# Patient Record
Sex: MALE | Race: WHITE | NOT HISPANIC OR LATINO | Employment: OTHER | URBAN - METROPOLITAN AREA
[De-identification: names, ages, dates, MRNs, and addresses within clinical notes are randomized per-mention and may not be internally consistent; named-entity substitution may affect disease eponyms.]

---

## 2017-01-06 ENCOUNTER — LAB VISIT (OUTPATIENT)
Dept: LAB | Facility: HOSPITAL | Age: 82
End: 2017-01-06
Attending: UROLOGY
Payer: MEDICARE

## 2017-01-06 DIAGNOSIS — Z12.5 ENCOUNTER FOR SCREENING FOR MALIGNANT NEOPLASM OF PROSTATE: ICD-10-CM

## 2017-01-06 DIAGNOSIS — N40.0 BENIGN NON-NODULAR PROSTATIC HYPERPLASIA WITHOUT LOWER URINARY TRACT SYMPTOMS: ICD-10-CM

## 2017-01-06 LAB — COMPLEXED PSA SERPL-MCNC: 1.8 NG/ML

## 2017-01-06 PROCEDURE — 84153 ASSAY OF PSA TOTAL: CPT

## 2017-01-06 PROCEDURE — 36415 COLL VENOUS BLD VENIPUNCTURE: CPT

## 2017-01-09 RX ORDER — ATORVASTATIN CALCIUM 10 MG/1
TABLET, FILM COATED ORAL
Qty: 90 TABLET | Refills: 3 | Status: SHIPPED | OUTPATIENT
Start: 2017-01-09 | End: 2017-05-16 | Stop reason: SDUPTHER

## 2017-01-09 RX ORDER — ATENOLOL AND CHLORTHALIDONE TABLET 50; 25 MG/1; MG/1
TABLET ORAL
Qty: 90 TABLET | Refills: 3 | Status: SHIPPED | OUTPATIENT
Start: 2017-01-09 | End: 2017-05-16 | Stop reason: SDUPTHER

## 2017-02-15 ENCOUNTER — OFFICE VISIT (OUTPATIENT)
Dept: URGENT CARE | Facility: CLINIC | Age: 82
End: 2017-02-15
Payer: MEDICARE

## 2017-02-15 VITALS
WEIGHT: 190.06 LBS | HEART RATE: 52 BPM | TEMPERATURE: 97 F | DIASTOLIC BLOOD PRESSURE: 68 MMHG | HEIGHT: 70 IN | BODY MASS INDEX: 27.21 KG/M2 | SYSTOLIC BLOOD PRESSURE: 130 MMHG | OXYGEN SATURATION: 99 %

## 2017-02-15 DIAGNOSIS — H10.019: Primary | ICD-10-CM

## 2017-02-15 DIAGNOSIS — H57.10: ICD-10-CM

## 2017-02-15 PROCEDURE — 99214 OFFICE O/P EST MOD 30 MIN: CPT | Mod: 25,S$GLB,, | Performed by: NURSE PRACTITIONER

## 2017-02-15 PROCEDURE — 1159F MED LIST DOCD IN RCRD: CPT | Mod: S$GLB,,, | Performed by: NURSE PRACTITIONER

## 2017-02-15 PROCEDURE — 99173 VISUAL ACUITY SCREEN: CPT | Mod: 59,S$GLB,, | Performed by: NURSE PRACTITIONER

## 2017-02-15 PROCEDURE — 99999 PR PBB SHADOW E&M-EST. PATIENT-LVL IV: CPT | Mod: PBBFAC,,, | Performed by: NURSE PRACTITIONER

## 2017-02-15 PROCEDURE — 3075F SYST BP GE 130 - 139MM HG: CPT | Mod: S$GLB,,, | Performed by: NURSE PRACTITIONER

## 2017-02-15 PROCEDURE — 1157F ADVNC CARE PLAN IN RCRD: CPT | Mod: S$GLB,,, | Performed by: NURSE PRACTITIONER

## 2017-02-15 PROCEDURE — 1160F RVW MEDS BY RX/DR IN RCRD: CPT | Mod: S$GLB,,, | Performed by: NURSE PRACTITIONER

## 2017-02-15 PROCEDURE — 1126F AMNT PAIN NOTED NONE PRSNT: CPT | Mod: S$GLB,,, | Performed by: NURSE PRACTITIONER

## 2017-02-15 PROCEDURE — 3078F DIAST BP <80 MM HG: CPT | Mod: S$GLB,,, | Performed by: NURSE PRACTITIONER

## 2017-02-15 RX ORDER — NEOMYCIN SULFATE, POLYMYXIN B SULFATE AND DEXAMETHASONE 3.5; 10000; 1 MG/ML; [USP'U]/ML; MG/ML
2 SUSPENSION/ DROPS OPHTHALMIC EVERY 6 HOURS
Qty: 5 ML | Refills: 0 | Status: SHIPPED | OUTPATIENT
Start: 2017-02-15 | End: 2017-02-25

## 2017-02-15 NOTE — PATIENT INSTRUCTIONS
What Is Conjunctivitis?  Conjunctivitis is an irritation or infection. It affects the membrane that covers the white of your eye and the inside of your eyelid (conjunctiva). It can happen to one or both eyes. The membrane swells and the blood vessels enlarge (dilate). This makes your eye red. That's why conjunctivitis is sometimes called red eye or pink eye.    What are the symptoms?  If you have one or more of these symptoms, see an eye doctor:  · Redness in and around your eye  · Eyes that are puffy and sore  · Itching, burning, or stinging eyes  · Watery eyes or discharge from your eye  · Eyelids that are crusty or stuck together when you wake up in the morning  · Pink color in the whites of one or both eyes  Getting treatment quickly can help prevent damage to your eyes.    How is it diagnosed?  Conjunctivitis is usually a minor eye infection. But it can sometimes become a more serious problem. Some more serious eye diseases have symptoms that look like conjunctivitis. So it's important for an eye doctor to diagnose you. Your eye doctor will ask about your symptoms and any medicines you take. He or she will ask about any illnesses or medical conditions you may have. The doctor will also check your eyes with a hand-held light and a special microscope called a slit lamp.  Date Last Reviewed: 6/11/2015  © 6619-8499 [employer]. [address] [place] [address], [place] [address]. All rights reserved. This information is not intended as a substitute for professional medical care. Always follow your healthcare professional's instructions.    Follow prescribed treatment plan as directed.  Stay hydrated and rest.  Report to ER if symptoms worsen.  Follow up with PCP in 2-3 days or sooner if symptoms do not improve.

## 2017-02-15 NOTE — MR AVS SNAPSHOT
[facility]  [address]  [facility] [address]  Phone: [phone]  Fax: [phone]                  Harman Johnston   2/15/2017 11:40 AM   Office Visit    Description:  Male : 1934   Provider:  GAMALIEL Ramos   Department:  [facility]           Reason for Visit     Eye Problem           Diagnoses this Visit        Comments    Follicular conjunctivitis, unspecified laterality    -  Primary     Eye discomfort, unspecified laterality                To Do List           Future Appointments        Provider Department Dept Phone    2017 8:20 AM LABORATORY, [facility] - Laboratory [phone]    2017 9:00 AM Bill Garcia MD [facility] Internal Medicine [phone]    2017 10:10 AM LABORATORY, [address] [facility] [phone]    2017 10:40 AM John Contreras IV, MD [facility] Urology [phone]      Goals (5 Years of Data)     None      Follow-Up and Disposition     Return if symptoms worsen or fail to improve.       These Medications        Disp Refills Start End    neomycin-polymyxin-dexamethasone (MAXITROL) 3.5mg/mL-10,000 unit/mL-0.1 % DrpS 5 mL 0 2/15/2017 2017    Place 2 drops into both eyes every 6 (six) hours. - Both Eyes    Pharmacy: [facility]/pharmacy #[id number] - [place], [place] - [address] [facility] #: [phone]         [facility] On Call     [facility] On Call Nurse Care Line -  Assistance  Registered nurses in the [facility] provide clinical advisement, health education, appointment booking, and other advisory services.  Call for this free service at [phone].             Medications           Message regarding Medications     Verify the changes and/or additions to your medication regime listed below are the same as discussed with your clinician today.  If any of these changes or additions are incorrect, please notify your healthcare provider.        START  "taking these NEW medications        Refills    neomycin-polymyxin-dexamethasone (MAXITROL) 3.5mg/mL-10,000 unit/mL-0.1 % DrpS 0    Sig: Place 2 drops into both eyes every 6 (six) hours.    Class: Normal    Route: Both Eyes           Verify that the below list of medications is an accurate representation of the medications you are currently taking.  If none reported, the list may be blank. If incorrect, please contact your healthcare provider. Carry this list with you in case of emergency.           Current Medications     aspirin (ECOTRIN) 81 MG EC tablet Take 81 mg by mouth once daily.      atenolol-chlorthalidone (TENORETIC) 50-25 mg Tab TAKE 1 TABLET ONE TIME DAILY    atorvastatin (LIPITOR) 10 MG tablet TAKE 1 TABLET ONE TIME DAILY    finasteride (PROSCAR) 5 mg tablet Take 1 tablet (5 mg total) by mouth once daily.    tamsulosin (FLOMAX) 0.4 mg Cp24 Take 1 capsule (0.4 mg total) by mouth once daily.    fluocinonide 0.05% (LIDEX) 0.05 % cream Apply topically 2 (two) times daily.    meloxicam (MOBIC) 7.5 MG tablet Take 1 tablet (7.5 mg total) by mouth 2 (two) times daily as needed for Pain.    neomycin-polymyxin-dexamethasone (MAXITROL) 3.5mg/mL-10,000 unit/mL-0.1 % DrpS Place 2 drops into both eyes every 6 (six) hours.           Clinical Reference Information           Your Vitals Were     BP Pulse Temp Height    130/68 (BP Location: Left arm, Patient Position: Sitting, BP Method: Manual) 52 96.7 °F (35.9 °C) (Tympanic) 5' 10" (1.778 m)    Weight SpO2 BMI    86.2 kg (190 lb 0.6 oz) 99% 27.27 kg/m2      Blood Pressure          Most Recent Value    BP  130/68      Allergies as of 2/15/2017     No Known Allergies      Immunizations Administered on Date of Encounter - 2/15/2017     None      Orders Placed During Today's Visit      Normal Orders This Visit    Visual acuity screening       Instructions      What Is Conjunctivitis?  Conjunctivitis is an irritation or infection. It affects the membrane that covers the " white of your eye and the inside of your eyelid (conjunctiva). It can happen to one or both eyes. The membrane swells and the blood vessels enlarge (dilate). This makes your eye red. That's why conjunctivitis is sometimes called red eye or pink eye.    What are the symptoms?  If you have one or more of these symptoms, see an eye doctor:  · Redness in and around your eye  · Eyes that are puffy and sore  · Itching, burning, or stinging eyes  · Watery eyes or discharge from your eye  · Eyelids that are crusty or stuck together when you wake up in the morning  · Pink color in the whites of one or both eyes  Getting treatment quickly can help prevent damage to your eyes.    How is it diagnosed?  Conjunctivitis is usually a minor eye infection. But it can sometimes become a more serious problem. Some more serious eye diseases have symptoms that look like conjunctivitis. So it's important for an eye doctor to diagnose you. Your eye doctor will ask about your symptoms and any medicines you take. He or she will ask about any illnesses or medical conditions you may have. The doctor will also check your eyes with a hand-held light and a special microscope called a slit lamp.  Date Last Reviewed: 6/11/2015  © 7287-9237 [employer]. [address] [place] [address], [place] [address]. All rights reserved. This information is not intended as a substitute for professional medical care. Always follow your healthcare professional's instructions.    Follow prescribed treatment plan as directed.  Stay hydrated and rest.  Report to ER if symptoms worsen.  Follow up with PCP in 2-3 days or sooner if symptoms do not improve.           Language Assistance Services     ATTENTION: Language assistance services are available, free of charge. Please call [phone].      ATENCIÓN: Si rosinala ayan, tiene a wynne disposición servicios gratuitos de asistencia lingüística. Llame al [phone].     [facility] Ý: N?u b?n nói Ti?ng Vi?t, có các  d?ch v? h? tr? ngôn ng? mi?n phí dành cho b?n. G?i s? [phone].         [facility] complies with applicable Federal civil rights laws and does not discriminate on the basis of race, color, national origin, age, disability, or sex.

## 2017-02-15 NOTE — PROGRESS NOTES
"Subjective:       Patient ID: Harman Johnston is a 82 y.o. male.    Chief Complaint: Eye Problem    HPI Comments: 82 year old white male presents to Urgent Care with reports of right eye redness that has been present for about 4 days and progressively becoming worse. According to patient symptoms occurred after a "cold".    Eye Problem    The right eye is affected. This is a new problem. The current episode started in the past 7 days. The problem has been gradually worsening. There was no injury mechanism. The pain is at a severity of 0/10. The patient is experiencing no pain. There is no known exposure to pink eye. He does not wear contacts. Associated symptoms include an eye discharge, eye redness and itching. Pertinent negatives include no blurred vision, fever, foreign body sensation, nausea, photophobia, vomiting or weakness. He has tried nothing for the symptoms.     Review of Systems   Constitutional: Negative for appetite change, chills and fever.   HENT: Negative for ear pain, sinus pressure, sore throat and trouble swallowing.    Eyes: Positive for discharge and redness. Negative for blurred vision, photophobia and visual disturbance.   Respiratory: Negative for shortness of breath.    Cardiovascular: Negative for chest pain.   Gastrointestinal: Negative for abdominal pain, diarrhea, nausea and vomiting.   Endocrine: Negative for cold intolerance, polyphagia and polyuria.   Genitourinary: Negative for decreased urine volume and dysuria.   Musculoskeletal: Negative for back pain.   Skin: Positive for itching. Negative for rash.   Allergic/Immunologic: Negative for environmental allergies and food allergies.   Neurological: Negative for dizziness, tremors, weakness and numbness.   Hematological: Does not bruise/bleed easily.   Psychiatric/Behavioral: Negative for confusion and hallucinations. The patient is not nervous/anxious and is not hyperactive.    All other systems reviewed and are negative.    "   Objective:     Physical Exam   Constitutional: He is oriented to person, place, and time. He appears well-developed and well-nourished.   HENT:   Head: Normocephalic.   Right Ear: External ear normal.   Left Ear: External ear normal.   Nose: Nose normal.   Mouth/Throat: Oropharynx is clear and moist.   Eyes: EOM and lids are normal. Pupils are equal, round, and reactive to light. Lids are everted and swept, no foreign bodies found. Right conjunctiva is injected. Right conjunctiva has no hemorrhage.       Neck: Normal range of motion. Neck supple. No JVD present.   Cardiovascular: Normal rate, regular rhythm, normal heart sounds and intact distal pulses.    Pulmonary/Chest: Effort normal and breath sounds normal. He has no wheezes.   Abdominal: Soft. Bowel sounds are normal. There is no tenderness.   Lymphadenopathy:     He has no cervical adenopathy.   Neurological: He is alert and oriented to person, place, and time.   Skin: Skin is warm and dry.   Psychiatric: He has a normal mood and affect. His behavior is normal. Judgment and thought content normal.   Nursing note and vitals reviewed.    Assessment:     1. Eye discomfort, unspecified laterality      Plan:   Eye discomfort, unspecified laterality  -     Visual acuity screening

## 2017-03-03 ENCOUNTER — OFFICE VISIT (OUTPATIENT)
Dept: OPHTHALMOLOGY | Facility: CLINIC | Age: 82
End: 2017-03-03
Payer: MEDICARE

## 2017-03-03 DIAGNOSIS — H01.00B BLEPHARITIS OF UPPER AND LOWER EYELIDS OF BOTH EYES, UNSPECIFIED TYPE: Primary | ICD-10-CM

## 2017-03-03 DIAGNOSIS — H01.00A BLEPHARITIS OF UPPER AND LOWER EYELIDS OF BOTH EYES, UNSPECIFIED TYPE: Primary | ICD-10-CM

## 2017-03-03 PROCEDURE — 92012 INTRM OPH EXAM EST PATIENT: CPT | Mod: S$GLB,,, | Performed by: OPTOMETRIST

## 2017-03-03 PROCEDURE — 99999 PR PBB SHADOW E&M-EST. PATIENT-LVL II: CPT | Mod: PBBFAC,,, | Performed by: OPTOMETRIST

## 2017-03-03 RX ORDER — NEOMYCIN SULFATE, POLYMYXIN B SULFATE, AND DEXAMETHASONE 3.5; 10000; 1 MG/G; [USP'U]/G; MG/G
OINTMENT OPHTHALMIC
Qty: 1 TUBE | Refills: 1 | Status: SHIPPED | OUTPATIENT
Start: 2017-03-03 | End: 2017-05-22

## 2017-03-03 NOTE — PROGRESS NOTES
HPI     Last [facility] exam 12/09/2016  Chief complaint: irritation, OD  Onset: about 3 weeks ago  Patient went to Urgent Care for treatment  Medication: REJI-POLY-DEX EYE DROP    Patient states eyes feel better  Patient states eyes are red with some swelling  No blurred vision  No matting in the AM  No light sensitivity       Last edited by Tommy Zaragoza MA on 3/3/2017  3:04 PM.         Assessment /Plan     For exam results, see Encounter Report.    Blepharitis of upper and lower eyelids of both eyes, unspecified type      Bilateral blepharoconjuntivitis OU improving with Maxitrol drops given outside of the clinic.    Start HC BID, with lid scrubs BID, and add Maxitrol david along UL an LL OU BID for 10 days for all.

## 2017-03-03 NOTE — MR AVS SNAPSHOT
[facility]hthal[facility]  [address] [facility] [address]  Phone: [phone]  Fax: [phone]                  Harman Johnston   3/3/2017 3:30 PM   Office Visit    Description:  Male : 1934   Provider:  LUDWIG Camarillo OD   Department:  [facility]           Reason for Visit     Eye Problem           Diagnoses this Visit        Comments    Blepharitis of upper and lower eyelids of both eyes, unspecified type    -  Primary            To Do List           Future Appointments        Provider Department Dept Phone    2017 8:20 AM LABORATORY, [facility] - Laboratory [phone]    2017 9:00 AM Bill Garcia MD [facility] Internal Medicine [phone]    2017 10:10 AM LABORATORY, [address] [facility] [phone]    2017 10:40 AM John Contreras IV, MD [facility] Urology [phone]      Goals (5 Years of Data)     None      Follow-Up and Disposition     Return if symptoms worsen or fail to improve.       These Medications        Disp Refills Start End    neomycin-polymyxin-dexamethasone (DEXACINE) 3.5 mg/g-10,000 unit/g-0.1 % Oint 1 Tube 1 3/3/2017     Apply to eyelid rims OU, upper and lower, twice a day for 10 days as instructed.    Pharmacy: [facility]/pharmacy #[id number] [address] #: [phone]         [facility] On Call     [facility] Nurse Care Line -  Assistance  Registered nurses in the [facility] provide clinical advisement, health education, appointment booking, and other advisory services.  Call for this free service at [phone].             Medications           Message regarding Medications     Verify the changes and/or additions to your medication regime listed below are the same as discussed with your clinician today.  If any of these changes or additions are incorrect, please notify your healthcare provider.        START taking these NEW medications         Refills    neomycin-polymyxin-dexamethasone (DEXACINE) 3.5 mg/g-10,000 unit/g-0.1 % Oint 1    Sig: Apply to eyelid rims OU, upper and lower, twice a day for 10 days as instructed.    Class: Normal           Verify that the below list of medications is an accurate representation of the medications you are currently taking.  If none reported, the list may be blank. If incorrect, please contact your healthcare provider. Carry this list with you in case of emergency.           Current Medications     aspirin (ECOTRIN) 81 MG EC tablet Take 81 mg by mouth once daily.      atenolol-chlorthalidone (TENORETIC) 50-25 mg Tab TAKE 1 TABLET ONE TIME DAILY    atorvastatin (LIPITOR) 10 MG tablet TAKE 1 TABLET ONE TIME DAILY    finasteride (PROSCAR) 5 mg tablet Take 1 tablet (5 mg total) by mouth once daily.    meloxicam (MOBIC) 7.5 MG tablet Take 1 tablet (7.5 mg total) by mouth 2 (two) times daily as needed for Pain.    tamsulosin (FLOMAX) 0.4 mg Cp24 Take 1 capsule (0.4 mg total) by mouth once daily.    fluocinonide 0.05% (LIDEX) 0.05 % cream Apply topically 2 (two) times daily.    neomycin-polymyxin-dexamethasone (DEXACINE) 3.5 mg/g-10,000 unit/g-0.1 % Oint Apply to eyelid rims OU, upper and lower, twice a day for 10 days as instructed.           Clinical Reference Information           Allergies as of 3/3/2017     No Known Allergies      Immunizations Administered on Date of Encounter - 3/3/2017     None      Language Assistance Services     ATTENTION: Language assistance services are available, free of charge. Please call [phone].      ATENCIÓN: Si sejal ayan, tiene a wynne disposición servicios gratuitos de asistencia lingüística. Llame al [phone].     [facility] Ý: N?u b?n nói Ti?ng Vi?t, có các d?ch v? h? tr? ngôn ng? mi?n phí dành cho b?n. G?i s? [phone].         [facility] complies with applicable Federal civil rights laws and does not discriminate on the basis of race, color, national origin,  age, disability, or sex.

## 2017-03-27 RX ORDER — FINASTERIDE 5 MG/1
5 TABLET, FILM COATED ORAL DAILY
Qty: 90 TABLET | Refills: 3 | Status: SHIPPED | OUTPATIENT
Start: 2017-03-27 | End: 2017-05-16 | Stop reason: SDUPTHER

## 2017-03-27 NOTE — TELEPHONE ENCOUNTER
----- Message from Mami Green sent at 3/27/2017  1:04 PM CDT -----  Contact: pt wife - Susanne   States pt has 2 weeks left of his meds and needs a refill on Finasteride 5mg and can be reached at [phone]/phong/dbw       Pt pharm is   [facility] [address]   [address]  [facility] [address]  Phone: [phone] Fax: [phone]

## 2017-04-07 ENCOUNTER — CLINICAL SUPPORT (OUTPATIENT)
Dept: AUDIOLOGY | Facility: CLINIC | Age: 82
End: 2017-04-07
Payer: MEDICARE

## 2017-04-07 ENCOUNTER — OFFICE VISIT (OUTPATIENT)
Dept: OTOLARYNGOLOGY | Facility: CLINIC | Age: 82
End: 2017-04-07
Payer: MEDICARE

## 2017-04-07 DIAGNOSIS — H90.3 HEARING LOSS, SENSORINEURAL, ASYMMETRICAL: Primary | ICD-10-CM

## 2017-04-07 DIAGNOSIS — H90.3 SENSORINEURAL HEARING LOSS OF BOTH EARS: ICD-10-CM

## 2017-04-07 DIAGNOSIS — Z96.21 COCHLEAR IMPLANT IN PLACE: Primary | ICD-10-CM

## 2017-04-07 DIAGNOSIS — H61.21 IMPACTED CERUMEN OF RIGHT EAR: ICD-10-CM

## 2017-04-07 PROCEDURE — 1157F ADVNC CARE PLAN IN RCRD: CPT | Mod: S$GLB,,, | Performed by: OTOLARYNGOLOGY

## 2017-04-07 PROCEDURE — 1160F RVW MEDS BY RX/DR IN RCRD: CPT | Mod: S$GLB,,, | Performed by: OTOLARYNGOLOGY

## 2017-04-07 PROCEDURE — 99203 OFFICE O/P NEW LOW 30 MIN: CPT | Mod: 25,S$GLB,, | Performed by: OTOLARYNGOLOGY

## 2017-04-07 PROCEDURE — 69210 REMOVE IMPACTED EAR WAX UNI: CPT | Mod: S$GLB,,, | Performed by: OTOLARYNGOLOGY

## 2017-04-07 PROCEDURE — 1159F MED LIST DOCD IN RCRD: CPT | Mod: S$GLB,,, | Performed by: OTOLARYNGOLOGY

## 2017-04-07 NOTE — PROGRESS NOTES
Harman Johnston was seen 04/07/2017 for a hearing aid follow-up appointment.  Impacted cerumen in the right ear where he wears his hearing aid.  Left CI.  Dr. Bueno cleaned the ear in clinic today.  Aid is in good working order.      Patient will call for any future hearing aid follow-up appointments as needed.

## 2017-04-13 NOTE — PROGRESS NOTES
Subjective:   Patient: Harman Johnston [id number], :1934   Visit date:2017 9:03 AM    Chief Complaint:  No chief complaint on file.    HPI:  Harman is a 82 y.o. male is here for evaluation of the following issue(s):    Harman is here for the first time.  He had a left CI in  by Dr. Hobson.  He did well with this for a few years but it is no longer working.  He has profound HL on the left.  No issues with vertigo, drainage or pain.  No facial nerve stimulation with the implant.  His right ear has severe hearing loss.  He uses a HA but has had decreased benefit over time.  He still has servicable hearing on the right.  No major tinnitus or drainage.      Review of Systems:  -     Allergic/Immunologic: has No Known Allergies..  -     Constitutional: Current temp:        His meds, allergies, medical, surgical, social & family histories were reviewed & updated:  -     He has a current medication list which includes the following prescription(s): aspirin, atenolol-chlorthalidone, atorvastatin, finasteride, fluocinonide 0.05%, meloxicam, neomycin-polymyxin-dexamethasone, and tamsulosin.  -     He  has a past medical history of BPH (benign prostatic hyperplasia); Cataract; Chronic kidney disease, stage III (moderate) (7/15/2016); Elevated PSA; HEARING LOSS; Hyperlipemia; Hypertension; Myocardial infarction; and Tobacco dependence.   -     He  does not have any pertinent problems on file.   -     He  has a past surgical history that includes Ear mastoidectomy w/ cochlear implant w/ landmark (); Tonsillectomy; PCIOL OD (Right, ); Cataract extraction w/  intraocular lens implant (Left, 4-20-16); Cataract extraction w/  intraocular lens implant (Right, 316-16); Appendectomy (); and Eye surgery (Bilateral, ).  -     He  reports that he quit smoking about 16 years ago. His smoking use included Cigarettes. He has a 10.00 pack-year smoking history. He has never used smokeless tobacco. He reports that  he does not drink alcohol or use illicit drugs.  -     His family history includes Cancer in his brother, sister, and sister; Hyperlipidemia in his mother; Macular degeneration in his sister and sister; Stroke in his mother. There is no history of Prostate cancer, Diabetes, Heart disease, Kidney disease, Mental illness, or Mental retardation.  -     He has No Known Allergies.    Objective:     Physical Exam:  Vitals:  AFVSS  Communication:  Able to communicate, no hoarseness.  Head & Face:  Normocephalic, atraumatic, no sinus tenderness.  Eyes:  Extraocular motions intact.  Ears:  After cerumen removal, Otoscopy of external auditory canals and tympanic membranes was normal, clinical speech reception thresholds grossly elevated, no mass/lesion of auricle.  Nose:  No masses/lesions of external nose, nasal mucosa, septum, and turbinates were within normal limits.  Mouth:  No mass/lesion of lips, teeth, gums, hard/soft palate, tongue, tonsils, or oropharynx.  Neck & Lymphatics:  No cervical lymphadenopathy, no neck mass/crepitus/ asymmetry, trachea is midline, no thyroid enlargement/tenderness/mass.  Neuro/Psych: Alert with normal mood and affect.   Respiration/Chest:  Symmetric expansion during respiration, normal respiratory effort.  Skin:  Warm and intact.    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  Chief Complaint:  No chief complaint on file.    HPI:  Harman is a  82 y.o. male with the history of present illness as discussed in the clinic note from today.  During this unrelated patient encounter I observed impacted cerumen.  The otoscopic examination of the tympanic membrane was not possible due to copious cerumen impaction.      Procedure: The patient was in agreement with the examination and debridement of the ears. Removal of the cerumen required a high level of expertise and use of an operating microscope and multiple micro-instruments.     With the patient in the supine position, we used the operating microscope to examine both ears with the appropriate sized ear speculum.  A variety of sterile, micro-instruments were utilized to remove the cerumen atraumatically.  I performed the procedure which required a significant amount of time and effort. The tympanic membrane was then well visualized.  The patient tolerated the procedure well and there were no complications.    Findings:   Right ear had significant wax, the EAC was normal, and the tympanic membrane was intact with no evidence of middle ear fluid.    Left ear had little wax, the EAC was normal, and the tympanic membrane was intact with no evidence of middle ear fluid.

## 2017-05-08 ENCOUNTER — LAB VISIT (OUTPATIENT)
Dept: LAB | Facility: HOSPITAL | Age: 82
End: 2017-05-08
Attending: INTERNAL MEDICINE
Payer: MEDICARE

## 2017-05-08 DIAGNOSIS — I10 ESSENTIAL HYPERTENSION: Chronic | ICD-10-CM

## 2017-05-08 LAB
ALBUMIN SERPL BCP-MCNC: 3.4 G/DL
ALP SERPL-CCNC: 97 U/L
ALT SERPL W/O P-5'-P-CCNC: 19 U/L
ANION GAP SERPL CALC-SCNC: 9 MMOL/L
AST SERPL-CCNC: 23 U/L
BILIRUB SERPL-MCNC: 1 MG/DL
BUN SERPL-MCNC: 17 MG/DL
CALCIUM SERPL-MCNC: 9.3 MG/DL
CHLORIDE SERPL-SCNC: 103 MMOL/L
CHOLEST/HDLC SERPL: 4 {RATIO}
CO2 SERPL-SCNC: 30 MMOL/L
CREAT SERPL-MCNC: 1.2 MG/DL
EST. GFR  (AFRICAN AMERICAN): >60 ML/MIN/1.73 M^2
EST. GFR  (NON AFRICAN AMERICAN): 56 ML/MIN/1.73 M^2
GLUCOSE SERPL-MCNC: 80 MG/DL
HDL/CHOLESTEROL RATIO: 24.8 %
HDLC SERPL-MCNC: 157 MG/DL
HDLC SERPL-MCNC: 39 MG/DL
LDLC SERPL CALC-MCNC: 77.8 MG/DL
NONHDLC SERPL-MCNC: 118 MG/DL
POTASSIUM SERPL-SCNC: 3.6 MMOL/L
PROT SERPL-MCNC: 7.1 G/DL
SODIUM SERPL-SCNC: 142 MMOL/L
TRIGL SERPL-MCNC: 201 MG/DL

## 2017-05-08 PROCEDURE — 80053 COMPREHEN METABOLIC PANEL: CPT

## 2017-05-08 PROCEDURE — 80061 LIPID PANEL: CPT

## 2017-05-08 PROCEDURE — 36415 COLL VENOUS BLD VENIPUNCTURE: CPT | Mod: PO

## 2017-05-16 ENCOUNTER — OFFICE VISIT (OUTPATIENT)
Dept: INTERNAL MEDICINE | Facility: CLINIC | Age: 82
End: 2017-05-16
Payer: MEDICARE

## 2017-05-16 VITALS
WEIGHT: 189.81 LBS | TEMPERATURE: 98 F | HEART RATE: 61 BPM | SYSTOLIC BLOOD PRESSURE: 126 MMHG | OXYGEN SATURATION: 96 % | BODY MASS INDEX: 27.17 KG/M2 | HEIGHT: 70 IN | DIASTOLIC BLOOD PRESSURE: 70 MMHG

## 2017-05-16 DIAGNOSIS — E78.00 PURE HYPERCHOLESTEROLEMIA: Chronic | ICD-10-CM

## 2017-05-16 DIAGNOSIS — N18.30 CHRONIC KIDNEY DISEASE, STAGE III (MODERATE): Chronic | ICD-10-CM

## 2017-05-16 DIAGNOSIS — I10 ESSENTIAL HYPERTENSION: Chronic | ICD-10-CM

## 2017-05-16 DIAGNOSIS — N40.0 BENIGN NODULAR PROSTATIC HYPERPLASIA WITHOUT LOWER URINARY TRACT SYMPTOMS: Chronic | ICD-10-CM

## 2017-05-16 DIAGNOSIS — Z00.00 ROUTINE GENERAL MEDICAL EXAMINATION AT A HEALTH CARE FACILITY: Primary | ICD-10-CM

## 2017-05-16 PROCEDURE — 3074F SYST BP LT 130 MM HG: CPT | Mod: S$GLB,,, | Performed by: INTERNAL MEDICINE

## 2017-05-16 PROCEDURE — 99397 PER PM REEVAL EST PAT 65+ YR: CPT | Mod: S$GLB,,, | Performed by: INTERNAL MEDICINE

## 2017-05-16 PROCEDURE — 3078F DIAST BP <80 MM HG: CPT | Mod: S$GLB,,, | Performed by: INTERNAL MEDICINE

## 2017-05-16 PROCEDURE — 99999 PR PBB SHADOW E&M-EST. PATIENT-LVL III: CPT | Mod: PBBFAC,,, | Performed by: INTERNAL MEDICINE

## 2017-05-16 PROCEDURE — 99499 UNLISTED E&M SERVICE: CPT | Mod: S$GLB,,, | Performed by: INTERNAL MEDICINE

## 2017-05-16 RX ORDER — MELOXICAM 7.5 MG/1
7.5 TABLET ORAL 2 TIMES DAILY PRN
Qty: 180 TABLET | Refills: 3 | Status: SHIPPED | OUTPATIENT
Start: 2017-05-16 | End: 2018-07-20 | Stop reason: SDUPTHER

## 2017-05-16 RX ORDER — ATENOLOL AND CHLORTHALIDONE TABLET 50; 25 MG/1; MG/1
TABLET ORAL
Qty: 90 TABLET | Refills: 3 | Status: SHIPPED | OUTPATIENT
Start: 2017-05-16 | End: 2018-03-19 | Stop reason: SDUPTHER

## 2017-05-16 RX ORDER — TAMSULOSIN HYDROCHLORIDE 0.4 MG/1
0.4 CAPSULE ORAL DAILY
Qty: 90 CAPSULE | Refills: 3 | Status: SHIPPED | OUTPATIENT
Start: 2017-05-16 | End: 2017-07-12 | Stop reason: SDUPTHER

## 2017-05-16 RX ORDER — ATORVASTATIN CALCIUM 10 MG/1
TABLET, FILM COATED ORAL
Qty: 90 TABLET | Refills: 3 | Status: SHIPPED | OUTPATIENT
Start: 2017-05-16 | End: 2018-03-19 | Stop reason: SDUPTHER

## 2017-05-16 RX ORDER — FINASTERIDE 5 MG/1
5 TABLET, FILM COATED ORAL DAILY
Qty: 90 TABLET | Refills: 3 | Status: SHIPPED | OUTPATIENT
Start: 2017-05-16 | End: 2017-07-12 | Stop reason: SDUPTHER

## 2017-05-16 NOTE — MR AVS SNAPSHOT
[facility]  [address] [place] [address]  Phone: [phone]                  Harman Johnston   2017 9:00 AM   Office Visit    Description:  Male : 1934   Provider:  Bill Garcia MD   Department:  [place] - [facility] Medicine           Reason for Visit     Annual Exam           Diagnoses this Visit        Comments    Routine general medical examination at a health care facility    -  Primary     Essential hypertension         Pure hypercholesterolemia         Chronic kidney disease, stage III (moderate)         Benign nodular prostatic hyperplasia without lower urinary tract symptoms                To Do List           Future Appointments        Provider Department Dept Phone    2017 1:30 PM Kaylee Alarcon, [facility] - Audiology [phone]    2017 2:00 PM HEARING [facility] - Hearing Aids [phone]    2017 10:00 AM [facility] - Internal Medicine [phone]    2017 10:10 AM LABORATORY, [address] [facility] [phone]    2017 10:40 AM [facility] [phone]      Goals (5 Years of Data)     None      Follow-Up and Disposition     Return in about 6 months (around 2017).       These Medications        Disp Refills Start End    meloxicam (MOBIC) 7.5 MG tablet 180 tablet 3 2017     Take 1 tablet (7.5 mg total) by mouth 2 (two) times daily as needed for Pain. - Oral    Pharmacy: [facility] - [facility] [address] Ph #: [phone]       tamsulosin (FLOMAX) 0.4 mg Cp24 90 capsule 3 2017     Take 1 capsule (0.4 mg total) by mouth once daily. - Oral    Pharmacy: [facility] - [facility] [address] Ph #: [phone]       finasteride (PROSCAR) 5 mg tablet 90 tablet 3 2017     Take 1 tablet (5 mg total) by mouth once daily. - Oral    Pharmacy: [facility] -  [facility] [address] Ph #: [phone]       atorvastatin (LIPITOR) 10 MG tablet 90 tablet 3 5/16/2017     TAKE 1 TABLET ONE TIME DAILY    Pharmacy: [facility] - [facility] [address] Ph #: [phone]       atenolol-chlorthalidone (TENORETIC) 50-25 mg Tab 90 tablet 3 5/16/2017     TAKE 1 TABLET ONE TIME DAILY    Pharmacy: [facility] - [facility] [address] Ph #: [phone]         [facility]     [facility] - 24/7 Assistance  Unless otherwise directed by your provider, please contact [facility], our nurse care line that is available for 24/7 assistance.     Registered nurses in the [facility] provide: appointment scheduling, clinical advisement, health education, and other advisory services.  Call: [phone] (toll free)               Medications           Message regarding Medications     Verify the changes and/or additions to your medication regime listed below are the same as discussed with your clinician today.  If any of these changes or additions are incorrect, please notify your healthcare provider.             Verify that the below list of medications is an accurate representation of the medications you are currently taking.  If none reported, the list may be blank. If incorrect, please contact your healthcare provider. Carry this list with you in case of emergency.           Current Medications     aspirin (ECOTRIN) 81 MG EC tablet Take 81 mg by mouth once daily.      atenolol-chlorthalidone (TENORETIC) 50-25 mg Tab TAKE 1 TABLET ONE TIME DAILY    atorvastatin (LIPITOR) 10 MG tablet TAKE 1 TABLET ONE TIME DAILY    finasteride (PROSCAR) 5 mg tablet Take 1 tablet (5 mg total) by mouth once daily.    meloxicam (MOBIC) 7.5 MG tablet Take 1 tablet (7.5 mg total) by mouth 2 (two) times daily as needed for Pain.    neomycin-polymyxin-dexamethasone (DEXACINE) 3.5 mg/g-10,000 unit/g-0.1  "% Oint Apply to eyelid rims OU, upper and lower, twice a day for 10 days as instructed.    tamsulosin (FLOMAX) 0.4 mg Cp24 Take 1 capsule (0.4 mg total) by mouth once daily.    fluocinonide 0.05% (LIDEX) 0.05 % cream Apply topically 2 (two) times daily.           Clinical Reference Information           Your Vitals Were     BP Pulse Temp Height Weight SpO2    126/70 61 98 °F (36.7 °C) (Tympanic) 5' 10" (1.778 m) 86.1 kg (189 lb 13.1 oz) 96%    BMI                27.24 kg/m2          Blood Pressure          Most Recent Value    BP  126/70      Allergies as of 5/16/2017     No Known Allergies      Immunizations Administered on Date of Encounter - 5/16/2017     None      Orders Placed During Today's Visit     Future Labs/Procedures Expected by Expires    Comprehensive metabolic panel  11/12/2017 (Approximate) 7/15/2018    Lipid panel  11/12/2017 (Approximate) 7/15/2018      [facility] Sign-Up     Activating your [facility] account is as easy as 1-2-3!     1) Visit [email or web address], select Sign Up Now, enter this activation code and your date of birth, then select Next.  Activation code not generated  Current Patient Portal Status: Account disabled      2) Create a username and password to use when you visit [facility] in the future and select a security question in case you lose your password and select Next.    3) Enter your e-mail address and click Sign Up!    Additional Information  If you have questions, please e-mail [email or web address] or call [phone] to talk to our [facility] staff. Remember, [facility] is NOT to be used for urgent needs. For medical emergencies, dial 911.         Language Assistance Services     ATTENTION: Language assistance services are available, free of charge. Please call [phone].      ATENCIÓN: Si habla español, tiene a wynne disposición servicios gratuitos de asistencia lingüística. Llame al [phone].     CHÚ Ý: N?u b?n nói Ti?ng Vi?t, có các d?ch v? h? tr? ngôn ng? mi?n phí " dành cho b?n. G?i s? [phone].         [facility] complies with applicable Federal civil rights laws and does not discriminate on the basis of race, color, national origin, age, disability, or sex.

## 2017-05-16 NOTE — PROGRESS NOTES
"HPI:  Patient is an 82-year-old man who comes in today for follow-up of his hypertension, lipids and for his annual physical exam.  He's been doing well.  He denies any chest pains or shortness of breath.  His blood pressures been very well-controlled.  There've been no other new problems or complaints.      Current MEDS: medcard review, verified and update  Allergies: Per the electronic medical record    Past Medical History:   Diagnosis Date    BPH (benign prostatic hyperplasia)     Cataract     Chronic kidney disease, stage III (moderate) 7/15/2016    Elevated PSA     HEARING LOSS     Hyperlipemia     Hypertension     Myocardial infarction     per ekg and nuclear ett - old scar    Tobacco dependence     resolved       Past Surgical History:   Procedure Laterality Date    APPENDECTOMY  1960    CATARACT EXTRACTION W/  INTRAOCULAR LENS IMPLANT Left 4-20-16    CATARACT EXTRACTION W/  INTRAOCULAR LENS IMPLANT Right 3-16-16    EAR MASTOIDECTOMY W/ COCHLEAR IMPLANT W/ LANDMARK  2011    failed later removed    EYE SURGERY Bilateral 2016    cataract w/ IOL    PCIOL OD Right 03/`16/16    DR. CARPIO    TONSILLECTOMY         SHx: per the electronic medical record    FHx: recorded in the electronic medical record    ROS:    denies any chest pains or shortness of breath. Denies any nausea, vomiting or diarrhea. Denies any fever, chills or sweats. Denies any change in weight, voice, stool, skin or hair. Denies any dysuria, dyspepsia or dysphagia. Denies any change in vision, hearing or headaches. Denies any swollen lymph nodes or loss of memory.    PE:  /70  Pulse 61  Temp 98 °F (36.7 °C) (Tympanic)   Ht 5' 10" (1.778 m)  Wt 86.1 kg (189 lb 13.1 oz)  SpO2 96%  BMI 27.24 kg/m2  Gen: Well-developed, well-nourished, male, in no acute distress, oriented x3  HEENT: neck is supple, no adenopathy, carotids 2+ equal without bruits, thyroid exam normal size without nodules.  CHEST: clear to auscultation and " percussion  CVS: regular rate and rhythm without significant murmur, gallop, or rubs  ABD: soft, benign, no rebound no guarding, no distention.  Bowel sounds are normal.     nontender.  No palpable masses.  No organomegaly and no audible bruits.  RECTAL: Deferred  EXT: no clubbing, cyanosis, or edema  LYMPH: no cervical, inguinal, or axillary adenopathy  FEET: no loss of sensation.  No ulcers or pressure sores.  NEURO: gait normal.  Cranial nerves II- XII intact. No nystagmus.  Speech normal.   Gross motor and sensory unremarkable.    Lab Results   Component Value Date    WBC 8.05 11/04/2016    HGB 15.1 11/04/2016    HCT 46.8 11/04/2016     11/04/2016    CHOL 157 05/08/2017    TRIG 201 (H) 05/08/2017    HDL 39 (L) 05/08/2017    ALT 19 05/08/2017    AST 23 05/08/2017     05/08/2017    K 3.6 05/08/2017     05/08/2017    CREATININE 1.2 05/08/2017    BUN 17 05/08/2017    CO2 30 (H) 05/08/2017    TSH 2.482 11/04/2016    PSA 1.8 01/06/2017       Impression:  Multiple medical problems below, stable  Patient Active Problem List   Diagnosis    BPH (benign prostatic hyperplasia)    Hyperlipemia    Essential hypertension    Choroidal nevus, right eye    Macular chorioretinal scar of left eye    Chronic kidney disease, stage III (moderate)    SNHL (sensorineural hearing loss)s/p implant on rt       Plan:   Orders Placed This Encounter    Comprehensive metabolic panel    Lipid panel    meloxicam (MOBIC) 7.5 MG tablet    tamsulosin (FLOMAX) 0.4 mg Cp24    finasteride (PROSCAR) 5 mg tablet    atorvastatin (LIPITOR) 10 MG tablet    atenolol-chlorthalidone (TENORETIC) 50-25 mg Tab    his medications remain the same.  He'll be seen again in 6 months with above lab work.

## 2017-05-17 ENCOUNTER — CLINICAL SUPPORT (OUTPATIENT)
Dept: AUDIOLOGY | Facility: CLINIC | Age: 82
End: 2017-05-17
Payer: MEDICARE

## 2017-05-17 DIAGNOSIS — H90.3 HEARING LOSS, SENSORINEURAL, ASYMMETRICAL: Primary | ICD-10-CM

## 2017-05-17 PROCEDURE — 92567 TYMPANOMETRY: CPT | Mod: S$GLB,,, | Performed by: AUDIOLOGIST

## 2017-05-17 PROCEDURE — 92557 COMPREHENSIVE HEARING TEST: CPT | Mod: S$GLB,,, | Performed by: AUDIOLOGIST

## 2017-05-17 NOTE — PROGRESS NOTES
Harman Johnston was seen 05/17/2017 for a hearing aid follow-up appointment.  Current audiogram put into computer and re-prescribed settings.  He will remain in 12 mm power dome.    He will renew his warranty today that will cover him until 7/6/18       Patient will call for any future hearing aid follow-up appointments as needed.

## 2017-05-22 ENCOUNTER — OFFICE VISIT (OUTPATIENT)
Dept: INTERNAL MEDICINE | Facility: CLINIC | Age: 82
End: 2017-05-22
Payer: MEDICARE

## 2017-05-22 VITALS
OXYGEN SATURATION: 98 % | HEIGHT: 70 IN | DIASTOLIC BLOOD PRESSURE: 70 MMHG | BODY MASS INDEX: 26.92 KG/M2 | TEMPERATURE: 97 F | SYSTOLIC BLOOD PRESSURE: 128 MMHG | HEART RATE: 60 BPM | WEIGHT: 188.06 LBS

## 2017-05-22 DIAGNOSIS — I70.0 ATHEROSCLEROSIS OF AORTA: ICD-10-CM

## 2017-05-22 DIAGNOSIS — N40.0 BENIGN NODULAR PROSTATIC HYPERPLASIA WITHOUT LOWER URINARY TRACT SYMPTOMS: Chronic | ICD-10-CM

## 2017-05-22 DIAGNOSIS — I10 ESSENTIAL HYPERTENSION: Chronic | ICD-10-CM

## 2017-05-22 DIAGNOSIS — E78.00 PURE HYPERCHOLESTEROLEMIA: Chronic | ICD-10-CM

## 2017-05-22 DIAGNOSIS — H90.A21 SENSORINEURAL HEARING LOSS (SNHL) OF RIGHT EAR WITH RESTRICTED HEARING OF LEFT EAR: Chronic | ICD-10-CM

## 2017-05-22 DIAGNOSIS — Z00.00 ENCOUNTER FOR PREVENTIVE HEALTH EXAMINATION: Primary | ICD-10-CM

## 2017-05-22 DIAGNOSIS — N18.30 CHRONIC KIDNEY DISEASE, STAGE III (MODERATE): Chronic | ICD-10-CM

## 2017-05-22 PROCEDURE — 3078F DIAST BP <80 MM HG: CPT | Mod: S$GLB,,, | Performed by: NURSE PRACTITIONER

## 2017-05-22 PROCEDURE — 3074F SYST BP LT 130 MM HG: CPT | Mod: S$GLB,,, | Performed by: NURSE PRACTITIONER

## 2017-05-22 PROCEDURE — G0439 PPPS, SUBSEQ VISIT: HCPCS | Mod: S$GLB,,, | Performed by: NURSE PRACTITIONER

## 2017-05-22 PROCEDURE — 99499 UNLISTED E&M SERVICE: CPT | Mod: S$GLB,,, | Performed by: NURSE PRACTITIONER

## 2017-05-22 PROCEDURE — 99999 PR PBB SHADOW E&M-EST. PATIENT-LVL IV: CPT | Mod: PBBFAC,,, | Performed by: NURSE PRACTITIONER

## 2017-05-22 NOTE — Clinical Note
Your patient was seen today for a HRA visit.  I have included a copy of my visit note, please review the note and feel free to contact me with any questions.  Thank you for allowing me to participate in the care of your patients.  ARI Cardozo

## 2017-05-22 NOTE — PATIENT INSTRUCTIONS
Counseling and Referral of Other Preventative  (Italic type indicates deductible and co-insurance are waived)    Patient Name: Harman Johnston  Today's Date: 5/22/2017      SERVICE LIMITATIONS RECOMMENDATION    Vaccines    · Pneumococcal (once after 65)    · Influenza (annually)    · Hepatitis B (if medium/high risk)    · Prevnar 13      Hepatitis B medium/high risk factors:       - End-stage renal disease       - Hemophiliacs who received Factor VII or         IX concentrates       - Clients of institutions for the mentally             retarded       - Persons who live in the same house as          a HepB carrier       - Homosexual men       - Illicit injectable drug abusers     Pneumococcal: Done, no repeat necessary 10/2014     Influenza: Done, repeat in one year 11/2016     Hepatitis B: N/A     Prevnar 13: Done, no repeat necessary 3/2013    Prostate cancer screening (annually to age 75)     Prostate specific antigen (PSA) Shared decision making with Provider. Sometimes a co-pay may be required if the patient decides to have this test. The USPSTF no longer recommends prostate cancer screening routinely in medicine: every 1 year last done 1//2017    Colorectal cancer screening (to age 75)    · Fecal occult blood test (annual)  · Flexible sigmoidoscopy (5y)  · Screening colonoscopy (10y)  · Barium enema   Last done 1/11/13, recommend to repeat every 3  years     Diabetes self-management training (no USPSTF recommendations)  Requires referral by treating physician for patient with diabetes or renal disease. 10 hours of initial DSMT sessions of no less than 30 minutes each in a continuous 12-month period. 2 hours of follow-up DSMT in subsequent years.  N/A    Glaucoma screening (no USPSTF recommendation)  Diabetes mellitus, family history   , age 50 or over    American, age 65 or over  N/A    Medical nutrition therapy for diabetes or renal disease (no recommended schedule)  Requires referral by  treating physician for patient with diabetes or renal disease or kidney transplant within the past 3 years.  Can be provided in same year as diabetes self-management training (DSMT), and CMS recommends medical nutrition therapy take place after DSMT. Up to 3 hours for initial year and 2 hours in subsequent years.  N/A    Cardiovascular screening blood tests (every 5 years)  · Fasting lipid panel  Order as a panel if possible  Done this year, repeat every year 5/8/17    Diabetes screening tests (at least every 3 years, Medicare covers annually or at 6-month intervals for prediabetic patients)  · Fasting blood sugar (FBS) or glucose tolerance test (GTT)  Patient must be diagnosed with one of the following:       - Hypertension       - Dyslipidemia       - Obesity (BMI 30kg/m2)       - Previous elevated impaired FBS or GTT       ... or any two of the following:       - Overweight (BMI 25 but <30)       - Family history of diabetes       - Age 65 or older       - History of gestational diabetes or birth of baby weighing more than 9 pounds  Done this year, repeat every year    Abdominal aortic aneurysm screening (once)  · Sonogram   Limited to patients who meet one of the following criteria:       - Men who are 65-75 years old and have smoked more than 100 cigarette in their lifetime       - Anyone with a family history of abdominal aortic aneurysm       - Anyone recommended for screening by the USPSTF  Recommended to patient, discussed to follow up with PCP    HIV screening (annually for increased risk patients)  · HIV-1 and HIV-2 by EIA, or SIMONA, rapid antibody test or oral mucosa transudate  Patients must be at increased risk for HIV infection per USPSTF guidelines or pregnant. Tests covered annually for patient at increased risk or as requested by the patient. Pregnant patients may receive up to 3 tests during pregnancy.  Risks discussed, screening is not recommended    Smoking cessation counseling (up to 8 sessions  per year)  Patients must be asymptomatic of tobacco-related conditions to receive as a preventative service.  Non-smoker    Subsequent annual wellness visit  At least 12 months since last AWV  Return in one year     The following information is provided to all patients.  This information is to help you find resources for any of the problems found today that may be affecting your health:                Living healthy guide: www.[facility].[place].[facility]      Understanding Diabetes: www.diabetes.org      Eating healthy: www.cdc.gov/healthyweight      CDC home safety checklist: www.cdc.gov/steadi/patient.html      Agency on Aging: www.goea.[place].[facility]      Alcoholics anonymous (AA): www.aa.org      Physical Activity: www.bradley.nih.gov/yb7kpyq      Tobacco use: www.quitwithusla.org

## 2017-05-22 NOTE — PROGRESS NOTES
"Harman Johnston presented for a  Medicare AWV and comprehensive Health Risk Assessment today. The following components were reviewed and updated:    · Medical history  · Family History  · Social history  · Allergies and Current Medications  · Health Risk Assessment  · Health Maintenance  · Care Team     ** See Completed Assessments for Annual Wellness Visit within the encounter summary.**       The following assessments were completed:  · Living Situation  · CAGE  · Depression Screening  · Timed Get Up and Go  · Whisper Test  · Cognitive Function Screening  · Nutrition Screening  · ADL Screening  · PAQ Screening    Vitals:    05/22/17 1010   BP: 128/70   BP Location: Right arm   Patient Position: Sitting   Pulse: 60   Temp: 96.9 °F (36.1 °C)   TempSrc: Tympanic   SpO2: 98%   Weight: 85.3 kg (188 lb 0.8 oz)   Height: 5' 10" (1.778 m)     Body mass index is 26.98 kg/m².  Physical Exam   Constitutional: He is oriented to person, place, and time. He appears well-developed and well-nourished. No distress.   HENT:   Head: Normocephalic and atraumatic.   Eyes: Conjunctivae and EOM are normal. Pupils are equal, round, and reactive to light. No scleral icterus.   Cardiovascular: Normal rate and regular rhythm.  Exam reveals no gallop and no friction rub.    No murmur heard.  Pulmonary/Chest: Effort normal and breath sounds normal.   Abdominal: Soft. Bowel sounds are normal. He exhibits no distension and no mass. There is no tenderness.   Musculoskeletal: Normal range of motion.   Neurological: He is alert and oriented to person, place, and time. No cranial nerve deficit.   Skin: Skin is warm and dry.   Psychiatric: He has a normal mood and affect.   Vitals reviewed.        Diagnoses and health risks identified today and associated recommendations/orders:    1. Encounter for preventive health examination      2. Essential hypertension  Stable and controlled. Continue current treatment plan as previously prescribed with your PCP. "       3. Pure hypercholesterolemia  Stable and controlled. Continue current treatment plan as previously prescribed with your PCP.       4. Sensorineural hearing loss (SNHL) of right ear with restricted hearing of left ear  Stable and controlled. Continue current treatment plan as previously prescribed with your physicians and audiologist.       5. Chronic kidney disease, stage III (moderate)  Ongoing problem that is being monitored. Improved GFR to 56 (from 46.8). Continue current treatment plan as previously prescribed with your PCP.      6. Benign nodular prostatic hyperplasia without lower urinary tract symptoms  Stable and controlled on finasteride and tamsulosin. Prior elevated PSA, now normal, Continue current treatment plan as previously prescribed with your urologist, Dr. Contreras.    9. Atherosclerosis of Aorta  From CXR 3/29/2011. No chest pain, SOB, or claudication. Discussed risk modification to control BP and lipids. Continue current treatment plan as previously prescribed with your PCP.        Patient has no recall of AAA screen. Advised to discuss with PCP at next visit.       Provided Harman with a 5-10 year written screening schedule and personal prevention plan. Recommendations were developed using the USPSTF age appropriate recommendations. Education, counseling, and referrals were provided as needed. After Visit Summary printed and given to patient which includes a list of additional screenings\tests needed.    Return in about 6 months (around 11/22/2017) for follow-up of chronic conditions.    Shey Zaragoza NP

## 2017-07-07 ENCOUNTER — LAB VISIT (OUTPATIENT)
Dept: LAB | Facility: HOSPITAL | Age: 82
End: 2017-07-07
Attending: UROLOGY
Payer: MEDICARE

## 2017-07-07 DIAGNOSIS — Z12.5 ENCOUNTER FOR SCREENING FOR MALIGNANT NEOPLASM OF PROSTATE: ICD-10-CM

## 2017-07-07 DIAGNOSIS — N40.0 BENIGN NON-NODULAR PROSTATIC HYPERPLASIA WITHOUT LOWER URINARY TRACT SYMPTOMS: ICD-10-CM

## 2017-07-07 LAB — COMPLEXED PSA SERPL-MCNC: 1.4 NG/ML

## 2017-07-07 PROCEDURE — 84153 ASSAY OF PSA TOTAL: CPT

## 2017-07-07 PROCEDURE — 36415 COLL VENOUS BLD VENIPUNCTURE: CPT | Mod: PO

## 2017-07-12 ENCOUNTER — OFFICE VISIT (OUTPATIENT)
Dept: UROLOGY | Facility: CLINIC | Age: 82
End: 2017-07-12
Payer: MEDICARE

## 2017-07-12 VITALS
SYSTOLIC BLOOD PRESSURE: 133 MMHG | WEIGHT: 184.06 LBS | DIASTOLIC BLOOD PRESSURE: 71 MMHG | BODY MASS INDEX: 26.41 KG/M2 | HEART RATE: 55 BPM

## 2017-07-12 DIAGNOSIS — N40.0 BENIGN PROSTATIC HYPERPLASIA, PRESENCE OF LOWER URINARY TRACT SYMPTOMS UNSPECIFIED: ICD-10-CM

## 2017-07-12 DIAGNOSIS — Z12.5 PROSTATE CANCER SCREENING: Primary | ICD-10-CM

## 2017-07-12 LAB
BILIRUB SERPL-MCNC: NORMAL MG/DL
BLOOD URINE, POC: NORMAL
COLOR, POC UA: NORMAL
GLUCOSE UR QL STRIP: NORMAL
KETONES UR QL STRIP: NORMAL
LEUKOCYTE ESTERASE URINE, POC: NORMAL
NITRITE, POC UA: NORMAL
PH, POC UA: 7
PROTEIN, POC: NORMAL
SPECIFIC GRAVITY, POC UA: 1
UROBILINOGEN, POC UA: NORMAL

## 2017-07-12 PROCEDURE — 1126F AMNT PAIN NOTED NONE PRSNT: CPT | Mod: S$GLB,,, | Performed by: UROLOGY

## 2017-07-12 PROCEDURE — 81002 URINALYSIS NONAUTO W/O SCOPE: CPT | Mod: S$GLB,,, | Performed by: UROLOGY

## 2017-07-12 PROCEDURE — 99214 OFFICE O/P EST MOD 30 MIN: CPT | Mod: 25,S$GLB,, | Performed by: UROLOGY

## 2017-07-12 PROCEDURE — 99999 PR PBB SHADOW E&M-EST. PATIENT-LVL II: CPT | Mod: PBBFAC,,, | Performed by: UROLOGY

## 2017-07-12 PROCEDURE — 1159F MED LIST DOCD IN RCRD: CPT | Mod: S$GLB,,, | Performed by: UROLOGY

## 2017-07-12 RX ORDER — TAMSULOSIN HYDROCHLORIDE 0.4 MG/1
0.4 CAPSULE ORAL DAILY
Qty: 90 CAPSULE | Refills: 3 | Status: SHIPPED | OUTPATIENT
Start: 2017-07-12 | End: 2017-12-01

## 2017-07-12 RX ORDER — FINASTERIDE 5 MG/1
5 TABLET, FILM COATED ORAL DAILY
Qty: 90 TABLET | Refills: 3 | Status: SHIPPED | OUTPATIENT
Start: 2017-07-12 | End: 2018-06-06

## 2017-07-12 NOTE — PROGRESS NOTES
Chief Complaint: Prostate Cancer screening    HPI:   7/12/17: PSA down sig on finasteride, no LUTS.  7/8/16: 82 yo man with elevated PSA was started on finasteride and with expectation of improving the value.  No urinary bother on flomax/finasteride seein improvement. PSA dramatically lower in last 3 mo    Allergies:  Review of patient's allergies indicates no known allergies.    Medications:  has a current medication list which includes the following prescription(s): aspirin, atenolol-chlorthalidone, atorvastatin, finasteride, meloxicam, and tamsulosin.    Review of Systems:  General: No fever, chills, fatigability, or weight loss.  Skin: No rashes, itching, or changes in color or texture of skin.  Chest: Denies COTTON, cyanosis, wheezing, cough, and sputum production.  Abdomen: Appetite fine. No weight loss. Denies diarrhea, abdominal pain, hematemesis, or blood in stool.  Musculoskeletal: No joint stiffness or swelling. Denies back pain.  : As above.  All other review of systems negative.    PMH:   has a past medical history of BPH (benign prostatic hyperplasia); Cataract; Chronic kidney disease, stage III (moderate) (7/15/2016); Elevated PSA; HEARING LOSS; Hyperlipemia; Hypertension; Myocardial infarction; and Tobacco dependence.    PSH:   has a past surgical history that includes Ear mastoidectomy w/ cochlear implant w/ landmark (2011); Tonsillectomy; PCIOL OD (Right, 03/`16/16); Cataract extraction w/  intraocular lens implant (Left, 4-20-16); Cataract extraction w/  intraocular lens implant (Right, 3-16-16); Appendectomy (1960); and Eye surgery (Bilateral, 2016).    FamHx: family history includes Cancer in his brother, sister, and sister; Hyperlipidemia in his mother; Macular degeneration in his sister and sister; Stroke in his mother.    SocHx:  reports that he quit smoking about 17 years ago. His smoking use included Cigarettes. He has a 10.00 pack-year smoking history. He has never used smokeless tobacco.  He reports that he does not drink alcohol or use drugs.      Physical Exam:  Vitals:    07/12/17 1045   BP: 133/71   Pulse: (!) 55     General: A&Ox3, no apparent distress, no deformities  Neck: No masses, normal thyroid  Lungs: normal inspiration, no use of accessory muscles  Heart: normal pulse, no arrhythmias  Abdomen: Soft, NT, ND  Skin: The skin is warm and dry. No jaundice.  Ext: No c/c/e.  : Test desc maira, no abnormalities of epididymus. Penis normal, with normal penile and scrotal skin. Meatus normal. Normal rectal tone, no hemorrhoids. Prost 40 gm no nodules or masses appreciated. SV not palpable. Perineum and anus normal.    Labs/Studies:   PSA    2010-15: 7-9    3/16: 11.4    6/16: 2.7    7/17: 1.4    Impression/Plan:   1. Appopriate response to finasteride.  Refill meds.  BOBBY/RTC 12 mo.

## 2017-07-18 ENCOUNTER — OFFICE VISIT (OUTPATIENT)
Dept: INTERNAL MEDICINE | Facility: CLINIC | Age: 82
End: 2017-07-18
Payer: MEDICARE

## 2017-07-18 VITALS
HEART RATE: 92 BPM | SYSTOLIC BLOOD PRESSURE: 132 MMHG | TEMPERATURE: 97 F | WEIGHT: 187.81 LBS | DIASTOLIC BLOOD PRESSURE: 78 MMHG | OXYGEN SATURATION: 99 % | HEIGHT: 70 IN | BODY MASS INDEX: 26.89 KG/M2

## 2017-07-18 DIAGNOSIS — N64.4 NIPPLE PAIN: Primary | ICD-10-CM

## 2017-07-18 PROCEDURE — 99999 PR PBB SHADOW E&M-EST. PATIENT-LVL III: CPT | Mod: PBBFAC,,, | Performed by: NURSE PRACTITIONER

## 2017-07-18 PROCEDURE — 1159F MED LIST DOCD IN RCRD: CPT | Mod: S$GLB,,, | Performed by: NURSE PRACTITIONER

## 2017-07-18 PROCEDURE — 99213 OFFICE O/P EST LOW 20 MIN: CPT | Mod: S$GLB,,, | Performed by: NURSE PRACTITIONER

## 2017-07-18 PROCEDURE — 1125F AMNT PAIN NOTED PAIN PRSNT: CPT | Mod: S$GLB,,, | Performed by: NURSE PRACTITIONER

## 2017-07-18 NOTE — PROGRESS NOTES
"Subjective:      Patient ID: Harman Johnston is a 82 y.o. male.    Chief Complaint: Pain (right breast)    HPI:  Patient state  For the last month he has had a sore breast on the right side.  He denies any trauma, falls, or injury.  Denies any discharge from the nipple, no redness.  Has not taken anything at home for it.      Past Medical History:   Diagnosis Date    BPH (benign prostatic hyperplasia)     Cataract     Chronic kidney disease, stage III (moderate) 7/15/2016    Elevated PSA     HEARING LOSS     Hyperlipemia     Hypertension     Myocardial infarction     per ekg and nuclear ett - old scar    Tobacco dependence     resolved       Past Surgical History:   Procedure Laterality Date    APPENDECTOMY  1960    CATARACT EXTRACTION W/  INTRAOCULAR LENS IMPLANT Left 4-20-16    CATARACT EXTRACTION W/  INTRAOCULAR LENS IMPLANT Right 3-16-16    EAR MASTOIDECTOMY W/ COCHLEAR IMPLANT W/ LANDMARK  2011    failed later removed    EYE SURGERY Bilateral 2016    cataract w/ IOL    PCIOL OD Right 03/`16/16    DR. CARPIO    TONSILLECTOMY         Lab Results   Component Value Date    WBC 8.05 11/04/2016    HGB 15.1 11/04/2016    HCT 46.8 11/04/2016     11/04/2016    CHOL 157 05/08/2017    TRIG 201 (H) 05/08/2017    HDL 39 (L) 05/08/2017    ALT 19 05/08/2017    AST 23 05/08/2017     05/08/2017    K 3.6 05/08/2017     05/08/2017    CREATININE 1.2 05/08/2017    BUN 17 05/08/2017    CO2 30 (H) 05/08/2017    TSH 2.482 11/04/2016    PSA 1.4 07/07/2017       /78 (BP Location: Left arm, Patient Position: Sitting, BP Method: Manual)   Pulse 92   Temp 97 °F (36.1 °C) (Tympanic)   Ht 5' 10" (1.778 m)   Wt 85.2 kg (187 lb 13.3 oz)   SpO2 99%   BMI 26.95 kg/m²       Review of Systems   Constitutional: Negative for appetite change, chills, diaphoresis and fever.   HENT: Negative for congestion, ear pain, postnasal drip, rhinorrhea, sneezing, sore throat and trouble swallowing.    Eyes: Negative " for photophobia, pain and visual disturbance.   Respiratory: Negative for apnea, cough, choking, chest tightness, shortness of breath and wheezing.    Cardiovascular: Negative for chest pain, palpitations and leg swelling.   Gastrointestinal: Negative for abdominal pain, constipation, diarrhea, nausea and vomiting.   Genitourinary: Negative for decreased urine volume, difficulty urinating, dysuria, hematuria and urgency.   Musculoskeletal: Negative for arthralgias, gait problem, joint swelling and myalgias.   Skin: Negative for rash.   Neurological: Negative for dizziness, tremors, seizures, syncope, weakness, light-headedness, numbness and headaches.   Psychiatric/Behavioral: Negative for agitation, confusion, decreased concentration, hallucinations and sleep disturbance. The patient is not nervous/anxious.       Objective:     Physical Exam   Constitutional: He is oriented to person, place, and time. He appears well-developed and well-nourished. No distress.   Musculoskeletal:   Normal gait   Neurological: He is alert and oriented to person, place, and time.   Skin: Skin is warm and dry.   Bilateral breasts look normal, right breast mild firmness around nipple, no redness, mild TTP   Psychiatric: He has a normal mood and affect. His behavior is normal.     Assessment:      1. Nipple pain      Plan:   Nipple pain  -     US Breast Right Limited; Future; Expected date: 07/18/2017    will check with the above,  Can use Tylenol for pain prn and warm compress      Current Outpatient Prescriptions:     aspirin (ECOTRIN) 81 MG EC tablet, Take 81 mg by mouth once daily.  , Disp: , Rfl:     atenolol-chlorthalidone (TENORETIC) 50-25 mg Tab, TAKE 1 TABLET ONE TIME DAILY, Disp: 90 tablet, Rfl: 3    atorvastatin (LIPITOR) 10 MG tablet, TAKE 1 TABLET ONE TIME DAILY, Disp: 90 tablet, Rfl: 3    finasteride (PROSCAR) 5 mg tablet, Take 1 tablet (5 mg total) by mouth once daily., Disp: 90 tablet, Rfl: 3    meloxicam (MOBIC) 7.5  MG tablet, Take 1 tablet (7.5 mg total) by mouth 2 (two) times daily as needed for Pain., Disp: 180 tablet, Rfl: 3    tamsulosin (FLOMAX) 0.4 mg Cp24, Take 1 capsule (0.4 mg total) by mouth once daily., Disp: 90 capsule, Rfl: 3

## 2017-07-26 ENCOUNTER — TELEPHONE (OUTPATIENT)
Dept: INTERNAL MEDICINE | Facility: CLINIC | Age: 82
End: 2017-07-26

## 2017-07-26 ENCOUNTER — TELEPHONE (OUTPATIENT)
Dept: RADIOLOGY | Facility: HOSPITAL | Age: 82
End: 2017-07-26

## 2017-07-26 DIAGNOSIS — N64.4 BREAST PAIN IN MALE: Primary | ICD-10-CM

## 2017-07-26 NOTE — TELEPHONE ENCOUNTER
Called pt. Spoke with his wife.  Informed her that a mammogram is needed before the Ultrasound.  She  voiced understanding and scheduled appointments.

## 2017-07-26 NOTE — TELEPHONE ENCOUNTER
Call from Candis in radiology.  Pt is needing a mammogram done before the ultrasound.  Need order.  Will have to reschedule his appointments.

## 2017-07-31 ENCOUNTER — TELEPHONE (OUTPATIENT)
Dept: UROLOGY | Facility: CLINIC | Age: 82
End: 2017-07-31

## 2017-07-31 NOTE — TELEPHONE ENCOUNTER
Returned call to pts wife; told her that Dr. Contreras couldn't be sure if the Finasteride is causing breast soreness but wanted pt to f/u with him once mammogram is done.  Wife verbalized understanding and stated that she would call back to schedule appt.

## 2017-08-09 ENCOUNTER — TELEPHONE (OUTPATIENT)
Dept: RADIOLOGY | Facility: HOSPITAL | Age: 82
End: 2017-08-09

## 2017-08-10 ENCOUNTER — TELEPHONE (OUTPATIENT)
Dept: INTERNAL MEDICINE | Facility: CLINIC | Age: 82
End: 2017-08-10

## 2017-08-10 ENCOUNTER — HOSPITAL ENCOUNTER (OUTPATIENT)
Dept: RADIOLOGY | Facility: HOSPITAL | Age: 82
Discharge: HOME | End: 2017-08-10
Attending: NURSE PRACTITIONER
Payer: MEDICARE

## 2017-08-10 VITALS — BODY MASS INDEX: 26.77 KG/M2 | HEIGHT: 70 IN | WEIGHT: 187 LBS

## 2017-08-10 DIAGNOSIS — N64.4 NIPPLE PAIN: ICD-10-CM

## 2017-08-10 DIAGNOSIS — N64.4 BREAST PAIN IN MALE: ICD-10-CM

## 2017-08-10 PROCEDURE — 77062 BREAST TOMOSYNTHESIS BI: CPT | Mod: 26,,, | Performed by: RADIOLOGY

## 2017-08-10 PROCEDURE — 76642 ULTRASOUND BREAST LIMITED: CPT | Mod: TC,50,PO

## 2017-08-10 PROCEDURE — 77066 DX MAMMO INCL CAD BI: CPT | Mod: TC

## 2017-08-10 PROCEDURE — 77066 DX MAMMO INCL CAD BI: CPT | Mod: 26,,, | Performed by: RADIOLOGY

## 2017-08-10 PROCEDURE — 76642 ULTRASOUND BREAST LIMITED: CPT | Mod: 26,50,, | Performed by: RADIOLOGY

## 2017-11-24 ENCOUNTER — LAB VISIT (OUTPATIENT)
Dept: LAB | Facility: HOSPITAL | Age: 82
End: 2017-11-24
Attending: INTERNAL MEDICINE
Payer: MEDICARE

## 2017-11-24 DIAGNOSIS — I10 ESSENTIAL HYPERTENSION: Chronic | ICD-10-CM

## 2017-11-24 LAB
ALBUMIN SERPL BCP-MCNC: 3.5 G/DL
ALP SERPL-CCNC: 120 U/L
ALT SERPL W/O P-5'-P-CCNC: 15 U/L
ANION GAP SERPL CALC-SCNC: 8 MMOL/L
AST SERPL-CCNC: 22 U/L
BILIRUB SERPL-MCNC: 0.9 MG/DL
BUN SERPL-MCNC: 27 MG/DL
CALCIUM SERPL-MCNC: 9.7 MG/DL
CHLORIDE SERPL-SCNC: 104 MMOL/L
CHOLEST SERPL-MCNC: 143 MG/DL
CHOLEST/HDLC SERPL: 3.3 {RATIO}
CO2 SERPL-SCNC: 30 MMOL/L
CREAT SERPL-MCNC: 1.3 MG/DL
EST. GFR  (AFRICAN AMERICAN): 58.3 ML/MIN/1.73 M^2
EST. GFR  (NON AFRICAN AMERICAN): 50.5 ML/MIN/1.73 M^2
GLUCOSE SERPL-MCNC: 97 MG/DL
HDLC SERPL-MCNC: 43 MG/DL
HDLC SERPL: 30.1 %
LDLC SERPL CALC-MCNC: 80.8 MG/DL
NONHDLC SERPL-MCNC: 100 MG/DL
POTASSIUM SERPL-SCNC: 3.5 MMOL/L
PROT SERPL-MCNC: 8.1 G/DL
SODIUM SERPL-SCNC: 142 MMOL/L
TRIGL SERPL-MCNC: 96 MG/DL

## 2017-11-24 PROCEDURE — 36415 COLL VENOUS BLD VENIPUNCTURE: CPT | Mod: PO

## 2017-11-24 PROCEDURE — 80053 COMPREHEN METABOLIC PANEL: CPT

## 2017-11-24 PROCEDURE — 80061 LIPID PANEL: CPT

## 2017-12-01 ENCOUNTER — OFFICE VISIT (OUTPATIENT)
Dept: INTERNAL MEDICINE | Facility: CLINIC | Age: 82
End: 2017-12-01
Payer: MEDICARE

## 2017-12-01 VITALS
WEIGHT: 184.94 LBS | HEART RATE: 60 BPM | TEMPERATURE: 98 F | HEIGHT: 70 IN | DIASTOLIC BLOOD PRESSURE: 80 MMHG | OXYGEN SATURATION: 96 % | SYSTOLIC BLOOD PRESSURE: 112 MMHG | BODY MASS INDEX: 26.48 KG/M2

## 2017-12-01 DIAGNOSIS — I10 ESSENTIAL HYPERTENSION: Chronic | ICD-10-CM

## 2017-12-01 DIAGNOSIS — I70.0 ATHEROSCLEROSIS OF AORTA: ICD-10-CM

## 2017-12-01 DIAGNOSIS — E78.00 PURE HYPERCHOLESTEROLEMIA: Primary | Chronic | ICD-10-CM

## 2017-12-01 DIAGNOSIS — N18.30 CHRONIC KIDNEY DISEASE, STAGE III (MODERATE): Chronic | ICD-10-CM

## 2017-12-01 DIAGNOSIS — N40.0 BENIGN PROSTATIC HYPERPLASIA WITHOUT LOWER URINARY TRACT SYMPTOMS: Chronic | ICD-10-CM

## 2017-12-01 PROCEDURE — G0008 ADMIN INFLUENZA VIRUS VAC: HCPCS | Mod: S$GLB,,, | Performed by: INTERNAL MEDICINE

## 2017-12-01 PROCEDURE — 99999 PR PBB SHADOW E&M-EST. PATIENT-LVL III: CPT | Mod: PBBFAC,,, | Performed by: INTERNAL MEDICINE

## 2017-12-01 PROCEDURE — 99499 UNLISTED E&M SERVICE: CPT | Mod: S$GLB,,, | Performed by: INTERNAL MEDICINE

## 2017-12-01 PROCEDURE — 99213 OFFICE O/P EST LOW 20 MIN: CPT | Mod: 25,S$GLB,, | Performed by: INTERNAL MEDICINE

## 2017-12-01 PROCEDURE — 90662 IIV NO PRSV INCREASED AG IM: CPT | Mod: S$GLB,,, | Performed by: INTERNAL MEDICINE

## 2017-12-01 NOTE — PROGRESS NOTES
"HPI:  Patient is a 83-year-old man who comes today for follow-up of his hypertension, lipids.  He's doing extremely well.  He has no complaints at all.  His blood pressures been well controlled at home.    Current meds have been verified and updated per the EMR  Exam:/80 (BP Location: Right arm)   Pulse 60   Temp 97.6 °F (36.4 °C) (Tympanic)   Ht 5' 10" (1.778 m)   Wt 83.9 kg (184 lb 15.5 oz)   SpO2 96%   BMI 26.54 kg/m²   Carotids 2+ equal without bruits  Chest clear  Cardiovascular regular rate and rhythm without murmur, gallop or rub  Extremities without edema    Lab Results   Component Value Date    WBC 8.05 11/04/2016    HGB 15.1 11/04/2016    HCT 46.8 11/04/2016     11/04/2016    CHOL 143 11/24/2017    TRIG 96 11/24/2017    HDL 43 11/24/2017    ALT 15 11/24/2017    AST 22 11/24/2017     11/24/2017    K 3.5 11/24/2017     11/24/2017    CREATININE 1.3 11/24/2017    BUN 27 (H) 11/24/2017    CO2 30 (H) 11/24/2017    TSH 2.482 11/04/2016    PSA 1.4 07/07/2017       Impression:  Multiple medical problems below, stable  Patient Active Problem List   Diagnosis    BPH (benign prostatic hyperplasia)    Hyperlipemia    Essential hypertension    Choroidal nevus, right eye    Macular chorioretinal scar of left eye    Chronic kidney disease, stage III (moderate)    SNHL (sensorineural hearing loss)s/p implant on rt    Atherosclerosis of aorta       Plan:  Orders Placed This Encounter    Comprehensive metabolic panel    Lipid panel    TSH    CBC auto differential     Medications remain same.  He'll be seen again in 6 months with above lab work.  He is given high-dose influenza vaccine today.    "

## 2018-02-01 ENCOUNTER — TELEPHONE (OUTPATIENT)
Dept: UROLOGY | Facility: CLINIC | Age: 83
End: 2018-02-01

## 2018-02-01 RX ORDER — TAMSULOSIN HYDROCHLORIDE 0.4 MG/1
0.4 CAPSULE ORAL DAILY
Qty: 30 CAPSULE | Refills: 11 | Status: SHIPPED | OUTPATIENT
Start: 2018-02-01 | End: 2018-07-18 | Stop reason: SDUPTHER

## 2018-02-01 NOTE — TELEPHONE ENCOUNTER
Called and spoke to patient's wife; stated pt stopped taking the Finasteride because he was experiencing difficulty breathing;  Needs new order to for flomax.

## 2018-02-26 ENCOUNTER — PES CALL (OUTPATIENT)
Dept: ADMINISTRATIVE | Facility: CLINIC | Age: 83
End: 2018-02-26

## 2018-03-20 RX ORDER — ATENOLOL AND CHLORTHALIDONE TABLET 50; 25 MG/1; MG/1
TABLET ORAL
Qty: 90 TABLET | Refills: 3 | Status: SHIPPED | OUTPATIENT
Start: 2018-03-20 | End: 2019-01-03 | Stop reason: SDUPTHER

## 2018-03-20 RX ORDER — ATORVASTATIN CALCIUM 10 MG/1
TABLET, FILM COATED ORAL
Qty: 90 TABLET | Refills: 3 | Status: SHIPPED | OUTPATIENT
Start: 2018-03-20 | End: 2019-01-03 | Stop reason: SDUPTHER

## 2018-05-23 ENCOUNTER — PATIENT OUTREACH (OUTPATIENT)
Dept: ADMINISTRATIVE | Facility: HOSPITAL | Age: 83
End: 2018-05-23

## 2018-05-25 ENCOUNTER — LAB VISIT (OUTPATIENT)
Dept: LAB | Facility: HOSPITAL | Age: 83
End: 2018-05-25
Attending: INTERNAL MEDICINE
Payer: MEDICARE

## 2018-05-25 DIAGNOSIS — I10 ESSENTIAL HYPERTENSION: Chronic | ICD-10-CM

## 2018-05-25 LAB
ALBUMIN SERPL BCP-MCNC: 3.6 G/DL
ALP SERPL-CCNC: 110 U/L
ALT SERPL W/O P-5'-P-CCNC: 15 U/L
ANION GAP SERPL CALC-SCNC: 9 MMOL/L
AST SERPL-CCNC: 22 U/L
BASOPHILS # BLD AUTO: 0.06 K/UL
BASOPHILS NFR BLD: 0.8 %
BILIRUB SERPL-MCNC: 0.7 MG/DL
BUN SERPL-MCNC: 20 MG/DL
CALCIUM SERPL-MCNC: 9.3 MG/DL
CHLORIDE SERPL-SCNC: 104 MMOL/L
CHOLEST SERPL-MCNC: 152 MG/DL
CHOLEST/HDLC SERPL: 3.7 {RATIO}
CO2 SERPL-SCNC: 28 MMOL/L
CREAT SERPL-MCNC: 1.3 MG/DL
DIFFERENTIAL METHOD: ABNORMAL
EOSINOPHIL # BLD AUTO: 0.7 K/UL
EOSINOPHIL NFR BLD: 8.3 %
ERYTHROCYTE [DISTWIDTH] IN BLOOD BY AUTOMATED COUNT: 13.2 %
EST. GFR  (AFRICAN AMERICAN): 58.3 ML/MIN/1.73 M^2
EST. GFR  (NON AFRICAN AMERICAN): 50.5 ML/MIN/1.73 M^2
GLUCOSE SERPL-MCNC: 91 MG/DL
HCT VFR BLD AUTO: 46.9 %
HDLC SERPL-MCNC: 41 MG/DL
HDLC SERPL: 27 %
HGB BLD-MCNC: 15.2 G/DL
IMM GRANULOCYTES # BLD AUTO: 0.02 K/UL
IMM GRANULOCYTES NFR BLD AUTO: 0.3 %
LDLC SERPL CALC-MCNC: 89.2 MG/DL
LYMPHOCYTES # BLD AUTO: 2 K/UL
LYMPHOCYTES NFR BLD: 25 %
MCH RBC QN AUTO: 29.6 PG
MCHC RBC AUTO-ENTMCNC: 32.4 G/DL
MCV RBC AUTO: 91 FL
MONOCYTES # BLD AUTO: 0.7 K/UL
MONOCYTES NFR BLD: 8.4 %
NEUTROPHILS # BLD AUTO: 4.5 K/UL
NEUTROPHILS NFR BLD: 57.2 %
NONHDLC SERPL-MCNC: 111 MG/DL
NRBC BLD-RTO: 0 /100 WBC
PLATELET # BLD AUTO: 214 K/UL
PMV BLD AUTO: 11.2 FL
POTASSIUM SERPL-SCNC: 4 MMOL/L
PROT SERPL-MCNC: 7.7 G/DL
RBC # BLD AUTO: 5.14 M/UL
SODIUM SERPL-SCNC: 141 MMOL/L
TRIGL SERPL-MCNC: 109 MG/DL
TSH SERPL DL<=0.005 MIU/L-ACNC: 3.36 UIU/ML
WBC # BLD AUTO: 7.84 K/UL

## 2018-05-25 PROCEDURE — 84443 ASSAY THYROID STIM HORMONE: CPT

## 2018-05-25 PROCEDURE — 36415 COLL VENOUS BLD VENIPUNCTURE: CPT | Mod: PO

## 2018-05-25 PROCEDURE — 85025 COMPLETE CBC W/AUTO DIFF WBC: CPT

## 2018-05-25 PROCEDURE — 80053 COMPREHEN METABOLIC PANEL: CPT

## 2018-05-25 PROCEDURE — 80061 LIPID PANEL: CPT

## 2018-05-28 ENCOUNTER — TELEPHONE (OUTPATIENT)
Dept: OPHTHALMOLOGY | Facility: CLINIC | Age: 83
End: 2018-05-28

## 2018-05-28 NOTE — TELEPHONE ENCOUNTER
I left a message for the patient to call me. I was calling the patient to see if he wanted to schedule an appointment to see the doctor.

## 2018-06-06 ENCOUNTER — DOCUMENTATION ONLY (OUTPATIENT)
Dept: INTERNAL MEDICINE | Facility: CLINIC | Age: 83
End: 2018-06-06

## 2018-06-06 ENCOUNTER — OFFICE VISIT (OUTPATIENT)
Dept: INTERNAL MEDICINE | Facility: CLINIC | Age: 83
End: 2018-06-06
Payer: MEDICARE

## 2018-06-06 VITALS
RESPIRATION RATE: 16 BRPM | BODY MASS INDEX: 25.74 KG/M2 | HEART RATE: 54 BPM | SYSTOLIC BLOOD PRESSURE: 126 MMHG | DIASTOLIC BLOOD PRESSURE: 76 MMHG | OXYGEN SATURATION: 97 % | TEMPERATURE: 98 F | WEIGHT: 183.88 LBS | HEIGHT: 71 IN

## 2018-06-06 DIAGNOSIS — Z00.00 ROUTINE GENERAL MEDICAL EXAMINATION AT A HEALTH CARE FACILITY: Primary | ICD-10-CM

## 2018-06-06 DIAGNOSIS — I10 ESSENTIAL HYPERTENSION: Chronic | ICD-10-CM

## 2018-06-06 DIAGNOSIS — N18.30 CHRONIC KIDNEY DISEASE, STAGE III (MODERATE): Chronic | ICD-10-CM

## 2018-06-06 DIAGNOSIS — I70.0 ATHEROSCLEROSIS OF AORTA: ICD-10-CM

## 2018-06-06 DIAGNOSIS — Z12.11 COLON CANCER SCREENING: ICD-10-CM

## 2018-06-06 DIAGNOSIS — Z86.010 HISTORY OF COLON POLYPS: ICD-10-CM

## 2018-06-06 DIAGNOSIS — E78.00 PURE HYPERCHOLESTEROLEMIA: Chronic | ICD-10-CM

## 2018-06-06 DIAGNOSIS — N40.0 BENIGN PROSTATIC HYPERPLASIA WITHOUT LOWER URINARY TRACT SYMPTOMS: Chronic | ICD-10-CM

## 2018-06-06 PROCEDURE — 99397 PER PM REEVAL EST PAT 65+ YR: CPT | Mod: S$GLB,,, | Performed by: INTERNAL MEDICINE

## 2018-06-06 PROCEDURE — 99999 PR PBB SHADOW E&M-EST. PATIENT-LVL III: CPT | Mod: PBBFAC,,, | Performed by: INTERNAL MEDICINE

## 2018-06-06 PROCEDURE — 99499 UNLISTED E&M SERVICE: CPT | Mod: S$PBB,,, | Performed by: INTERNAL MEDICINE

## 2018-06-06 PROCEDURE — 3074F SYST BP LT 130 MM HG: CPT | Mod: CPTII,S$GLB,, | Performed by: INTERNAL MEDICINE

## 2018-06-06 PROCEDURE — 3078F DIAST BP <80 MM HG: CPT | Mod: CPTII,S$GLB,, | Performed by: INTERNAL MEDICINE

## 2018-06-06 NOTE — PROGRESS NOTES
"HPI:  Patient is an 83-year-old man who comes today for follow-up of his hypertension, lipids, coronary artery disease, chronic kidney disease, and for his annual physical exam.  He denies any chest pains or shortness of breath.  His blood pressures been very well controlled.  There been no other new problems or complaints.      Current MEDS: medcard review, verified and update  Allergies: Per the electronic medical record    Past Medical History:   Diagnosis Date    BPH (benign prostatic hyperplasia)     Cataract     Chronic kidney disease, stage III (moderate) 7/15/2016    Elevated PSA     HEARING LOSS     Hyperlipemia     Hypertension     Myocardial infarction     per ekg and nuclear ett - old scar    Tobacco dependence     resolved       Past Surgical History:   Procedure Laterality Date    APPENDECTOMY  1960    CATARACT EXTRACTION W/  INTRAOCULAR LENS IMPLANT Left 4-20-16    CATARACT EXTRACTION W/  INTRAOCULAR LENS IMPLANT Right 3-16-16    EAR MASTOIDECTOMY W/ COCHLEAR IMPLANT W/ LANDMARK  2011    failed later removed    EYE SURGERY Bilateral 2016    cataract w/ IOL    PCIOL OD Right 03/`16/16    DR. CARPIO    TONSILLECTOMY         SHx: per the electronic medical record    FHx: recorded in the electronic medical record    ROS:    denies any chest pains or shortness of breath. Denies any nausea, vomiting or diarrhea. Denies any fever, chills or sweats. Denies any change in weight, voice, stool, skin or hair. Denies any dysuria, dyspepsia or dysphagia. Denies any change in vision, hearing or headaches. Denies any swollen lymph nodes or loss of memory.    PE:  /76   Pulse (!) 54   Temp 97.6 °F (36.4 °C)   Resp 16   Ht 5' 10.5" (1.791 m)   Wt 83.4 kg (183 lb 13.8 oz)   SpO2 97%   BMI 26.01 kg/m²   Gen: Well-developed, well-nourished, male, in no acute distress, oriented x3  HEENT: neck is supple, no adenopathy, carotids 2+ equal without bruits, thyroid exam normal size without " nodules.  CHEST: clear to auscultation and percussion  CVS: regular rate and rhythm without significant murmur, gallop, or rubs  ABD: soft, benign, no rebound no guarding, no distention.  Bowel sounds are normal.     nontender.  No palpable masses.  No organomegaly and no audible bruits.  RECTAL:  Deferred  EXT: no clubbing, cyanosis, or edema  LYMPH: no cervical, inguinal, or axillary adenopathy  FEET: no loss of sensation.  No ulcers or pressure sores.  NEURO: gait normal.  Cranial nerves II- XII intact. No nystagmus.  Speech normal.   Gross motor and sensory unremarkable.    Lab Results   Component Value Date    WBC 7.84 05/25/2018    HGB 15.2 05/25/2018    HCT 46.9 05/25/2018     05/25/2018    CHOL 152 05/25/2018    TRIG 109 05/25/2018    HDL 41 05/25/2018    ALT 15 05/25/2018    AST 22 05/25/2018     05/25/2018    K 4.0 05/25/2018     05/25/2018    CREATININE 1.3 05/25/2018    BUN 20 05/25/2018    CO2 28 05/25/2018    TSH 3.363 05/25/2018    PSA 1.4 07/07/2017       Impression:  Multiple medical problems below, stable  Patient Active Problem List   Diagnosis    BPH (benign prostatic hyperplasia)    Hyperlipemia    Essential hypertension    Choroidal nevus, right eye    Macular chorioretinal scar of left eye    Chronic kidney disease, stage III (moderate)    SNHL (sensorineural hearing loss)s/p implant on rt    Atherosclerosis of aorta       Plan:   Orders Placed This Encounter    Lipid panel    Basic metabolic panel    Fecal Immunochemical Test (iFOBT)     Patient will have a fecal immunochemical test done.  He will see me again in 6 months with above lab work.  His medications remain the same

## 2018-06-08 ENCOUNTER — OFFICE VISIT (OUTPATIENT)
Dept: OPHTHALMOLOGY | Facility: CLINIC | Age: 83
End: 2018-06-08
Payer: MEDICARE

## 2018-06-08 DIAGNOSIS — D31.31 CHOROIDAL NEVUS, RIGHT: ICD-10-CM

## 2018-06-08 DIAGNOSIS — Z96.1 PSEUDOPHAKIA OF BOTH EYES: Primary | ICD-10-CM

## 2018-06-08 DIAGNOSIS — H52.223 REGULAR ASTIGMATISM OF BOTH EYES: ICD-10-CM

## 2018-06-08 DIAGNOSIS — H52.4 PRESBYOPIA: ICD-10-CM

## 2018-06-08 DIAGNOSIS — Z13.5 SCREENING FOR GLAUCOMA: ICD-10-CM

## 2018-06-08 DIAGNOSIS — H02.132 SENILE ECTROPION OF RIGHT LOWER EYELID: ICD-10-CM

## 2018-06-08 DIAGNOSIS — H31.012 MACULAR SCAR, LEFT: ICD-10-CM

## 2018-06-08 PROCEDURE — 92015 DETERMINE REFRACTIVE STATE: CPT | Mod: S$GLB,,, | Performed by: OPTOMETRIST

## 2018-06-08 PROCEDURE — 99999 PR PBB SHADOW E&M-EST. PATIENT-LVL II: CPT | Mod: PBBFAC,,, | Performed by: OPTOMETRIST

## 2018-06-08 PROCEDURE — 92014 COMPRE OPH EXAM EST PT 1/>: CPT | Mod: S$GLB,,, | Performed by: OPTOMETRIST

## 2018-06-08 NOTE — PROGRESS NOTES
HPI     Last [facility] exam 12/09/2016  Pseudophakia, OU  Choroidal nevus, OD  Macular chorioretinal scar of left eye      Last edited by Tommy Zaragoza MA on 6/8/2018  1:34 PM. (History)            Assessment /Plan     For exam results, see Encounter Report.    Pseudophakia of both eyes    Choroidal nevus, right    Macular scar, left    Screening for glaucoma    Regular astigmatism of both eyes    Presbyopia    Senile ectropion of right lower eyelid      Stable PIOL OU.  Stable CR nevus OD and macular scar OS. OD lower lid ectropion (senile with chronic blepharitis) with little response to david/HC.  I suggest he see Dr. Muro at his convenience for an ectropion evaluation. OH OK OU otherwise.  Spec Rx given.  RTC one year.

## 2018-07-03 ENCOUNTER — OFFICE VISIT (OUTPATIENT)
Dept: FAMILY MEDICINE | Facility: CLINIC | Age: 83
End: 2018-07-03
Payer: MEDICARE

## 2018-07-03 VITALS
TEMPERATURE: 98 F | DIASTOLIC BLOOD PRESSURE: 76 MMHG | WEIGHT: 182.75 LBS | HEART RATE: 63 BPM | OXYGEN SATURATION: 96 % | HEIGHT: 71 IN | BODY MASS INDEX: 25.58 KG/M2 | SYSTOLIC BLOOD PRESSURE: 126 MMHG

## 2018-07-03 DIAGNOSIS — H90.5 SENSORINEURAL HEARING LOSS (SNHL) OF RIGHT EAR, UNSPECIFIED HEARING STATUS ON CONTRALATERAL SIDE: Chronic | ICD-10-CM

## 2018-07-03 DIAGNOSIS — I70.0 ATHEROSCLEROSIS OF AORTA: ICD-10-CM

## 2018-07-03 DIAGNOSIS — E78.2 MIXED HYPERLIPIDEMIA: Chronic | ICD-10-CM

## 2018-07-03 DIAGNOSIS — N40.0 BENIGN PROSTATIC HYPERPLASIA WITHOUT LOWER URINARY TRACT SYMPTOMS: Primary | Chronic | ICD-10-CM

## 2018-07-03 DIAGNOSIS — I10 ESSENTIAL HYPERTENSION: Chronic | ICD-10-CM

## 2018-07-03 DIAGNOSIS — N18.30 CHRONIC KIDNEY DISEASE, STAGE III (MODERATE): Chronic | ICD-10-CM

## 2018-07-03 PROBLEM — H91.93 BILATERAL HEARING LOSS: Status: ACTIVE | Noted: 2018-07-03

## 2018-07-03 PROCEDURE — 99999 PR PBB SHADOW E&M-EST. PATIENT-LVL III: CPT | Mod: PBBFAC,,, | Performed by: NURSE PRACTITIONER

## 2018-07-03 PROCEDURE — 96160 PT-FOCUSED HLTH RISK ASSMT: CPT | Mod: S$GLB,,, | Performed by: NURSE PRACTITIONER

## 2018-07-03 NOTE — PROGRESS NOTES
"Harman Johnston presented for a  Medicare AWV and comprehensive Health Risk Assessment today. The following components were reviewed and updated:    · Medical history  · Family History  · Social history  · Allergies and Current Medications  · Health Risk Assessment  · Health Maintenance  · Care Team     ** See Completed Assessments for Annual Wellness Visit within the encounter summary.**       The following assessments were completed:  · Living Situation  · CAGE  · Depression Screening  · Timed Get Up and Go  · Whisper Test  · Cognitive Function Screening  · Nutrition Screening  · ADL Screening  · PAQ Screening    Vitals:    07/03/18 0950   BP: 126/76   BP Location: Left arm   Patient Position: Sitting   BP Method: Large (Manual)   Pulse: 63   Temp: 97.7 °F (36.5 °C)   TempSrc: Tympanic   SpO2: 96%   Weight: 82.9 kg (182 lb 12.2 oz)   Height: 5' 10.5" (1.791 m)     Body mass index is 25.85 kg/m².  Physical Exam      Diagnoses and health risks identified today and associated recommendations/orders:    1. Benign prostatic hyperplasia without lower urinary tract symptoms  Stable. Being treated per urology    2. Mixed hyperlipidemia  Stable. Continue current treatment plan    3. Essential hypertension  Stable. Continue current treatment plan    4. Chronic kidney disease, stage III (moderate)  5/25/2018   eGFR if non African American >60 mL/min/1.73 m^2 50.5       Stable.     5. Atherosclerosis of aorta  Stable. Continue statin therapy.  Low fat diet and exercise    6. Sensorineural hearing loss (SNHL) of right ear, unspecified hearing status on contralateral side  Managed per audiology       Provided Harman with a 5-10 year written screening schedule and personal prevention plan. Recommendations were developed using the USPSTF age appropriate recommendations. Education, counseling, and referrals were provided as needed. After Visit Summary printed and given to patient which includes a list of additional screenings\tests " needed.    No Follow-up on file.    Callie Biswas NP  I offered to discuss end of life issues, including information on how to make advance directives that the patient could use to name someone who would make medical decisions on their behalf if they became too ill to make themselves.    _X_Patient declined  ___Patient is interested, I provided paper work and offered to discuss.

## 2018-07-18 ENCOUNTER — OFFICE VISIT (OUTPATIENT)
Dept: UROLOGY | Facility: CLINIC | Age: 83
End: 2018-07-18
Payer: MEDICARE

## 2018-07-18 VITALS
BODY MASS INDEX: 25.5 KG/M2 | HEIGHT: 71 IN | DIASTOLIC BLOOD PRESSURE: 64 MMHG | HEART RATE: 68 BPM | SYSTOLIC BLOOD PRESSURE: 116 MMHG | WEIGHT: 182.13 LBS

## 2018-07-18 DIAGNOSIS — N40.0 BENIGN PROSTATIC HYPERPLASIA, UNSPECIFIED WHETHER LOWER URINARY TRACT SYMPTOMS PRESENT: Primary | ICD-10-CM

## 2018-07-18 PROCEDURE — 3074F SYST BP LT 130 MM HG: CPT | Mod: CPTII,S$GLB,, | Performed by: UROLOGY

## 2018-07-18 PROCEDURE — 3078F DIAST BP <80 MM HG: CPT | Mod: CPTII,S$GLB,, | Performed by: UROLOGY

## 2018-07-18 PROCEDURE — 99213 OFFICE O/P EST LOW 20 MIN: CPT | Mod: S$GLB,,, | Performed by: UROLOGY

## 2018-07-18 PROCEDURE — 99999 PR PBB SHADOW E&M-EST. PATIENT-LVL II: CPT | Mod: PBBFAC,,, | Performed by: UROLOGY

## 2018-07-18 RX ORDER — TAMSULOSIN HYDROCHLORIDE 0.4 MG/1
0.4 CAPSULE ORAL DAILY
Qty: 30 CAPSULE | Refills: 11 | Status: SHIPPED | OUTPATIENT
Start: 2018-07-18 | End: 2019-07-18

## 2018-07-18 NOTE — PROGRESS NOTES
Chief Complaint: Prostate Cancer screening    HPI:   7/18/18: No problems in last years, doing okay except he had some breast tenderness and it seems he is off the finasteride and that got better. Reviewed history in detail.  7/12/17: PSA down sig on finasteride, no LUTS.  7/8/16: 82 yo man with elevated PSA was started on finasteride and with expectation of improving the value.  No urinary bother on flomax/finasteride seein improvement. PSA dramatically lower in last 3 mo    Allergies:  Patient has no known allergies.    Medications:  has a current medication list which includes the following prescription(s): aspirin, atenolol-chlorthalidone, atorvastatin, meloxicam, and tamsulosin.    Review of Systems:  General: No fever, chills, fatigability, or weight loss.  Skin: No rashes, itching, or changes in color or texture of skin.  Chest: Denies COTTON, cyanosis, wheezing, cough, and sputum production.  Abdomen: Appetite fine. No weight loss. Denies diarrhea, abdominal pain, hematemesis, or blood in stool.  Musculoskeletal: No joint stiffness or swelling. Denies back pain.  : As above.  All other review of systems negative.    PMH:   has a past medical history of BPH (benign prostatic hyperplasia); Cataract; Chronic kidney disease, stage III (moderate) (7/15/2016); Elevated PSA; HEARING LOSS; History of colon polyps; Hyperlipemia; Hypertension; Myocardial infarction; and Tobacco dependence.    PSH:   has a past surgical history that includes Ear mastoidectomy w/ cochlear implant w/ landmark (2011); Tonsillectomy; PCIOL OD (Right, 03/`16/16); Cataract extraction w/  intraocular lens implant (Left, 4-20-16); Cataract extraction w/  intraocular lens implant (Right, 3-16-16); Appendectomy (1960); and Eye surgery (Bilateral, 2016).    FamHx: family history includes Breast cancer in his sister; Cancer in his brother, sister, and sister; Hyperlipidemia in his mother; Macular degeneration in his sister and sister; Stroke in his  mother.    SocHx:  reports that he quit smoking about 18 years ago. His smoking use included Cigarettes. He has a 10.00 pack-year smoking history. He has never used smokeless tobacco. He reports that he drinks about 0.6 oz of alcohol per week . He reports that he does not use drugs.      Physical Exam:  Vitals:    07/18/18 1434   BP: 116/64   Pulse: 68     General: A&Ox3, no apparent distress, no deformities  Neck: No masses, normal thyroid  Lungs: normal inspiration, no use of accessory muscles  Heart: normal pulse, no arrhythmias  Abdomen: Soft, NT, ND  Skin: The skin is warm and dry. No jaundice.  Ext: No c/c/e.  : Test desc maira, no abnormalities of epididymus. Penis normal, with normal penile and scrotal skin. Meatus normal. Normal rectal tone, no hemorrhoids. Prost 40 gm no nodules or masses appreciated. SV not palpable. Perineum and anus normal.    Labs/Studies:   PSA    2010-15: 7-9    3/16: 11.4    6/16: 2.7    7/17: 1.4    Impression/Plan:   1. Off finasteride but emptying well.  Refill flomax.  BOBBY/RTC 12 mo.

## 2018-07-20 RX ORDER — MELOXICAM 7.5 MG/1
7.5 TABLET ORAL 2 TIMES DAILY PRN
Qty: 180 TABLET | Refills: 3 | Status: SHIPPED | OUTPATIENT
Start: 2018-07-20 | End: 2019-06-12

## 2018-07-20 NOTE — TELEPHONE ENCOUNTER
----- Message from William Lopez sent at 7/20/2018  1:54 PM CDT -----  Contact: self [phone]  Would like to have refill for Meloxicam sent to pharmacy.  Please call back at [phone].  Md Jonathan          Pt uses.    [facility]  [place], [place]  [phone]

## 2018-07-20 NOTE — TELEPHONE ENCOUNTER
LOV 6/6/18   Dr. Garcia, can you please review the dosage before sending this in to just be sure that it's the correct dosage that you would like the patient to have, Thanks

## 2018-07-24 ENCOUNTER — OFFICE VISIT (OUTPATIENT)
Dept: OPHTHALMOLOGY | Facility: CLINIC | Age: 83
End: 2018-07-24
Payer: MEDICARE

## 2018-07-24 DIAGNOSIS — L71.9 ROSACEA: ICD-10-CM

## 2018-07-24 DIAGNOSIS — H01.00A BLEPHARITIS OF UPPER AND LOWER EYELIDS OF BOTH EYES, UNSPECIFIED TYPE: ICD-10-CM

## 2018-07-24 DIAGNOSIS — H02.132 SENILE ECTROPION OF RIGHT LOWER EYELID: Primary | ICD-10-CM

## 2018-07-24 DIAGNOSIS — H04.129 DRY EYE: ICD-10-CM

## 2018-07-24 DIAGNOSIS — H01.00B BLEPHARITIS OF UPPER AND LOWER EYELIDS OF BOTH EYES, UNSPECIFIED TYPE: ICD-10-CM

## 2018-07-24 PROCEDURE — 99999 PR PBB SHADOW E&M-EST. PATIENT-LVL II: CPT | Mod: PBBFAC,,, | Performed by: OPHTHALMOLOGY

## 2018-07-24 PROCEDURE — 92012 INTRM OPH EXAM EST PATIENT: CPT | Mod: S$GLB,,, | Performed by: OPHTHALMOLOGY

## 2018-07-24 NOTE — PROGRESS NOTES
SUBJECTIVE:   Harman Johnston is a 83 y.o. male   Corrected distance visual acuity was 20/50 in the right eye and 20/25 +1 in the left eye.   Chief Complaint   Patient presents with    Eye Problem     pt states here due to ou being red and itch at times states using drops but they do not help         HPI:  HPI     Eye Problem    Additional comments: pt states here due to ou being red and itch at times   states using drops but they do not help            Comments   [place] [facility]  1.PCIOL OD 03/16/16   2.PCIOL OS 4/20/16  3. Chorioretinal scars OD  4. Choroidal nevus OD       Last edited by Mirna Hugo MA on 7/24/2018 10:00 AM. (History)        Assessment /Plan :  1. Senile ectropion of right lower eyelid with some retraction due to rosacea/actinic skin damage and prominent orbital fat pads OD>OS  Pt interested in seeing Dr Castano for possible repair due to irritation and FBS. Will use ATs and lube.   2. Dry eye    3. Blepharitis of upper and lower eyelids of both eyes, unspecified type    4. Rosacea

## 2018-08-02 ENCOUNTER — APPOINTMENT (OUTPATIENT)
Dept: LAB | Facility: HOSPITAL | Age: 83
End: 2018-08-02
Attending: INTERNAL MEDICINE
Payer: MEDICARE

## 2018-08-03 ENCOUNTER — TELEPHONE (OUTPATIENT)
Dept: INTERNAL MEDICINE | Facility: CLINIC | Age: 83
End: 2018-08-03

## 2018-08-03 NOTE — TELEPHONE ENCOUNTER
----- Message from Bill Garcia MD sent at 8/3/2018  1:18 PM CDT -----  Your stool test was negative

## 2018-09-18 ENCOUNTER — TELEPHONE (OUTPATIENT)
Dept: INTERNAL MEDICINE | Facility: CLINIC | Age: 83
End: 2018-09-18

## 2018-10-01 ENCOUNTER — IMMUNIZATION (OUTPATIENT)
Dept: FAMILY MEDICINE | Facility: CLINIC | Age: 83
End: 2018-10-01
Payer: MEDICARE

## 2018-10-01 PROCEDURE — 90662 IIV NO PRSV INCREASED AG IM: CPT | Mod: PBBFAC,PO

## 2018-10-03 ENCOUNTER — PATIENT OUTREACH (OUTPATIENT)
Dept: ADMINISTRATIVE | Facility: HOSPITAL | Age: 83
End: 2018-10-03

## 2018-10-10 ENCOUNTER — OFFICE VISIT (OUTPATIENT)
Dept: INTERNAL MEDICINE | Facility: CLINIC | Age: 83
End: 2018-10-10
Payer: MEDICARE

## 2018-10-10 ENCOUNTER — HOSPITAL ENCOUNTER (OUTPATIENT)
Dept: RADIOLOGY | Facility: HOSPITAL | Age: 83
Discharge: HOME | End: 2018-10-10
Attending: INTERNAL MEDICINE
Payer: MEDICARE

## 2018-10-10 VITALS
SYSTOLIC BLOOD PRESSURE: 130 MMHG | OXYGEN SATURATION: 97 % | WEIGHT: 178.13 LBS | HEART RATE: 60 BPM | RESPIRATION RATE: 16 BRPM | DIASTOLIC BLOOD PRESSURE: 80 MMHG | HEIGHT: 71 IN | TEMPERATURE: 97 F | BODY MASS INDEX: 24.94 KG/M2

## 2018-10-10 DIAGNOSIS — N18.30 CHRONIC KIDNEY DISEASE, STAGE III (MODERATE): Chronic | ICD-10-CM

## 2018-10-10 DIAGNOSIS — I10 ESSENTIAL HYPERTENSION: Chronic | ICD-10-CM

## 2018-10-10 DIAGNOSIS — E78.2 MIXED HYPERLIPIDEMIA: Chronic | ICD-10-CM

## 2018-10-10 DIAGNOSIS — Z01.818 PREOP EXAMINATION: Primary | ICD-10-CM

## 2018-10-10 DIAGNOSIS — Z01.818 PREOP EXAMINATION: ICD-10-CM

## 2018-10-10 DIAGNOSIS — H10.509 BLEPHAROCONJUNCTIVITIS, UNSPECIFIED BLEPHAROCONJUNCTIVITIS TYPE, UNSPECIFIED LATERALITY: ICD-10-CM

## 2018-10-10 PROCEDURE — 3079F DIAST BP 80-89 MM HG: CPT | Mod: CPTII,,, | Performed by: INTERNAL MEDICINE

## 2018-10-10 PROCEDURE — 93005 ELECTROCARDIOGRAM TRACING: CPT | Mod: PBBFAC,PO | Performed by: INTERNAL MEDICINE

## 2018-10-10 PROCEDURE — 99213 OFFICE O/P EST LOW 20 MIN: CPT | Mod: PBBFAC,25,PO | Performed by: INTERNAL MEDICINE

## 2018-10-10 PROCEDURE — 1101F PT FALLS ASSESS-DOCD LE1/YR: CPT | Mod: CPTII,,, | Performed by: INTERNAL MEDICINE

## 2018-10-10 PROCEDURE — 71046 X-RAY EXAM CHEST 2 VIEWS: CPT | Mod: TC,PO

## 2018-10-10 PROCEDURE — 93010 ELECTROCARDIOGRAM REPORT: CPT | Mod: ,,, | Performed by: INTERNAL MEDICINE

## 2018-10-10 PROCEDURE — 71046 X-RAY EXAM CHEST 2 VIEWS: CPT | Mod: 26,,, | Performed by: RADIOLOGY

## 2018-10-10 PROCEDURE — 99999 PR PBB SHADOW E&M-EST. PATIENT-LVL III: CPT | Mod: PBBFAC,,, | Performed by: INTERNAL MEDICINE

## 2018-10-10 PROCEDURE — 99214 OFFICE O/P EST MOD 30 MIN: CPT | Mod: S$PBB,,, | Performed by: INTERNAL MEDICINE

## 2018-10-10 PROCEDURE — 3075F SYST BP GE 130 - 139MM HG: CPT | Mod: CPTII,,, | Performed by: INTERNAL MEDICINE

## 2018-10-10 NOTE — PROGRESS NOTES
"HPI:  Patient is an 84-year-old man who comes in today for preoperative clearance to have surgery on both lower eyelids done.  He has a chronically inverted eyelid.  Patient otherwise has been doing fine.  He denies any chest pains or shortness of breath.  His blood pressures been well controlled.      Current MEDS: medcard review, verified and update  Allergies: Per the electronic medical record    Past Medical History:   Diagnosis Date    BPH (benign prostatic hyperplasia)     Cataract     Chronic kidney disease, stage III (moderate) 7/15/2016    Elevated PSA     HEARING LOSS     History of colon polyps     Hyperlipemia     Hypertension     Myocardial infarction     per ekg and nuclear ett - old scar    Tobacco dependence     resolved       Past Surgical History:   Procedure Laterality Date    APPENDECTOMY  1960    CATARACT EXTRACTION W/  INTRAOCULAR LENS IMPLANT Left 4-20-16    CATARACT EXTRACTION W/  INTRAOCULAR LENS IMPLANT Right 3-16-16    EAR MASTOIDECTOMY W/ COCHLEAR IMPLANT W/ LANDMARK  2011    failed later removed    EYE SURGERY Bilateral 2016    cataract w/ IOL    PCIOL OD Right 03/`16/16    DR. CARPIO    TONSILLECTOMY         SHx: per the electronic medical record    FHx: recorded in the electronic medical record    ROS:    denies any chest pains or shortness of breath. Denies any nausea, vomiting or diarrhea. Denies any fever, chills or sweats. Denies any change in weight, voice, stool, skin or hair. Denies any dysuria, dyspepsia or dysphagia. Denies any change in vision, hearing or headaches. Denies any swollen lymph nodes or loss of memory.    PE:  /80   Pulse 60   Temp 96.7 °F (35.9 °C)   Resp 16   Ht 5' 11" (1.803 m)   Wt 80.8 kg (178 lb 2.1 oz)   SpO2 97%   BMI 24.84 kg/m²   Gen: Well-developed, well-nourished, male, in no acute distress, oriented x3  HEENT: neck is supple, no adenopathy, carotids 2+ equal without bruits, thyroid exam normal size without " nodules.  CHEST: clear to auscultation and percussion  CVS: regular rate and rhythm without significant murmur, gallop, or rubs  ABD: soft, benign, no rebound no guarding, no distention.  Bowel sounds are normal.     nontender.  No palpable masses.  No organomegaly and no audible bruits.          Impression:  Chronically inverted eyelids  Patient Active Problem List   Diagnosis    BPH (benign prostatic hyperplasia)    Hyperlipemia    Essential hypertension    Choroidal nevus, right eye    Macular chorioretinal scar of left eye    Chronic kidney disease, stage III (moderate)    SNHL (sensorineural hearing loss)s/p implant on rt    Atherosclerosis of aorta    History of colon polyps    Bilateral hearing loss    Rosacea    Senile ectropion of right lower eyelid    Dry eye    Blepharitis of upper and lower eyelids of both eyes       Plan:   Orders Placed This Encounter    X-Ray Chest PA And Lateral    Basic metabolic panel    CBC auto differential    EKG 12-lead     Patient will have the above lab work chest x-ray and EKG done.  Patient is cleared for anesthesia and surgery.  He has no contraindications.  He should not take the aspirin or anti-inflammatories 1 week prior to the procedure.  All of his forms have been filled out and sent to his surgeon

## 2018-10-12 ENCOUNTER — TELEPHONE (OUTPATIENT)
Dept: INTERNAL MEDICINE | Facility: CLINIC | Age: 83
End: 2018-10-12

## 2018-10-12 NOTE — TELEPHONE ENCOUNTER
----- Message from Mami Green sent at 10/12/2018 10:51 AM CDT -----  Contact: pt wife - Susanne   State she's calling rg rtn nurses call and can be reached at [phone]//thanks/dbw

## 2018-11-20 ENCOUNTER — TELEPHONE (OUTPATIENT)
Dept: OTOLARYNGOLOGY | Facility: CLINIC | Age: 83
End: 2018-11-20

## 2018-11-20 NOTE — TELEPHONE ENCOUNTER
----- Message from Janell Zaragoza sent at 11/20/2018  3:12 PM CST -----  Contact: PT WIFE   Caller states that pt has hearing aid and need to schedule a appt due to not hearing well.  [phone] (home)

## 2018-11-20 NOTE — TELEPHONE ENCOUNTER
Pt needs to see audiology to have his hearing aids looked at.  Scheduled to see [facility] audiology on 11/28/18 @9:30am.  Wife verbalized understanding of appt details.

## 2018-11-28 ENCOUNTER — TELEPHONE (OUTPATIENT)
Dept: INTERNAL MEDICINE | Facility: CLINIC | Age: 83
End: 2018-11-28

## 2018-11-28 ENCOUNTER — CLINICAL SUPPORT (OUTPATIENT)
Dept: AUDIOLOGY | Facility: CLINIC | Age: 83
End: 2018-11-28
Payer: MEDICARE

## 2018-11-28 DIAGNOSIS — H91.90 HEARING LOSS, UNSPECIFIED HEARING LOSS TYPE, UNSPECIFIED LATERALITY: Primary | ICD-10-CM

## 2018-11-28 DIAGNOSIS — Z46.1 HEARING AID FITTING OR ADJUSTMENT: Primary | ICD-10-CM

## 2018-11-28 NOTE — PROGRESS NOTES
Harman Johnston was seen 11/28/2018 for a hearing aid follow-up appointment.  Hearing aid dead. After replacing the speaker the aid is now in good working order. He would like to purchase the speaker today and not send the aid in for repair. I offered a 30 day warranty on the speaker and will credit this toward repair charge if necessary.     He will return next week for audiogram and hearing aid follow-up and his wife will call PCP for referral.

## 2018-11-28 NOTE — TELEPHONE ENCOUNTER
----- Message from Balbina Dallas sent at 11/28/2018  1:10 PM CST -----  Contact: Mrs. Johnston/wife  Mrs. Johnston called and stated the patient was seen in Audiology today, but he needs to have another Hearing Test. They advised him that they would need a referral to have another test (as Medicare won't pay until the doctor approves it). He would like a referral to [facility]. She can be contacted at [phone].    Thanks,  Balbina

## 2018-12-05 ENCOUNTER — LAB VISIT (OUTPATIENT)
Dept: LAB | Facility: HOSPITAL | Age: 83
End: 2018-12-05
Attending: INTERNAL MEDICINE
Payer: MEDICARE

## 2018-12-05 DIAGNOSIS — I10 ESSENTIAL HYPERTENSION: Chronic | ICD-10-CM

## 2018-12-05 LAB
ANION GAP SERPL CALC-SCNC: 6 MMOL/L
BUN SERPL-MCNC: 22 MG/DL
CALCIUM SERPL-MCNC: 9.6 MG/DL
CHLORIDE SERPL-SCNC: 103 MMOL/L
CHOLEST SERPL-MCNC: 162 MG/DL
CHOLEST/HDLC SERPL: 3.3 {RATIO}
CO2 SERPL-SCNC: 32 MMOL/L
CREAT SERPL-MCNC: 1.4 MG/DL
EST. GFR  (AFRICAN AMERICAN): 53 ML/MIN/1.73 M^2
EST. GFR  (NON AFRICAN AMERICAN): 45.8 ML/MIN/1.73 M^2
GLUCOSE SERPL-MCNC: 90 MG/DL
HDLC SERPL-MCNC: 49 MG/DL
HDLC SERPL: 30.2 %
LDLC SERPL CALC-MCNC: 90.2 MG/DL
NONHDLC SERPL-MCNC: 113 MG/DL
POTASSIUM SERPL-SCNC: 3.9 MMOL/L
SODIUM SERPL-SCNC: 141 MMOL/L
TRIGL SERPL-MCNC: 114 MG/DL

## 2018-12-05 PROCEDURE — 36415 COLL VENOUS BLD VENIPUNCTURE: CPT | Mod: HCNC,PO

## 2018-12-05 PROCEDURE — 80048 BASIC METABOLIC PNL TOTAL CA: CPT | Mod: HCNC

## 2018-12-05 PROCEDURE — 80061 LIPID PANEL: CPT | Mod: HCNC

## 2018-12-06 ENCOUNTER — CLINICAL SUPPORT (OUTPATIENT)
Dept: AUDIOLOGY | Facility: CLINIC | Age: 83
End: 2018-12-06
Payer: MEDICARE

## 2018-12-06 DIAGNOSIS — Z46.1 HEARING AID FITTING OR ADJUSTMENT: Primary | ICD-10-CM

## 2018-12-06 DIAGNOSIS — H90.3 HEARING LOSS, SENSORINEURAL, ASYMMETRICAL: Primary | ICD-10-CM

## 2018-12-06 PROCEDURE — 92567 TYMPANOMETRY: CPT | Mod: 52,HCNC,S$GLB, | Performed by: AUDIOLOGIST

## 2018-12-06 PROCEDURE — 92552 PURE TONE AUDIOMETRY AIR: CPT | Mod: 52,HCNC,S$GLB, | Performed by: AUDIOLOGIST

## 2018-12-06 PROCEDURE — 92556 SPEECH AUDIOMETRY COMPLETE: CPT | Mod: 52,HCNC,S$GLB, | Performed by: AUDIOLOGIST

## 2018-12-06 NOTE — PROGRESS NOTES
Harman Johnston was seen 12/06/2018 for a hearing aid follow-up appointment.  No change in hearing, therefore no adjustments made today. He will reach out to [facility] to start the process of getting a new left CI processor.      Patient will call for any future hearing aid follow-up appointments as needed.

## 2018-12-06 NOTE — PROGRESS NOTES
Harman Johnston was seen 12/06/2018 for an audiological evaluation.  Patient here for annual  audiogram. He complains of a possible decreased hearing. He has CI, left and an [employer]ine RITE purchased in 2014. He is due for a new processor for his left CI and will contact [facility] at [facility] in [place].     Results reveal a moderately severe to severe sensorineural hearing loss 250-8000 Hz for the right ear. Speech Reception Thresholds were  65 dBHL for the right ear. Word recognition scores were poor for the right ear.  Tympanograms were Type A, normal for the right ear.    Patient was counseled on the above findings.     Recommendations include:    1.  ENT follow-up as needed  2.  Hearing aid follow-up today  3.  Wear hearing protective devices around loud noise  4.  Annual audiograms

## 2018-12-12 ENCOUNTER — OFFICE VISIT (OUTPATIENT)
Dept: INTERNAL MEDICINE | Facility: CLINIC | Age: 83
End: 2018-12-12
Payer: MEDICARE

## 2018-12-12 VITALS
TEMPERATURE: 98 F | SYSTOLIC BLOOD PRESSURE: 118 MMHG | DIASTOLIC BLOOD PRESSURE: 70 MMHG | OXYGEN SATURATION: 97 % | HEIGHT: 69 IN | BODY MASS INDEX: 26.91 KG/M2 | WEIGHT: 181.69 LBS | HEART RATE: 52 BPM

## 2018-12-12 DIAGNOSIS — E78.2 MIXED HYPERLIPIDEMIA: Primary | Chronic | ICD-10-CM

## 2018-12-12 DIAGNOSIS — I70.0 ATHEROSCLEROSIS OF AORTA: ICD-10-CM

## 2018-12-12 DIAGNOSIS — N18.30 CHRONIC KIDNEY DISEASE, STAGE III (MODERATE): Chronic | ICD-10-CM

## 2018-12-12 DIAGNOSIS — I10 ESSENTIAL HYPERTENSION: Chronic | ICD-10-CM

## 2018-12-12 PROCEDURE — 3078F DIAST BP <80 MM HG: CPT | Mod: CPTII,HCNC,S$GLB, | Performed by: INTERNAL MEDICINE

## 2018-12-12 PROCEDURE — 3074F SYST BP LT 130 MM HG: CPT | Mod: CPTII,HCNC,S$GLB, | Performed by: INTERNAL MEDICINE

## 2018-12-12 PROCEDURE — 99213 OFFICE O/P EST LOW 20 MIN: CPT | Mod: HCNC,S$GLB,, | Performed by: INTERNAL MEDICINE

## 2018-12-12 PROCEDURE — 99999 PR PBB SHADOW E&M-EST. PATIENT-LVL III: CPT | Mod: PBBFAC,HCNC,, | Performed by: INTERNAL MEDICINE

## 2018-12-12 PROCEDURE — 1101F PT FALLS ASSESS-DOCD LE1/YR: CPT | Mod: CPTII,HCNC,S$GLB, | Performed by: INTERNAL MEDICINE

## 2018-12-12 RX ORDER — ERYTHROMYCIN 5 MG/G
0.5 OINTMENT OPHTHALMIC DAILY PRN
COMMUNITY
Start: 2018-10-25 | End: 2019-01-02

## 2018-12-12 NOTE — PROGRESS NOTES
"HPI:  Patient is an 84-year-old man who comes today for follow-up of his hypertension and lipids.  He is doing well.  His recent eye surgery went well.  He has has no complaints at this time.  His blood pressures been well controlled.    Current meds have been verified and updated per the EMR  Exam:/70 (BP Location: Left arm, Patient Position: Sitting, BP Method: Large (Manual))   Pulse (!) 52   Temp 98.1 °F (36.7 °C) (Tympanic)   Ht 5' 9" (1.753 m)   Wt 82.4 kg (181 lb 10.5 oz)   SpO2 97%   BMI 26.83 kg/m²   Carotids 2+ equal without bruits  Chest clear  Cardiovascular regular rate and rhythm without murmur gallop or rub    Lab Results   Component Value Date    WBC 7.58 10/10/2018    HGB 15.4 10/10/2018    HCT 48.5 10/10/2018     10/10/2018    CHOL 162 12/05/2018    TRIG 114 12/05/2018    HDL 49 12/05/2018    ALT 15 05/25/2018    AST 22 05/25/2018     12/05/2018    K 3.9 12/05/2018     12/05/2018    CREATININE 1.4 12/05/2018    BUN 22 12/05/2018    CO2 32 (H) 12/05/2018    TSH 3.363 05/25/2018    PSA 1.4 07/07/2017       Impression:  Stable medical problems below  Patient Active Problem List   Diagnosis    BPH (benign prostatic hyperplasia)    Hyperlipemia    Essential hypertension    Choroidal nevus, right eye    Macular chorioretinal scar of left eye    Chronic kidney disease, stage III (moderate)    SNHL (sensorineural hearing loss)s/p implant on rt    Atherosclerosis of aorta    History of colon polyps    Bilateral hearing loss    Senile ectropion of right lower eyelid    Dry eye    Blepharitis of upper and lower eyelids of both eyes       Plan:  Orders Placed This Encounter    Comprehensive metabolic panel    Lipid panel    TSH    CBC auto differential     Medications remain the same.  He will be seen again in 6 months with above lab work.    "

## 2019-01-02 ENCOUNTER — OFFICE VISIT (OUTPATIENT)
Dept: URGENT CARE | Facility: CLINIC | Age: 84
End: 2019-01-02
Payer: MEDICARE

## 2019-01-02 ENCOUNTER — HOSPITAL ENCOUNTER (OUTPATIENT)
Dept: RADIOLOGY | Facility: HOSPITAL | Age: 84
Discharge: HOME | End: 2019-01-02
Attending: PHYSICIAN ASSISTANT
Payer: MEDICARE

## 2019-01-02 VITALS
WEIGHT: 176 LBS | HEIGHT: 69 IN | BODY MASS INDEX: 26.07 KG/M2 | OXYGEN SATURATION: 97 % | TEMPERATURE: 99 F | RESPIRATION RATE: 18 BRPM | HEART RATE: 64 BPM

## 2019-01-02 DIAGNOSIS — B96.89 BACTERIAL LOWER RESPIRATORY INFECTION: ICD-10-CM

## 2019-01-02 DIAGNOSIS — R05.9 COUGH: ICD-10-CM

## 2019-01-02 DIAGNOSIS — R05.9 COUGH: Primary | ICD-10-CM

## 2019-01-02 DIAGNOSIS — J22 BACTERIAL LOWER RESPIRATORY INFECTION: ICD-10-CM

## 2019-01-02 PROCEDURE — 71046 XR CHEST PA AND LATERAL: ICD-10-PCS | Mod: 26,HCNC,, | Performed by: RADIOLOGY

## 2019-01-02 PROCEDURE — 71046 X-RAY EXAM CHEST 2 VIEWS: CPT | Mod: TC,HCNC,PO

## 2019-01-02 PROCEDURE — 99214 OFFICE O/P EST MOD 30 MIN: CPT | Mod: HCNC,S$GLB,, | Performed by: PHYSICIAN ASSISTANT

## 2019-01-02 PROCEDURE — 71046 X-RAY EXAM CHEST 2 VIEWS: CPT | Mod: 26,HCNC,, | Performed by: RADIOLOGY

## 2019-01-02 PROCEDURE — 1101F PT FALLS ASSESS-DOCD LE1/YR: CPT | Mod: CPTII,HCNC,S$GLB, | Performed by: PHYSICIAN ASSISTANT

## 2019-01-02 PROCEDURE — 99214 PR OFFICE/OUTPT VISIT, EST, LEVL IV, 30-39 MIN: ICD-10-PCS | Mod: HCNC,S$GLB,, | Performed by: PHYSICIAN ASSISTANT

## 2019-01-02 PROCEDURE — 1101F PR PT FALLS ASSESS DOC 0-1 FALLS W/OUT INJ PAST YR: ICD-10-PCS | Mod: CPTII,HCNC,S$GLB, | Performed by: PHYSICIAN ASSISTANT

## 2019-01-02 PROCEDURE — 99999 PR PBB SHADOW E&M-EST. PATIENT-LVL IV: CPT | Mod: PBBFAC,HCNC,, | Performed by: PHYSICIAN ASSISTANT

## 2019-01-02 PROCEDURE — 99999 PR PBB SHADOW E&M-EST. PATIENT-LVL IV: ICD-10-PCS | Mod: PBBFAC,HCNC,, | Performed by: PHYSICIAN ASSISTANT

## 2019-01-02 RX ORDER — DOXYCYCLINE 100 MG/1
100 CAPSULE ORAL EVERY 12 HOURS
Qty: 14 CAPSULE | Refills: 0 | Status: SHIPPED | OUTPATIENT
Start: 2019-01-02 | End: 2019-01-09

## 2019-01-02 NOTE — PATIENT INSTRUCTIONS
Pneumonia (Adult)  Pneumonia is an infection deep within the lungs. It is in the small air sacs (alveoli). Pneumonia may be caused by a virus or bacteria. Pneumonia caused by bacteria is usually treated with an antibiotic. Severe cases may need to be treated in the hospital. Milder cases can be treated at home. Symptoms usually start to get better during the first 2 days of treatment.    Home care  Follow these guidelines when caring for yourself at home:  · Rest at home for the first 2 to 3 days, or until you feel stronger. Dont let yourself get overly tired when you go back to your activities.  · Stay away from cigarette smoke - yours or other peoples.  · You may use acetaminophen or ibuprofen to control fever or pain, unless another medicine was prescribed. If you have chronic liver or kidney disease, talk with your healthcare provider before using these medicines. Also talk with your provider if youve had a stomach ulcer or gastrointestinal bleeding. Dont give aspirin to anyone younger than 18 years of age who is ill with a fever. It may cause severe liver damage.  · Your appetite may be poor, so a light diet is fine.  · Drink 6 to 8 glasses of fluids every day to make sure you are getting enough fluids. Beverages can include water, sport drinks, sodas without caffeine, juices, tea, or soup. Fluids will help loosen secretions in the lung. This will make it easier for you to cough up the phlegm (sputum). If you also have heart or kidney disease, check with your healthcare provider before you drink extra fluids.  · Take antibiotic medicine prescribed until it is all gone, even if you are feeling better after a few days.  Follow-up care  Follow up with your healthcare provider in the next 2 to 3 days, or as advised. This is to be sure the medicine is helping you get better.  If you are 65 or older, you should get a pneumococcal vaccine and a yearly flu (influenza) shot. You should also get these vaccines if  you have chronic lung disease like asthma, emphysema, or COPD. Recently, a second type of pneumonia vaccine has become available for everyone over 65 years old. This is in addition to the previous vaccine. Ask your provider about this.  When to seek medical advice  Call your healthcare provider right away if any of these occur:  · You dont get better within the first 48 hours of treatment  · Shortness of breath gets worse  · Rapid breathing (more than 25 breaths per minute)  · Coughing up blood  · Chest pain gets worse with breathing  · Fever of 100.4°F (38°C) or higher that doesnt get better with fever medicine  · Weakness, dizziness, or fainting that gets worse  · Thirst or dry mouth that gets worse  · Sinus pain, headache, or a stiff neck  · Chest pain not caused by coughing  Date Last Reviewed: 1/1/2017  © 0069-8133 [employer]. [address] [place] [address]. All rights reserved. This information is not intended as a substitute for professional medical care. Always follow your healthcare professional's instructions.

## 2019-01-02 NOTE — PROGRESS NOTES
"Subjective:      Patient ID: Harman Johnston is a 84 y.o. male.    Chief Complaint: Cough (congestion x 3 -4 days )    Cough   This is a new problem. The problem has been gradually worsening. The cough is productive of purulent sputum. Associated symptoms include postnasal drip and rhinorrhea. Pertinent negatives include no chills, ear congestion, ear pain, fever, headaches, rash, shortness of breath or wheezing. Treatments tried: corticedin  The treatment provided mild relief.     Review of Systems   Constitutional: Negative for chills and fever.   HENT: Positive for postnasal drip and rhinorrhea. Negative for ear pain.    Respiratory: Positive for cough. Negative for shortness of breath and wheezing.    Gastrointestinal: Negative for abdominal pain, diarrhea, nausea and vomiting.   Skin: Negative for rash.   Neurological: Negative for headaches.       Objective:   Pulse 64   Temp 98.5 °F (36.9 °C) (Tympanic)   Resp 18   Ht 5' 9" (1.753 m)   Wt 79.8 kg (176 lb)   SpO2 97%   BMI 25.99 kg/m²   Physical Exam   Constitutional: He is oriented to person, place, and time. He appears well-developed and well-nourished. No distress.   HENT:   Head: Normocephalic.   Right Ear: Tympanic membrane, external ear and ear canal normal.   Left Ear: Tympanic membrane, external ear and ear canal normal.   Nose: Nose normal. No mucosal edema or rhinorrhea. Right sinus exhibits no maxillary sinus tenderness and no frontal sinus tenderness. Left sinus exhibits no maxillary sinus tenderness and no frontal sinus tenderness.   Mouth/Throat: Uvula is midline, oropharynx is clear and moist and mucous membranes are normal. No oropharyngeal exudate, posterior oropharyngeal edema or posterior oropharyngeal erythema.   Eyes: Conjunctivae and EOM are normal.   Neck: Normal range of motion. Neck supple.   Cardiovascular: Normal rate, regular rhythm and normal heart sounds.   Pulmonary/Chest: Effort normal and breath sounds normal. No accessory " muscle usage. No apnea, no tachypnea and no bradypnea. No respiratory distress. He has no decreased breath sounds. He has no wheezes. He has no rhonchi. He has no rales.   Lymphadenopathy:        Head (right side): No submental, no submandibular and no tonsillar adenopathy present.        Head (left side): No submental, no submandibular and no tonsillar adenopathy present.     He has no cervical adenopathy.   Neurological: He is alert and oriented to person, place, and time.   Skin: Skin is warm and dry. He is not diaphoretic.   Vitals reviewed.    Assessment:      1. Cough    2. Bacterial lower respiratory infection       Plan:   Cough  -     X-Ray Chest PA And Lateral; Future; Expected date: 01/02/2019    Bacterial lower respiratory infection  -     doxycycline (VIBRAMYCIN) 100 MG Cap; Take 1 capsule (100 mg total) by mouth every 12 (twelve) hours. for 7 days  Dispense: 14 capsule; Refill: 0    Bacterial Lower Respiratory Infection     -  Chest x-ray - atelectasis vs. Pneumonia    -  Treat with doxycycline    -  Increase fluids, rest    -  Follow-up with Dr. Garcia to ensure improvement after antibiotic     AVS provided and instructions reviewed with patient. Patient was counseled on supportive care and instructed to return or contact primary care provider if condition does not improve or for any new or worsening symptoms.    Verna López PA-C   Physician Assistant   [facility]

## 2019-01-03 RX ORDER — ATORVASTATIN CALCIUM 10 MG/1
TABLET, FILM COATED ORAL
Qty: 90 TABLET | Refills: 3 | Status: SHIPPED | OUTPATIENT
Start: 2019-01-03 | End: 2019-10-28 | Stop reason: SDUPTHER

## 2019-01-03 RX ORDER — ATENOLOL AND CHLORTHALIDONE TABLET 50; 25 MG/1; MG/1
TABLET ORAL
Qty: 90 TABLET | Refills: 3 | Status: SHIPPED | OUTPATIENT
Start: 2019-01-03 | End: 2019-10-28 | Stop reason: SDUPTHER

## 2019-01-11 ENCOUNTER — OFFICE VISIT (OUTPATIENT)
Dept: INTERNAL MEDICINE | Facility: CLINIC | Age: 84
End: 2019-01-11
Payer: MEDICARE

## 2019-01-11 VITALS
TEMPERATURE: 97 F | OXYGEN SATURATION: 95 % | DIASTOLIC BLOOD PRESSURE: 72 MMHG | BODY MASS INDEX: 26.71 KG/M2 | HEART RATE: 55 BPM | SYSTOLIC BLOOD PRESSURE: 134 MMHG | WEIGHT: 180.31 LBS | HEIGHT: 69 IN

## 2019-01-11 DIAGNOSIS — E78.2 MIXED HYPERLIPIDEMIA: Chronic | ICD-10-CM

## 2019-01-11 DIAGNOSIS — I10 ESSENTIAL HYPERTENSION: Primary | Chronic | ICD-10-CM

## 2019-01-11 DIAGNOSIS — N18.30 CHRONIC KIDNEY DISEASE, STAGE III (MODERATE): Chronic | ICD-10-CM

## 2019-01-11 PROCEDURE — 99213 OFFICE O/P EST LOW 20 MIN: CPT | Mod: HCNC,S$GLB,, | Performed by: INTERNAL MEDICINE

## 2019-01-11 PROCEDURE — 3078F PR MOST RECENT DIASTOLIC BLOOD PRESSURE < 80 MM HG: ICD-10-PCS | Mod: CPTII,HCNC,S$GLB, | Performed by: INTERNAL MEDICINE

## 2019-01-11 PROCEDURE — 99213 PR OFFICE/OUTPT VISIT, EST, LEVL III, 20-29 MIN: ICD-10-PCS | Mod: HCNC,S$GLB,, | Performed by: INTERNAL MEDICINE

## 2019-01-11 PROCEDURE — 3075F SYST BP GE 130 - 139MM HG: CPT | Mod: CPTII,HCNC,S$GLB, | Performed by: INTERNAL MEDICINE

## 2019-01-11 PROCEDURE — 1101F PT FALLS ASSESS-DOCD LE1/YR: CPT | Mod: CPTII,HCNC,S$GLB, | Performed by: INTERNAL MEDICINE

## 2019-01-11 PROCEDURE — 3075F PR MOST RECENT SYSTOLIC BLOOD PRESS GE 130-139MM HG: ICD-10-PCS | Mod: CPTII,HCNC,S$GLB, | Performed by: INTERNAL MEDICINE

## 2019-01-11 PROCEDURE — 1101F PR PT FALLS ASSESS DOC 0-1 FALLS W/OUT INJ PAST YR: ICD-10-PCS | Mod: CPTII,HCNC,S$GLB, | Performed by: INTERNAL MEDICINE

## 2019-01-11 PROCEDURE — 99999 PR PBB SHADOW E&M-EST. PATIENT-LVL III: ICD-10-PCS | Mod: PBBFAC,HCNC,, | Performed by: INTERNAL MEDICINE

## 2019-01-11 PROCEDURE — 3078F DIAST BP <80 MM HG: CPT | Mod: CPTII,HCNC,S$GLB, | Performed by: INTERNAL MEDICINE

## 2019-01-11 PROCEDURE — 99999 PR PBB SHADOW E&M-EST. PATIENT-LVL III: CPT | Mod: PBBFAC,HCNC,, | Performed by: INTERNAL MEDICINE

## 2019-01-11 NOTE — PROGRESS NOTES
"HPI:  Patient is an 84-year-old man who comes today for follow-up of his hypertension and lipids.  He is doing well.  He has no reported problems or complaints.    Current meds have been verified and updated per the EMR  Exam:/72   Pulse (!) 55   Temp 97 °F (36.1 °C) (Tympanic)   Ht 5' 9" (1.753 m)   Wt 81.8 kg (180 lb 5.4 oz)   SpO2 95%   BMI 26.63 kg/m²   Carotids 2+ equal without bruits  Chest clear  Cardiovascular regular rate and rhythm without murmur gallop or rub    Lab Results   Component Value Date    WBC 7.58 10/10/2018    HGB 15.4 10/10/2018    HCT 48.5 10/10/2018     10/10/2018    CHOL 162 12/05/2018    TRIG 114 12/05/2018    HDL 49 12/05/2018    ALT 15 05/25/2018    AST 22 05/25/2018     12/05/2018    K 3.9 12/05/2018     12/05/2018    CREATININE 1.4 12/05/2018    BUN 22 12/05/2018    CO2 32 (H) 12/05/2018    TSH 3.363 05/25/2018    PSA 1.4 07/07/2017       Impression:  Multiple medical problems below, stable  Patient Active Problem List   Diagnosis    BPH (benign prostatic hyperplasia)    Hyperlipemia    Essential hypertension    Choroidal nevus, right eye    Macular chorioretinal scar of left eye    Chronic kidney disease, stage III (moderate)    SNHL (sensorineural hearing loss)s/p implant on rt    Atherosclerosis of aorta    History of colon polyps    Bilateral hearing loss    Senile ectropion of right lower eyelid    Dry eye    Blepharitis of upper and lower eyelids of both eyes       Plan:     Patient will see me again in June with lab work.  Medications remain the same    "

## 2019-06-05 ENCOUNTER — LAB VISIT (OUTPATIENT)
Dept: LAB | Facility: HOSPITAL | Age: 84
End: 2019-06-05
Attending: INTERNAL MEDICINE
Payer: MEDICARE

## 2019-06-05 DIAGNOSIS — I10 ESSENTIAL HYPERTENSION: Chronic | ICD-10-CM

## 2019-06-05 LAB
ALBUMIN SERPL BCP-MCNC: 3.5 G/DL (ref 3.5–5.2)
ALP SERPL-CCNC: 92 U/L (ref 55–135)
ALT SERPL W/O P-5'-P-CCNC: 18 U/L (ref 10–44)
ANION GAP SERPL CALC-SCNC: 6 MMOL/L (ref 8–16)
AST SERPL-CCNC: 24 U/L (ref 10–40)
BASOPHILS # BLD AUTO: 0.04 K/UL (ref 0–0.2)
BASOPHILS NFR BLD: 0.6 % (ref 0–1.9)
BILIRUB SERPL-MCNC: 0.8 MG/DL (ref 0.1–1)
BUN SERPL-MCNC: 30 MG/DL (ref 8–23)
CALCIUM SERPL-MCNC: 9.9 MG/DL (ref 8.7–10.5)
CHLORIDE SERPL-SCNC: 107 MMOL/L (ref 95–110)
CHOLEST SERPL-MCNC: 125 MG/DL (ref 120–199)
CHOLEST/HDLC SERPL: 2.9 {RATIO} (ref 2–5)
CO2 SERPL-SCNC: 29 MMOL/L (ref 23–29)
CREAT SERPL-MCNC: 1.9 MG/DL (ref 0.5–1.4)
DIFFERENTIAL METHOD: ABNORMAL
EOSINOPHIL # BLD AUTO: 0.5 K/UL (ref 0–0.5)
EOSINOPHIL NFR BLD: 7.1 % (ref 0–8)
ERYTHROCYTE [DISTWIDTH] IN BLOOD BY AUTOMATED COUNT: 13.8 % (ref 11.5–14.5)
EST. GFR  (AFRICAN AMERICAN): 36.6 ML/MIN/1.73 M^2
EST. GFR  (NON AFRICAN AMERICAN): 31.7 ML/MIN/1.73 M^2
GLUCOSE SERPL-MCNC: 93 MG/DL (ref 70–110)
HCT VFR BLD AUTO: 44.5 % (ref 40–54)
HDLC SERPL-MCNC: 43 MG/DL (ref 40–75)
HDLC SERPL: 34.4 % (ref 20–50)
HGB BLD-MCNC: 13.9 G/DL (ref 14–18)
IMM GRANULOCYTES # BLD AUTO: 0.02 K/UL (ref 0–0.04)
IMM GRANULOCYTES NFR BLD AUTO: 0.3 % (ref 0–0.5)
LDLC SERPL CALC-MCNC: 56 MG/DL (ref 63–159)
LYMPHOCYTES # BLD AUTO: 1.7 K/UL (ref 1–4.8)
LYMPHOCYTES NFR BLD: 24.1 % (ref 18–48)
MCH RBC QN AUTO: 29.8 PG (ref 27–31)
MCHC RBC AUTO-ENTMCNC: 31.2 G/DL (ref 32–36)
MCV RBC AUTO: 96 FL (ref 82–98)
MONOCYTES # BLD AUTO: 0.6 K/UL (ref 0.3–1)
MONOCYTES NFR BLD: 8.7 % (ref 4–15)
NEUTROPHILS # BLD AUTO: 4.2 K/UL (ref 1.8–7.7)
NEUTROPHILS NFR BLD: 59.2 % (ref 38–73)
NONHDLC SERPL-MCNC: 82 MG/DL
NRBC BLD-RTO: 0 /100 WBC
PLATELET # BLD AUTO: 217 K/UL (ref 150–350)
PMV BLD AUTO: 11.6 FL (ref 9.2–12.9)
POTASSIUM SERPL-SCNC: 4.3 MMOL/L (ref 3.5–5.1)
PROT SERPL-MCNC: 7.7 G/DL (ref 6–8.4)
RBC # BLD AUTO: 4.66 M/UL (ref 4.6–6.2)
SODIUM SERPL-SCNC: 142 MMOL/L (ref 136–145)
TRIGL SERPL-MCNC: 130 MG/DL (ref 30–150)
TSH SERPL DL<=0.005 MIU/L-ACNC: 2.65 UIU/ML (ref 0.4–4)
WBC # BLD AUTO: 7.15 K/UL (ref 3.9–12.7)

## 2019-06-05 PROCEDURE — 80061 LIPID PANEL: CPT | Mod: HCNC

## 2019-06-05 PROCEDURE — 80053 COMPREHEN METABOLIC PANEL: CPT | Mod: HCNC

## 2019-06-05 PROCEDURE — 36415 COLL VENOUS BLD VENIPUNCTURE: CPT | Mod: HCNC,PO

## 2019-06-05 PROCEDURE — 85025 COMPLETE CBC W/AUTO DIFF WBC: CPT | Mod: HCNC

## 2019-06-05 PROCEDURE — 84443 ASSAY THYROID STIM HORMONE: CPT | Mod: HCNC

## 2019-06-12 ENCOUNTER — OFFICE VISIT (OUTPATIENT)
Dept: INTERNAL MEDICINE | Facility: CLINIC | Age: 84
End: 2019-06-12
Payer: MEDICARE

## 2019-06-12 VITALS
TEMPERATURE: 97 F | WEIGHT: 177.94 LBS | OXYGEN SATURATION: 97 % | SYSTOLIC BLOOD PRESSURE: 120 MMHG | BODY MASS INDEX: 26.35 KG/M2 | DIASTOLIC BLOOD PRESSURE: 62 MMHG | HEART RATE: 61 BPM | HEIGHT: 69 IN

## 2019-06-12 DIAGNOSIS — Z00.00 ROUTINE GENERAL MEDICAL EXAMINATION AT A HEALTH CARE FACILITY: Primary | ICD-10-CM

## 2019-06-12 DIAGNOSIS — I10 ESSENTIAL HYPERTENSION: Chronic | ICD-10-CM

## 2019-06-12 DIAGNOSIS — E78.2 MIXED HYPERLIPIDEMIA: Chronic | ICD-10-CM

## 2019-06-12 PROCEDURE — 99397 PER PM REEVAL EST PAT 65+ YR: CPT | Mod: HCNC,S$GLB,, | Performed by: INTERNAL MEDICINE

## 2019-06-12 PROCEDURE — 99499 RISK ADDL DX/OHS AUDIT: ICD-10-PCS | Mod: HCNC,S$GLB,, | Performed by: INTERNAL MEDICINE

## 2019-06-12 PROCEDURE — 99397 PR PREVENTIVE VISIT,EST,65 & OVER: ICD-10-PCS | Mod: HCNC,S$GLB,, | Performed by: INTERNAL MEDICINE

## 2019-06-12 PROCEDURE — 99999 PR PBB SHADOW E&M-EST. PATIENT-LVL III: ICD-10-PCS | Mod: PBBFAC,HCNC,, | Performed by: INTERNAL MEDICINE

## 2019-06-12 PROCEDURE — 3074F PR MOST RECENT SYSTOLIC BLOOD PRESSURE < 130 MM HG: ICD-10-PCS | Mod: HCNC,CPTII,S$GLB, | Performed by: INTERNAL MEDICINE

## 2019-06-12 PROCEDURE — 3074F SYST BP LT 130 MM HG: CPT | Mod: HCNC,CPTII,S$GLB, | Performed by: INTERNAL MEDICINE

## 2019-06-12 PROCEDURE — 3078F PR MOST RECENT DIASTOLIC BLOOD PRESSURE < 80 MM HG: ICD-10-PCS | Mod: HCNC,CPTII,S$GLB, | Performed by: INTERNAL MEDICINE

## 2019-06-12 PROCEDURE — 3078F DIAST BP <80 MM HG: CPT | Mod: HCNC,CPTII,S$GLB, | Performed by: INTERNAL MEDICINE

## 2019-06-12 PROCEDURE — 99499 UNLISTED E&M SERVICE: CPT | Mod: HCNC,S$GLB,, | Performed by: INTERNAL MEDICINE

## 2019-06-12 PROCEDURE — 99999 PR PBB SHADOW E&M-EST. PATIENT-LVL III: CPT | Mod: PBBFAC,HCNC,, | Performed by: INTERNAL MEDICINE

## 2019-06-12 NOTE — PROGRESS NOTES
"HPI:  Patient is an 84-year-old man who comes today for follow-up of hypertension, lipids, chronic kidney disease, coronary disease, and for his annual physical.  He is doing well.  He has no complaints at all.  He denies any chest pain.  His blood pressure has been well controlled.      Current MEDS: medcard review, verified and update  Allergies: Per the electronic medical record    Past Medical History:   Diagnosis Date    BPH (benign prostatic hyperplasia)     Cataract     Chronic kidney disease, stage III (moderate) 7/15/2016    Elevated PSA     HEARING LOSS     History of colon polyps     Hyperlipemia     Hypertension     Myocardial infarction     per ekg and nuclear ett - old scar    Tobacco dependence     resolved       Past Surgical History:   Procedure Laterality Date    APPENDECTOMY  1960    CATARACT EXTRACTION W/  INTRAOCULAR LENS IMPLANT Left 4-20-16    CATARACT EXTRACTION W/  INTRAOCULAR LENS IMPLANT Right 3-16-16    EAR MASTOIDECTOMY W/ COCHLEAR IMPLANT W/ LANDMARK  2011    failed later removed    EYE SURGERY Bilateral 2016    cataract w/ IOL    PCIOL OD Right 03/`16/16    DR. CARPIO    TONSILLECTOMY         SHx: per the electronic medical record    FHx: recorded in the electronic medical record    ROS:    denies any chest pains or shortness of breath. Denies any nausea, vomiting or diarrhea. Denies any fever, chills or sweats. Denies any change in weight, voice, stool, skin or hair. Denies any dysuria, dyspepsia or dysphagia. Denies any change in vision, hearing or headaches. Denies any swollen lymph nodes or loss of memory.    PE:  /62   Pulse 61   Temp 97.2 °F (36.2 °C) (Tympanic)   Ht 5' 9" (1.753 m)   Wt 80.7 kg (177 lb 14.6 oz)   SpO2 97%   BMI 26.27 kg/m²   Gen: Well-developed, well-nourished, male, in no acute distress, oriented x3  HEENT: neck is supple, no adenopathy, carotids 2+ equal without bruits, thyroid exam normal size without nodules.  CHEST: clear to " auscultation and percussion  CVS: regular rate and rhythm without significant murmur, gallop, or rubs  ABD: soft, benign, no rebound no guarding, no distention.  Bowel sounds are normal.     nontender.  No palpable masses.  No organomegaly and no audible bruits.  RECTAL:  Deferred.  EXT: no clubbing, cyanosis, or edema  LYMPH: no cervical, inguinal, or axillary adenopathy  FEET: no loss of sensation.  No ulcers or pressure sores.  NEURO: gait normal.  Cranial nerves II- XII intact. No nystagmus.  Speech normal.   Gross motor and sensory unremarkable.    Lab Results   Component Value Date    WBC 7.15 06/05/2019    HGB 13.9 (L) 06/05/2019    HCT 44.5 06/05/2019     06/05/2019    CHOL 125 06/05/2019    TRIG 130 06/05/2019    HDL 43 06/05/2019    ALT 18 06/05/2019    AST 24 06/05/2019     06/05/2019    K 4.3 06/05/2019     06/05/2019    CREATININE 1.9 (H) 06/05/2019    BUN 30 (H) 06/05/2019    CO2 29 06/05/2019    TSH 2.650 06/05/2019    PSA 1.4 07/07/2017       Impression:  Chronic kidney disease, his creatinine is increased from 1.4 to now being 1.9.  He uses Mobic intermittently for arthritis.  Other medical problems below, stable  Patient Active Problem List   Diagnosis    BPH (benign prostatic hyperplasia)    Hyperlipemia    Essential hypertension    Choroidal nevus, right eye    Macular chorioretinal scar of left eye    Chronic kidney disease, stage III (moderate)    SNHL (sensorineural hearing loss)s/p implant on rt    Atherosclerosis of aorta    History of colon polyps    Bilateral hearing loss    Senile ectropion of right lower eyelid    Dry eye    Blepharitis of upper and lower eyelids of both eyes       Plan:   Orders Placed This Encounter    Basic metabolic panel    He has been told to stop the Mobic altogether.  He should use only Tylenol for aches and pains.  He will see me in 3 months with repeat BMP    This note is generated with speech recognition software and is subject  to transcription error and sound alike phrases that may be missed by proofreading.

## 2019-06-17 RX ORDER — TAMSULOSIN HYDROCHLORIDE 0.4 MG/1
CAPSULE ORAL
Qty: 90 CAPSULE | Refills: 11 | Status: SHIPPED | OUTPATIENT
Start: 2019-06-17 | End: 2019-11-13 | Stop reason: SDUPTHER

## 2019-07-30 ENCOUNTER — OFFICE VISIT (OUTPATIENT)
Dept: INTERNAL MEDICINE | Facility: CLINIC | Age: 84
End: 2019-07-30
Payer: MEDICARE

## 2019-07-30 VITALS
WEIGHT: 177.5 LBS | OXYGEN SATURATION: 98 % | SYSTOLIC BLOOD PRESSURE: 110 MMHG | TEMPERATURE: 97 F | HEIGHT: 69 IN | HEART RATE: 74 BPM | BODY MASS INDEX: 26.29 KG/M2 | DIASTOLIC BLOOD PRESSURE: 76 MMHG | RESPIRATION RATE: 16 BRPM

## 2019-07-30 DIAGNOSIS — H31.012 MACULAR CHORIORETINAL SCAR OF LEFT EYE: ICD-10-CM

## 2019-07-30 DIAGNOSIS — H91.93 BILATERAL HEARING LOSS, UNSPECIFIED HEARING LOSS TYPE: ICD-10-CM

## 2019-07-30 DIAGNOSIS — H01.00A BLEPHARITIS OF UPPER AND LOWER EYELIDS OF BOTH EYES, UNSPECIFIED TYPE: ICD-10-CM

## 2019-07-30 DIAGNOSIS — N18.30 CHRONIC KIDNEY DISEASE, STAGE III (MODERATE): Chronic | ICD-10-CM

## 2019-07-30 DIAGNOSIS — E78.2 MIXED HYPERLIPIDEMIA: Chronic | ICD-10-CM

## 2019-07-30 DIAGNOSIS — D31.31 CHOROIDAL NEVUS, RIGHT EYE: ICD-10-CM

## 2019-07-30 DIAGNOSIS — H90.5 SENSORINEURAL HEARING LOSS (SNHL) OF RIGHT EAR, UNSPECIFIED HEARING STATUS ON CONTRALATERAL SIDE: Chronic | ICD-10-CM

## 2019-07-30 DIAGNOSIS — I10 ESSENTIAL HYPERTENSION: Chronic | ICD-10-CM

## 2019-07-30 DIAGNOSIS — Z00.00 ENCOUNTER FOR PREVENTIVE HEALTH EXAMINATION: Primary | ICD-10-CM

## 2019-07-30 DIAGNOSIS — N40.0 BENIGN PROSTATIC HYPERPLASIA WITHOUT LOWER URINARY TRACT SYMPTOMS: Chronic | ICD-10-CM

## 2019-07-30 DIAGNOSIS — I70.0 ATHEROSCLEROSIS OF AORTA: ICD-10-CM

## 2019-07-30 DIAGNOSIS — H04.129 DRY EYE: ICD-10-CM

## 2019-07-30 DIAGNOSIS — H02.132 SENILE ECTROPION OF RIGHT LOWER EYELID: ICD-10-CM

## 2019-07-30 DIAGNOSIS — H01.00B BLEPHARITIS OF UPPER AND LOWER EYELIDS OF BOTH EYES, UNSPECIFIED TYPE: ICD-10-CM

## 2019-07-30 PROCEDURE — 3074F SYST BP LT 130 MM HG: CPT | Mod: HCNC,CPTII,S$GLB, | Performed by: NURSE PRACTITIONER

## 2019-07-30 PROCEDURE — 99999 PR PBB SHADOW E&M-EST. PATIENT-LVL III: ICD-10-PCS | Mod: PBBFAC,HCNC,, | Performed by: NURSE PRACTITIONER

## 2019-07-30 PROCEDURE — 3074F PR MOST RECENT SYSTOLIC BLOOD PRESSURE < 130 MM HG: ICD-10-PCS | Mod: HCNC,CPTII,S$GLB, | Performed by: NURSE PRACTITIONER

## 2019-07-30 PROCEDURE — 3078F DIAST BP <80 MM HG: CPT | Mod: HCNC,CPTII,S$GLB, | Performed by: NURSE PRACTITIONER

## 2019-07-30 PROCEDURE — G0439 PPPS, SUBSEQ VISIT: HCPCS | Mod: HCNC,S$GLB,, | Performed by: NURSE PRACTITIONER

## 2019-07-30 PROCEDURE — 3078F PR MOST RECENT DIASTOLIC BLOOD PRESSURE < 80 MM HG: ICD-10-PCS | Mod: HCNC,CPTII,S$GLB, | Performed by: NURSE PRACTITIONER

## 2019-07-30 PROCEDURE — 99999 PR PBB SHADOW E&M-EST. PATIENT-LVL III: CPT | Mod: PBBFAC,HCNC,, | Performed by: NURSE PRACTITIONER

## 2019-07-30 PROCEDURE — G0439 PR MEDICARE ANNUAL WELLNESS SUBSEQUENT VISIT: ICD-10-PCS | Mod: HCNC,S$GLB,, | Performed by: NURSE PRACTITIONER

## 2019-07-30 NOTE — PROGRESS NOTES
"Harman Johnston presented for a  Medicare AWV and comprehensive Health Risk Assessment today. The following components were reviewed and updated:    · Medical history  · Family History  · Social history  · Allergies and Current Medications  · Health Risk Assessment  · Health Maintenance  · Care Team     ** See Completed Assessments for Annual Wellness Visit within the encounter summary.**       The following assessments were completed:  · Living Situation  · CAGE  · Depression Screening  · Timed Get Up and Go  · Whisper Test  · Cognitive Function Screening  · Nutrition Screening  · ADL Screening  · PAQ Screening    Vitals:    07/30/19 1342   BP: 110/76   Pulse: 74   Resp: 16   Temp: 97.4 °F (36.3 °C)   SpO2: 98%   Weight: 80.5 kg (177 lb 7.5 oz)   Height: 5' 9" (1.753 m)     Body mass index is 26.21 kg/m².  Physical Exam   Constitutional: He is oriented to person, place, and time. He appears well-developed and well-nourished. No distress.   HENT:   Head: Normocephalic and atraumatic.   Mouth/Throat: Oropharynx is clear and moist.   Bilateral hearing aids   Eyes: Conjunctivae are normal.   Neck: Normal range of motion. Neck supple. No JVD present. No tracheal deviation present. No thyromegaly present.   No carotid bruits   Cardiovascular: Normal rate, regular rhythm, normal heart sounds and intact distal pulses. Exam reveals no gallop and no friction rub.   No murmur heard.  Pulmonary/Chest: Effort normal and breath sounds normal. No respiratory distress. He has no wheezes. He has no rales. He exhibits no tenderness.   Abdominal: Soft. Bowel sounds are normal. He exhibits no distension and no mass. There is no tenderness. There is no rebound and no guarding. No hernia.   Musculoskeletal: Normal range of motion. He exhibits no edema or tenderness.   Lymphadenopathy:     He has no cervical adenopathy.   Neurological: He is alert and oriented to person, place, and time. No cranial nerve deficit.   Skin: Skin is warm and " dry. He is not diaphoretic.   Psychiatric: He has a normal mood and affect. His behavior is normal.         Diagnoses and health risks identified today and associated recommendations/orders:    1. Encounter for preventive health examination  Screenings performed, as noted above.  Personal preventative testing needs reviewed.     2. Blepharitis of upper and lower eyelids of both eyes, unspecified type  Monitored/treated on meds, continue the same tx, stable, sees Dr Muro    3. Choroidal nevus, right eye  Monitored/treated on meds, continue the same tx, stable, sees Dr Muro    4. Dry eye  Monitored/treated on meds, continue the same tx, stable, sees Dr Muro    5. Macular chorioretinal scar of left eye  Monitored/treated on meds, continue the same tx, stable, sees Dr Muro    6. Senile ectropion of right lower eyelid  Monitored/treated on meds, continue the same tx, stable, sees Dr Muro    7. Bilateral hearing loss, unspecified hearing loss type  Monitored/treated on meds, continue the same tx, stable, sees Dr Bueno    8. Sensorineural hearing loss (SNHL) of right ear, unspecified hearing status on contralateral side  Monitored/treated on meds, continue the same tx, stable, sees Dr Bueno    9. Atherosclerosis of aorta  Monitored/treated on meds, continue the same tx, stable, no chest pain    10. Essential hypertension  Monitored/treated on meds, continue the same tx, stable    11. Mixed hyperlipidemia  Monitored/treated on meds, continue the same tx, stable, last LDL 56.0    12. Benign prostatic hyperplasia without lower urinary tract symptoms  Monitored/treated on meds, continue the same tx, stable, sees Dr Contreras    13. Chronic kidney disease, stage III (moderate)  Monitored/treated on meds, continue the same tx, stable      Provided Harman with a 5-10 year written screening schedule and personal prevention plan. Recommendations were developed using the USPSTF age appropriate recommendations. Education,  counseling, and referrals were provided as needed. After Visit Summary printed and given to patient which includes a list of additional screenings\tests needed.    No follow-ups on file.    Vasile Reed NP

## 2019-07-30 NOTE — PATIENT INSTRUCTIONS
Counseling and Referral of Other Preventative  (Italic type indicates deductible and co-insurance are waived)    Patient Name: Harman Johnston  Today's Date: 7/30/2019    Health Maintenance       Date Due Completion Date    TETANUS VACCINE 09/30/1952 ---    Shingles Vaccine (1 of 2) 09/30/1984 ---    Influenza Vaccine 08/01/2019 10/1/2018    Fecal Occult Blood Test (FOBT)/[employer] 08/02/2019 8/2/2018    Lipid Panel 06/05/2020 6/5/2019        No orders of the defined types were placed in this encounter.    The following information is provided to all patients.  This information is to help you find resources for any of the problems found today that may be affecting your health:                Living healthy guide: www.[facility].gov      Understanding Diabetes: www.diabetes.org      Eating healthy: www.cdc.gov/healthyweight      [facility] home safety checklist: www.cdc.gov/steadi/patient.html      Agency on Aging: www.goea.[place].gov      Alcoholics anonymous (AA): www.aa.org      Physical Activity: www.[place].nih.gov/wa6qyjx      Tobacco use: www.quitwithusla.org

## 2019-08-01 ENCOUNTER — TELEPHONE (OUTPATIENT)
Dept: UROLOGY | Facility: CLINIC | Age: 84
End: 2019-08-01

## 2019-08-01 DIAGNOSIS — Z12.5 ENCOUNTER FOR SCREENING FOR MALIGNANT NEOPLASM OF PROSTATE: ICD-10-CM

## 2019-08-01 DIAGNOSIS — N40.0 BENIGN PROSTATIC HYPERPLASIA, UNSPECIFIED WHETHER LOWER URINARY TRACT SYMPTOMS PRESENT: Primary | ICD-10-CM

## 2019-08-01 NOTE — TELEPHONE ENCOUNTER
Pt was asking for a PSA level to be done before appointment with Dr Contreras.  Dr Contreras ok'ed the order.  Labs ordered for labs at [place].  Called and spoke with Susanne isidro's wife to tell her that labs were ordered and will be drawn on 9-4 with other labs that were ordered by his pcp

## 2019-08-02 ENCOUNTER — OFFICE VISIT (OUTPATIENT)
Dept: OPHTHALMOLOGY | Facility: CLINIC | Age: 84
End: 2019-08-02
Payer: MEDICARE

## 2019-08-02 DIAGNOSIS — H02.132 SENILE ECTROPION OF RIGHT LOWER EYELID: ICD-10-CM

## 2019-08-02 DIAGNOSIS — H52.223 REGULAR ASTIGMATISM OF BOTH EYES: ICD-10-CM

## 2019-08-02 DIAGNOSIS — H52.4 PRESBYOPIA: ICD-10-CM

## 2019-08-02 DIAGNOSIS — Z96.1 PSEUDOPHAKIA OF BOTH EYES: ICD-10-CM

## 2019-08-02 DIAGNOSIS — H04.129 DRY EYE: ICD-10-CM

## 2019-08-02 DIAGNOSIS — H31.012 MACULAR SCAR, LEFT: ICD-10-CM

## 2019-08-02 DIAGNOSIS — Z13.5 SCREENING FOR GLAUCOMA: ICD-10-CM

## 2019-08-02 DIAGNOSIS — D31.31 CHOROIDAL NEVUS, RIGHT: Primary | ICD-10-CM

## 2019-08-02 PROCEDURE — 99999 PR PBB SHADOW E&M-EST. PATIENT-LVL II: CPT | Mod: PBBFAC,HCNC,, | Performed by: OPTOMETRIST

## 2019-08-02 PROCEDURE — 99999 PR PBB SHADOW E&M-EST. PATIENT-LVL II: ICD-10-PCS | Mod: PBBFAC,HCNC,, | Performed by: OPTOMETRIST

## 2019-08-02 PROCEDURE — 92014 COMPRE OPH EXAM EST PT 1/>: CPT | Mod: HCNC,S$GLB,, | Performed by: OPTOMETRIST

## 2019-08-02 PROCEDURE — 92014 PR EYE EXAM, EST PATIENT,COMPREHESV: ICD-10-PCS | Mod: HCNC,S$GLB,, | Performed by: OPTOMETRIST

## 2019-08-02 NOTE — PROGRESS NOTES
HPI        Last eye exam 06/08/2018 [facility].  Update glasses RX.  HX of   1.PCIOL OD 03/16/16   2.PCIOL OS 4/20/16  3. Chorioretinal scars OD  4. Choroidal nevus OD    Last edited by Hannah Felix on 8/2/2019  3:09 PM. (History)            Assessment /Plan     For exam results, see Encounter Report.    Choroidal nevus, right    Macular scar, left    Senile ectropion of right lower eyelid    Dry eye    Pseudophakia of both eyes    Screening for glaucoma    Regular astigmatism of both eyes    Presbyopia      Stable PIOL OU.  Stable CR nevus OD and macular scar OS. OD lower lid ectropion (senile with chronic blepharitis) with little response to david/HC.  I again suggest he see Dr. Muro at his convenience for an ectropion evaluation of the right lower eyelid. OH OK OU otherwise.  Spec Rx given.  RTC one year.

## 2019-09-04 ENCOUNTER — LAB VISIT (OUTPATIENT)
Dept: LAB | Facility: HOSPITAL | Age: 84
End: 2019-09-04
Attending: INTERNAL MEDICINE
Payer: MEDICARE

## 2019-09-04 DIAGNOSIS — N40.0 BENIGN PROSTATIC HYPERPLASIA, UNSPECIFIED WHETHER LOWER URINARY TRACT SYMPTOMS PRESENT: ICD-10-CM

## 2019-09-04 DIAGNOSIS — I10 ESSENTIAL HYPERTENSION: Chronic | ICD-10-CM

## 2019-09-04 DIAGNOSIS — Z12.5 ENCOUNTER FOR SCREENING FOR MALIGNANT NEOPLASM OF PROSTATE: ICD-10-CM

## 2019-09-04 LAB
ANION GAP SERPL CALC-SCNC: 10 MMOL/L (ref 8–16)
BUN SERPL-MCNC: 34 MG/DL (ref 8–23)
CALCIUM SERPL-MCNC: 9.5 MG/DL (ref 8.7–10.5)
CHLORIDE SERPL-SCNC: 104 MMOL/L (ref 95–110)
CO2 SERPL-SCNC: 25 MMOL/L (ref 23–29)
CREAT SERPL-MCNC: 2.2 MG/DL (ref 0.5–1.4)
EST. GFR  (AFRICAN AMERICAN): 30.7 ML/MIN/1.73 M^2
EST. GFR  (NON AFRICAN AMERICAN): 26.5 ML/MIN/1.73 M^2
GLUCOSE SERPL-MCNC: 83 MG/DL (ref 70–110)
POTASSIUM SERPL-SCNC: 4 MMOL/L (ref 3.5–5.1)
SODIUM SERPL-SCNC: 139 MMOL/L (ref 136–145)

## 2019-09-04 PROCEDURE — 80048 BASIC METABOLIC PNL TOTAL CA: CPT | Mod: HCNC

## 2019-09-04 PROCEDURE — 36415 COLL VENOUS BLD VENIPUNCTURE: CPT | Mod: HCNC,PO

## 2019-09-04 PROCEDURE — 84153 ASSAY OF PSA TOTAL: CPT | Mod: HCNC

## 2019-09-05 ENCOUNTER — TELEPHONE (OUTPATIENT)
Dept: UROLOGY | Facility: CLINIC | Age: 84
End: 2019-09-05

## 2019-09-05 DIAGNOSIS — Z12.5 ENCOUNTER FOR SCREENING FOR MALIGNANT NEOPLASM OF PROSTATE: ICD-10-CM

## 2019-09-05 DIAGNOSIS — R97.20 ELEVATED PSA: Primary | ICD-10-CM

## 2019-09-05 LAB — COMPLEXED PSA SERPL-MCNC: 16.1 NG/ML (ref 0–4)

## 2019-09-05 NOTE — TELEPHONE ENCOUNTER
Spoke with pt's wife, scheduled psa recheck for tomorrow at [facility] & confirmed appt for 9/9.

## 2019-09-06 ENCOUNTER — LAB VISIT (OUTPATIENT)
Dept: LAB | Facility: HOSPITAL | Age: 84
End: 2019-09-06
Attending: UROLOGY
Payer: MEDICARE

## 2019-09-06 DIAGNOSIS — R97.20 ELEVATED PSA: ICD-10-CM

## 2019-09-06 DIAGNOSIS — Z12.5 ENCOUNTER FOR SCREENING FOR MALIGNANT NEOPLASM OF PROSTATE: ICD-10-CM

## 2019-09-06 LAB — COMPLEXED PSA SERPL-MCNC: 17.2 NG/ML (ref 0–4)

## 2019-09-06 PROCEDURE — 84153 ASSAY OF PSA TOTAL: CPT | Mod: HCNC

## 2019-09-06 PROCEDURE — 36415 COLL VENOUS BLD VENIPUNCTURE: CPT | Mod: HCNC,PO

## 2019-09-09 ENCOUNTER — OFFICE VISIT (OUTPATIENT)
Dept: UROLOGY | Facility: CLINIC | Age: 84
End: 2019-09-09
Payer: MEDICARE

## 2019-09-09 VITALS
HEIGHT: 69 IN | DIASTOLIC BLOOD PRESSURE: 64 MMHG | SYSTOLIC BLOOD PRESSURE: 118 MMHG | BODY MASS INDEX: 25.83 KG/M2 | HEART RATE: 51 BPM | WEIGHT: 174.38 LBS

## 2019-09-09 DIAGNOSIS — R97.20 ELEVATED PSA: Primary | ICD-10-CM

## 2019-09-09 DIAGNOSIS — Z12.5 PROSTATE CANCER SCREENING: ICD-10-CM

## 2019-09-09 LAB
BILIRUB SERPL-MCNC: NORMAL MG/DL
BLOOD URINE, POC: NORMAL
COLOR, POC UA: YELLOW
GLUCOSE UR QL STRIP: NORMAL
KETONES UR QL STRIP: NORMAL
LEUKOCYTE ESTERASE URINE, POC: NORMAL
NITRITE, POC UA: NORMAL
PH, POC UA: 5
PROTEIN, POC: NORMAL
SPECIFIC GRAVITY, POC UA: 1.01
UROBILINOGEN, POC UA: NORMAL

## 2019-09-09 PROCEDURE — 3074F SYST BP LT 130 MM HG: CPT | Mod: HCNC,CPTII,S$GLB, | Performed by: UROLOGY

## 2019-09-09 PROCEDURE — 81002 URINALYSIS NONAUTO W/O SCOPE: CPT | Mod: HCNC,S$GLB,, | Performed by: UROLOGY

## 2019-09-09 PROCEDURE — 99499 RISK ADDL DX/OHS AUDIT: ICD-10-PCS | Mod: HCNC,S$GLB,, | Performed by: UROLOGY

## 2019-09-09 PROCEDURE — 99499 UNLISTED E&M SERVICE: CPT | Mod: HCNC,S$GLB,, | Performed by: UROLOGY

## 2019-09-09 PROCEDURE — 1101F PT FALLS ASSESS-DOCD LE1/YR: CPT | Mod: HCNC,CPTII,S$GLB, | Performed by: UROLOGY

## 2019-09-09 PROCEDURE — 3078F PR MOST RECENT DIASTOLIC BLOOD PRESSURE < 80 MM HG: ICD-10-PCS | Mod: HCNC,CPTII,S$GLB, | Performed by: UROLOGY

## 2019-09-09 PROCEDURE — 99999 PR PBB SHADOW E&M-EST. PATIENT-LVL III: CPT | Mod: PBBFAC,HCNC,, | Performed by: UROLOGY

## 2019-09-09 PROCEDURE — 99999 PR PBB SHADOW E&M-EST. PATIENT-LVL III: ICD-10-PCS | Mod: PBBFAC,HCNC,, | Performed by: UROLOGY

## 2019-09-09 PROCEDURE — 3074F PR MOST RECENT SYSTOLIC BLOOD PRESSURE < 130 MM HG: ICD-10-PCS | Mod: HCNC,CPTII,S$GLB, | Performed by: UROLOGY

## 2019-09-09 PROCEDURE — 1101F PR PT FALLS ASSESS DOC 0-1 FALLS W/OUT INJ PAST YR: ICD-10-PCS | Mod: HCNC,CPTII,S$GLB, | Performed by: UROLOGY

## 2019-09-09 PROCEDURE — 81002 POCT URINE DIPSTICK WITHOUT MICROSCOPE: ICD-10-PCS | Mod: HCNC,S$GLB,, | Performed by: UROLOGY

## 2019-09-09 PROCEDURE — 99214 OFFICE O/P EST MOD 30 MIN: CPT | Mod: 25,HCNC,S$GLB, | Performed by: UROLOGY

## 2019-09-09 PROCEDURE — 3078F DIAST BP <80 MM HG: CPT | Mod: HCNC,CPTII,S$GLB, | Performed by: UROLOGY

## 2019-09-09 PROCEDURE — 99214 PR OFFICE/OUTPT VISIT, EST, LEVL IV, 30-39 MIN: ICD-10-PCS | Mod: 25,HCNC,S$GLB, | Performed by: UROLOGY

## 2019-09-09 RX ORDER — CIPROFLOXACIN 500 MG/1
500 TABLET ORAL EVERY 12 HOURS
Qty: 3 TABLET | Refills: 0 | Status: SHIPPED | OUTPATIENT
Start: 2019-09-09 | End: 2019-11-13

## 2019-09-09 NOTE — PROGRESS NOTES
Chief Complaint: Prostate Cancer screening    HPI:   9/9/19: Voiding okay no sig problems.    7/18/18: No problems in last years, doing okay except he had some breast tenderness and it seems he is off the finasteride and that got better. Reviewed history in detail.  7/12/17: PSA down sig on finasteride, no LUTS.  7/8/16: 82 yo man with elevated PSA was started on finasteride and with expectation of improving the value.  No urinary bother on flomax/finasteride seein improvement. PSA dramatically lower in last 3 mo    Allergies:  Patient has no known allergies.    Medications:  has a current medication list which includes the following prescription(s): aspirin, atenolol-chlorthalidone, atorvastatin, and tamsulosin.    Review of Systems:  General: No fever, chills, fatigability, or weight loss.  Skin: No rashes, itching, or changes in color or texture of skin.  Chest: Denies COTTON, cyanosis, wheezing, cough, and sputum production.  Abdomen: Appetite fine. No weight loss. Denies diarrhea, abdominal pain, hematemesis, or blood in stool.  Musculoskeletal: No joint stiffness or swelling. Denies back pain.  : As above.  All other review of systems negative.    PMH:   has a past medical history of BPH (benign prostatic hyperplasia), Cataract, Chronic kidney disease, stage III (moderate) (7/15/2016), Elevated PSA, HEARING LOSS, History of colon polyps, Hyperlipemia, Hypertension, Myocardial infarction, and Tobacco dependence.    PSH:   has a past surgical history that includes Ear mastoidectomy w/ cochlear implant w/ landmark (2011); Tonsillectomy; PCIOL OD (Right, 03/`16/16); Cataract extraction w/  intraocular lens implant (Left, 4-20-16); Cataract extraction w/  intraocular lens implant (Right, 3-16-16); Appendectomy (1960); and Eye surgery (Bilateral, 2016).    FamHx: family history includes Breast cancer in his sister; Cancer in his brother, sister, and sister; Hyperlipidemia in his mother; Macular degeneration in his  sister and sister; Stroke in his mother.    SocHx:  reports that he quit smoking about 19 years ago. His smoking use included cigarettes. He has a 10.00 pack-year smoking history. He has never used smokeless tobacco. He reports that he drinks about 0.6 oz of alcohol per week. He reports that he does not use drugs.      Physical Exam:  Vitals:    09/09/19 1620   BP: 118/64   Pulse: (!) 51     General: A&Ox3, no apparent distress, no deformities  Neck: No masses, normal thyroid  Lungs: normal inspiration, no use of accessory muscles  Heart: normal pulse, no arrhythmias  Abdomen: Soft, NT, ND  Skin: The skin is warm and dry. No jaundice.  Ext: No c/c/e.  :   8/18: Test desc maira, no abnormalities of epididymus. Penis normal, with normal penile and scrotal skin. Meatus normal. Normal rectal tone, no hemorrhoids. Prost 40 gm no nodules or masses appreciated. SV not palpable. Perineum and anus normal.    Labs/Studies:   PSA    2010-15: 7-9    3/16: 11.4    6/16: 2.7    7/17: 1.4    9/19: 16.1; 17.2    Impression/Plan:   1. Sig worry for prostate cancer. Cr/MRI/Cipro -> prostate biopsy.

## 2019-09-11 ENCOUNTER — OFFICE VISIT (OUTPATIENT)
Dept: INTERNAL MEDICINE | Facility: CLINIC | Age: 84
End: 2019-09-11
Payer: MEDICARE

## 2019-09-11 VITALS
WEIGHT: 175.25 LBS | DIASTOLIC BLOOD PRESSURE: 64 MMHG | HEART RATE: 51 BPM | OXYGEN SATURATION: 97 % | HEIGHT: 67 IN | TEMPERATURE: 97 F | BODY MASS INDEX: 27.51 KG/M2 | SYSTOLIC BLOOD PRESSURE: 122 MMHG

## 2019-09-11 DIAGNOSIS — I10 ESSENTIAL HYPERTENSION: Primary | Chronic | ICD-10-CM

## 2019-09-11 DIAGNOSIS — N18.4 CKD (CHRONIC KIDNEY DISEASE), STAGE IV: ICD-10-CM

## 2019-09-11 PROCEDURE — 3074F SYST BP LT 130 MM HG: CPT | Mod: HCNC,CPTII,S$GLB, | Performed by: INTERNAL MEDICINE

## 2019-09-11 PROCEDURE — 99213 OFFICE O/P EST LOW 20 MIN: CPT | Mod: HCNC,S$GLB,, | Performed by: INTERNAL MEDICINE

## 2019-09-11 PROCEDURE — 99499 RISK ADDL DX/OHS AUDIT: ICD-10-PCS | Mod: HCNC,S$GLB,, | Performed by: INTERNAL MEDICINE

## 2019-09-11 PROCEDURE — 3078F DIAST BP <80 MM HG: CPT | Mod: HCNC,CPTII,S$GLB, | Performed by: INTERNAL MEDICINE

## 2019-09-11 PROCEDURE — 99499 UNLISTED E&M SERVICE: CPT | Mod: HCNC,S$GLB,, | Performed by: INTERNAL MEDICINE

## 2019-09-11 PROCEDURE — 99999 PR PBB SHADOW E&M-EST. PATIENT-LVL III: ICD-10-PCS | Mod: PBBFAC,HCNC,, | Performed by: INTERNAL MEDICINE

## 2019-09-11 PROCEDURE — 1101F PR PT FALLS ASSESS DOC 0-1 FALLS W/OUT INJ PAST YR: ICD-10-PCS | Mod: HCNC,CPTII,S$GLB, | Performed by: INTERNAL MEDICINE

## 2019-09-11 PROCEDURE — 99999 PR PBB SHADOW E&M-EST. PATIENT-LVL III: CPT | Mod: PBBFAC,HCNC,, | Performed by: INTERNAL MEDICINE

## 2019-09-11 PROCEDURE — 3074F PR MOST RECENT SYSTOLIC BLOOD PRESSURE < 130 MM HG: ICD-10-PCS | Mod: HCNC,CPTII,S$GLB, | Performed by: INTERNAL MEDICINE

## 2019-09-11 PROCEDURE — 99213 PR OFFICE/OUTPT VISIT, EST, LEVL III, 20-29 MIN: ICD-10-PCS | Mod: HCNC,S$GLB,, | Performed by: INTERNAL MEDICINE

## 2019-09-11 PROCEDURE — 1101F PT FALLS ASSESS-DOCD LE1/YR: CPT | Mod: HCNC,CPTII,S$GLB, | Performed by: INTERNAL MEDICINE

## 2019-09-11 PROCEDURE — 3078F PR MOST RECENT DIASTOLIC BLOOD PRESSURE < 80 MM HG: ICD-10-PCS | Mod: HCNC,CPTII,S$GLB, | Performed by: INTERNAL MEDICINE

## 2019-09-11 NOTE — PROGRESS NOTES
"HPI:  Patient is 84-year-old man who comes in today for recheck of his chronic kidney disease.  Patient's creatinine had increased from 1.32 up to 1.9 at his last visit 3 months ago.  His meloxicam was discontinued.  He has been faithful and not taking any anti-inflammatories for the last 3 months.  He comes today for follow-up.  He is doing fine.  He has no complaints    Current meds have been verified and updated per the EMR  Exam:/64 (BP Location: Right arm, Patient Position: Sitting, BP Method: Medium (Manual))   Pulse (!) 51   Temp 97.4 °F (36.3 °C) (Tympanic)   Ht 5' 7" (1.702 m)   Wt 79.5 kg (175 lb 4.3 oz)   SpO2 97%   BMI 27.45 kg/m²   Exam deferred    Lab Results   Component Value Date    WBC 7.15 06/05/2019    HGB 13.9 (L) 06/05/2019    HCT 44.5 06/05/2019     06/05/2019    CHOL 125 06/05/2019    TRIG 130 06/05/2019    HDL 43 06/05/2019    ALT 18 06/05/2019    AST 24 06/05/2019     09/04/2019    K 4.0 09/04/2019     09/04/2019    CREATININE 2.2 (H) 09/04/2019    BUN 34 (H) 09/04/2019    CO2 25 09/04/2019    TSH 2.650 06/05/2019    PSA 17.2 (H) 09/06/2019       Impression:  Chronic kidney disease, progressive.  Multiple medical problems below, stable  Patient Active Problem List   Diagnosis    BPH (benign prostatic hyperplasia)    Hyperlipemia    Essential hypertension    Choroidal nevus, right eye    Macular chorioretinal scar of left eye    SNHL (sensorineural hearing loss)s/p implant on rt    Atherosclerosis of aorta    History of colon polyps    Bilateral hearing loss    Senile ectropion of right lower eyelid    Dry eye    Blepharitis of upper and lower eyelids of both eyes    CKD (chronic kidney disease), stage IV       Plan:  Orders Placed This Encounter    Comprehensive metabolic panel    Lipid panel    TSH    CBC auto differential    PTH, intact    PHOSPHORUS    Ambulatory consult to Nephrology     Patient was referred to see Nephrology.  He will " see me again in 6 months with the other lab work above    This note is generated with speech recognition software and is subject to transcription error and sound alike phrases that may be missed by proofreading.

## 2019-09-19 ENCOUNTER — TELEPHONE (OUTPATIENT)
Dept: UROLOGY | Facility: CLINIC | Age: 84
End: 2019-09-19

## 2019-09-19 NOTE — TELEPHONE ENCOUNTER
Scheduled lab for 9/23 prior to MRI. Spoke with pt's wife & explained need to be there an hour early for labs. She verbalized understanding.

## 2019-09-19 NOTE — TELEPHONE ENCOUNTER
----- Message from Yamilka Bee RT sent at 9/19/2019  1:10 PM CDT -----  Regarding: MRI Labs  Can someone order a ASAP Creatinine on Mr Johnston and order it one hour before his 3:00 MRI appointment on Monday 23rd. Thank you!

## 2019-09-20 ENCOUNTER — TELEPHONE (OUTPATIENT)
Dept: RADIOLOGY | Facility: HOSPITAL | Age: 84
End: 2019-09-20

## 2019-10-02 ENCOUNTER — TELEPHONE (OUTPATIENT)
Dept: RADIOLOGY | Facility: HOSPITAL | Age: 84
End: 2019-10-02

## 2019-10-03 ENCOUNTER — HOSPITAL ENCOUNTER (OUTPATIENT)
Dept: RADIOLOGY | Facility: HOSPITAL | Age: 84
Discharge: HOME | End: 2019-10-03
Attending: UROLOGY
Payer: MEDICARE

## 2019-10-03 DIAGNOSIS — Z12.5 PROSTATE CANCER SCREENING: ICD-10-CM

## 2019-10-03 DIAGNOSIS — R97.20 ELEVATED PSA: ICD-10-CM

## 2019-10-03 PROCEDURE — 72195 MRI PELVIS WITHOUT CONTRAST: ICD-10-PCS | Mod: 26,HCNC,, | Performed by: RADIOLOGY

## 2019-10-03 PROCEDURE — 72195 MRI PELVIS W/O DYE: CPT | Mod: 26,HCNC,, | Performed by: RADIOLOGY

## 2019-10-03 PROCEDURE — 72195 MRI PELVIS W/O DYE: CPT | Mod: TC,HCNC

## 2019-10-08 ENCOUNTER — IMMUNIZATION (OUTPATIENT)
Dept: FAMILY MEDICINE | Facility: CLINIC | Age: 84
End: 2019-10-08
Payer: MEDICARE

## 2019-10-08 PROCEDURE — G0008 ADMIN INFLUENZA VIRUS VAC: HCPCS | Mod: HCNC,S$GLB,, | Performed by: FAMILY MEDICINE

## 2019-10-08 PROCEDURE — 90662 FLU VACCINE - HIGH DOSE (65+) PRESERVATIVE FREE IM: ICD-10-PCS | Mod: HCNC,S$GLB,, | Performed by: FAMILY MEDICINE

## 2019-10-08 PROCEDURE — 99999 PR PBB SHADOW E&M-EST. PATIENT-LVL I: ICD-10-PCS | Mod: PBBFAC,HCNC,,

## 2019-10-08 PROCEDURE — 90662 IIV NO PRSV INCREASED AG IM: CPT | Mod: HCNC,S$GLB,, | Performed by: FAMILY MEDICINE

## 2019-10-08 PROCEDURE — G0008 FLU VACCINE - HIGH DOSE (65+) PRESERVATIVE FREE IM: ICD-10-PCS | Mod: HCNC,S$GLB,, | Performed by: FAMILY MEDICINE

## 2019-10-08 PROCEDURE — 99999 PR PBB SHADOW E&M-EST. PATIENT-LVL I: CPT | Mod: PBBFAC,HCNC,,

## 2019-10-23 ENCOUNTER — PROCEDURE VISIT (OUTPATIENT)
Dept: UROLOGY | Facility: CLINIC | Age: 84
End: 2019-10-23
Payer: MEDICARE

## 2019-10-23 VITALS
WEIGHT: 175.25 LBS | DIASTOLIC BLOOD PRESSURE: 64 MMHG | HEART RATE: 51 BPM | HEIGHT: 67 IN | SYSTOLIC BLOOD PRESSURE: 128 MMHG | BODY MASS INDEX: 27.51 KG/M2

## 2019-10-23 DIAGNOSIS — R97.20 ELEVATED PSA: Primary | ICD-10-CM

## 2019-10-23 PROCEDURE — 88305 TISSUE EXAM BY PATHOLOGIST: CPT | Mod: 26,HCNC,, | Performed by: PATHOLOGY

## 2019-10-23 PROCEDURE — 76872 PR US TRANSRECTAL: ICD-10-PCS | Mod: 26,HCNC,S$GLB, | Performed by: UROLOGY

## 2019-10-23 PROCEDURE — 88305 TISSUE SPECIMEN TO PATHOLOGY, UROLOGY: ICD-10-PCS | Mod: 26,HCNC,, | Performed by: PATHOLOGY

## 2019-10-23 PROCEDURE — 55700 PR BIOPSY OF PROSTATE,NEEDLE/PUNCH: ICD-10-PCS | Mod: HCNC,S$GLB,, | Performed by: UROLOGY

## 2019-10-23 PROCEDURE — 76942 ECHO GUIDE FOR BIOPSY: CPT | Mod: 26,59,HCNC,S$GLB | Performed by: UROLOGY

## 2019-10-23 PROCEDURE — 55700 PR BIOPSY OF PROSTATE,NEEDLE/PUNCH: CPT | Mod: HCNC,S$GLB,, | Performed by: UROLOGY

## 2019-10-23 PROCEDURE — 88305 TISSUE EXAM BY PATHOLOGIST: CPT | Mod: HCNC | Performed by: PATHOLOGY

## 2019-10-23 PROCEDURE — 76942 PR U/S GUIDANCE FOR NEEDLE GUIDANCE: ICD-10-PCS | Mod: 26,59,HCNC,S$GLB | Performed by: UROLOGY

## 2019-10-23 PROCEDURE — 76872 US TRANSRECTAL: CPT | Mod: 26,HCNC,S$GLB, | Performed by: UROLOGY

## 2019-10-23 NOTE — PROCEDURES
Procedures  Chief Complaint: Prostate Cancer screening    HPI:   10/23/19: TRUS/Bx 53 gm.  MRI PIRADS5 multifocal.  9/9/19: Voiding okay no sig problems.    7/18/18: No problems in last years, doing okay except he had some breast tenderness and it seems he is off the finasteride and that got better. Reviewed history in detail.  7/12/17: PSA down sig on finasteride, no LUTS.  7/8/16: 80 yo man with elevated PSA was started on finasteride and with expectation of improving the value.  No urinary bother on flomax/finasteride seein improvement. PSA dramatically lower in last 3 mo    Allergies:  Patient has no known allergies.    Medications:  has a current medication list which includes the following prescription(s): aspirin, atenolol-chlorthalidone, atorvastatin, ciprofloxacin hcl, and tamsulosin.    Review of Systems:  General: No fever, chills, fatigability, or weight loss.  Skin: No rashes, itching, or changes in color or texture of skin.  Chest: Denies COTTON, cyanosis, wheezing, cough, and sputum production.  Abdomen: Appetite fine. No weight loss. Denies diarrhea, abdominal pain, hematemesis, or blood in stool.  Musculoskeletal: No joint stiffness or swelling. Denies back pain.  : As above.  All other review of systems negative.    PMH:   has a past medical history of BPH (benign prostatic hyperplasia), Cataract, CKD (chronic kidney disease), stage IV (07/15/2016), Elevated PSA, HEARING LOSS, History of colon polyps, Hyperlipemia, Hypertension, Myocardial infarction, and Tobacco dependence.    PSH:   has a past surgical history that includes Ear mastoidectomy w/ cochlear implant w/ landmark (2011); Tonsillectomy; PCIOL OD (Right, 03/`16/16); Cataract extraction w/  intraocular lens implant (Left, 4-20-16); Cataract extraction w/  intraocular lens implant (Right, 3-16-16); Appendectomy (1960); and Eye surgery (Bilateral, 2016).    FamHx: family history includes Breast cancer in his sister; Cancer in his brother,  sister, and sister; Hyperlipidemia in his mother; Macular degeneration in his sister and sister; Stroke in his mother.    SocHx:  reports that he quit smoking about 19 years ago. His smoking use included cigarettes. He has a 10.00 pack-year smoking history. He has never used smokeless tobacco. He reports that he drinks about 1.0 standard drinks of alcohol per week. He reports that he does not use drugs.      Physical Exam:  Vitals:    10/23/19 1616   BP: 128/64   Pulse: (!) 51     General: A&Ox3, no apparent distress, no deformities  Neck: No masses, normal thyroid  Lungs: normal inspiration, no use of accessory muscles  Heart: normal pulse, no arrhythmias  Abdomen: Soft, NT, ND  Skin: The skin is warm and dry. No jaundice.  Ext: No c/c/e.  :   8/18: Test desc maira, no abnormalities of epididymus. Penis normal, with normal penile and scrotal skin. Meatus normal. Normal rectal tone, no hemorrhoids. Prost 40 gm no nodules or masses appreciated. SV not palpable. Perineum and anus normal.    Labs/Studies:   PSA    2010-15: 7-9    3/16: 11.4    6/16: 2.7    7/17: 1.4    9/19: 16.1; 17.2    Procedure: (1) Transrectal Prostate Biopsy                      (2) Transrectal ultrasound of prostate                     (3) Ultrasound Guidance of Prostate Biopsy needles    Detail: After proper consents were obtained, the patient was prepped and draped in normal fashion in the left lateral decubitus position for TRUS/Bx.  5 ml of lidocaine jelly was instilled in the rectum.  The U/S with rectal probe was used to size the prostate.  A spinal needle was used and 5ml of 1% lidocaine was instilled on the side of the prostate at the base of the seminal vesicles.  Biopsy was then performed using an 18Ga biopsy needle directed at the base, mid and apex bilaterally for a total of 12 cores. Antibiotic prophylaxis was provided using oral ciprofloxacin.    Findings: The prostate is 46W, 41H, and 53L for volume of 53 grams, with no abnl  apprec.    Impression/Plan:   1. Sig worry for prostate cancer. Cr/MRI/Cipro -> prostate biopsy.

## 2019-10-24 ENCOUNTER — OFFICE VISIT (OUTPATIENT)
Dept: NEPHROLOGY | Facility: CLINIC | Age: 84
End: 2019-10-24
Payer: MEDICARE

## 2019-10-24 VITALS
BODY MASS INDEX: 27.09 KG/M2 | RESPIRATION RATE: 20 BRPM | HEIGHT: 67 IN | HEART RATE: 58 BPM | SYSTOLIC BLOOD PRESSURE: 130 MMHG | WEIGHT: 172.63 LBS | DIASTOLIC BLOOD PRESSURE: 58 MMHG

## 2019-10-24 DIAGNOSIS — N18.4 CKD (CHRONIC KIDNEY DISEASE) STAGE 4, GFR 15-29 ML/MIN: Primary | ICD-10-CM

## 2019-10-24 PROCEDURE — 99999 PR PBB SHADOW E&M-EST. PATIENT-LVL III: ICD-10-PCS | Mod: PBBFAC,HCNC,, | Performed by: INTERNAL MEDICINE

## 2019-10-24 PROCEDURE — 3078F PR MOST RECENT DIASTOLIC BLOOD PRESSURE < 80 MM HG: ICD-10-PCS | Mod: HCNC,CPTII,S$GLB, | Performed by: INTERNAL MEDICINE

## 2019-10-24 PROCEDURE — 99999 PR PBB SHADOW E&M-EST. PATIENT-LVL III: CPT | Mod: PBBFAC,HCNC,, | Performed by: INTERNAL MEDICINE

## 2019-10-24 PROCEDURE — 1101F PR PT FALLS ASSESS DOC 0-1 FALLS W/OUT INJ PAST YR: ICD-10-PCS | Mod: HCNC,CPTII,S$GLB, | Performed by: INTERNAL MEDICINE

## 2019-10-24 PROCEDURE — 3075F SYST BP GE 130 - 139MM HG: CPT | Mod: HCNC,CPTII,S$GLB, | Performed by: INTERNAL MEDICINE

## 2019-10-24 PROCEDURE — 1101F PT FALLS ASSESS-DOCD LE1/YR: CPT | Mod: HCNC,CPTII,S$GLB, | Performed by: INTERNAL MEDICINE

## 2019-10-24 PROCEDURE — 3078F DIAST BP <80 MM HG: CPT | Mod: HCNC,CPTII,S$GLB, | Performed by: INTERNAL MEDICINE

## 2019-10-24 PROCEDURE — 3075F PR MOST RECENT SYSTOLIC BLOOD PRESS GE 130-139MM HG: ICD-10-PCS | Mod: HCNC,CPTII,S$GLB, | Performed by: INTERNAL MEDICINE

## 2019-10-24 PROCEDURE — 99205 OFFICE O/P NEW HI 60 MIN: CPT | Mod: HCNC,S$GLB,, | Performed by: INTERNAL MEDICINE

## 2019-10-24 PROCEDURE — 99205 PR OFFICE/OUTPT VISIT, NEW, LEVL V, 60-74 MIN: ICD-10-PCS | Mod: HCNC,S$GLB,, | Performed by: INTERNAL MEDICINE

## 2019-10-24 NOTE — PATIENT INSTRUCTIONS
Avoid NSAID pain medications such as advil, aleve, motrin, ibuprofen, naprosyn, meloxicam, diclofenac, mobic.     Hydrate with 60-80 ounces of water per day.

## 2019-10-24 NOTE — LETTER
October 24, 2019      Bill Garcia MD  [address]  [address]  [facility] [address]           [facility]  [address] [place] [id number]  Phone: [phone]  Fax: [phone]          Patient: Harman Johnston   MR Number: [id number]   YOB: 1934   Date of Visit: 10/24/2019       Dear Dr. Bill Garcia:    Thank you for referring Harman Johnston to me for evaluation. Attached you will find relevant portions of my assessment and plan of care.    If you have questions, please do not hesitate to call me. I look forward to following Harman Johnston along with you.    Sincerely,    Nico Li MD    Enclosure  CC:  No Recipients    If you would like to receive this communication electronically, please contact [email or web address] or [phone] to request more information on [facility] access.    For providers and/or their staff who would like to refer a patient to [facility], please contact us through our one-stop-shop provider referral line, [facility], at [phone].    If you feel you have received this communication in error or would no longer like to receive these types of communications, please e-mail [email or web address]

## 2019-10-24 NOTE — PROGRESS NOTES
NEPHROLOGY CONSULTATION    PHYSICIAN REQUESTING THE CONSULT: Dr. Bill Garcia    REASON FOR CONSULTATION: Renal insufficiency    85 y.o. male with history of HTN, hyperlipidemia, elevated PSA, BPH, hearing loss (acquired) presents to the renal clinic for evaluation of renal insufficiency. A consultation has been requested by the patient's PMD, Dr. Bill Garcia. Patient today presents to the clinic complaining of intermittent LE swelling and back pain. He denies any headaches, congenital hearing loss, chest pain, SOB, hemoptysis, abdominal or flank pain, diarrhea, nausea/vomiting, hematuria, dysuria, rashes, hand/foot paresthesia, nasal congestion. Patient reports strong NSAID use history. He had been on regular meloxicam for at least 2-3 years. Last use was about 3 months ago.  Patient has a longstanding history of HTN that was diagnosed > 10 years ago. BP is currently well controlled at 130/58. In addition, patient reports history of BPH (s/p prostate biopsy on 10/23/19, followed by Dr. Contreras), nephrolithiasis (he reports at least 2 episodes about 30 years ago, no recurrence). He denies any history of diabetes mellitus or heart disease. Patient denies any history of renal disease in his family. Laboratory review revealed that the patient's renal function has been worsening with creatinine increasing from 1.4 on 12/5/2018 to 1.9 on 6/5/19 to 2.2 on 9/4/19.        Past Medical History:   Diagnosis Date    BPH (benign prostatic hyperplasia)     Cataract     CKD (chronic kidney disease), stage IV 07/15/2016    Elevated PSA     HEARING LOSS     History of colon polyps     Hyperlipemia     Hypertension     Myocardial infarction     per ekg and nuclear ett - old scar    Tobacco dependence     resolved       Past Surgical History:   Procedure Laterality Date    APPENDECTOMY  1960    CATARACT EXTRACTION W/  INTRAOCULAR LENS IMPLANT Left 4-20-16    CATARACT EXTRACTION W/  INTRAOCULAR LENS IMPLANT Right  3-16-16    EAR MASTOIDECTOMY W/ COCHLEAR IMPLANT W/ LANDMARK      failed later removed    EYE SURGERY Bilateral 2016    cataract w/ IOL    PCIOL OD Right     DR. CARPIO    TONSILLECTOMY         Review of patient's allergies indicates:  No Known Allergies    Current Outpatient Medications   Medication Sig Dispense Refill    aspirin (ECOTRIN) 81 MG EC tablet Take 81 mg by mouth once daily.        atenolol-chlorthalidone (TENORETIC) 50-25 mg Tab TAKE 1 TABLET EVERY DAY 90 tablet 3    atorvastatin (LIPITOR) 10 MG tablet TAKE 1 TABLET EVERY DAY 90 tablet 3    ciprofloxacin HCl (CIPRO) 500 MG tablet Take 1 tablet (500 mg total) by mouth every 12 (twelve) hours. 3 tablet 0    tamsulosin (FLOMAX) 0.4 mg Cap TAKE 1 CAPSULE EVERY DAY 90 capsule 11     No current facility-administered medications for this visit.        Family History   Problem Relation Age of Onset    Macular degeneration Sister     Cancer Sister         Breast    Breast cancer Sister     Macular degeneration Sister     Cancer Sister         colon    Stroke Mother     Hyperlipidemia Mother     Cancer Brother         stomach    Prostate cancer Neg Hx     Diabetes Neg Hx     Heart disease Neg Hx     Kidney disease Neg Hx     Mental illness Neg Hx     Mental retardation Neg Hx        Social History     Socioeconomic History    Marital status:      Spouse name: Not on file    Number of children: Not on file    Years of education: Not on file    Highest education level: Not on file   Occupational History    Not on file   Social Needs    Financial resource strain: Not on file    Food insecurity:     Worry: Not on file     Inability: Not on file    Transportation needs:     Medical: Not on file     Non-medical: Not on file   Tobacco Use    Smoking status: Former Smoker     Packs/day: 0.50     Years: 20.00     Pack years: 10.00     Types: Cigarettes     Last attempt to quit: 7/15/2000     Years since quittin.2  "   Smokeless tobacco: Never Used   Substance and Sexual Activity    Alcohol use: Yes     Alcohol/week: 1.0 standard drinks     Types: 1 Cans of beer per week     Comment: weekly    Drug use: No    Sexual activity: Never   Lifestyle    Physical activity:     Days per week: Not on file     Minutes per session: Not on file    Stress: Not on file   Relationships    Social connections:     Talks on phone: Not on file     Gets together: Not on file     Attends Yazidi service: Not on file     Active member of club or organization: Not on file     Attends meetings of clubs or organizations: Not on file     Relationship status: Not on file   Other Topics Concern    Not on file   Social History Narrative    . 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 He still drives.2 children - in good health. Does not have a Living Will.       Review of Systems:  1. GENERAL: patient denies any fever, weight changes, generalized weakness, dizziness.  2. HEENT: patient denies headaches, visual disturbances, swallowing problems, sinus pain, nasal congestion.  3. CARDIOVASCULAR: patient denies chest pain, palpitations.  4. PULMONARY: patient denies SOB, coughing, hemoptysis, wheezing.  5. GASTROINTESTINAL: patient denies abdominal pain, nausea, vomiting, diarrhea.  6. GENITOURINARY: patient denies dysuria, hematuria, hesitancy, frequency.  7. EXTREMITIES: patient reports intermittent  LE edema, no LE cramping.  8. DERMATOLOGY: patient denies rashes, ulcers.  9. NEURO: patient denies tremors, extremity weakness, extremity numbness/tingling.  10. MUSCULOSKELETAL: patient denies joint pain, joint swelling, he reports intermittent back pain  11. HEMATOLOGY: patient denies rectal or gum bleeding.  12: PSYCH: patient denies anxiety, depression.      PHYSICAL EXAM:    BP (!) 130/58   Pulse (!) 58   Resp 20   Ht 5' 7" (1.702 m)   Wt 78.3 kg (172 lb 9.9 oz)   BMI 27.04 kg/m²     GENERAL: Pleasant gentleman presents to clinic with " non-labored breathing, patient is hard-of-hearing (wearing hearing aid in left ear)  HEENT: PER, no nasal discharge, no icterus, no oral exudates, moist mucosal membranes.  NECK: no thyroid mass, no lymphadenopathy.  HEART: RRR S1/S2, no rubs, good peripheral pulses.  LUNGS: CTA bilaterally, no wheezing, breathing is nonlabored.  ABDOMEN: soft, nontender, not distended, bowel sounds are present, no abdominal hernia.  EXTREM: no LE edema.  SKIN: no rashes, skin is warm and dry.  NEURO: A & O x 3, no obvious focal signs.    LABORATORY RESULTS:    Lab Results   Component Value Date    CREATININE 2.2 (H) 10/03/2019    BUN 34 (H) 09/04/2019     09/04/2019    K 4.0 09/04/2019     09/04/2019    CO2 25 09/04/2019      Lab Results   Component Value Date    CALCIUM 9.5 09/04/2019     Lab Results   Component Value Date    ALBUMIN 3.5 06/05/2019     Lab Results   Component Value Date    WBC 7.15 06/05/2019    HGB 13.9 (L) 06/05/2019    HCT 44.5 06/05/2019    MCV 96 06/05/2019     06/05/2019     Urinalysis: no protein, no blood (3/5/13).       ASSESSMENT AND PLAN:  85 y.o.  male with history of HTN, hyperlipidemia, elevated PSA, BPH presents to the renal clinic for evaluation of renal insufficiency.     1. Renal insufficiency: Patient presents with renal insufficiency, consistent with CKD stage 4. Likely cause of his renal insufficiency is his past extensive NSAID use in the form of meloxicam. Patient's renal function will be monitored closely and he will return to the clinic in 3 weeks for follow up. I will order a renal panel, CBC, urinalysis, protein creatinine ratio, PTH and renal ultrasound prior to his return visit. Patient was advised to avoid NSAID pain medications such as advil, aleve, motrin, ibuprofen, naprosyn, meloxicam, diclofenac, mobic and to hydrate with 60-80 ounces of water per day.     2. Electrolytes: Within normal limits.    3. Acid base status: No acute issues.    4. Volume: Euvolemic.      5. Hypertension: Good BP control.     6. Medications: Reviewed. Agree with current medical regimen. Patient was advised to avoid NSAID pain medications such as advil, aleve, motrin, ibuprofen, naprosyn, meloxicam, diclofenac, mobic.     7. Anemia: Will monitor.          Thank you very much for this consult. Please see my note in Epic for recommendations.    Total time spent was about 45 min. 50 % or more of time was spent on counseling patient about treatment options and educating patient about disease etiology. Complex case. Level 5 consult.

## 2019-10-28 ENCOUNTER — TELEPHONE (OUTPATIENT)
Dept: RADIOLOGY | Facility: HOSPITAL | Age: 84
End: 2019-10-28

## 2019-10-29 ENCOUNTER — APPOINTMENT (OUTPATIENT)
Dept: RADIOLOGY | Facility: HOSPITAL | Age: 84
End: 2019-10-29
Attending: INTERNAL MEDICINE
Payer: MEDICARE

## 2019-10-29 DIAGNOSIS — N18.4 CKD (CHRONIC KIDNEY DISEASE) STAGE 4, GFR 15-29 ML/MIN: ICD-10-CM

## 2019-10-29 PROCEDURE — 76770 US EXAM ABDO BACK WALL COMP: CPT | Mod: TC,HCNC,PO

## 2019-10-29 PROCEDURE — 76770 US EXAM ABDO BACK WALL COMP: CPT | Mod: 26,HCNC,, | Performed by: RADIOLOGY

## 2019-10-29 PROCEDURE — 76770 US KIDNEY: ICD-10-PCS | Mod: 26,HCNC,, | Performed by: RADIOLOGY

## 2019-10-29 RX ORDER — ATORVASTATIN CALCIUM 10 MG/1
TABLET, FILM COATED ORAL
Qty: 90 TABLET | Refills: 3 | Status: SHIPPED | OUTPATIENT
Start: 2019-10-29 | End: 2020-08-13

## 2019-10-29 RX ORDER — ATENOLOL AND CHLORTHALIDONE TABLET 50; 25 MG/1; MG/1
TABLET ORAL
Qty: 90 TABLET | Refills: 3 | Status: SHIPPED | OUTPATIENT
Start: 2019-10-29 | End: 2020-01-23 | Stop reason: ALTCHOICE

## 2019-11-13 ENCOUNTER — OFFICE VISIT (OUTPATIENT)
Dept: UROLOGY | Facility: CLINIC | Age: 84
End: 2019-11-13
Payer: MEDICARE

## 2019-11-13 VITALS — DIASTOLIC BLOOD PRESSURE: 68 MMHG | WEIGHT: 175.5 LBS | SYSTOLIC BLOOD PRESSURE: 138 MMHG | BODY MASS INDEX: 27.49 KG/M2

## 2019-11-13 DIAGNOSIS — N40.0 BENIGN PROSTATIC HYPERPLASIA, UNSPECIFIED WHETHER LOWER URINARY TRACT SYMPTOMS PRESENT: Primary | ICD-10-CM

## 2019-11-13 DIAGNOSIS — R97.20 ELEVATED PSA: ICD-10-CM

## 2019-11-13 DIAGNOSIS — Z12.5 ENCOUNTER FOR SCREENING FOR MALIGNANT NEOPLASM OF PROSTATE: ICD-10-CM

## 2019-11-13 DIAGNOSIS — R97.20 INCREASED PROSTATE SPECIFIC ANTIGEN (PSA) VELOCITY: ICD-10-CM

## 2019-11-13 PROCEDURE — 99999 PR PBB SHADOW E&M-EST. PATIENT-LVL II: ICD-10-PCS | Mod: PBBFAC,HCNC,, | Performed by: UROLOGY

## 2019-11-13 PROCEDURE — 3078F PR MOST RECENT DIASTOLIC BLOOD PRESSURE < 80 MM HG: ICD-10-PCS | Mod: HCNC,CPTII,S$GLB, | Performed by: UROLOGY

## 2019-11-13 PROCEDURE — 3075F SYST BP GE 130 - 139MM HG: CPT | Mod: HCNC,CPTII,S$GLB, | Performed by: UROLOGY

## 2019-11-13 PROCEDURE — 81002 URINALYSIS NONAUTO W/O SCOPE: CPT | Mod: HCNC,S$GLB,, | Performed by: UROLOGY

## 2019-11-13 PROCEDURE — 99214 OFFICE O/P EST MOD 30 MIN: CPT | Mod: 25,HCNC,S$GLB, | Performed by: UROLOGY

## 2019-11-13 PROCEDURE — 3075F PR MOST RECENT SYSTOLIC BLOOD PRESS GE 130-139MM HG: ICD-10-PCS | Mod: HCNC,CPTII,S$GLB, | Performed by: UROLOGY

## 2019-11-13 PROCEDURE — 3078F DIAST BP <80 MM HG: CPT | Mod: HCNC,CPTII,S$GLB, | Performed by: UROLOGY

## 2019-11-13 PROCEDURE — 1101F PT FALLS ASSESS-DOCD LE1/YR: CPT | Mod: HCNC,CPTII,S$GLB, | Performed by: UROLOGY

## 2019-11-13 PROCEDURE — 99214 PR OFFICE/OUTPT VISIT, EST, LEVL IV, 30-39 MIN: ICD-10-PCS | Mod: 25,HCNC,S$GLB, | Performed by: UROLOGY

## 2019-11-13 PROCEDURE — 81002 POCT URINE DIPSTICK WITHOUT MICROSCOPE: ICD-10-PCS | Mod: HCNC,S$GLB,, | Performed by: UROLOGY

## 2019-11-13 PROCEDURE — 99999 PR PBB SHADOW E&M-EST. PATIENT-LVL II: CPT | Mod: PBBFAC,HCNC,, | Performed by: UROLOGY

## 2019-11-13 PROCEDURE — 1101F PR PT FALLS ASSESS DOC 0-1 FALLS W/OUT INJ PAST YR: ICD-10-PCS | Mod: HCNC,CPTII,S$GLB, | Performed by: UROLOGY

## 2019-11-13 RX ORDER — TAMSULOSIN HYDROCHLORIDE 0.4 MG/1
1 CAPSULE ORAL DAILY
Qty: 90 CAPSULE | Refills: 11 | Status: SHIPPED | OUTPATIENT
Start: 2019-11-13 | End: 2020-02-19 | Stop reason: SDUPTHER

## 2019-11-13 RX ORDER — DUTASTERIDE 0.5 MG/1
0.5 CAPSULE, LIQUID FILLED ORAL DAILY
Qty: 30 CAPSULE | Refills: 11 | Status: SHIPPED | OUTPATIENT
Start: 2019-11-13 | End: 2020-02-19 | Stop reason: SDUPTHER

## 2019-11-13 NOTE — PROGRESS NOTES
Chief Complaint: Prostate Cancer screening    HPI:   11/13/19: Biopsy negative findings.  Discussed BPH and indications for dutasteride (finasteride caused breast tenderness).  Reviewed history in detail.   10/23/19: TRUS/Bx 53 gm.  MRI PIRADS5 multifocal.  9/9/19: Voiding okay no sig problems.    7/18/18: No problems in last years, doing okay except he had some breast tenderness and it seems he is off the finasteride and that got better. Reviewed history in detail.  7/12/17: PSA down sig on finasteride, no LUTS.  7/8/16: 80 yo man with elevated PSA was started on finasteride and with expectation of improving the value.  No urinary bother on flomax/finasteride seein improvement. PSA dramatically lower in last 3 mo    Allergies:  Patient has no known allergies.    Medications:  has a current medication list which includes the following prescription(s): aspirin, atenolol-chlorthalidone, atorvastatin, tamsulosin, and ciprofloxacin hcl.    Review of Systems:  General: No fever, chills, fatigability, or weight loss.  Skin: No rashes, itching, or changes in color or texture of skin.  Chest: Denies COTTON, cyanosis, wheezing, cough, and sputum production.  Abdomen: Appetite fine. No weight loss. Denies diarrhea, abdominal pain, hematemesis, or blood in stool.  Musculoskeletal: No joint stiffness or swelling. Denies back pain.  : As above.  All other review of systems negative.    PMH:   has a past medical history of BPH (benign prostatic hyperplasia), Cataract, CKD (chronic kidney disease), stage IV (07/15/2016), Elevated PSA, HEARING LOSS, History of colon polyps, Hyperlipemia, Hypertension, Myocardial infarction, and Tobacco dependence.    PSH:   has a past surgical history that includes Ear mastoidectomy w/ cochlear implant w/ landmark (2011); Tonsillectomy; PCIOL OD (Right, 03/`16/16); Cataract extraction w/  intraocular lens implant (Left, 4-20-16); Cataract extraction w/  intraocular lens implant (Right, 3-16-16);  Appendectomy (1960); and Eye surgery (Bilateral, 2016).    FamHx: family history includes Breast cancer in his sister; Cancer in his brother, sister, and sister; Hyperlipidemia in his mother; Macular degeneration in his sister and sister; Stroke in his mother.    SocHx:  reports that he quit smoking about 19 years ago. His smoking use included cigarettes. He has a 10.00 pack-year smoking history. He has never used smokeless tobacco. He reports that he drinks about 1.0 standard drinks of alcohol per week. He reports that he does not use drugs.      Physical Exam:  Vitals:    11/13/19 1406   BP: 138/68     General: A&Ox3, no apparent distress, no deformities  Neck: No masses, normal thyroid  Lungs: normal inspiration, no use of accessory muscles  Heart: normal pulse, no arrhythmias  Abdomen: Soft, NT, ND  Skin: The skin is warm and dry. No jaundice.  Ext: No c/c/e.  :   8/18: Test desc maira, no abnormalities of epididymus. Penis normal, with normal penile and scrotal skin. Meatus normal. Normal rectal tone, no hemorrhoids. Prost 40 gm no nodules or masses appreciated. SV not palpable. Perineum and anus normal.    Labs/Studies:   PSA    2010-15: 7-9    3/16: 11.4    6/16: 2.7    7/17: 1.4    9/19: 16.1; 17.2    Impression/Plan:   1. Sig worry for prostate cancer but biopsy negative, elev PSA. PSA/RTC 3 mo  2. Adding dutasteride to flomax for BPH

## 2019-11-25 ENCOUNTER — LAB VISIT (OUTPATIENT)
Dept: LAB | Facility: HOSPITAL | Age: 84
End: 2019-11-25
Attending: INTERNAL MEDICINE
Payer: MEDICARE

## 2019-11-25 DIAGNOSIS — N18.4 CKD (CHRONIC KIDNEY DISEASE) STAGE 4, GFR 15-29 ML/MIN: ICD-10-CM

## 2019-11-25 LAB
BASOPHILS # BLD AUTO: 0.06 K/UL (ref 0–0.2)
BASOPHILS NFR BLD: 0.7 % (ref 0–1.9)
DIFFERENTIAL METHOD: ABNORMAL
EOSINOPHIL # BLD AUTO: 0.5 K/UL (ref 0–0.5)
EOSINOPHIL NFR BLD: 5.9 % (ref 0–8)
ERYTHROCYTE [DISTWIDTH] IN BLOOD BY AUTOMATED COUNT: 13.2 % (ref 11.5–14.5)
HCT VFR BLD AUTO: 41.1 % (ref 40–54)
HGB BLD-MCNC: 12.7 G/DL (ref 14–18)
IMM GRANULOCYTES # BLD AUTO: 0.02 K/UL (ref 0–0.04)
IMM GRANULOCYTES NFR BLD AUTO: 0.2 % (ref 0–0.5)
LYMPHOCYTES # BLD AUTO: 1.7 K/UL (ref 1–4.8)
LYMPHOCYTES NFR BLD: 19.5 % (ref 18–48)
MCH RBC QN AUTO: 30 PG (ref 27–31)
MCHC RBC AUTO-ENTMCNC: 30.9 G/DL (ref 32–36)
MCV RBC AUTO: 97 FL (ref 82–98)
MONOCYTES # BLD AUTO: 0.8 K/UL (ref 0.3–1)
MONOCYTES NFR BLD: 9.3 % (ref 4–15)
NEUTROPHILS # BLD AUTO: 5.5 K/UL (ref 1.8–7.7)
NEUTROPHILS NFR BLD: 64.4 % (ref 38–73)
NRBC BLD-RTO: 0 /100 WBC
PLATELET # BLD AUTO: 200 K/UL (ref 150–350)
PMV BLD AUTO: 12.3 FL (ref 9.2–12.9)
RBC # BLD AUTO: 4.23 M/UL (ref 4.6–6.2)
WBC # BLD AUTO: 8.61 K/UL (ref 3.9–12.7)

## 2019-11-25 PROCEDURE — 85025 COMPLETE CBC W/AUTO DIFF WBC: CPT | Mod: HCNC

## 2019-11-25 PROCEDURE — 83970 ASSAY OF PARATHORMONE: CPT | Mod: HCNC

## 2019-11-25 PROCEDURE — 80069 RENAL FUNCTION PANEL: CPT | Mod: HCNC

## 2019-11-25 PROCEDURE — 36415 COLL VENOUS BLD VENIPUNCTURE: CPT | Mod: HCNC,PO

## 2019-11-26 LAB
ALBUMIN SERPL BCP-MCNC: 3.2 G/DL (ref 3.5–5.2)
ANION GAP SERPL CALC-SCNC: 7 MMOL/L (ref 8–16)
BUN SERPL-MCNC: 37 MG/DL (ref 8–23)
CALCIUM SERPL-MCNC: 9.3 MG/DL (ref 8.7–10.5)
CHLORIDE SERPL-SCNC: 109 MMOL/L (ref 95–110)
CO2 SERPL-SCNC: 28 MMOL/L (ref 23–29)
CREAT SERPL-MCNC: 2.3 MG/DL (ref 0.5–1.4)
EST. GFR  (AFRICAN AMERICAN): 28.9 ML/MIN/1.73 M^2
EST. GFR  (NON AFRICAN AMERICAN): 25 ML/MIN/1.73 M^2
GLUCOSE SERPL-MCNC: 68 MG/DL (ref 70–110)
PHOSPHATE SERPL-MCNC: 2.7 MG/DL (ref 2.7–4.5)
POTASSIUM SERPL-SCNC: 4.3 MMOL/L (ref 3.5–5.1)
PTH-INTACT SERPL-MCNC: 74 PG/ML (ref 9–77)
SODIUM SERPL-SCNC: 144 MMOL/L (ref 136–145)

## 2019-11-27 ENCOUNTER — OFFICE VISIT (OUTPATIENT)
Dept: NEPHROLOGY | Facility: CLINIC | Age: 84
End: 2019-11-27
Payer: MEDICARE

## 2019-11-27 VITALS
WEIGHT: 175.06 LBS | HEART RATE: 52 BPM | DIASTOLIC BLOOD PRESSURE: 68 MMHG | SYSTOLIC BLOOD PRESSURE: 132 MMHG | BODY MASS INDEX: 26.53 KG/M2 | HEIGHT: 68 IN

## 2019-11-27 DIAGNOSIS — N18.30 STAGE 3 CHRONIC KIDNEY DISEASE: ICD-10-CM

## 2019-11-27 DIAGNOSIS — N40.0 BENIGN PROSTATIC HYPERPLASIA WITHOUT LOWER URINARY TRACT SYMPTOMS: ICD-10-CM

## 2019-11-27 DIAGNOSIS — N17.9 ACUTE KIDNEY INJURY: ICD-10-CM

## 2019-11-27 DIAGNOSIS — I10 ESSENTIAL HYPERTENSION: ICD-10-CM

## 2019-11-27 DIAGNOSIS — Z71.89 ENCOUNTER FOR MEDICATION REVIEW AND COUNSELING: ICD-10-CM

## 2019-11-27 DIAGNOSIS — N14.0 ANALGESIC NEPHROPATHY: Primary | ICD-10-CM

## 2019-11-27 PROCEDURE — 99999 PR PBB SHADOW E&M-EST. PATIENT-LVL III: CPT | Mod: PBBFAC,HCNC,, | Performed by: INTERNAL MEDICINE

## 2019-11-27 PROCEDURE — 1159F PR MEDICATION LIST DOCUMENTED IN MEDICAL RECORD: ICD-10-PCS | Mod: HCNC,S$GLB,, | Performed by: INTERNAL MEDICINE

## 2019-11-27 PROCEDURE — 99999 PR PBB SHADOW E&M-EST. PATIENT-LVL III: ICD-10-PCS | Mod: PBBFAC,HCNC,, | Performed by: INTERNAL MEDICINE

## 2019-11-27 PROCEDURE — 3075F PR MOST RECENT SYSTOLIC BLOOD PRESS GE 130-139MM HG: ICD-10-PCS | Mod: HCNC,CPTII,S$GLB, | Performed by: INTERNAL MEDICINE

## 2019-11-27 PROCEDURE — 99215 OFFICE O/P EST HI 40 MIN: CPT | Mod: HCNC,S$GLB,, | Performed by: INTERNAL MEDICINE

## 2019-11-27 PROCEDURE — 99499 UNLISTED E&M SERVICE: CPT | Mod: HCNC,S$GLB,, | Performed by: INTERNAL MEDICINE

## 2019-11-27 PROCEDURE — 3078F PR MOST RECENT DIASTOLIC BLOOD PRESSURE < 80 MM HG: ICD-10-PCS | Mod: HCNC,CPTII,S$GLB, | Performed by: INTERNAL MEDICINE

## 2019-11-27 PROCEDURE — 1126F AMNT PAIN NOTED NONE PRSNT: CPT | Mod: HCNC,S$GLB,, | Performed by: INTERNAL MEDICINE

## 2019-11-27 PROCEDURE — 1126F PR PAIN SEVERITY QUANTIFIED, NO PAIN PRESENT: ICD-10-PCS | Mod: HCNC,S$GLB,, | Performed by: INTERNAL MEDICINE

## 2019-11-27 PROCEDURE — 1159F MED LIST DOCD IN RCRD: CPT | Mod: HCNC,S$GLB,, | Performed by: INTERNAL MEDICINE

## 2019-11-27 PROCEDURE — 1101F PT FALLS ASSESS-DOCD LE1/YR: CPT | Mod: HCNC,CPTII,S$GLB, | Performed by: INTERNAL MEDICINE

## 2019-11-27 PROCEDURE — 3078F DIAST BP <80 MM HG: CPT | Mod: HCNC,CPTII,S$GLB, | Performed by: INTERNAL MEDICINE

## 2019-11-27 PROCEDURE — 99215 PR OFFICE/OUTPT VISIT, EST, LEVL V, 40-54 MIN: ICD-10-PCS | Mod: HCNC,S$GLB,, | Performed by: INTERNAL MEDICINE

## 2019-11-27 PROCEDURE — 99499 RISK ADDL DX/OHS AUDIT: ICD-10-PCS | Mod: HCNC,S$GLB,, | Performed by: INTERNAL MEDICINE

## 2019-11-27 PROCEDURE — 1101F PR PT FALLS ASSESS DOC 0-1 FALLS W/OUT INJ PAST YR: ICD-10-PCS | Mod: HCNC,CPTII,S$GLB, | Performed by: INTERNAL MEDICINE

## 2019-11-27 PROCEDURE — 3075F SYST BP GE 130 - 139MM HG: CPT | Mod: HCNC,CPTII,S$GLB, | Performed by: INTERNAL MEDICINE

## 2019-11-28 NOTE — PROGRESS NOTES
Harman Johnston is a 85 y.o. male for whom nephrology consult has been requested to evaluate and give opinion.   Renal clinic f/u note:  Date of clinic visit: 11/27/19  Reason for f/u and chief c/o: acute kidney injury    HPI: Pt was seen and examined and chart was reviewed. Pt is a 86 y/o male whom I am seeing for the first time today, seen previously by Dr. Li, who has developed renal injury in the past 11 months since he started using mobic more frequently. s  r has worsened form baseline of 1.3-1.4 to 2.3. Pt has been using mobic for foot pain (both sides) x 3-4 times a week. Pt says that ever time he goes walking, his feet begin yo hurt and he takes the pain killer. No other pertinent h/o, no cardiopulmonary sx's, no sx's pf dyuria, no recent infections, no abx.     PAST MEDICAL HISTORY:  CKD stage 3 (baseline s Cr 1.4), BPH (benign prostatic hyperplasia), Cataract, Elevated PSA, HEARING LOSS, History of colon polyps, Hyperlipemia, Hypertension, Myocardial infarction, and Tobacco dependence.    PAST SURGICAL HISTORY:  He  has a past surgical history that includes Ear mastoidectomy w/ cochlear implant w/ landmark (2011); Tonsillectomy; PCIOL OD (Right, 03/`16/16); Cataract extraction w/  intraocular lens implant (Left, 4-20-16); Cataract extraction w/  intraocular lens implant (Right, 3-16-16); Appendectomy (1960); and Eye surgery (Bilateral, 2016).    SOCIAL HISTORY:  He  reports that he quit smoking about 19 years ago. His smoking use included cigarettes. He has a 10.00 pack-year smoking history. He has never used smokeless tobacco. He reports that he drinks about 1.0 standard drinks of alcohol per week. He reports that he does not use drugs.    FAMILY MEDICAL HISTORY:  His family history includes Breast cancer in his sister; Cancer in his brother, sister, and sister; Hyperlipidemia in his mother; Macular degeneration in his sister and sister; Stroke in his mother.    Review of patient's allergies  "indicates:  No Known Allergies        Prior to Admission medications    Medication Sig Start Date End Date Taking? Authorizing Provider   aspirin (ECOTRIN) 81 MG EC tablet Take 81 mg by mouth once daily.     Yes Historical Provider, MD   atenolol-chlorthalidone (TENORETIC) 50-25 mg Tab TAKE 1 TABLET EVERY DAY 10/29/19  Yes Bill Garcia MD   atorvastatin (LIPITOR) 10 MG tablet TAKE 1 TABLET EVERY DAY 10/29/19  Yes Bill Garcia MD   dutasteride (AVODART) 0.5 mg capsule Take 1 capsule (0.5 mg total) by mouth once daily. 11/13/19 11/12/20 Yes John Contreras IV, MD   tamsulosin (FLOMAX) 0.4 mg Cap Take 1 capsule (0.4 mg total) by mouth once daily. 11/13/19  Yes John Contreras IV, MD        REVIEW OF SYSTEMS:  Patient has no fever, fatigue, visual changes, chest pain, edema, cough, dyspnea, nausea, vomiting, constipation, diarrhea, arthralgias, pruritis, dizziness, weakness, depression, confusion.    PHYSICAL EXAM:   height is 5' 8" (1.727 m) and weight is 79.4 kg (175 lb 0.7 oz). His blood pressure is 132/68 and his pulse is 52 (abnormal).   Gen: WDWN male in no apparent distress  Psych: Normal mood and affect  Skin: No rashes or ulcers  Eyes: Normal conjunctiva and lids, PERRLA  ENT: Normal hearing with no oropharyngeal lesions  Neck: No JVD  Chest: Clear with no rales, rhonchi, wheezing with normal effort  CV: Regular with no murmurs, gallops or rubs  Abd: Soft, nontender, no distension, positive bowel sounds  Ext: No cyanosis, clubbing or edema    Labs reviewed  BMP  Lab Results   Component Value Date     11/25/2019    K 4.3 11/25/2019     11/25/2019    CO2 28 11/25/2019    BUN 37 (H) 11/25/2019    CREATININE 2.3 (H) 11/25/2019    CALCIUM 9.3 11/25/2019    ANIONGAP 7 (L) 11/25/2019    ESTGFRAFRICA 28.9 (A) 11/25/2019    EGFRNONAA 25.0 (A) 11/25/2019     Lab Results   Component Value Date    WBC 8.61 11/25/2019    HGB 12.7 (L) 11/25/2019    HCT 41.1 11/25/2019    MCV 97 11/25/2019     " 11/25/2019       Lab Results   Component Value Date    PTH 74.0 11/25/2019    CALCIUM 9.3 11/25/2019    PHOS 2.7 11/25/2019         IMPRESSION AND RECOMMENDATIONS:  86 y/o male with NINO on CKD stage 3:    1. Renal: NINO likely due to taking mobic frequently.  Analgesic nephropathy  Per review of the records, s Cr started going up about 11 months ago  K normal  Acid base stable  Advised pt to stop taking mobic, do not take other forms of NSAIDs, examples described  Risk of renal injury with NSAIDs, in elderly    2. HTN: BP controlled  Meds reviewed    3. Has BPH: no hydronephrosis per review of the renal u/s.    Plans and recommendations:   As reviewed  Above  Opportunity for questions provided  Total time spent 60 minutes including time needed to review the records, the   patient evaluation, documentation, face-to-face discussion with the patient,   more than 50% of the time was spent on coordination of care and counseling.    Level V visit.  RTC 2 months    Tanmay Pereira MD

## 2020-01-16 ENCOUNTER — LAB VISIT (OUTPATIENT)
Dept: LAB | Facility: HOSPITAL | Age: 85
End: 2020-01-16
Attending: INTERNAL MEDICINE
Payer: MEDICARE

## 2020-01-16 DIAGNOSIS — N14.0 ANALGESIC NEPHROPATHY: ICD-10-CM

## 2020-01-16 LAB
ALBUMIN SERPL BCP-MCNC: 3.3 G/DL (ref 3.5–5.2)
ANION GAP SERPL CALC-SCNC: 10 MMOL/L (ref 8–16)
BUN SERPL-MCNC: 41 MG/DL (ref 8–23)
CALCIUM SERPL-MCNC: 9.2 MG/DL (ref 8.7–10.5)
CHLORIDE SERPL-SCNC: 106 MMOL/L (ref 95–110)
CO2 SERPL-SCNC: 26 MMOL/L (ref 23–29)
CREAT SERPL-MCNC: 2.7 MG/DL (ref 0.5–1.4)
EST. GFR  (AFRICAN AMERICAN): 23.8 ML/MIN/1.73 M^2
EST. GFR  (NON AFRICAN AMERICAN): 20.6 ML/MIN/1.73 M^2
GLUCOSE SERPL-MCNC: 90 MG/DL (ref 70–110)
PHOSPHATE SERPL-MCNC: 3.4 MG/DL (ref 2.7–4.5)
POTASSIUM SERPL-SCNC: 3.8 MMOL/L (ref 3.5–5.1)
SODIUM SERPL-SCNC: 142 MMOL/L (ref 136–145)

## 2020-01-16 PROCEDURE — 36415 COLL VENOUS BLD VENIPUNCTURE: CPT | Mod: HCNC

## 2020-01-16 PROCEDURE — 80069 RENAL FUNCTION PANEL: CPT | Mod: HCNC

## 2020-01-23 ENCOUNTER — OFFICE VISIT (OUTPATIENT)
Dept: NEPHROLOGY | Facility: CLINIC | Age: 85
End: 2020-01-23
Payer: MEDICARE

## 2020-01-23 VITALS
DIASTOLIC BLOOD PRESSURE: 68 MMHG | SYSTOLIC BLOOD PRESSURE: 138 MMHG | HEART RATE: 60 BPM | HEIGHT: 68 IN | RESPIRATION RATE: 20 BRPM | BODY MASS INDEX: 27.03 KG/M2 | WEIGHT: 178.38 LBS

## 2020-01-23 DIAGNOSIS — N14.0 ANALGESIC NEPHROPATHY: ICD-10-CM

## 2020-01-23 DIAGNOSIS — E87.6 HYPOKALEMIA: ICD-10-CM

## 2020-01-23 DIAGNOSIS — E87.3 METABOLIC ALKALOSIS: ICD-10-CM

## 2020-01-23 DIAGNOSIS — N17.9 PRERENAL ACUTE RENAL FAILURE: ICD-10-CM

## 2020-01-23 DIAGNOSIS — Z71.89 ENCOUNTER FOR MEDICATION REVIEW AND COUNSELING: ICD-10-CM

## 2020-01-23 DIAGNOSIS — N18.30 STAGE 3 CHRONIC KIDNEY DISEASE: ICD-10-CM

## 2020-01-23 DIAGNOSIS — N17.9 ACUTE KIDNEY INJURY: Primary | ICD-10-CM

## 2020-01-23 PROCEDURE — 99499 UNLISTED E&M SERVICE: CPT | Mod: S$PBB,,, | Performed by: INTERNAL MEDICINE

## 2020-01-23 PROCEDURE — 99999 PR PBB SHADOW E&M-EST. PATIENT-LVL III: CPT | Mod: PBBFAC,HCNC,, | Performed by: INTERNAL MEDICINE

## 2020-01-23 PROCEDURE — 1159F MED LIST DOCD IN RCRD: CPT | Mod: HCNC,S$GLB,, | Performed by: INTERNAL MEDICINE

## 2020-01-23 PROCEDURE — 1159F PR MEDICATION LIST DOCUMENTED IN MEDICAL RECORD: ICD-10-PCS | Mod: HCNC,S$GLB,, | Performed by: INTERNAL MEDICINE

## 2020-01-23 PROCEDURE — 3078F PR MOST RECENT DIASTOLIC BLOOD PRESSURE < 80 MM HG: ICD-10-PCS | Mod: HCNC,CPTII,S$GLB, | Performed by: INTERNAL MEDICINE

## 2020-01-23 PROCEDURE — 3075F SYST BP GE 130 - 139MM HG: CPT | Mod: HCNC,CPTII,S$GLB, | Performed by: INTERNAL MEDICINE

## 2020-01-23 PROCEDURE — 1101F PT FALLS ASSESS-DOCD LE1/YR: CPT | Mod: HCNC,CPTII,S$GLB, | Performed by: INTERNAL MEDICINE

## 2020-01-23 PROCEDURE — 3075F PR MOST RECENT SYSTOLIC BLOOD PRESS GE 130-139MM HG: ICD-10-PCS | Mod: HCNC,CPTII,S$GLB, | Performed by: INTERNAL MEDICINE

## 2020-01-23 PROCEDURE — 99215 OFFICE O/P EST HI 40 MIN: CPT | Mod: HCNC,S$GLB,, | Performed by: INTERNAL MEDICINE

## 2020-01-23 PROCEDURE — 99215 PR OFFICE/OUTPT VISIT, EST, LEVL V, 40-54 MIN: ICD-10-PCS | Mod: HCNC,S$GLB,, | Performed by: INTERNAL MEDICINE

## 2020-01-23 PROCEDURE — 1126F PR PAIN SEVERITY QUANTIFIED, NO PAIN PRESENT: ICD-10-PCS | Mod: HCNC,S$GLB,, | Performed by: INTERNAL MEDICINE

## 2020-01-23 PROCEDURE — 3078F DIAST BP <80 MM HG: CPT | Mod: HCNC,CPTII,S$GLB, | Performed by: INTERNAL MEDICINE

## 2020-01-23 PROCEDURE — 1101F PR PT FALLS ASSESS DOC 0-1 FALLS W/OUT INJ PAST YR: ICD-10-PCS | Mod: HCNC,CPTII,S$GLB, | Performed by: INTERNAL MEDICINE

## 2020-01-23 PROCEDURE — 99499 RISK ADDL DX/OHS AUDIT: ICD-10-PCS | Mod: S$PBB,,, | Performed by: INTERNAL MEDICINE

## 2020-01-23 PROCEDURE — 1126F AMNT PAIN NOTED NONE PRSNT: CPT | Mod: HCNC,S$GLB,, | Performed by: INTERNAL MEDICINE

## 2020-01-23 PROCEDURE — 99999 PR PBB SHADOW E&M-EST. PATIENT-LVL III: ICD-10-PCS | Mod: PBBFAC,HCNC,, | Performed by: INTERNAL MEDICINE

## 2020-01-23 RX ORDER — ATENOLOL 50 MG/1
50 TABLET ORAL DAILY
Qty: 30 TABLET | Refills: 11 | Status: SHIPPED | OUTPATIENT
Start: 2020-01-23 | End: 2020-02-19

## 2020-01-23 NOTE — PROGRESS NOTES
Patient, Harman Johnston (MRN #[id number]), presented with a recent Estimated Glumerular Filtration Rate (EGFR) between 15 and 29 consistent with the definition of chronic kidney disease stage 4 (ICD10 - N18.4).    eGFR if non    Date Value Ref Range Status   01/16/2020 20.6 (A) >60 mL/min/1.73 m^2 Final     Comment:     Calculation used to obtain the estimated glomerular filtration  rate (eGFR) is the CKD-EPI equation.          The patient's chronic kidney disease stage 4 was monitored, evaluated, addressed and/or treated. This addendum to the medical record is made on 01/23/2020.

## 2020-01-23 NOTE — PROGRESS NOTES
Renal clinic f/u note:  Date of clinic visit: 1/23/20  Reason for f/u and chief c/o: acute kidney injury     HPI: Pt is a 86 y/o male who presents for f/u of NINO on CKD stage 3. s Cr worsened from baseline of 1.4 to 2.3. NINO was suspected to be due to taking mobic on a daily basis x 11 months for foot pain. He was last seen by us 2 months ago. He was advised that his NINO is very alarming, specially given pt's advanced age of 85. He was advised to immediately stop mobic. He was advised that in addition he should not take any form of ibuprofen or naproxyn. Generic names of these meds reviewed with him.     On f/u today, noted s Cr has worsened further to 2.7. Pt's wife is present. They deny that he is taking any form of mobic, ibuprofen, or naproxyn, or any other NSAIds. He has not taken any abx, no dehydration, no GI losses. No leg swelling, no SOB. Noted that despite renal failure, s K is borderline low and he has metabolic alkalosis. Meds reviewed. He is on chlorthalidone combined with atenolol. He also denies any sx's pf dyuria, no recent infections.       PAST MEDICAL HISTORY:  CKD stage 3 (baseline s Cr 1.4), BPH (benign prostatic hyperplasia), Cataract, Elevated PSA, HEARING LOSS, History of colon polyps, Hyperlipemia, Hypertension, Myocardial infarction, and Tobacco dependence.     PAST SURGICAL HISTORY:  He  has a past surgical history that includes Ear mastoidectomy w/ cochlear implant w/ landmark (2011); Tonsillectomy; PCIOL OD (Right, 03/`16/16); Cataract extraction w/  intraocular lens implant (Left, 4-20-16); Cataract extraction w/  intraocular lens implant (Right, 3-16-16); Appendectomy (1960); and Eye surgery (Bilateral, 2016).     SOCIAL HISTORY:  He  reports that he quit smoking about 19 years ago. His smoking use included cigarettes. He has a 10.00 pack-year smoking history. He has never used smokeless tobacco. He reports that he drinks about 1.0 standard drinks of alcohol per week. He reports that  "he does not use drugs.     FAMILY MEDICAL HISTORY:  His family history includes Breast cancer in his sister; Cancer in his brother, sister, and sister; Hyperlipidemia in his mother; Macular degeneration in his sister and sister; Stroke in his mother.     Review of patient's allergies indicates:  No Known Allergies     Meds:    Current Outpatient Medications:     aspirin (ECOTRIN) 81 MG EC tablet, Take 81 mg by mouth once daily.  , Disp: , Rfl:     atorvastatin (LIPITOR) 10 MG tablet, TAKE 1 TABLET EVERY DAY, Disp: 90 tablet, Rfl: 3    dutasteride (AVODART) 0.5 mg capsule, Take 1 capsule (0.5 mg total) by mouth once daily., Disp: 30 capsule, Rfl: 11    tamsulosin (FLOMAX) 0.4 mg Cap, Take 1 capsule (0.4 mg total) by mouth once daily., Disp: 90 capsule, Rfl: 11    atenolol (TENORMIN) 50 MG tablet, Take 1 tablet (50 mg total) by mouth once daily., Disp: 30 tablet, Rfl: 11        REVIEW OF SYSTEMS:  Patient has no fever, fatigue, visual changes, chest pain, edema, cough, dyspnea, nausea, vomiting, constipation, diarrhea, arthralgias, pruritis, dizziness, weakness, depression, confusion.     PHYSICAL EXAM:   height is 5' 8" (1.727 m) and weight is 79.4 kg (175 lb 0.7 oz). His blood pressure is 132/68 and his pulse is 52 (abnormal).   Gen:    WDWN male in no apparent distress  Psych: Normal mood and affect  Skin:    No rashes or ulcers  Eyes:   Normal conjunctiva and lids, PERRLA  ENT:    Normal hearing with no oropharyngeal lesions  Neck:   No JVD  Chest:  Clear with no rales, rhonchi, wheezing with normal effort  CV:      Regular with no murmurs, gallops or rubs  Abd:     Soft, nontender, no distension, positive bowel sounds  Ext:      No cyanosis, clubbing or edema     Labs reviewed  BMP  Lab Results   Component Value Date     01/16/2020    K 3.8 01/16/2020     01/16/2020    CO2 26 01/16/2020    BUN 41 (H) 01/16/2020    CREATININE 2.7 (H) 01/16/2020    CALCIUM 9.2 01/16/2020    ANIONGAP 10 01/16/2020 "    ESTGFRAFRICA 23.8 (A) 01/16/2020    EGFRNONAA 20.6 (A) 01/16/2020                IMPRESSION AND RECOMMENDATIONS:  84 y/o male with NINO on CKD stage 3:     1. Renal: s Cr is worse. No sign of recovery in NINO.  This is alarming in an elderly pt  NINO occurred as a result of taking mobic frequently   Analgesic nephropathy  s Cr may not recover fully as pt had exposure to mobic for a long time (11 months)  However renal failure has been maintained and not reversed because of diuretics. Pt is overdiuresed: prerenal azotemia  Despite renal failure, pt has mild hypokalemia and metabolic alkalosis  Will d/c chlorthalidone in tenoretic and give pt instead plain atenolol  No NSAIds of any form     2. HTN: BP controlled and is appropriate for pt's age  Meds reviewed     3. Has BPH: no hydronephrosis per review of the renal u/s.     Plans and recommendations:   As reviewed  Above  Opportunity for questions provided  Total time spent 40 minutes including time needed to review the records, the   patient evaluation, documentation, face-to-face discussion with the patient,   more than 50% of the time was spent on coordination of care and counseling.    Level V visit.  RTC 1 month     Tanmay Pereira MD

## 2020-02-05 ENCOUNTER — LAB VISIT (OUTPATIENT)
Dept: LAB | Facility: HOSPITAL | Age: 85
End: 2020-02-05
Attending: UROLOGY
Payer: MEDICARE

## 2020-02-05 DIAGNOSIS — Z12.5 ENCOUNTER FOR SCREENING FOR MALIGNANT NEOPLASM OF PROSTATE: ICD-10-CM

## 2020-02-05 DIAGNOSIS — N40.0 BENIGN PROSTATIC HYPERPLASIA, UNSPECIFIED WHETHER LOWER URINARY TRACT SYMPTOMS PRESENT: ICD-10-CM

## 2020-02-05 DIAGNOSIS — R97.20 ELEVATED PSA: ICD-10-CM

## 2020-02-05 DIAGNOSIS — R97.20 INCREASED PROSTATE SPECIFIC ANTIGEN (PSA) VELOCITY: ICD-10-CM

## 2020-02-05 DIAGNOSIS — N17.9 ACUTE KIDNEY INJURY: ICD-10-CM

## 2020-02-05 PROCEDURE — 80069 RENAL FUNCTION PANEL: CPT | Mod: HCNC

## 2020-02-05 PROCEDURE — 84153 ASSAY OF PSA TOTAL: CPT | Mod: HCNC

## 2020-02-05 PROCEDURE — 36415 COLL VENOUS BLD VENIPUNCTURE: CPT | Mod: HCNC,PO

## 2020-02-06 LAB
ALBUMIN SERPL BCP-MCNC: 3.1 G/DL (ref 3.5–5.2)
ANION GAP SERPL CALC-SCNC: 7 MMOL/L (ref 8–16)
BUN SERPL-MCNC: 38 MG/DL (ref 8–23)
CALCIUM SERPL-MCNC: 9.3 MG/DL (ref 8.7–10.5)
CHLORIDE SERPL-SCNC: 109 MMOL/L (ref 95–110)
CO2 SERPL-SCNC: 27 MMOL/L (ref 23–29)
COMPLEXED PSA SERPL-MCNC: 4.2 NG/ML (ref 0–4)
CREAT SERPL-MCNC: 2.4 MG/DL (ref 0.5–1.4)
EST. GFR  (AFRICAN AMERICAN): 27.4 ML/MIN/1.73 M^2
EST. GFR  (NON AFRICAN AMERICAN): 23.7 ML/MIN/1.73 M^2
GLUCOSE SERPL-MCNC: 93 MG/DL (ref 70–110)
PHOSPHATE SERPL-MCNC: 3 MG/DL (ref 2.7–4.5)
POTASSIUM SERPL-SCNC: 4.5 MMOL/L (ref 3.5–5.1)
SODIUM SERPL-SCNC: 143 MMOL/L (ref 136–145)

## 2020-02-19 ENCOUNTER — OFFICE VISIT (OUTPATIENT)
Dept: NEPHROLOGY | Facility: CLINIC | Age: 85
End: 2020-02-19
Payer: MEDICARE

## 2020-02-19 ENCOUNTER — OFFICE VISIT (OUTPATIENT)
Dept: UROLOGY | Facility: CLINIC | Age: 85
End: 2020-02-19
Payer: MEDICARE

## 2020-02-19 VITALS
HEIGHT: 68 IN | WEIGHT: 209.44 LBS | SYSTOLIC BLOOD PRESSURE: 124 MMHG | DIASTOLIC BLOOD PRESSURE: 70 MMHG | BODY MASS INDEX: 31.74 KG/M2

## 2020-02-19 VITALS
HEART RATE: 64 BPM | SYSTOLIC BLOOD PRESSURE: 114 MMHG | HEIGHT: 68 IN | WEIGHT: 181 LBS | DIASTOLIC BLOOD PRESSURE: 50 MMHG | BODY MASS INDEX: 27.43 KG/M2

## 2020-02-19 DIAGNOSIS — I95.2 HYPOTENSION DUE TO DRUGS: ICD-10-CM

## 2020-02-19 DIAGNOSIS — N40.0 BENIGN PROSTATIC HYPERPLASIA, UNSPECIFIED WHETHER LOWER URINARY TRACT SYMPTOMS PRESENT: ICD-10-CM

## 2020-02-19 DIAGNOSIS — N14.0 ANALGESIC NEPHROPATHY: ICD-10-CM

## 2020-02-19 DIAGNOSIS — N17.9 PRERENAL ACUTE RENAL FAILURE: ICD-10-CM

## 2020-02-19 DIAGNOSIS — N17.9 ACUTE KIDNEY INJURY: Primary | ICD-10-CM

## 2020-02-19 DIAGNOSIS — Z71.89 ENCOUNTER FOR MEDICATION REVIEW AND COUNSELING: ICD-10-CM

## 2020-02-19 DIAGNOSIS — I10 HYPERTENSION, UNSPECIFIED TYPE: ICD-10-CM

## 2020-02-19 DIAGNOSIS — Z12.5 PROSTATE CANCER SCREENING: Primary | ICD-10-CM

## 2020-02-19 PROCEDURE — 99215 OFFICE O/P EST HI 40 MIN: CPT | Mod: HCNC,S$GLB,, | Performed by: INTERNAL MEDICINE

## 2020-02-19 PROCEDURE — 1101F PT FALLS ASSESS-DOCD LE1/YR: CPT | Mod: HCNC,CPTII,S$GLB, | Performed by: INTERNAL MEDICINE

## 2020-02-19 PROCEDURE — 1101F PT FALLS ASSESS-DOCD LE1/YR: CPT | Mod: HCNC,CPTII,S$GLB, | Performed by: UROLOGY

## 2020-02-19 PROCEDURE — 99499 RISK ADDL DX/OHS AUDIT: ICD-10-PCS | Mod: S$PBB,,, | Performed by: UROLOGY

## 2020-02-19 PROCEDURE — 1101F PR PT FALLS ASSESS DOC 0-1 FALLS W/OUT INJ PAST YR: ICD-10-PCS | Mod: HCNC,CPTII,S$GLB, | Performed by: INTERNAL MEDICINE

## 2020-02-19 PROCEDURE — 81002 POCT URINE DIPSTICK WITHOUT MICROSCOPE: ICD-10-PCS | Mod: HCNC,S$GLB,, | Performed by: UROLOGY

## 2020-02-19 PROCEDURE — 99214 OFFICE O/P EST MOD 30 MIN: CPT | Mod: HCNC,25,S$GLB, | Performed by: UROLOGY

## 2020-02-19 PROCEDURE — 99999 PR PBB SHADOW E&M-EST. PATIENT-LVL III: ICD-10-PCS | Mod: PBBFAC,HCNC,, | Performed by: INTERNAL MEDICINE

## 2020-02-19 PROCEDURE — 1159F PR MEDICATION LIST DOCUMENTED IN MEDICAL RECORD: ICD-10-PCS | Mod: HCNC,S$GLB,, | Performed by: INTERNAL MEDICINE

## 2020-02-19 PROCEDURE — 1159F MED LIST DOCD IN RCRD: CPT | Mod: HCNC,S$GLB,, | Performed by: UROLOGY

## 2020-02-19 PROCEDURE — 99499 UNLISTED E&M SERVICE: CPT | Mod: S$PBB,,, | Performed by: INTERNAL MEDICINE

## 2020-02-19 PROCEDURE — 1159F MED LIST DOCD IN RCRD: CPT | Mod: HCNC,S$GLB,, | Performed by: INTERNAL MEDICINE

## 2020-02-19 PROCEDURE — 81002 URINALYSIS NONAUTO W/O SCOPE: CPT | Mod: HCNC,S$GLB,, | Performed by: UROLOGY

## 2020-02-19 PROCEDURE — 1126F AMNT PAIN NOTED NONE PRSNT: CPT | Mod: HCNC,S$GLB,, | Performed by: UROLOGY

## 2020-02-19 PROCEDURE — 3078F DIAST BP <80 MM HG: CPT | Mod: HCNC,CPTII,S$GLB, | Performed by: UROLOGY

## 2020-02-19 PROCEDURE — 99215 PR OFFICE/OUTPT VISIT, EST, LEVL V, 40-54 MIN: ICD-10-PCS | Mod: HCNC,S$GLB,, | Performed by: INTERNAL MEDICINE

## 2020-02-19 PROCEDURE — 1126F PR PAIN SEVERITY QUANTIFIED, NO PAIN PRESENT: ICD-10-PCS | Mod: HCNC,S$GLB,, | Performed by: INTERNAL MEDICINE

## 2020-02-19 PROCEDURE — 3078F PR MOST RECENT DIASTOLIC BLOOD PRESSURE < 80 MM HG: ICD-10-PCS | Mod: HCNC,CPTII,S$GLB, | Performed by: INTERNAL MEDICINE

## 2020-02-19 PROCEDURE — 1159F PR MEDICATION LIST DOCUMENTED IN MEDICAL RECORD: ICD-10-PCS | Mod: HCNC,S$GLB,, | Performed by: UROLOGY

## 2020-02-19 PROCEDURE — 99999 PR PBB SHADOW E&M-EST. PATIENT-LVL II: CPT | Mod: PBBFAC,HCNC,, | Performed by: UROLOGY

## 2020-02-19 PROCEDURE — 99214 PR OFFICE/OUTPT VISIT, EST, LEVL IV, 30-39 MIN: ICD-10-PCS | Mod: HCNC,25,S$GLB, | Performed by: UROLOGY

## 2020-02-19 PROCEDURE — 99999 PR PBB SHADOW E&M-EST. PATIENT-LVL III: CPT | Mod: PBBFAC,HCNC,, | Performed by: INTERNAL MEDICINE

## 2020-02-19 PROCEDURE — 3074F PR MOST RECENT SYSTOLIC BLOOD PRESSURE < 130 MM HG: ICD-10-PCS | Mod: HCNC,CPTII,S$GLB, | Performed by: INTERNAL MEDICINE

## 2020-02-19 PROCEDURE — 3074F PR MOST RECENT SYSTOLIC BLOOD PRESSURE < 130 MM HG: ICD-10-PCS | Mod: HCNC,CPTII,S$GLB, | Performed by: UROLOGY

## 2020-02-19 PROCEDURE — 3078F PR MOST RECENT DIASTOLIC BLOOD PRESSURE < 80 MM HG: ICD-10-PCS | Mod: HCNC,CPTII,S$GLB, | Performed by: UROLOGY

## 2020-02-19 PROCEDURE — 3078F DIAST BP <80 MM HG: CPT | Mod: HCNC,CPTII,S$GLB, | Performed by: INTERNAL MEDICINE

## 2020-02-19 PROCEDURE — 99499 UNLISTED E&M SERVICE: CPT | Mod: S$PBB,,, | Performed by: UROLOGY

## 2020-02-19 PROCEDURE — 1126F AMNT PAIN NOTED NONE PRSNT: CPT | Mod: HCNC,S$GLB,, | Performed by: INTERNAL MEDICINE

## 2020-02-19 PROCEDURE — 99999 PR PBB SHADOW E&M-EST. PATIENT-LVL II: ICD-10-PCS | Mod: PBBFAC,HCNC,, | Performed by: UROLOGY

## 2020-02-19 PROCEDURE — 3074F SYST BP LT 130 MM HG: CPT | Mod: HCNC,CPTII,S$GLB, | Performed by: UROLOGY

## 2020-02-19 PROCEDURE — 3074F SYST BP LT 130 MM HG: CPT | Mod: HCNC,CPTII,S$GLB, | Performed by: INTERNAL MEDICINE

## 2020-02-19 PROCEDURE — 1126F PR PAIN SEVERITY QUANTIFIED, NO PAIN PRESENT: ICD-10-PCS | Mod: HCNC,S$GLB,, | Performed by: UROLOGY

## 2020-02-19 PROCEDURE — 1101F PR PT FALLS ASSESS DOC 0-1 FALLS W/OUT INJ PAST YR: ICD-10-PCS | Mod: HCNC,CPTII,S$GLB, | Performed by: UROLOGY

## 2020-02-19 PROCEDURE — 99499 RISK ADDL DX/OHS AUDIT: ICD-10-PCS | Mod: S$PBB,,, | Performed by: INTERNAL MEDICINE

## 2020-02-19 RX ORDER — TAMSULOSIN HYDROCHLORIDE 0.4 MG/1
1 CAPSULE ORAL DAILY
Qty: 90 CAPSULE | Refills: 11 | Status: SHIPPED | OUTPATIENT
Start: 2020-02-19 | End: 2020-02-19 | Stop reason: SDUPTHER

## 2020-02-19 RX ORDER — DUTASTERIDE 0.5 MG/1
0.5 CAPSULE, LIQUID FILLED ORAL DAILY
Qty: 30 CAPSULE | Refills: 11 | Status: SHIPPED | OUTPATIENT
Start: 2020-02-19 | End: 2020-11-23 | Stop reason: SDUPTHER

## 2020-02-19 RX ORDER — TAMSULOSIN HYDROCHLORIDE 0.4 MG/1
1 CAPSULE ORAL DAILY
Qty: 90 CAPSULE | Refills: 11 | Status: SHIPPED | OUTPATIENT
Start: 2020-02-19 | End: 2020-11-23 | Stop reason: SDUPTHER

## 2020-02-19 RX ORDER — DUTASTERIDE 0.5 MG/1
0.5 CAPSULE, LIQUID FILLED ORAL DAILY
Qty: 30 CAPSULE | Refills: 11 | Status: SHIPPED | OUTPATIENT
Start: 2020-02-19 | End: 2020-02-19 | Stop reason: SDUPTHER

## 2020-02-19 RX ORDER — ATENOLOL 25 MG/1
25 TABLET ORAL DAILY
Qty: 90 TABLET | Refills: 3 | Status: SHIPPED | OUTPATIENT
Start: 2020-02-19 | End: 2020-03-11

## 2020-02-19 NOTE — PROGRESS NOTES
Chief Complaint: Prostate Cancer screening    HPI:   2/19/20: PSA way down again.  Reviewed history in detail. Voiding better on meds lately.  Occas nocturia.  2-3 on avg.  11/13/19: Biopsy negative findings.  Discussed BPH and indications for dutasteride (finasteride caused breast tenderness).  Reviewed history in detail.   10/23/19: TRUS/Bx 53 gm.  MRI PIRADS5 multifocal.  9/9/19: Voiding okay no sig problems.    7/18/18: No problems in last years, doing okay except he had some breast tenderness and it seems he is off the finasteride and that got better. Reviewed history in detail.  7/12/17: PSA down sig on finasteride, no LUTS.  7/8/16: 82 yo man with elevated PSA was started on finasteride and with expectation of improving the value.  No urinary bother on flomax/finasteride seein improvement. PSA dramatically lower in last 3 mo    Allergies:  Patient has no known allergies.    Medications:  has a current medication list which includes the following prescription(s): aspirin, atenolol, atorvastatin, dutasteride, and tamsulosin.    Review of Systems:  General: No fever, chills, fatigability, or weight loss.  Skin: No rashes, itching, or changes in color or texture of skin.  Chest: Denies COTTON, cyanosis, wheezing, cough, and sputum production.  Abdomen: Appetite fine. No weight loss. Denies diarrhea, abdominal pain, hematemesis, or blood in stool.  Musculoskeletal: No joint stiffness or swelling. Denies back pain.  : As above.  All other review of systems negative.    PMH:   has a past medical history of BPH (benign prostatic hyperplasia), Cataract, CKD (chronic kidney disease), stage IV (07/15/2016), Elevated PSA, HEARING LOSS, History of colon polyps, Hyperlipemia, Hypertension, Myocardial infarction, and Tobacco dependence.    PSH:   has a past surgical history that includes Ear mastoidectomy w/ cochlear implant w/ landmark (2011); Tonsillectomy; PCIOL OD (Right, 03/`16/16); Cataract extraction w/  intraocular  lens implant (Left, 4-20-16); Cataract extraction w/  intraocular lens implant (Right, 3-16-16); Appendectomy (1960); and Eye surgery (Bilateral, 2016).    FamHx: family history includes Breast cancer in his sister; Cancer in his brother, sister, and sister; Hyperlipidemia in his mother; Macular degeneration in his sister and sister; Stroke in his mother.    SocHx:  reports that he has been smoking cigarettes. He has a 10.00 pack-year smoking history. He has never used smokeless tobacco. He reports that he drinks about 1.0 standard drinks of alcohol per week. He reports that he does not use drugs.      Physical Exam:  Vitals:    02/19/20 1043   BP: 124/70     General: A&Ox3, no apparent distress, no deformities  Neck: No masses, normal thyroid  Lungs: normal inspiration, no use of accessory muscles  Heart: normal pulse, no arrhythmias  Abdomen: Soft, NT, ND  Skin: The skin is warm and dry. No jaundice.  Ext: No c/c/e.  :   8/18: Test desc maira, no abnormalities of epididymus. Penis normal, with normal penile and scrotal skin. Meatus normal. Normal rectal tone, no hemorrhoids. Prost 40 gm no nodules or masses appreciated. SV not palpable. Perineum and anus normal.    Labs/Studies:   PSA    2010-15: 7-9    3/16: 11.4    6/16: 2.7    7/17: 1.4    9/19: 16.1; 17.2    2/20: 4.2    Impression/Plan:   1. Sig worry for prostate cancer but biopsy negative, elev PSA. PSA/RTC 6 mo  2. Continue dutasteride/flomax for BPH   3. HTN: stable; discussed.  4. Frequency; discussed evening restriction

## 2020-02-19 NOTE — PROGRESS NOTES
Renal clinic f/u note:  Date of clinic visit: 1/23/20  Reason for f/u and chief c/o: acute kidney injury     HPI: Pt is a 86 y/o male who presents for f/u of NINO on CKD stage 3. s Cr worsened from baseline of 1.4 to 2.3, and then to 2.7. As documented,. NINO was suspected to be due to taking mobic on a daily basis x 11 months for foot pain. Mobic was stopped, Pt was advised not to take any NSAIds. s Cr still woresned, likely due to low BP and overdiuresis. On his last visit 1 month ago, tenoretic was changed to plain atenolol 50 mg po qd. Diuretic induced hypokalemia and metabolic alkalosis have improved, but BP is still low. Pt remains asymptomatic with no c/o of dizziness, no CP, no SOB. Pt is taking the beta blocker only for BP and has no h/o of any arrhythmias. He has been advised that his NINO is very alarming, specially given pt's advanced age of 85. He has been advised (repeated today) not to take mobic or any form of ibuprofen or naproxyn. Generic names of these meds reviewed with him.      Also noted his h/o of BPH and taking flomax, noted PSA has improved from 17 to 4. He denies any sx's pf dyuria, no recent infections.        PAST MEDICAL HISTORY:  CKD stage 3 (baseline s Cr 1.4), BPH (benign prostatic hyperplasia), Cataract, Elevated PSA, HEARING LOSS, History of colon polyps, Hyperlipemia, Hypertension, Myocardial infarction, and Tobacco dependence.     PAST SURGICAL HISTORY:  He  has a past surgical history that includes Ear mastoidectomy w/ cochlear implant w/ landmark (2011); Tonsillectomy; PCIOL OD (Right, 03/`16/16); Cataract extraction w/  intraocular lens implant (Left, 4-20-16); Cataract extraction w/  intraocular lens implant (Right, 3-16-16); Appendectomy (1960); and Eye surgery (Bilateral, 2016).     SOCIAL HISTORY:  He  reports that he quit smoking about 19 years ago. His smoking use included cigarettes. He has a 10.00 pack-year smoking history. He has never used smokeless tobacco. He reports  "that he drinks about 1.0 standard drinks of alcohol per week. He reports that he does not use drugs.     FAMILY MEDICAL HISTORY:  His family history includes Breast cancer in his sister; Cancer in his brother, sister, and sister; Hyperlipidemia in his mother; Macular degeneration in his sister and sister; Stroke in his mother.     Review of patient's allergies indicates:  No Known Allergies     Meds:    Current Outpatient Medications:     aspirin (ECOTRIN) 81 MG EC tablet, Take 81 mg by mouth once daily.  , Disp: , Rfl:     atenoloL (TENORMIN) 25 MG tablet, Take 1 tablet (25 mg total) by mouth once daily., Disp: 90 tablet, Rfl: 3    atorvastatin (LIPITOR) 10 MG tablet, TAKE 1 TABLET EVERY DAY, Disp: 90 tablet, Rfl: 3    dutasteride (AVODART) 0.5 mg capsule, Take 1 capsule (0.5 mg total) by mouth once daily., Disp: 30 capsule, Rfl: 11    tamsulosin (FLOMAX) 0.4 mg Cap, Take 1 capsule (0.4 mg total) by mouth once daily., Disp: 90 capsule, Rfl: 11     REVIEW OF SYSTEMS:  Patient has no fever, fatigue, visual changes, chest pain, edema, cough, dyspnea, nausea, vomiting, constipation, diarrhea, arthralgias, pruritis, dizziness, weakness, depression, confusion.     PHYSICAL EXAM:  Blood pressure (!) 114/50, pulse 64, height 5' 8" (1.727 m), weight 82.1 kg (181 lb), last visit 175 lbs  Gen:    WDWN male in no apparent distress  Psych: Normal mood and affect  Skin:    No rashes or ulcers  Eyes:   Normal conjunctiva and lids, PERRLA  ENT:    Normal hearing with no oropharyngeal lesions  Neck:   No JVD  Chest:  Clear with no rales, rhonchi, wheezing with normal effort  CV:      Regular with no murmurs, gallops or rubs  Abd:     Soft, nontender, no distension, positive bowel sounds  Ext:      No cyanosis, clubbing or edema     Labs reviewed  BMP  Lab Results   Component Value Date     02/05/2020    K 4.5 02/05/2020     02/05/2020    CO2 27 02/05/2020    BUN 38 (H) 02/05/2020    CREATININE 2.4 (H) 02/05/2020    " CALCIUM 9.3 02/05/2020    ANIONGAP 7 (L) 02/05/2020    ESTGFRAFRICA 27.4 (A) 02/05/2020    EGFRNONAA 23.7 (A) 02/05/2020              IMPRESSION AND RECOMMENDATIONS:  86 y/o male with NINO on CKD stage 3:     1. 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  Hypotension in this elderly pt is not allowing renal recovery to occur as fast as expected after stopping NSAIDs  Pt takes atenolol only for BP. No h/o of arrhythmias  Will reduce atenolol form 50 to 25 mg po qd   Will need close f/u  No NSAIds of any form     2. HTN: BP controlled to low  Mgmt as above  Meds reviewed     3. Has BPH: no hydronephrosis per review of the renal u/s.  Noted marked improvement in PSA with flomax.  Has f/u with urology today     Plans and recommendations:   As reviewed  Above  Opportunity for questions provided  Total time spent 40 minutes including time needed to review the records, the   patient evaluation, documentation, face-to-face discussion with the patient,   more than 50% of the time was spent on coordination of care and counseling.    Level V visit.  RTC 2 months     Tanmay Pereira MD

## 2020-03-04 ENCOUNTER — LAB VISIT (OUTPATIENT)
Dept: LAB | Facility: HOSPITAL | Age: 85
End: 2020-03-04
Attending: INTERNAL MEDICINE
Payer: MEDICARE

## 2020-03-04 DIAGNOSIS — N18.4 CKD (CHRONIC KIDNEY DISEASE), STAGE IV: ICD-10-CM

## 2020-03-04 DIAGNOSIS — I10 ESSENTIAL HYPERTENSION: Chronic | ICD-10-CM

## 2020-03-04 LAB
BASOPHILS # BLD AUTO: 0.04 K/UL (ref 0–0.2)
BASOPHILS NFR BLD: 0.5 % (ref 0–1.9)
DIFFERENTIAL METHOD: ABNORMAL
EOSINOPHIL # BLD AUTO: 0.7 K/UL (ref 0–0.5)
EOSINOPHIL NFR BLD: 8.9 % (ref 0–8)
ERYTHROCYTE [DISTWIDTH] IN BLOOD BY AUTOMATED COUNT: 13.2 % (ref 11.5–14.5)
HCT VFR BLD AUTO: 41.7 % (ref 40–54)
HGB BLD-MCNC: 12.8 G/DL (ref 14–18)
IMM GRANULOCYTES # BLD AUTO: 0.02 K/UL (ref 0–0.04)
IMM GRANULOCYTES NFR BLD AUTO: 0.3 % (ref 0–0.5)
LYMPHOCYTES # BLD AUTO: 1.5 K/UL (ref 1–4.8)
LYMPHOCYTES NFR BLD: 20.2 % (ref 18–48)
MCH RBC QN AUTO: 29.4 PG (ref 27–31)
MCHC RBC AUTO-ENTMCNC: 30.7 G/DL (ref 32–36)
MCV RBC AUTO: 96 FL (ref 82–98)
MONOCYTES # BLD AUTO: 0.8 K/UL (ref 0.3–1)
MONOCYTES NFR BLD: 10.4 % (ref 4–15)
NEUTROPHILS # BLD AUTO: 4.5 K/UL (ref 1.8–7.7)
NEUTROPHILS NFR BLD: 59.7 % (ref 38–73)
NRBC BLD-RTO: 0 /100 WBC
PLATELET # BLD AUTO: 173 K/UL (ref 150–350)
PMV BLD AUTO: 12.2 FL (ref 9.2–12.9)
RBC # BLD AUTO: 4.35 M/UL (ref 4.6–6.2)
WBC # BLD AUTO: 7.57 K/UL (ref 3.9–12.7)

## 2020-03-04 PROCEDURE — 84100 ASSAY OF PHOSPHORUS: CPT | Mod: HCNC

## 2020-03-04 PROCEDURE — 84443 ASSAY THYROID STIM HORMONE: CPT | Mod: HCNC

## 2020-03-04 PROCEDURE — 80053 COMPREHEN METABOLIC PANEL: CPT | Mod: HCNC

## 2020-03-04 PROCEDURE — 80061 LIPID PANEL: CPT | Mod: HCNC

## 2020-03-04 PROCEDURE — 36415 COLL VENOUS BLD VENIPUNCTURE: CPT | Mod: HCNC,PO

## 2020-03-04 PROCEDURE — 83970 ASSAY OF PARATHORMONE: CPT | Mod: HCNC

## 2020-03-04 PROCEDURE — 85025 COMPLETE CBC W/AUTO DIFF WBC: CPT | Mod: HCNC

## 2020-03-05 LAB
ALBUMIN SERPL BCP-MCNC: 3.2 G/DL (ref 3.5–5.2)
ALP SERPL-CCNC: 98 U/L (ref 55–135)
ALT SERPL W/O P-5'-P-CCNC: 14 U/L (ref 10–44)
ANION GAP SERPL CALC-SCNC: 9 MMOL/L (ref 8–16)
AST SERPL-CCNC: 20 U/L (ref 10–40)
BILIRUB SERPL-MCNC: 0.6 MG/DL (ref 0.1–1)
BUN SERPL-MCNC: 30 MG/DL (ref 8–23)
CALCIUM SERPL-MCNC: 9.1 MG/DL (ref 8.7–10.5)
CHLORIDE SERPL-SCNC: 109 MMOL/L (ref 95–110)
CHOLEST SERPL-MCNC: 128 MG/DL (ref 120–199)
CHOLEST/HDLC SERPL: 3.3 {RATIO} (ref 2–5)
CO2 SERPL-SCNC: 24 MMOL/L (ref 23–29)
CREAT SERPL-MCNC: 2.7 MG/DL (ref 0.5–1.4)
EST. GFR  (AFRICAN AMERICAN): 23.8 ML/MIN/1.73 M^2
EST. GFR  (NON AFRICAN AMERICAN): 20.6 ML/MIN/1.73 M^2
GLUCOSE SERPL-MCNC: 81 MG/DL (ref 70–110)
HDLC SERPL-MCNC: 39 MG/DL (ref 40–75)
HDLC SERPL: 30.5 % (ref 20–50)
LDLC SERPL CALC-MCNC: 68.4 MG/DL (ref 63–159)
NONHDLC SERPL-MCNC: 89 MG/DL
PHOSPHATE SERPL-MCNC: 3.6 MG/DL (ref 2.7–4.5)
POTASSIUM SERPL-SCNC: 4.2 MMOL/L (ref 3.5–5.1)
PROT SERPL-MCNC: 7.3 G/DL (ref 6–8.4)
PTH-INTACT SERPL-MCNC: 100 PG/ML (ref 9–77)
SODIUM SERPL-SCNC: 142 MMOL/L (ref 136–145)
TRIGL SERPL-MCNC: 103 MG/DL (ref 30–150)
TSH SERPL DL<=0.005 MIU/L-ACNC: 3.02 UIU/ML (ref 0.4–4)

## 2020-03-11 ENCOUNTER — OFFICE VISIT (OUTPATIENT)
Dept: INTERNAL MEDICINE | Facility: CLINIC | Age: 85
End: 2020-03-11
Payer: MEDICARE

## 2020-03-11 VITALS
HEART RATE: 52 BPM | OXYGEN SATURATION: 98 % | DIASTOLIC BLOOD PRESSURE: 82 MMHG | TEMPERATURE: 98 F | WEIGHT: 182.13 LBS | BODY MASS INDEX: 27.6 KG/M2 | SYSTOLIC BLOOD PRESSURE: 142 MMHG | HEIGHT: 68 IN

## 2020-03-11 DIAGNOSIS — I10 ESSENTIAL HYPERTENSION: Primary | Chronic | ICD-10-CM

## 2020-03-11 DIAGNOSIS — N18.4 CKD (CHRONIC KIDNEY DISEASE), STAGE IV: ICD-10-CM

## 2020-03-11 DIAGNOSIS — I70.0 ATHEROSCLEROSIS OF AORTA: ICD-10-CM

## 2020-03-11 DIAGNOSIS — E78.2 MIXED HYPERLIPIDEMIA: Chronic | ICD-10-CM

## 2020-03-11 PROCEDURE — 3077F SYST BP >= 140 MM HG: CPT | Mod: HCNC,CPTII,S$GLB, | Performed by: INTERNAL MEDICINE

## 2020-03-11 PROCEDURE — 1125F AMNT PAIN NOTED PAIN PRSNT: CPT | Mod: HCNC,S$GLB,, | Performed by: INTERNAL MEDICINE

## 2020-03-11 PROCEDURE — 1101F PR PT FALLS ASSESS DOC 0-1 FALLS W/OUT INJ PAST YR: ICD-10-PCS | Mod: HCNC,CPTII,S$GLB, | Performed by: INTERNAL MEDICINE

## 2020-03-11 PROCEDURE — 3079F DIAST BP 80-89 MM HG: CPT | Mod: HCNC,CPTII,S$GLB, | Performed by: INTERNAL MEDICINE

## 2020-03-11 PROCEDURE — 3079F PR MOST RECENT DIASTOLIC BLOOD PRESSURE 80-89 MM HG: ICD-10-PCS | Mod: HCNC,CPTII,S$GLB, | Performed by: INTERNAL MEDICINE

## 2020-03-11 PROCEDURE — 1125F PR PAIN SEVERITY QUANTIFIED, PAIN PRESENT: ICD-10-PCS | Mod: HCNC,S$GLB,, | Performed by: INTERNAL MEDICINE

## 2020-03-11 PROCEDURE — 99499 UNLISTED E&M SERVICE: CPT | Mod: HCNC,S$GLB,, | Performed by: INTERNAL MEDICINE

## 2020-03-11 PROCEDURE — 1159F MED LIST DOCD IN RCRD: CPT | Mod: HCNC,S$GLB,, | Performed by: INTERNAL MEDICINE

## 2020-03-11 PROCEDURE — 99214 PR OFFICE/OUTPT VISIT, EST, LEVL IV, 30-39 MIN: ICD-10-PCS | Mod: HCNC,S$GLB,, | Performed by: INTERNAL MEDICINE

## 2020-03-11 PROCEDURE — 99999 PR PBB SHADOW E&M-EST. PATIENT-LVL III: CPT | Mod: PBBFAC,HCNC,, | Performed by: INTERNAL MEDICINE

## 2020-03-11 PROCEDURE — 1101F PT FALLS ASSESS-DOCD LE1/YR: CPT | Mod: HCNC,CPTII,S$GLB, | Performed by: INTERNAL MEDICINE

## 2020-03-11 PROCEDURE — 3077F PR MOST RECENT SYSTOLIC BLOOD PRESSURE >= 140 MM HG: ICD-10-PCS | Mod: HCNC,CPTII,S$GLB, | Performed by: INTERNAL MEDICINE

## 2020-03-11 PROCEDURE — 99214 OFFICE O/P EST MOD 30 MIN: CPT | Mod: HCNC,S$GLB,, | Performed by: INTERNAL MEDICINE

## 2020-03-11 PROCEDURE — 1159F PR MEDICATION LIST DOCUMENTED IN MEDICAL RECORD: ICD-10-PCS | Mod: HCNC,S$GLB,, | Performed by: INTERNAL MEDICINE

## 2020-03-11 PROCEDURE — 99499 RISK ADDL DX/OHS AUDIT: ICD-10-PCS | Mod: HCNC,S$GLB,, | Performed by: INTERNAL MEDICINE

## 2020-03-11 PROCEDURE — 99999 PR PBB SHADOW E&M-EST. PATIENT-LVL III: ICD-10-PCS | Mod: PBBFAC,HCNC,, | Performed by: INTERNAL MEDICINE

## 2020-03-11 RX ORDER — ATENOLOL 50 MG/1
50 TABLET ORAL DAILY
Qty: 90 TABLET | Refills: 3 | Status: SHIPPED | OUTPATIENT
Start: 2020-03-11 | End: 2020-09-01

## 2020-03-11 RX ORDER — FLUOCINONIDE 0.5 MG/G
CREAM TOPICAL 2 TIMES DAILY
Qty: 60 G | Refills: 3 | Status: SHIPPED | OUTPATIENT
Start: 2020-03-11 | End: 2022-04-06

## 2020-03-11 NOTE — PROGRESS NOTES
"HPI:  Patient is an 85-year-old man who comes today for follow-up of his hypertension, lipids and chronic kidney disease. Patient has been doing fairly well.  He is only taking Tylenol for aches and pains.  He does have arthritic issues in his knees and back.  He has no other complaints at this time    Current meds have been verified and updated per the EMR  Exam:BP (!) 142/82 (BP Location: Right arm)   Pulse (!) 52   Temp 97.6 °F (36.4 °C) (Tympanic)   Ht 5' 8" (1.727 m)   Wt 82.6 kg (182 lb 1.6 oz)   SpO2 98%   BMI 27.69 kg/m²   Carotids 2+ equal without bruits  Chest clear  Cardiovascular regular rate and rhythm without murmur gallop or rub    Lab Results   Component Value Date    WBC 7.57 03/04/2020    HGB 12.8 (L) 03/04/2020    HCT 41.7 03/04/2020     03/04/2020    CHOL 128 03/04/2020    TRIG 103 03/04/2020    HDL 39 (L) 03/04/2020    ALT 14 03/04/2020    AST 20 03/04/2020     03/04/2020    K 4.2 03/04/2020     03/04/2020    CREATININE 2.7 (H) 03/04/2020    BUN 30 (H) 03/04/2020    CO2 24 03/04/2020    TSH 3.020 03/04/2020    PSA 4.2 (H) 02/05/2020       Impression:  Chronic kidney disease, stable.  Patient continues to avoid all anti-inflammatories  Hypertension, patient on his own increase his atenolol to 50 mg a day is about 2 weeks ago because his blood pressure was elevated.  His blood pressure readings recently been in the 130-140/70-80 most of time.  Multiple other medical problems below, stable  Patient Active Problem List   Diagnosis    BPH (benign prostatic hyperplasia)    Hyperlipemia    Essential hypertension    Choroidal nevus, right eye    Macular chorioretinal scar of left eye    SNHL (sensorineural hearing loss)s/p implant on rt    Atherosclerosis of aorta    History of colon polyps    Bilateral hearing loss    Senile ectropion of right lower eyelid    Dry eye    Blepharitis of upper and lower eyelids of both eyes    CKD (chronic kidney disease), stage IV "       Plan:  Orders Placed This Encounter    Lipid panel    Basic metabolic panel    atenoloL (TENORMIN) 50 MG tablet    fluocinonide 0.05% (LIDEX) 0.05 % cream     Patient will continue with atenolol 50 mg today.  He will see me again in 6 months with above lab work.  His other medications remain the same.    This note is generated with speech recognition software and is subject to transcription error and sound alike phrases that may be missed by proofreading.

## 2020-05-18 ENCOUNTER — PATIENT OUTREACH (OUTPATIENT)
Dept: ADMINISTRATIVE | Facility: OTHER | Age: 85
End: 2020-05-18

## 2020-05-20 ENCOUNTER — OFFICE VISIT (OUTPATIENT)
Dept: UROLOGY | Facility: CLINIC | Age: 85
End: 2020-05-20
Payer: MEDICARE

## 2020-05-20 VITALS
DIASTOLIC BLOOD PRESSURE: 70 MMHG | TEMPERATURE: 98 F | BODY MASS INDEX: 26.7 KG/M2 | SYSTOLIC BLOOD PRESSURE: 160 MMHG | WEIGHT: 175.63 LBS

## 2020-05-20 DIAGNOSIS — N40.0 BENIGN PROSTATIC HYPERPLASIA, UNSPECIFIED WHETHER LOWER URINARY TRACT SYMPTOMS PRESENT: ICD-10-CM

## 2020-05-20 DIAGNOSIS — R33.9 URINARY RETENTION: Primary | ICD-10-CM

## 2020-05-20 LAB — POC RESIDUAL URINE VOLUME: 74 ML (ref 0–100)

## 2020-05-20 PROCEDURE — 99499 RISK ADDL DX/OHS AUDIT: ICD-10-PCS | Mod: HCNC,S$GLB,, | Performed by: UROLOGY

## 2020-05-20 PROCEDURE — 99999 PR PBB SHADOW E&M-EST. PATIENT-LVL II: ICD-10-PCS | Mod: PBBFAC,HCNC,, | Performed by: UROLOGY

## 2020-05-20 PROCEDURE — 3077F SYST BP >= 140 MM HG: CPT | Mod: HCNC,CPTII,S$GLB, | Performed by: UROLOGY

## 2020-05-20 PROCEDURE — 3077F PR MOST RECENT SYSTOLIC BLOOD PRESSURE >= 140 MM HG: ICD-10-PCS | Mod: HCNC,CPTII,S$GLB, | Performed by: UROLOGY

## 2020-05-20 PROCEDURE — 99213 PR OFFICE/OUTPT VISIT, EST, LEVL III, 20-29 MIN: ICD-10-PCS | Mod: HCNC,S$GLB,, | Performed by: UROLOGY

## 2020-05-20 PROCEDURE — 1101F PT FALLS ASSESS-DOCD LE1/YR: CPT | Mod: HCNC,CPTII,S$GLB, | Performed by: UROLOGY

## 2020-05-20 PROCEDURE — 1159F MED LIST DOCD IN RCRD: CPT | Mod: HCNC,S$GLB,, | Performed by: UROLOGY

## 2020-05-20 PROCEDURE — 1101F PR PT FALLS ASSESS DOC 0-1 FALLS W/OUT INJ PAST YR: ICD-10-PCS | Mod: HCNC,CPTII,S$GLB, | Performed by: UROLOGY

## 2020-05-20 PROCEDURE — 51798 US URINE CAPACITY MEASURE: CPT | Mod: HCNC,S$GLB,, | Performed by: UROLOGY

## 2020-05-20 PROCEDURE — 1126F AMNT PAIN NOTED NONE PRSNT: CPT | Mod: HCNC,S$GLB,, | Performed by: UROLOGY

## 2020-05-20 PROCEDURE — 99499 UNLISTED E&M SERVICE: CPT | Mod: HCNC,S$GLB,, | Performed by: UROLOGY

## 2020-05-20 PROCEDURE — 1126F PR PAIN SEVERITY QUANTIFIED, NO PAIN PRESENT: ICD-10-PCS | Mod: HCNC,S$GLB,, | Performed by: UROLOGY

## 2020-05-20 PROCEDURE — 3078F DIAST BP <80 MM HG: CPT | Mod: HCNC,CPTII,S$GLB, | Performed by: UROLOGY

## 2020-05-20 PROCEDURE — 99213 OFFICE O/P EST LOW 20 MIN: CPT | Mod: HCNC,S$GLB,, | Performed by: UROLOGY

## 2020-05-20 PROCEDURE — 99999 PR PBB SHADOW E&M-EST. PATIENT-LVL II: CPT | Mod: PBBFAC,HCNC,, | Performed by: UROLOGY

## 2020-05-20 PROCEDURE — 51798 POCT BLADDER SCAN: ICD-10-PCS | Mod: HCNC,S$GLB,, | Performed by: UROLOGY

## 2020-05-20 PROCEDURE — 1159F PR MEDICATION LIST DOCUMENTED IN MEDICAL RECORD: ICD-10-PCS | Mod: HCNC,S$GLB,, | Performed by: UROLOGY

## 2020-05-20 PROCEDURE — 3078F PR MOST RECENT DIASTOLIC BLOOD PRESSURE < 80 MM HG: ICD-10-PCS | Mod: HCNC,CPTII,S$GLB, | Performed by: UROLOGY

## 2020-05-20 NOTE — PROGRESS NOTES
Chief Complaint: Prostate Cancer screening    HPI:   5/20/20: Back a little early.  No new problems.  Reviewed history in detail.   2/19/20: PSA way down again.  Reviewed history in detail. Voiding better on meds lately.  Occas nocturia.  2-3 on avg.  11/13/19: Biopsy negative findings.  Discussed BPH and indications for dutasteride (finasteride caused breast tenderness).  Reviewed history in detail.   10/23/19: TRUS/Bx 53 gm.  MRI PIRADS5 multifocal.  9/9/19: Voiding okay no sig problems.    7/18/18: No problems in last years, doing okay except he had some breast tenderness and it seems he is off the finasteride and that got better. Reviewed history in detail.  7/12/17: PSA down sig on finasteride, no LUTS.  7/8/16: 82 yo man with elevated PSA was started on finasteride and with expectation of improving the value.  No urinary bother on flomax/finasteride seein improvement. PSA dramatically lower in last 3 mo    Allergies:  Patient has no known allergies.    Medications:  has a current medication list which includes the following prescription(s): aspirin, atenolol, atorvastatin, dutasteride, fluocinonide 0.05%, and tamsulosin.    Review of Systems:  General: No fever, chills, fatigability, or weight loss.  Skin: No rashes, itching, or changes in color or texture of skin.  Chest: Denies COTTON, cyanosis, wheezing, cough, and sputum production.  Abdomen: Appetite fine. No weight loss. Denies diarrhea, abdominal pain, hematemesis, or blood in stool.  Musculoskeletal: No joint stiffness or swelling. Denies back pain.  : As above.  All other review of systems negative.    PMH:   has a past medical history of BPH (benign prostatic hyperplasia), Cataract, CKD (chronic kidney disease), stage IV (07/15/2016), Elevated PSA, HEARING LOSS, History of colon polyps, Hyperlipemia, Hypertension, Myocardial infarction, and Tobacco dependence.    PSH:   has a past surgical history that includes Ear mastoidectomy w/ cochlear implant w/  landmark (2011); Tonsillectomy; PCIOL OD (Right, 03/`16/16); Cataract extraction w/  intraocular lens implant (Left, 4-20-16); Cataract extraction w/  intraocular lens implant (Right, 3-16-16); Appendectomy (1960); and Eye surgery (Bilateral, 2016).    FamHx: family history includes Breast cancer in his sister; Cancer in his brother, sister, and sister; Hyperlipidemia in his mother; Macular degeneration in his sister and sister; Stroke in his mother.    SocHx:  reports that he has never smoked. He has never used smokeless tobacco. He reports that he drinks about 1.0 standard drinks of alcohol per week. He reports that he does not use drugs.      Physical Exam:  Vitals:    05/20/20 1430   BP: (!) 160/70   Temp: 98.1 °F (36.7 °C)     General: A&Ox3, no apparent distress, no deformities  Neck: No masses, normal thyroid  Lungs: normal inspiration, no use of accessory muscles  Heart: normal pulse, no arrhythmias  Abdomen: Soft, NT, ND  Skin: The skin is warm and dry. No jaundice.  Ext: No c/c/e.  :   8/18: Test desc maira, no abnormalities of epididymus. Penis normal, with normal penile and scrotal skin. Meatus normal. Normal rectal tone, no hemorrhoids. Prost 40 gm no nodules or masses appreciated. SV not palpable. Perineum and anus normal.    Labs/Studies:   PSA    2010-15: 7-9    3/16: 11.4    6/16: 2.7    7/17: 1.4    9/19: 16.1; 17.2    2/20: 4.2    Impression/Plan:   1. Sig worry for prostate cancer but biopsy negative, elev PSA. PSA/RTC 6 mo  2. Continue dutasteride/flomax for BPH   3. HTN: stable; discussed.  4. Frequency; discussed evening restriction

## 2020-06-02 ENCOUNTER — LAB VISIT (OUTPATIENT)
Dept: LAB | Facility: HOSPITAL | Age: 85
End: 2020-06-02
Attending: INTERNAL MEDICINE
Payer: MEDICARE

## 2020-06-02 DIAGNOSIS — N17.9 ACUTE KIDNEY INJURY: ICD-10-CM

## 2020-06-02 PROCEDURE — 36415 COLL VENOUS BLD VENIPUNCTURE: CPT | Mod: HCNC,PO

## 2020-06-02 PROCEDURE — 80069 RENAL FUNCTION PANEL: CPT | Mod: HCNC

## 2020-06-03 LAB
ALBUMIN SERPL BCP-MCNC: 3.3 G/DL (ref 3.5–5.2)
ANION GAP SERPL CALC-SCNC: 9 MMOL/L (ref 8–16)
BUN SERPL-MCNC: 36 MG/DL (ref 8–23)
CALCIUM SERPL-MCNC: 9.1 MG/DL (ref 8.7–10.5)
CHLORIDE SERPL-SCNC: 108 MMOL/L (ref 95–110)
CO2 SERPL-SCNC: 25 MMOL/L (ref 23–29)
CREAT SERPL-MCNC: 3.2 MG/DL (ref 0.5–1.4)
EST. GFR  (AFRICAN AMERICAN): 19.4 ML/MIN/1.73 M^2
EST. GFR  (NON AFRICAN AMERICAN): 16.7 ML/MIN/1.73 M^2
GLUCOSE SERPL-MCNC: 110 MG/DL (ref 70–110)
PHOSPHATE SERPL-MCNC: 3.2 MG/DL (ref 2.7–4.5)
POTASSIUM SERPL-SCNC: 4.4 MMOL/L (ref 3.5–5.1)
SODIUM SERPL-SCNC: 142 MMOL/L (ref 136–145)

## 2020-06-08 ENCOUNTER — PATIENT OUTREACH (OUTPATIENT)
Dept: ADMINISTRATIVE | Facility: OTHER | Age: 85
End: 2020-06-08

## 2020-06-08 NOTE — PROGRESS NOTES
Chart reviewed.   Immunizations: Triggered Imm Registry     Orders placed: n/a  Upcoming appts to satisfy RICA topics: n/a

## 2020-06-09 ENCOUNTER — OFFICE VISIT (OUTPATIENT)
Dept: NEPHROLOGY | Facility: CLINIC | Age: 85
End: 2020-06-09
Payer: MEDICARE

## 2020-06-09 VITALS
HEART RATE: 64 BPM | SYSTOLIC BLOOD PRESSURE: 118 MMHG | BODY MASS INDEX: 26.3 KG/M2 | HEIGHT: 68 IN | WEIGHT: 173.5 LBS | DIASTOLIC BLOOD PRESSURE: 50 MMHG

## 2020-06-09 DIAGNOSIS — Z71.89 ENCOUNTER FOR MEDICATION REVIEW AND COUNSELING: ICD-10-CM

## 2020-06-09 DIAGNOSIS — I95.2 HYPOTENSION DUE TO DRUGS: ICD-10-CM

## 2020-06-09 DIAGNOSIS — N14.0 ANALGESIC NEPHROPATHY: ICD-10-CM

## 2020-06-09 DIAGNOSIS — I10 ESSENTIAL HYPERTENSION: Primary | ICD-10-CM

## 2020-06-09 DIAGNOSIS — N17.9 ACUTE KIDNEY INJURY: ICD-10-CM

## 2020-06-09 PROCEDURE — 1101F PT FALLS ASSESS-DOCD LE1/YR: CPT | Mod: HCNC,CPTII,S$GLB, | Performed by: INTERNAL MEDICINE

## 2020-06-09 PROCEDURE — 1159F MED LIST DOCD IN RCRD: CPT | Mod: HCNC,S$GLB,, | Performed by: INTERNAL MEDICINE

## 2020-06-09 PROCEDURE — 3078F PR MOST RECENT DIASTOLIC BLOOD PRESSURE < 80 MM HG: ICD-10-PCS | Mod: HCNC,CPTII,S$GLB, | Performed by: INTERNAL MEDICINE

## 2020-06-09 PROCEDURE — 99215 OFFICE O/P EST HI 40 MIN: CPT | Mod: HCNC,S$GLB,, | Performed by: INTERNAL MEDICINE

## 2020-06-09 PROCEDURE — 1159F PR MEDICATION LIST DOCUMENTED IN MEDICAL RECORD: ICD-10-PCS | Mod: HCNC,S$GLB,, | Performed by: INTERNAL MEDICINE

## 2020-06-09 PROCEDURE — 1126F PR PAIN SEVERITY QUANTIFIED, NO PAIN PRESENT: ICD-10-PCS | Mod: HCNC,S$GLB,, | Performed by: INTERNAL MEDICINE

## 2020-06-09 PROCEDURE — 3078F DIAST BP <80 MM HG: CPT | Mod: HCNC,CPTII,S$GLB, | Performed by: INTERNAL MEDICINE

## 2020-06-09 PROCEDURE — 99215 PR OFFICE/OUTPT VISIT, EST, LEVL V, 40-54 MIN: ICD-10-PCS | Mod: HCNC,S$GLB,, | Performed by: INTERNAL MEDICINE

## 2020-06-09 PROCEDURE — 3074F PR MOST RECENT SYSTOLIC BLOOD PRESSURE < 130 MM HG: ICD-10-PCS | Mod: HCNC,CPTII,S$GLB, | Performed by: INTERNAL MEDICINE

## 2020-06-09 PROCEDURE — 1101F PR PT FALLS ASSESS DOC 0-1 FALLS W/OUT INJ PAST YR: ICD-10-PCS | Mod: HCNC,CPTII,S$GLB, | Performed by: INTERNAL MEDICINE

## 2020-06-09 PROCEDURE — 99999 PR PBB SHADOW E&M-EST. PATIENT-LVL III: CPT | Mod: PBBFAC,HCNC,, | Performed by: INTERNAL MEDICINE

## 2020-06-09 PROCEDURE — 99999 PR PBB SHADOW E&M-EST. PATIENT-LVL III: ICD-10-PCS | Mod: PBBFAC,HCNC,, | Performed by: INTERNAL MEDICINE

## 2020-06-09 PROCEDURE — 1126F AMNT PAIN NOTED NONE PRSNT: CPT | Mod: HCNC,S$GLB,, | Performed by: INTERNAL MEDICINE

## 2020-06-09 PROCEDURE — 3074F SYST BP LT 130 MM HG: CPT | Mod: HCNC,CPTII,S$GLB, | Performed by: INTERNAL MEDICINE

## 2020-06-09 NOTE — PROGRESS NOTES
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 s Cr initially worsened after taking mobic x 11 month on a daily bases. s Cr continued to worsen, despite not taking mobic or other NSAIDs any more (or as pt claims),  suspect due to low BP in an elderly pt. Previously atenolol/chlorthalidone combination was changed to plain atenolol 50 mg po qd. BP remained low, and atenolol was reduced to 25 mg po qd. Pt was last seen by us in Feb 2020. Per PCP note reviewed and pt's wife's report today, pt went back and re-started the higher dose of atenolol because he felt his BP was high (). It's unclear if he takes any NSAIds. He has been advised that his NINO is very alarming, specially given pt's advanced age of 85. He has been advised (repeated today) not to take mobic or any form of ibuprofen or naproxyn. Generic names of these meds reviewed with him.  On f/u, he has no acute or new c/o's, no dizziness.     Also noted his h/o of BPH and taking flomax, noted PSA has improved from 17 to 4. He denies any sx's pf dyuria, no recent infections.        PAST MEDICAL HISTORY:  CKD stage 3 (baseline s Cr 1.4), BPH (benign prostatic hyperplasia), Cataract, Elevated PSA, HEARING LOSS, History of colon polyps, Hyperlipemia, Hypertension, Myocardial infarction, and Tobacco dependence.     PAST SURGICAL HISTORY:  He  has a past surgical history that includes Ear mastoidectomy w/ cochlear implant w/ landmark (2011); Tonsillectomy; PCIOL OD (Right, 03/`16/16); Cataract extraction w/  intraocular lens implant (Left, 4-20-16); Cataract extraction w/  intraocular lens implant (Right, 3-16-16); Appendectomy (1960); and Eye surgery (Bilateral, 2016).     SOCIAL HISTORY:  He  reports that he quit smoking about 19 years ago. His smoking use included cigarettes. He has a  "10.00 pack-year smoking history. He has never used smokeless tobacco. He reports that he drinks about 1.0 standard drinks of alcohol per week. He reports that he does not use drugs.     FAMILY MEDICAL HISTORY:  His family history includes Breast cancer in his sister; Cancer in his brother, sister, and sister; Hyperlipidemia in his mother; Macular degeneration in his sister and sister; Stroke in his mother.     Review of patient's allergies indicates:  No Known Allergies     Meds: reviewed    Current Outpatient Medications:     aspirin (ECOTRIN) 81 MG EC tablet, Take 81 mg by mouth once daily.  , Disp: , Rfl:     atenoloL (TENORMIN) 50 MG tablet, Take 1 tablet (50 mg total) by mouth once daily., Disp: 90 tablet, Rfl: 3    atorvastatin (LIPITOR) 10 MG tablet, TAKE 1 TABLET EVERY DAY, Disp: 90 tablet, Rfl: 3    dutasteride (AVODART) 0.5 mg capsule, Take 1 capsule (0.5 mg total) by mouth once daily., Disp: 30 capsule, Rfl: 11    fluocinonide 0.05% (LIDEX) 0.05 % cream, Apply topically 2 (two) times daily., Disp: 60 g, Rfl: 3    tamsulosin (FLOMAX) 0.4 mg Cap, Take 1 capsule (0.4 mg total) by mouth once daily., Disp: 90 capsule, Rfl: 11     REVIEW OF SYSTEMS:  Patient has no fever, fatigue, visual changes, chest pain, edema, cough, dyspnea, nausea, vomiting, constipation, diarrhea, arthralgias, pruritis, dizziness, weakness, depression, confusion.     PHYSICAL EXAM:  Blood pressure 118/50, pulse 64, height 5' 8" (1.727 m), weight 78.7 Kg, from 82.1 kg   Gen:    WDWN male in no apparent distress  Psych: Normal mood and affect  Skin:    No rashes or ulcers  Neck:   No JVD  Chest:  Clear with no rales, rhonchi, wheezing with normal effort  CV:      Regular with no murmurs, gallops or rubs  Abd:     Soft, nontender, no distension, positive bowel sounds  Ext:      trace edema     Labs reviewed  BMP  Lab Results   Component Value Date     06/02/2020    K 4.4 06/02/2020     06/02/2020    CO2 25 06/02/2020    " BUN 36 (H) 06/02/2020    CREATININE 3.2 (H) 06/02/2020    CALCIUM 9.1 06/02/2020    ANIONGAP 9 06/02/2020    ESTGFRAFRICA 19.4 (A) 06/02/2020    EGFRNONAA 16.7 (A) 06/02/2020              IMPRESSION AND RECOMMENDATIONS:  84 y/o male with NINO on CKD stage 3:     1. Renal: s Cr is worse due to sustained hypotension in an elderly  In addition, pt may be taking NSAIDs again that he is not telling us.   Was in the habit of taking mobic and aleve in the past  Advised pt and wife against taking any All NSAID  Attempted to minimize dose of BP meds, but pt somehow managed to go back to higher dose of atenolol  BP low today for pt's advanced age, causes organ hypoperfusion  NINO due to combination of analgesic nephropathy and dehydration (prerenal azotemia) in this elderly pt is alarmaing     s Cr may not recover fully as pt had exposure to mobic for a long time (11 months)  Hypokalemia and metabolic alkalosis have improved    Will reduce atenolol again from 50 to 25 mg po qd   Will need close f/u  No NSAIds of any form     2. HTN: BP controlled to low  Mgmt as above  Meds reviewed  Goal for SBP in this pt is 130-160: advised pt and wife both verbally and in writing      3. Has BPH: no hydronephrosis per review of the renal u/s.  Noted marked improvement in PSA with flomax.  Has f/u with urology today     Plans and recommendations:   As reviewed  Above  Opportunity for questions provided  Total time spent 40 minutes including time needed to review the records, the   patient evaluation, documentation, face-to-face discussion with the patient,   more than 50% of the time was spent on coordination of care and counseling.    Level V visit.  RTC 2 months     Tanmay Pereira MD

## 2020-08-05 ENCOUNTER — PATIENT OUTREACH (OUTPATIENT)
Dept: ADMINISTRATIVE | Facility: OTHER | Age: 85
End: 2020-08-05

## 2020-08-05 NOTE — PROGRESS NOTES
Requested updates within Care Everywhere.  Patient's chart was reviewed for overdue RICA topics.  Immunizations reconciled.

## 2020-08-07 ENCOUNTER — OFFICE VISIT (OUTPATIENT)
Dept: OPHTHALMOLOGY | Facility: CLINIC | Age: 85
End: 2020-08-07
Payer: MEDICARE

## 2020-08-07 DIAGNOSIS — H31.012 MACULAR SCAR, LEFT: ICD-10-CM

## 2020-08-07 DIAGNOSIS — H52.4 PRESBYOPIA: ICD-10-CM

## 2020-08-07 DIAGNOSIS — H04.129 DRY EYE: ICD-10-CM

## 2020-08-07 DIAGNOSIS — D31.31 CHOROIDAL NEVUS, RIGHT: Primary | ICD-10-CM

## 2020-08-07 PROCEDURE — 99999 PR PBB SHADOW E&M-EST. PATIENT-LVL II: CPT | Mod: PBBFAC,HCNC,, | Performed by: OPTOMETRIST

## 2020-08-07 PROCEDURE — 92014 PR EYE EXAM, EST PATIENT,COMPREHESV: ICD-10-PCS | Mod: HCNC,S$GLB,, | Performed by: OPTOMETRIST

## 2020-08-07 PROCEDURE — 92014 COMPRE OPH EXAM EST PT 1/>: CPT | Mod: HCNC,S$GLB,, | Performed by: OPTOMETRIST

## 2020-08-07 PROCEDURE — 99999 PR PBB SHADOW E&M-EST. PATIENT-LVL II: ICD-10-PCS | Mod: PBBFAC,HCNC,, | Performed by: OPTOMETRIST

## 2020-08-07 PROCEDURE — 92015 DETERMINE REFRACTIVE STATE: CPT | Mod: HCNC,S$GLB,, | Performed by: OPTOMETRIST

## 2020-08-07 PROCEDURE — 92015 PR REFRACTION: ICD-10-PCS | Mod: HCNC,S$GLB,, | Performed by: OPTOMETRIST

## 2020-08-07 NOTE — PROGRESS NOTES
HPI     No visual complaints.  Last eye exam 08/02/2019 [facility].  Update glasses RX.      Last edited by Hannah Felix on 8/7/2020 11:19 AM. (History)            Assessment /Plan     For exam results, see Encounter Report.    Choroidal nevus, right    Macular scar, left    Dry eye    Presbyopia      Stable nevus and scar    Worksheet given. Discussed Dry Eyes in detail including Artificial Tears, lubricants, and Omega 3 Fish Oils.    Dispense Final Rx for glasses.  RTC 1 year  Discussed above and answered questions.

## 2020-08-13 ENCOUNTER — LAB VISIT (OUTPATIENT)
Dept: LAB | Facility: HOSPITAL | Age: 85
End: 2020-08-13
Attending: UROLOGY
Payer: MEDICARE

## 2020-08-13 DIAGNOSIS — Z12.5 PROSTATE CANCER SCREENING: ICD-10-CM

## 2020-08-13 DIAGNOSIS — I10 HYPERTENSION, UNSPECIFIED TYPE: ICD-10-CM

## 2020-08-13 DIAGNOSIS — N40.0 BENIGN PROSTATIC HYPERPLASIA, UNSPECIFIED WHETHER LOWER URINARY TRACT SYMPTOMS PRESENT: ICD-10-CM

## 2020-08-13 DIAGNOSIS — I10 ESSENTIAL HYPERTENSION: ICD-10-CM

## 2020-08-13 LAB
ALBUMIN SERPL BCP-MCNC: 3.3 G/DL (ref 3.5–5.2)
ANION GAP SERPL CALC-SCNC: 7 MMOL/L (ref 8–16)
BUN SERPL-MCNC: 45 MG/DL (ref 8–23)
CALCIUM SERPL-MCNC: 9.3 MG/DL (ref 8.7–10.5)
CHLORIDE SERPL-SCNC: 112 MMOL/L (ref 95–110)
CO2 SERPL-SCNC: 25 MMOL/L (ref 23–29)
CREAT SERPL-MCNC: 3.5 MG/DL (ref 0.5–1.4)
EST. GFR  (AFRICAN AMERICAN): 17.4 ML/MIN/1.73 M^2
EST. GFR  (NON AFRICAN AMERICAN): 15 ML/MIN/1.73 M^2
GLUCOSE SERPL-MCNC: 89 MG/DL (ref 70–110)
PHOSPHATE SERPL-MCNC: 3.3 MG/DL (ref 2.7–4.5)
POTASSIUM SERPL-SCNC: 4.5 MMOL/L (ref 3.5–5.1)
SODIUM SERPL-SCNC: 144 MMOL/L (ref 136–145)

## 2020-08-13 PROCEDURE — 36415 COLL VENOUS BLD VENIPUNCTURE: CPT | Mod: HCNC,PO

## 2020-08-13 PROCEDURE — 80069 RENAL FUNCTION PANEL: CPT | Mod: HCNC

## 2020-08-13 PROCEDURE — 84153 ASSAY OF PSA TOTAL: CPT | Mod: HCNC

## 2020-08-14 LAB — COMPLEXED PSA SERPL-MCNC: 4.5 NG/ML (ref 0–4)

## 2020-09-01 ENCOUNTER — OFFICE VISIT (OUTPATIENT)
Dept: NEPHROLOGY | Facility: CLINIC | Age: 85
End: 2020-09-01
Payer: MEDICARE

## 2020-09-01 ENCOUNTER — PES CALL (OUTPATIENT)
Dept: ADMINISTRATIVE | Facility: CLINIC | Age: 85
End: 2020-09-01

## 2020-09-01 VITALS
BODY MASS INDEX: 26.82 KG/M2 | SYSTOLIC BLOOD PRESSURE: 126 MMHG | WEIGHT: 170.88 LBS | DIASTOLIC BLOOD PRESSURE: 60 MMHG | HEIGHT: 67 IN | HEART RATE: 60 BPM

## 2020-09-01 DIAGNOSIS — N17.9 ACUTE KIDNEY INJURY: Primary | ICD-10-CM

## 2020-09-01 PROCEDURE — 3074F SYST BP LT 130 MM HG: CPT | Mod: HCNC,CPTII,S$GLB, | Performed by: INTERNAL MEDICINE

## 2020-09-01 PROCEDURE — 99499 UNLISTED E&M SERVICE: CPT | Mod: S$GLB,,, | Performed by: INTERNAL MEDICINE

## 2020-09-01 PROCEDURE — 99499 RISK ADDL DX/OHS AUDIT: ICD-10-PCS | Mod: S$GLB,,, | Performed by: INTERNAL MEDICINE

## 2020-09-01 PROCEDURE — 3078F PR MOST RECENT DIASTOLIC BLOOD PRESSURE < 80 MM HG: ICD-10-PCS | Mod: HCNC,CPTII,S$GLB, | Performed by: INTERNAL MEDICINE

## 2020-09-01 PROCEDURE — 1101F PR PT FALLS ASSESS DOC 0-1 FALLS W/OUT INJ PAST YR: ICD-10-PCS | Mod: HCNC,CPTII,S$GLB, | Performed by: INTERNAL MEDICINE

## 2020-09-01 PROCEDURE — 3078F DIAST BP <80 MM HG: CPT | Mod: HCNC,CPTII,S$GLB, | Performed by: INTERNAL MEDICINE

## 2020-09-01 PROCEDURE — 1159F MED LIST DOCD IN RCRD: CPT | Mod: HCNC,S$GLB,, | Performed by: INTERNAL MEDICINE

## 2020-09-01 PROCEDURE — 99215 OFFICE O/P EST HI 40 MIN: CPT | Mod: HCNC,S$GLB,, | Performed by: INTERNAL MEDICINE

## 2020-09-01 PROCEDURE — 1126F AMNT PAIN NOTED NONE PRSNT: CPT | Mod: HCNC,S$GLB,, | Performed by: INTERNAL MEDICINE

## 2020-09-01 PROCEDURE — 99215 PR OFFICE/OUTPT VISIT, EST, LEVL V, 40-54 MIN: ICD-10-PCS | Mod: HCNC,S$GLB,, | Performed by: INTERNAL MEDICINE

## 2020-09-01 PROCEDURE — 3074F PR MOST RECENT SYSTOLIC BLOOD PRESSURE < 130 MM HG: ICD-10-PCS | Mod: HCNC,CPTII,S$GLB, | Performed by: INTERNAL MEDICINE

## 2020-09-01 PROCEDURE — 1159F PR MEDICATION LIST DOCUMENTED IN MEDICAL RECORD: ICD-10-PCS | Mod: HCNC,S$GLB,, | Performed by: INTERNAL MEDICINE

## 2020-09-01 PROCEDURE — 99999 PR PBB SHADOW E&M-EST. PATIENT-LVL III: CPT | Mod: PBBFAC,HCNC,, | Performed by: INTERNAL MEDICINE

## 2020-09-01 PROCEDURE — 1101F PT FALLS ASSESS-DOCD LE1/YR: CPT | Mod: HCNC,CPTII,S$GLB, | Performed by: INTERNAL MEDICINE

## 2020-09-01 PROCEDURE — 1126F PR PAIN SEVERITY QUANTIFIED, NO PAIN PRESENT: ICD-10-PCS | Mod: HCNC,S$GLB,, | Performed by: INTERNAL MEDICINE

## 2020-09-01 PROCEDURE — 99999 PR PBB SHADOW E&M-EST. PATIENT-LVL III: ICD-10-PCS | Mod: PBBFAC,HCNC,, | Performed by: INTERNAL MEDICINE

## 2020-09-01 RX ORDER — ATENOLOL 25 MG/1
25 TABLET ORAL DAILY
Qty: 90 TABLET | Refills: 3 | Status: SHIPPED | OUTPATIENT
Start: 2020-09-01 | End: 2020-10-27

## 2020-09-01 NOTE — PROGRESS NOTES
Patient, Harman Johnston (MRN #[id number]), presented with a recent estimated Glumerular Filtration Rate (eGFR) less than 15 consistent with the definition of chronic kidney disease stage 5 (ICD10 - N18.5).    eGFR if non    Date Value Ref Range Status   08/13/2020 15.0 (A) >60 mL/min/1.73 m^2 Final     Comment:     Calculation used to obtain the estimated glomerular filtration  rate (eGFR) is the CKD-EPI equation.          The patient's chronic kidney disease stage 5 was monitored, evaluated, addressed and/or treated. This addendum to the medical record is made on 09/01/2020.

## 2020-09-01 NOTE — PROGRESS NOTES
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 As documented before, s Cr started worsening sometime between Dec 2018 and June 2019, since then it has but slowly worsened. Initially worsening is suspected to have started due to pt taking mobic x 11 months on a daily bases. However, s Cr has continued to worsen, despite pt not taking mobic or other NSAIDs any more (or as pt claims). U/a has been unremarkable with no proteinuria or hematuria and with a bland urine sediment. Therefore, GN is not suspected. DDX has included NINO due to hypotension, as pt is an elderly and has has SBP sometimes close to 100's. Previously atenolol/chlorthalidone combination was changed to plain atenolol 50 mg po qd. BP remained low, and atenolol was reduced to 25 mg po qd. Pt was last seen by us in June 2020. Per PCP note reviewed and pt's wife's report today, pt went back and re-started the higher dose of atenolol because he felt his BP was high (). It's unclear if he takes any NSAIds, but he denies it. Atenolol was again reduced to 25 mg po qd in June 2020, but again today noted dose is 50 mg po qd??? How the dose was again increased is not clear to me!     DDX has also included obstructive nephropathy as pt has an elevated PSA (17 in Sept 2019, currently 4.5). renal u/s in Dec 2019 did not show hydronephrosis. Pt has seen urology. Prostate biopsy did not show cancer. He is taking flomax and avodart. He reports no sx's of dysuria. Again, no dehydration, no abx, no infections, no GI losses reported. He has been advised that his NINO is very alarming, specially given pt's advanced age of 85. He has been advised (repeated today) not to take mobic or any form of ibuprofen or naproxyn. Generic names of these meds reviewed with him.  On f/u, he has no acute or new  c/o's, no dizziness.         PAST MEDICAL HISTORY:  CKD stage 3 (baseline s Cr 1.4), BPH (benign prostatic hyperplasia), Cataract, Elevated PSA, HEARING LOSS, History of colon polyps, Hyperlipemia, Hypertension, Myocardial infarction, and Tobacco dependence.     PAST SURGICAL HISTORY:  He  has a past surgical history that includes Ear mastoidectomy w/ cochlear implant w/ landmark (2011); Tonsillectomy; PCIOL OD (Right, 03/`16/16); Cataract extraction w/  intraocular lens implant (Left, 4-20-16); Cataract extraction w/  intraocular lens implant (Right, 3-16-16); Appendectomy (1960); and Eye surgery (Bilateral, 2016).     SOCIAL HISTORY:  He  reports that he quit smoking about 19 years ago. His smoking use included cigarettes. He has a 10.00 pack-year smoking history. He has never used smokeless tobacco. He reports that he drinks about 1.0 standard drinks of alcohol per week. He reports that he does not use drugs.     FAMILY MEDICAL HISTORY:  His family history includes Breast cancer in his sister; Cancer in his brother, sister, and sister; Hyperlipidemia in his mother; Macular degeneration in his sister and sister; Stroke in his mother.     Review of patient's allergies indicates:  No Known Allergies     Meds: reviewed    Current Outpatient Medications:     aspirin (ECOTRIN) 81 MG EC tablet, Take 81 mg by mouth once daily.  , Disp: , Rfl:     atenoloL (TENORMIN) 25 MG tablet, Take 1 tablet (25 mg total) by mouth once daily., Disp: 90 tablet, Rfl: 3    atorvastatin (LIPITOR) 10 MG tablet, TAKE 1 TABLET EVERY DAY, Disp: 90 tablet, Rfl: 3    dutasteride (AVODART) 0.5 mg capsule, Take 1 capsule (0.5 mg total) by mouth once daily., Disp: 30 capsule, Rfl: 11    fluocinonide 0.05% (LIDEX) 0.05 % cream, Apply topically 2 (two) times daily., Disp: 60 g, Rfl: 3    tamsulosin (FLOMAX) 0.4 mg Cap, Take 1 capsule (0.4 mg total) by mouth once daily., Disp: 90 capsule, Rfl: 11     REVIEW OF SYSTEMS:  Patient has no fever,  "fatigue, visual changes, chest pain, edema, cough, dyspnea, nausea, vomiting, constipation, diarrhea, arthralgias, pruritis, dizziness, weakness, depression, confusion.     PHYSICAL EXAM:  Blood pressure 126/60, pulse 60, height 5' 8" (1.727 m), weight 77.5 Kg, from 78.7 Kg, from 82.1 kg   Gen:    WDWN male in no apparent distress  Psych: Normal mood and affect  Skin:    No rashes or ulcers  Neck:   No JVD  Chest:  Clear with no rales, rhonchi, wheezing with normal effort  CV:      Regular with no murmurs, gallops or rubs  Abd:     Soft, nontender, no distension, positive bowel sounds  Ext:      no edema     Labs reviewed  BMP  Lab Results   Component Value Date     08/13/2020    K 4.5 08/13/2020     (H) 08/13/2020    CO2 25 08/13/2020    BUN 45 (H) 08/13/2020    CREATININE 3.5 (H) 08/13/2020    CALCIUM 9.3 08/13/2020    ANIONGAP 7 (L) 08/13/2020    ESTGFRAFRICA 17.4 (A) 08/13/2020    EGFRNONAA 15.0 (A) 08/13/2020     Lab Results   Component Value Date    WBC 7.57 03/04/2020    HGB 12.8 (L) 03/04/2020    HCT 41.7 03/04/2020    MCV 96 03/04/2020     03/04/2020       U/a: no protein, no blood, no RBC's, no casts  Lab Results   Component Value Date    PSA 4.5 (H) 08/13/2020    PSA 4.2 (H) 02/05/2020    PSA 17.2 (H) 09/06/2019        IMPRESSION AND RECOMMENDATIONS:  84 y/o male with worsening renal function:     1. Renal: the reason for slowly worsening renal function not clear:  DDX: analgesic nephropathy: extensive mobic in the past x 11 months  Vs due to sustained hypotension in an elderly  Vs due to BPH/obstructive nephropathy  In addition, pt may be taking NSAIDs again that he is not telling us.     Advised pt and wife against taking any All NSAID  Attempted to minimize dose of BP meds, but pt somehow managed to go back to higher dose of atenolol  Will lower atenolol again to 25 mg po qd  Will repeat renal u/s to r/o hydronephrosis     s Cr may not recover fully as pt had exposure to mobic for a " long time (11 months)  K normal  Acid base stable     2. HTN: BP controlled to low  Mgmt as above  Meds reviewed  Goal for SBP in this pt is 130-160: advised pt and wife both verbally and in writing      3. Has BPH: no hydronephrosis per review of the renal u/s.  Noted marked improvement in PSA with flomax and avodart  Has f/u with urology today     Plans and recommendations:   As reviewed  Above  Opportunity for questions provided  Total time spent 40 minutes including time needed to review the records, the   patient evaluation, documentation, face-to-face discussion with the patient,   more than 50% of the time was spent on coordination of care and counseling.    Level V visit.  RTC 1 month     Tanmay Pereira MD

## 2020-09-04 ENCOUNTER — LAB VISIT (OUTPATIENT)
Dept: LAB | Facility: HOSPITAL | Age: 85
End: 2020-09-04
Attending: INTERNAL MEDICINE
Payer: MEDICARE

## 2020-09-04 DIAGNOSIS — I10 ESSENTIAL HYPERTENSION: Chronic | ICD-10-CM

## 2020-09-04 LAB
ANION GAP SERPL CALC-SCNC: 7 MMOL/L (ref 8–16)
BUN SERPL-MCNC: 52 MG/DL (ref 8–23)
CALCIUM SERPL-MCNC: 9 MG/DL (ref 8.7–10.5)
CHLORIDE SERPL-SCNC: 110 MMOL/L (ref 95–110)
CHOLEST SERPL-MCNC: 128 MG/DL (ref 120–199)
CHOLEST/HDLC SERPL: 3.4 {RATIO} (ref 2–5)
CO2 SERPL-SCNC: 22 MMOL/L (ref 23–29)
CREAT SERPL-MCNC: 3.8 MG/DL (ref 0.5–1.4)
EST. GFR  (AFRICAN AMERICAN): 15.7 ML/MIN/1.73 M^2
EST. GFR  (NON AFRICAN AMERICAN): 13.6 ML/MIN/1.73 M^2
GLUCOSE SERPL-MCNC: 80 MG/DL (ref 70–110)
HDLC SERPL-MCNC: 38 MG/DL (ref 40–75)
HDLC SERPL: 29.7 % (ref 20–50)
LDLC SERPL CALC-MCNC: 67.6 MG/DL (ref 63–159)
NONHDLC SERPL-MCNC: 90 MG/DL
POTASSIUM SERPL-SCNC: 4.4 MMOL/L (ref 3.5–5.1)
SODIUM SERPL-SCNC: 139 MMOL/L (ref 136–145)
TRIGL SERPL-MCNC: 112 MG/DL (ref 30–150)

## 2020-09-04 PROCEDURE — 80061 LIPID PANEL: CPT | Mod: HCNC

## 2020-09-04 PROCEDURE — 80048 BASIC METABOLIC PNL TOTAL CA: CPT | Mod: HCNC

## 2020-09-04 PROCEDURE — 36415 COLL VENOUS BLD VENIPUNCTURE: CPT | Mod: HCNC,PO

## 2020-09-09 ENCOUNTER — OFFICE VISIT (OUTPATIENT)
Dept: OPHTHALMOLOGY | Facility: CLINIC | Age: 85
End: 2020-09-09
Payer: MEDICARE

## 2020-09-09 DIAGNOSIS — H52.223 REGULAR ASTIGMATISM OF BOTH EYES: Primary | ICD-10-CM

## 2020-09-09 PROCEDURE — 99499 NO LOS: ICD-10-PCS | Mod: HCNC,S$GLB,, | Performed by: OPTOMETRIST

## 2020-09-09 PROCEDURE — 99999 PR PBB SHADOW E&M-EST. PATIENT-LVL II: ICD-10-PCS | Mod: PBBFAC,HCNC,, | Performed by: OPTOMETRIST

## 2020-09-09 PROCEDURE — 99499 UNLISTED E&M SERVICE: CPT | Mod: HCNC,S$GLB,, | Performed by: OPTOMETRIST

## 2020-09-09 PROCEDURE — 99999 PR PBB SHADOW E&M-EST. PATIENT-LVL II: CPT | Mod: PBBFAC,HCNC,, | Performed by: OPTOMETRIST

## 2020-09-09 NOTE — PROGRESS NOTES
HPI     Pt. Picked up his new glasses 3 weeks ago and having problem with the   left eye, at a distance    Last edited by Laverne Figueroa on 9/9/2020 10:04 AM. (History)            Assessment /Plan     For exam results, see Encounter Report.    Regular astigmatism of both eyes      Remake OS with cyl    RTC prn

## 2020-09-11 ENCOUNTER — OFFICE VISIT (OUTPATIENT)
Dept: INTERNAL MEDICINE | Facility: CLINIC | Age: 85
End: 2020-09-11
Payer: MEDICARE

## 2020-09-11 VITALS
SYSTOLIC BLOOD PRESSURE: 134 MMHG | OXYGEN SATURATION: 96 % | RESPIRATION RATE: 20 BRPM | DIASTOLIC BLOOD PRESSURE: 66 MMHG | TEMPERATURE: 98 F | HEART RATE: 56 BPM | BODY MASS INDEX: 25.67 KG/M2 | HEIGHT: 69 IN | WEIGHT: 173.31 LBS

## 2020-09-11 DIAGNOSIS — E78.2 MIXED HYPERLIPIDEMIA: Chronic | ICD-10-CM

## 2020-09-11 DIAGNOSIS — Z86.010 HISTORY OF COLON POLYPS: ICD-10-CM

## 2020-09-11 DIAGNOSIS — Z00.00 ROUTINE GENERAL MEDICAL EXAMINATION AT A HEALTH CARE FACILITY: Primary | ICD-10-CM

## 2020-09-11 DIAGNOSIS — I10 ESSENTIAL HYPERTENSION: Chronic | ICD-10-CM

## 2020-09-11 DIAGNOSIS — N18.4 CKD (CHRONIC KIDNEY DISEASE), STAGE IV: ICD-10-CM

## 2020-09-11 DIAGNOSIS — I70.0 ATHEROSCLEROSIS OF AORTA: ICD-10-CM

## 2020-09-11 PROCEDURE — 3075F PR MOST RECENT SYSTOLIC BLOOD PRESS GE 130-139MM HG: ICD-10-PCS | Mod: HCNC,CPTII,S$GLB, | Performed by: INTERNAL MEDICINE

## 2020-09-11 PROCEDURE — 99999 PR PBB SHADOW E&M-EST. PATIENT-LVL III: ICD-10-PCS | Mod: PBBFAC,HCNC,, | Performed by: INTERNAL MEDICINE

## 2020-09-11 PROCEDURE — 99999 PR PBB SHADOW E&M-EST. PATIENT-LVL III: CPT | Mod: PBBFAC,HCNC,, | Performed by: INTERNAL MEDICINE

## 2020-09-11 PROCEDURE — 3078F DIAST BP <80 MM HG: CPT | Mod: HCNC,CPTII,S$GLB, | Performed by: INTERNAL MEDICINE

## 2020-09-11 PROCEDURE — 3078F PR MOST RECENT DIASTOLIC BLOOD PRESSURE < 80 MM HG: ICD-10-PCS | Mod: HCNC,CPTII,S$GLB, | Performed by: INTERNAL MEDICINE

## 2020-09-11 PROCEDURE — 99397 PR PREVENTIVE VISIT,EST,65 & OVER: ICD-10-PCS | Mod: HCNC,S$GLB,, | Performed by: INTERNAL MEDICINE

## 2020-09-11 PROCEDURE — 99397 PER PM REEVAL EST PAT 65+ YR: CPT | Mod: HCNC,S$GLB,, | Performed by: INTERNAL MEDICINE

## 2020-09-11 PROCEDURE — 3075F SYST BP GE 130 - 139MM HG: CPT | Mod: HCNC,CPTII,S$GLB, | Performed by: INTERNAL MEDICINE

## 2020-09-18 ENCOUNTER — OFFICE VISIT (OUTPATIENT)
Dept: INTERNAL MEDICINE | Facility: CLINIC | Age: 85
End: 2020-09-18
Payer: MEDICARE

## 2020-09-18 VITALS
HEART RATE: 51 BPM | HEIGHT: 67 IN | TEMPERATURE: 98 F | SYSTOLIC BLOOD PRESSURE: 138 MMHG | OXYGEN SATURATION: 99 % | DIASTOLIC BLOOD PRESSURE: 70 MMHG | RESPIRATION RATE: 20 BRPM | BODY MASS INDEX: 27.75 KG/M2 | WEIGHT: 176.81 LBS

## 2020-09-18 DIAGNOSIS — H90.5 SENSORINEURAL HEARING LOSS (SNHL) OF RIGHT EAR, UNSPECIFIED HEARING STATUS ON CONTRALATERAL SIDE: Chronic | ICD-10-CM

## 2020-09-18 DIAGNOSIS — H02.132 SENILE ECTROPION OF RIGHT LOWER EYELID: ICD-10-CM

## 2020-09-18 DIAGNOSIS — E78.2 MIXED HYPERLIPIDEMIA: Chronic | ICD-10-CM

## 2020-09-18 DIAGNOSIS — D31.31 CHOROIDAL NEVUS, RIGHT EYE: ICD-10-CM

## 2020-09-18 DIAGNOSIS — H31.012 MACULAR CHORIORETINAL SCAR OF LEFT EYE: ICD-10-CM

## 2020-09-18 DIAGNOSIS — N18.4 CKD (CHRONIC KIDNEY DISEASE), STAGE IV: ICD-10-CM

## 2020-09-18 DIAGNOSIS — H04.129 DRY EYE: ICD-10-CM

## 2020-09-18 DIAGNOSIS — Z00.00 ENCOUNTER FOR PREVENTIVE HEALTH EXAMINATION: Primary | ICD-10-CM

## 2020-09-18 DIAGNOSIS — I10 ESSENTIAL HYPERTENSION: Chronic | ICD-10-CM

## 2020-09-18 DIAGNOSIS — H91.93 BILATERAL HEARING LOSS, UNSPECIFIED HEARING LOSS TYPE: ICD-10-CM

## 2020-09-18 DIAGNOSIS — I70.0 ATHEROSCLEROSIS OF AORTA: ICD-10-CM

## 2020-09-18 DIAGNOSIS — N40.0 BENIGN PROSTATIC HYPERPLASIA WITHOUT LOWER URINARY TRACT SYMPTOMS: Chronic | ICD-10-CM

## 2020-09-18 PROCEDURE — 3075F SYST BP GE 130 - 139MM HG: CPT | Mod: HCNC,CPTII,S$GLB, | Performed by: NURSE PRACTITIONER

## 2020-09-18 PROCEDURE — 3078F PR MOST RECENT DIASTOLIC BLOOD PRESSURE < 80 MM HG: ICD-10-PCS | Mod: HCNC,CPTII,S$GLB, | Performed by: NURSE PRACTITIONER

## 2020-09-18 PROCEDURE — 99999 PR PBB SHADOW E&M-EST. PATIENT-LVL III: ICD-10-PCS | Mod: PBBFAC,HCNC,, | Performed by: NURSE PRACTITIONER

## 2020-09-18 PROCEDURE — 99999 PR PBB SHADOW E&M-EST. PATIENT-LVL III: CPT | Mod: PBBFAC,HCNC,, | Performed by: NURSE PRACTITIONER

## 2020-09-18 PROCEDURE — G0439 PR MEDICARE ANNUAL WELLNESS SUBSEQUENT VISIT: ICD-10-PCS | Mod: HCNC,S$GLB,, | Performed by: NURSE PRACTITIONER

## 2020-09-18 PROCEDURE — 3078F DIAST BP <80 MM HG: CPT | Mod: HCNC,CPTII,S$GLB, | Performed by: NURSE PRACTITIONER

## 2020-09-18 PROCEDURE — 3075F PR MOST RECENT SYSTOLIC BLOOD PRESS GE 130-139MM HG: ICD-10-PCS | Mod: HCNC,CPTII,S$GLB, | Performed by: NURSE PRACTITIONER

## 2020-09-18 PROCEDURE — G0439 PPPS, SUBSEQ VISIT: HCPCS | Mod: HCNC,S$GLB,, | Performed by: NURSE PRACTITIONER

## 2020-09-18 NOTE — PROGRESS NOTES
"  Harman Johnston presented for a  Medicare AWV and comprehensive Health Risk Assessment today. The following components were reviewed and updated:    · Medical history  · Family History  · Social history  · Allergies and Current Medications  · Health Risk Assessment  · Health Maintenance  · Care Team     ** See Completed Assessments for Annual Wellness Visit within the encounter summary.**         The following assessments were completed:  · Living Situation  · CAGE  · Depression Screening  · Timed Get Up and Go  · Whisper Test  · Cognitive Function Screening  · Nutrition Screening  · ADL Screening  · PAQ Screening        Vitals:    09/18/20 0958   BP: 138/70   BP Location: Right arm   Patient Position: Sitting   BP Method: Medium (Manual)   Pulse: (!) 51   Resp: 20   Temp: 97.5 °F (36.4 °C)   TempSrc: Tympanic   SpO2: 99%   Weight: 80.2 kg (176 lb 12.9 oz)   Height: 5' 7" (1.702 m)     Body mass index is 27.69 kg/m².  Physical Exam  Constitutional:       General: He is not in acute distress.     Appearance: Normal appearance. He is well-developed. He is not ill-appearing, toxic-appearing or diaphoretic.   HENT:      Head: Normocephalic and atraumatic.      Right Ear: External ear normal.      Left Ear: External ear normal.      Nose: Nose normal.      Mouth/Throat:      Mouth: Mucous membranes are moist.      Pharynx: No posterior oropharyngeal erythema.   Eyes:      Conjunctiva/sclera: Conjunctivae normal.   Neck:      Musculoskeletal: Normal range of motion and neck supple. No neck rigidity or muscular tenderness.      Vascular: No carotid bruit.   Cardiovascular:      Rate and Rhythm: Normal rate and regular rhythm.      Pulses: Normal pulses.      Heart sounds: Normal heart sounds. No murmur. No friction rub. No gallop.    Pulmonary:      Effort: Pulmonary effort is normal. No respiratory distress.      Breath sounds: Normal breath sounds. No wheezing or rales.   Chest:      Chest wall: No tenderness.   Abdominal: "      General: Bowel sounds are normal. There is no distension.      Palpations: Abdomen is soft. There is no mass.      Tenderness: There is no abdominal tenderness.      Hernia: No hernia is present.   Musculoskeletal: Normal range of motion.         General: No tenderness.   Lymphadenopathy:      Cervical: No cervical adenopathy.   Skin:     General: Skin is warm and dry.   Neurological:      General: No focal deficit present.      Mental Status: He is alert and oriented to person, place, and time.      Cranial Nerves: No cranial nerve deficit.   Psychiatric:         Mood and Affect: Mood normal.         Behavior: Behavior normal.               Diagnoses and health risks identified today and associated recommendations/orders:    1. Encounter for preventive health examination  Screenings performed, as noted above.  Personal preventative testing needs reviewed.     2. Senile ectropion of right lower eyelid  Monitored/treated on meds, continue the same tx, stable    3. Macular chorioretinal scar of left eye  Monitored/treated on meds, continue the same tx, stable    4. Dry eye  Monitored/treated on meds, continue the same tx, stable    5. Choroidal nevus, right eye  Monitored/treated on meds, continue the same tx, stable, followed by Dr Jiménez    6. Sensorineural hearing loss (SNHL) of right ear, unspecified hearing status on contralateral side  Monitored/treated on meds, continue the same tx, stable    7. Bilateral hearing loss, unspecified hearing loss type  Monitored/treated on meds, continue the same tx, stable, followed by ent    8. Mixed hyperlipidemia  Monitored/treated on meds, continue the same tx, stable    9. Essential hypertension  Monitored/treated on meds, continue the same tx, stable    10. Atherosclerosis of aorta  Monitored/treated on meds, continue the same tx, stable    11. CKD (chronic kidney disease), stage IV  Monitored/treated on meds, continue the same tx, stable, followed by Dr Pereira,      12. Benign prostatic hyperplasia without lower urinary tract symptoms  Monitored/treated on meds, continue the same tx, stable, followed by Dr Contreras      Provided Harman with a 5-10 year written screening schedule and personal prevention plan. Recommendations were developed using the USPSTF age appropriate recommendations. Education, counseling, and referrals were provided as needed. After Visit Summary printed and given to patient which includes a list of additional screenings\tests needed.    No follow-ups on file.    Vasile Reed, NP

## 2020-09-18 NOTE — PATIENT INSTRUCTIONS
Counseling and Referral of Other Preventative  (Italic type indicates deductible and co-insurance are waived)    Patient Name: Harman Johnston  Today's Date: 9/18/2020    Health Maintenance       Date Due Completion Date    TETANUS VACCINE 09/30/1952 ---    Shingles Vaccine (1 of 2) 09/30/1984 ---    Colonoscopy 07/15/2019 7/15/2016 (ClinicallyNA)    Override on 7/15/2016: Not Clinically Appropriate    Influenza Vaccine (1) 08/01/2020 10/8/2019    Lipid Panel 09/04/2021 9/4/2020        No orders of the defined types were placed in this encounter.    The following information is provided to all patients.  This information is to help you find resources for any of the problems found today that may be affecting your health:                Living healthy guide: www.[facility].[place].gov      Understanding Diabetes: www.diabetes.org      Eating healthy: www.cdc.gov/healthyweight      CDC home safety checklist: www.cdc.gov/steadi/patient.html      Agency on Aging: www.goea.[place].[facility]      Alcoholics anonymous (AA): www.aa.org      Physical Activity: www.bradley.nih.gov/tn3bzsv      Tobacco use: www.quitwithusla.org

## 2020-10-20 ENCOUNTER — TELEPHONE (OUTPATIENT)
Dept: RADIOLOGY | Facility: HOSPITAL | Age: 85
End: 2020-10-20

## 2020-10-21 ENCOUNTER — HOSPITAL ENCOUNTER (OUTPATIENT)
Dept: RADIOLOGY | Facility: HOSPITAL | Age: 85
Discharge: HOME | End: 2020-10-21
Attending: INTERNAL MEDICINE
Payer: MEDICARE

## 2020-10-21 DIAGNOSIS — N17.9 ACUTE KIDNEY INJURY: ICD-10-CM

## 2020-10-21 PROCEDURE — 76770 US RETROPERITONEAL COMPLETE: ICD-10-PCS | Mod: 26,HCNC,, | Performed by: RADIOLOGY

## 2020-10-21 PROCEDURE — 76770 US EXAM ABDO BACK WALL COMP: CPT | Mod: 26,HCNC,, | Performed by: RADIOLOGY

## 2020-10-21 PROCEDURE — 76770 US EXAM ABDO BACK WALL COMP: CPT | Mod: TC,HCNC

## 2020-10-26 ENCOUNTER — PATIENT OUTREACH (OUTPATIENT)
Dept: ADMINISTRATIVE | Facility: OTHER | Age: 85
End: 2020-10-26

## 2020-10-26 NOTE — PROGRESS NOTES
Health Maintenance Due   Topic Date Due    TETANUS VACCINE  09/30/1952    Shingles Vaccine (1 of 2) 09/30/1984    Influenza Vaccine (1) 08/01/2020     Updates were requested from care everywhere.  Chart was reviewed for overdue Proactive [facility] Encounters (RICA) topics (CRS, Breast Cancer Screening, Eye exam)  Health Maintenance has been updated.  LINKS immunization registry triggered.  LINKS not responding.

## 2020-10-27 ENCOUNTER — OFFICE VISIT (OUTPATIENT)
Dept: NEPHROLOGY | Facility: CLINIC | Age: 85
End: 2020-10-27
Payer: MEDICARE

## 2020-10-27 VITALS
BODY MASS INDEX: 27.09 KG/M2 | WEIGHT: 172.63 LBS | HEART RATE: 52 BPM | HEIGHT: 67 IN | DIASTOLIC BLOOD PRESSURE: 66 MMHG | SYSTOLIC BLOOD PRESSURE: 118 MMHG

## 2020-10-27 DIAGNOSIS — N18.4 STAGE 4 CHRONIC KIDNEY DISEASE: Primary | ICD-10-CM

## 2020-10-27 PROCEDURE — 1126F AMNT PAIN NOTED NONE PRSNT: CPT | Mod: HCNC,S$GLB,, | Performed by: INTERNAL MEDICINE

## 2020-10-27 PROCEDURE — 99215 OFFICE O/P EST HI 40 MIN: CPT | Mod: HCNC,S$GLB,, | Performed by: INTERNAL MEDICINE

## 2020-10-27 PROCEDURE — 1126F PR PAIN SEVERITY QUANTIFIED, NO PAIN PRESENT: ICD-10-PCS | Mod: HCNC,S$GLB,, | Performed by: INTERNAL MEDICINE

## 2020-10-27 PROCEDURE — 1159F MED LIST DOCD IN RCRD: CPT | Mod: HCNC,S$GLB,, | Performed by: INTERNAL MEDICINE

## 2020-10-27 PROCEDURE — 1159F PR MEDICATION LIST DOCUMENTED IN MEDICAL RECORD: ICD-10-PCS | Mod: HCNC,S$GLB,, | Performed by: INTERNAL MEDICINE

## 2020-10-27 PROCEDURE — 99215 PR OFFICE/OUTPT VISIT, EST, LEVL V, 40-54 MIN: ICD-10-PCS | Mod: HCNC,S$GLB,, | Performed by: INTERNAL MEDICINE

## 2020-10-27 PROCEDURE — 1101F PR PT FALLS ASSESS DOC 0-1 FALLS W/OUT INJ PAST YR: ICD-10-PCS | Mod: HCNC,CPTII,S$GLB, | Performed by: INTERNAL MEDICINE

## 2020-10-27 PROCEDURE — 99999 PR PBB SHADOW E&M-EST. PATIENT-LVL III: ICD-10-PCS | Mod: PBBFAC,HCNC,, | Performed by: INTERNAL MEDICINE

## 2020-10-27 PROCEDURE — 99999 PR PBB SHADOW E&M-EST. PATIENT-LVL III: CPT | Mod: PBBFAC,HCNC,, | Performed by: INTERNAL MEDICINE

## 2020-10-27 PROCEDURE — 1101F PT FALLS ASSESS-DOCD LE1/YR: CPT | Mod: HCNC,CPTII,S$GLB, | Performed by: INTERNAL MEDICINE

## 2020-10-27 RX ORDER — ATENOLOL 25 MG/1
25 TABLET ORAL DAILY
Qty: 45 TABLET | Refills: 3 | Status: SHIPPED | OUTPATIENT
Start: 2020-10-27 | End: 2020-11-02 | Stop reason: SDUPTHER

## 2020-10-27 NOTE — PROGRESS NOTES
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 Pt presents with her daughter-in-law. Pt has no new c/o's.     As documented before, s Cr worsened faster than expected sometime between Dec 2018 and June 2019. Pt was taking mobic x 11 months on a daily bases. However, s Cr has continued to worsen, despite pt not taking mobic or other NSAIDs any more (or as pt claims). After taking to his daughter-in-law, it is not for certain that he has completely stopped taking mobic. He may have also taken ibuprofen. In addition, he has had low BP, and twice an attempt was made to lower dose of atenolol from 50 to 25 mg po qd, given low BP and his advanced age. As documented before, when he and his wife returned to the clinic, he was still on the 50 mg dose, which was hard to understand. In addition, chlorthalidone was stopped.    W/u for NINO showed unremarkable u/a and microscopy.Therefore, GN is not suspected. He has an elevated PSA (17 in Sept 2019, currently 4.5). Renal u/s in Dec 2019 and repeat u/s on 10/21/20 did not show any hydronephrosis. Pt has seen urology. Prostate biopsy did not show cancer. He is taking flomax and avodart. He reports no sx's of dysuria.     Again, no dehydration, no abx, no infections, no GI losses reported. He has been advised that his NINO is very alarming, specially given pt's advanced age of 85. He has been advised (repeated today) not to take mobic or any form of ibuprofen or naproxyn. Generic names of these meds reviewed with him.  On f/u, he has no acute or new c/o's, no dizziness.          PAST MEDICAL HISTORY:  CKD stage 3 (baseline s Cr 1.4), BPH (benign prostatic hyperplasia), Cataract, Elevated PSA, HEARING LOSS, History of colon polyps, Hyperlipemia, Hypertension, Myocardial infarction, and Tobacco dependence.     PAST  "SURGICAL HISTORY:  He  has a past surgical history that includes Ear mastoidectomy w/ cochlear implant w/ landmark (2011); Tonsillectomy; PCIOL OD (Right, 03/`16/16); Cataract extraction w/  intraocular lens implant (Left, 4-20-16); Cataract extraction w/  intraocular lens implant (Right, 3-16-16); Appendectomy (1960); and Eye surgery (Bilateral, 2016).     SOCIAL HISTORY:  He  reports that he quit smoking about 19 years ago. His smoking use included cigarettes. He has a 10.00 pack-year smoking history. He has never used smokeless tobacco. He reports that he drinks about 1.0 standard drinks of alcohol per week. He reports that he does not use drugs.     FAMILY MEDICAL HISTORY:  His family history includes Breast cancer in his sister; Cancer in his brother, sister, and sister; Hyperlipidemia in his mother; Macular degeneration in his sister and sister; Stroke in his mother.     Review of patient's allergies indicates:  No Known Allergies     Meds: reviewed    Current Outpatient Medications:     aspirin (ECOTRIN) 81 MG EC tablet, Take 81 mg by mouth once daily.  , Disp: , Rfl:     atenoloL (TENORMIN) 25 MG tablet, Take 1 tablet (25 mg total) by mouth once daily. Take 1/2 (12.5 mg) po qd, Disp: 45 tablet, Rfl: 3    atorvastatin (LIPITOR) 10 MG tablet, TAKE 1 TABLET EVERY DAY, Disp: 90 tablet, Rfl: 3    dutasteride (AVODART) 0.5 mg capsule, Take 1 capsule (0.5 mg total) by mouth once daily., Disp: 30 capsule, Rfl: 11    fluocinonide 0.05% (LIDEX) 0.05 % cream, Apply topically 2 (two) times daily., Disp: 60 g, Rfl: 3    tamsulosin (FLOMAX) 0.4 mg Cap, Take 1 capsule (0.4 mg total) by mouth once daily., Disp: 90 capsule, Rfl: 11     REVIEW OF SYSTEMS:  Patient has no fever, fatigue, visual changes, chest pain, edema, cough, dyspnea, nausea, vomiting, constipation, diarrhea, arthralgias, pruritis, dizziness, weakness, depression, confusion.     PHYSICAL EXAM:  Blood pressure 118/66, pulse 52, height 5' 8" (1.727 m), " weight 78.3 Kg, from 77.5 Kg, from 78.7 Kg, from 82.1 kg   Gen:    WDWN male in no apparent distress  Psych: Normal mood and affect  Skin:    No rashes or ulcers  Neck:   No JVD  Chest:  Clear with no rales, rhonchi, wheezing with normal effort  CV:      Regular with no murmurs, gallops or rubs  Abd:     Soft, nontender, no distension, positive bowel sounds  Ext:      no edema     Labs reviewed  BMP  Lab Results   Component Value Date     10/21/2020    K 4.8 10/21/2020     10/21/2020    CO2 24 10/21/2020    BUN 51 (H) 10/21/2020    CREATININE 3.8 (H) 10/21/2020    CALCIUM 9.2 10/21/2020    ANIONGAP 9 10/21/2020    ESTGFRAFRICA 15.6 (A) 10/21/2020    EGFRNONAA 13.5 (A) 10/21/2020     Lab Results   Component Value Date    WBC 7.89 10/21/2020    HGB 13.0 (L) 10/21/2020    HCT 42.9 10/21/2020    MCV 99 (H) 10/21/2020     10/21/2020       Lab Results   Component Value Date    .0 (H) 03/04/2020    CALCIUM 9.2 10/21/2020    PHOS 4.3 10/21/2020       Lab Results   Component Value Date    PSA 4.5 (H) 08/13/2020    PSA 4.2 (H) 02/05/2020    PSA 17.2 (H) 09/06/2019       U/a: no protein, no blood, no RBC's, no casts    U/s of the kidneys on 10/21/20: no hydronephrosis, small kidney with cortical thinning       IMPRESSION AND RECOMMENDATIONS:  85 y/o male with worsening renal function:     1. Renal: s Cr increase appears to have stabilized now, s Cr not worse, but has severe renal failure  NINO may now have led to worsened CKD to stage 4.  The reason for slowly worsening renal function not clear:  DDX: analgesic nephropathy: extensive mobic in the past x 11 months, and possibly more based on daughter-in-law's information today  Vs due to sustained hypotension in an elderly  Vs due to BPH/obstructive nephropathy  In addition, pt may be taking NSAIDs again that he is not telling us.      Advised pt and his daughter against taking any All NSAID, names of these meds mentioned, she wrote them down  Will  lower atenolol further from 25 to 12.5 mg po qd     s Cr may not recover fully as pt had exposure to mobic for a long time (11 months)  K normal  Acid base stable     2. HTN: BP controlled to low  Mgmt as above  Meds reviewed  Goal for SBP in this pt is 130-160: advised pt      3. Has BPH: no hydronephrosis per review of the renal u/s.  Noted marked improvement in PSA with flomax and avodart  Has f/u with urology today     Plans and recommendations:   As reviewed  Above  Opportunity for questions provided  Total time spent 40 minutes including time needed to review the records, the   patient evaluation, documentation, face-to-face discussion with the patient,   more than 50% of the time was spent on coordination of care and counseling.    Level V visit.  RTC 3 months     Tanmay Pereira MD

## 2020-11-02 RX ORDER — ATENOLOL 25 MG/1
25 TABLET ORAL DAILY
Qty: 45 TABLET | Refills: 3 | Status: SHIPPED | OUTPATIENT
Start: 2020-11-02 | End: 2021-07-20

## 2020-11-09 ENCOUNTER — IMMUNIZATION (OUTPATIENT)
Dept: FAMILY MEDICINE | Facility: CLINIC | Age: 85
End: 2020-11-09
Payer: MEDICARE

## 2020-11-09 PROCEDURE — 90694 VACC AIIV4 NO PRSRV 0.5ML IM: CPT | Mod: HCNC,S$GLB,, | Performed by: FAMILY MEDICINE

## 2020-11-09 PROCEDURE — 90694 FLU VACCINE - QUADRIVALENT - ADJUVANTED: ICD-10-PCS | Mod: HCNC,S$GLB,, | Performed by: FAMILY MEDICINE

## 2020-11-09 PROCEDURE — G0008 ADMIN INFLUENZA VIRUS VAC: HCPCS | Mod: HCNC,S$GLB,, | Performed by: FAMILY MEDICINE

## 2020-11-09 PROCEDURE — G0008 FLU VACCINE - QUADRIVALENT - ADJUVANTED: ICD-10-PCS | Mod: HCNC,S$GLB,, | Performed by: FAMILY MEDICINE

## 2020-11-23 ENCOUNTER — OFFICE VISIT (OUTPATIENT)
Dept: UROLOGY | Facility: CLINIC | Age: 85
End: 2020-11-23
Payer: MEDICARE

## 2020-11-23 VITALS
WEIGHT: 173.06 LBS | HEIGHT: 67 IN | TEMPERATURE: 98 F | DIASTOLIC BLOOD PRESSURE: 60 MMHG | SYSTOLIC BLOOD PRESSURE: 110 MMHG | BODY MASS INDEX: 27.16 KG/M2

## 2020-11-23 DIAGNOSIS — R97.20 ELEVATED PSA: Primary | ICD-10-CM

## 2020-11-23 DIAGNOSIS — N40.0 BENIGN PROSTATIC HYPERPLASIA, UNSPECIFIED WHETHER LOWER URINARY TRACT SYMPTOMS PRESENT: ICD-10-CM

## 2020-11-23 LAB
BILIRUB SERPL-MCNC: NORMAL MG/DL
BLOOD URINE, POC: NORMAL
CLARITY, POC UA: CLEAR
COLOR, POC UA: YELLOW
GLUCOSE UR QL STRIP: 50
KETONES UR QL STRIP: NORMAL
LEUKOCYTE ESTERASE URINE, POC: NORMAL
NITRITE, POC UA: NORMAL
PH, POC UA: 5
PROTEIN, POC: NORMAL
SPECIFIC GRAVITY, POC UA: 1.01
UROBILINOGEN, POC UA: NORMAL

## 2020-11-23 PROCEDURE — 3288F PR FALLS RISK ASSESSMENT DOCUMENTED: ICD-10-PCS | Mod: HCNC,CPTII,S$GLB, | Performed by: UROLOGY

## 2020-11-23 PROCEDURE — 1159F MED LIST DOCD IN RCRD: CPT | Mod: HCNC,S$GLB,, | Performed by: UROLOGY

## 2020-11-23 PROCEDURE — 1159F PR MEDICATION LIST DOCUMENTED IN MEDICAL RECORD: ICD-10-PCS | Mod: HCNC,S$GLB,, | Performed by: UROLOGY

## 2020-11-23 PROCEDURE — 1126F PR PAIN SEVERITY QUANTIFIED, NO PAIN PRESENT: ICD-10-PCS | Mod: HCNC,S$GLB,, | Performed by: UROLOGY

## 2020-11-23 PROCEDURE — 99213 OFFICE O/P EST LOW 20 MIN: CPT | Mod: HCNC,25,S$GLB, | Performed by: UROLOGY

## 2020-11-23 PROCEDURE — 1101F PT FALLS ASSESS-DOCD LE1/YR: CPT | Mod: HCNC,CPTII,S$GLB, | Performed by: UROLOGY

## 2020-11-23 PROCEDURE — 99999 PR PBB SHADOW E&M-EST. PATIENT-LVL III: ICD-10-PCS | Mod: PBBFAC,HCNC,, | Performed by: UROLOGY

## 2020-11-23 PROCEDURE — 3288F FALL RISK ASSESSMENT DOCD: CPT | Mod: HCNC,CPTII,S$GLB, | Performed by: UROLOGY

## 2020-11-23 PROCEDURE — 81002 URINALYSIS NONAUTO W/O SCOPE: CPT | Mod: HCNC,S$GLB,, | Performed by: UROLOGY

## 2020-11-23 PROCEDURE — 1101F PR PT FALLS ASSESS DOC 0-1 FALLS W/OUT INJ PAST YR: ICD-10-PCS | Mod: HCNC,CPTII,S$GLB, | Performed by: UROLOGY

## 2020-11-23 PROCEDURE — 99213 PR OFFICE/OUTPT VISIT, EST, LEVL III, 20-29 MIN: ICD-10-PCS | Mod: HCNC,25,S$GLB, | Performed by: UROLOGY

## 2020-11-23 PROCEDURE — 99999 PR PBB SHADOW E&M-EST. PATIENT-LVL III: CPT | Mod: PBBFAC,HCNC,, | Performed by: UROLOGY

## 2020-11-23 PROCEDURE — 1126F AMNT PAIN NOTED NONE PRSNT: CPT | Mod: HCNC,S$GLB,, | Performed by: UROLOGY

## 2020-11-23 PROCEDURE — 99499 UNLISTED E&M SERVICE: CPT | Mod: S$GLB,,, | Performed by: UROLOGY

## 2020-11-23 PROCEDURE — 81002 POCT URINE DIPSTICK WITHOUT MICROSCOPE: ICD-10-PCS | Mod: HCNC,S$GLB,, | Performed by: UROLOGY

## 2020-11-23 PROCEDURE — 99499 RISK ADDL DX/OHS AUDIT: ICD-10-PCS | Mod: S$GLB,,, | Performed by: UROLOGY

## 2020-11-23 RX ORDER — DUTASTERIDE 0.5 MG/1
0.5 CAPSULE, LIQUID FILLED ORAL DAILY
Qty: 30 CAPSULE | Refills: 11 | Status: SHIPPED | OUTPATIENT
Start: 2020-11-23 | End: 2020-11-23 | Stop reason: SDUPTHER

## 2020-11-23 RX ORDER — TAMSULOSIN HYDROCHLORIDE 0.4 MG/1
1 CAPSULE ORAL DAILY
Qty: 90 CAPSULE | Refills: 11 | Status: SHIPPED | OUTPATIENT
Start: 2020-11-23 | End: 2022-02-03 | Stop reason: SDUPTHER

## 2020-11-23 RX ORDER — TAMSULOSIN HYDROCHLORIDE 0.4 MG/1
1 CAPSULE ORAL DAILY
Qty: 90 CAPSULE | Refills: 11 | Status: SHIPPED | OUTPATIENT
Start: 2020-11-23 | End: 2020-11-23 | Stop reason: SDUPTHER

## 2020-11-23 RX ORDER — DUTASTERIDE 0.5 MG/1
0.5 CAPSULE, LIQUID FILLED ORAL DAILY
Qty: 30 CAPSULE | Refills: 11 | Status: SHIPPED | OUTPATIENT
Start: 2020-11-23 | End: 2021-11-09

## 2020-11-23 NOTE — PROGRESS NOTES
Chief Complaint: Prostate Cancer screening    HPI:   11/23/20: Frequency still a problem with nocturia x3.  About the same as before.  Willing to try dutasteride.  5/20/20: Back a little early.  No new problems.  Reviewed history in detail.   2/19/20: PSA way down again.  Reviewed history in detail. Voiding better on meds lately.  Occas nocturia.  2-3 on avg.  11/13/19: Biopsy negative findings.  Discussed BPH and indications for dutasteride (finasteride caused breast tenderness).  Reviewed history in detail.   10/23/19: TRUS/Bx 53 gm.  MRI PIRADS5 multifocal.  9/9/19: Voiding okay no sig problems.    7/18/18: No problems in last years, doing okay except he had some breast tenderness and it seems he is off the finasteride and that got better. Reviewed history in detail.  7/12/17: PSA down sig on finasteride, no LUTS.  7/8/16: 82 yo man with elevated PSA was started on finasteride and with expectation of improving the value.  No urinary bother on flomax/finasteride seein improvement. PSA dramatically lower in last 3 mo    Allergies:  Patient has no known allergies.    Medications:  has a current medication list which includes the following prescription(s): aspirin, atenolol, atorvastatin, dutasteride, tamsulosin, and fluocinonide 0.05%.    Review of Systems:  General: No fever, chills, fatigability, or weight loss.  Skin: No rashes, itching, or changes in color or texture of skin.  Chest: Denies COTTON, cyanosis, wheezing, cough, and sputum production.  Abdomen: Appetite fine. No weight loss. Denies diarrhea, abdominal pain, hematemesis, or blood in stool.  Musculoskeletal: No joint stiffness or swelling. Denies back pain.  : As above.  All other review of systems negative.    PMH:   has a past medical history of BPH (benign prostatic hyperplasia), Cataract, CKD (chronic kidney disease), stage IV (07/15/2016), Elevated PSA, HEARING LOSS, History of colon polyps, Hyperlipemia, Hypertension, Myocardial infarction, and  Tobacco dependence.    PSH:   has a past surgical history that includes Ear mastoidectomy w/ cochlear implant w/ landmark (2011); Tonsillectomy; PCIOL OD (Right, 03/`16/16); Cataract extraction w/  intraocular lens implant (Left, 4-20-16); Cataract extraction w/  intraocular lens implant (Right, 3-16-16); Appendectomy (1960); and Eye surgery (Bilateral, 2016).    FamHx: family history includes Breast cancer in his sister; Cancer in his brother, sister, and sister; Hyperlipidemia in his mother; Macular degeneration in his sister and sister; Stroke in his mother.    SocHx:  reports that he has never smoked. He has never used smokeless tobacco. He reports current alcohol use of about 1.0 standard drinks of alcohol per week. He reports that he does not use drugs.      Physical Exam:  Vitals:    11/23/20 0949   BP: 110/60   Temp: 98.1 °F (36.7 °C)     General: A&Ox3, no apparent distress, no deformities  Neck: No masses, normal thyroid  Lungs: normal inspiration, no use of accessory muscles  Heart: normal pulse, no arrhythmias  Abdomen: Soft, NT, ND  Skin: The skin is warm and dry. No jaundice.  Ext: No c/c/e.  :   8/18: Test desc maira, no abnormalities of epididymus. Penis normal, with normal penile and scrotal skin. Meatus normal. Normal rectal tone, no hemorrhoids. Prost 40 gm no nodules or masses appreciated. SV not palpable. Perineum and anus normal.    Labs/Studies:   PSA    2010-15: 7-9    3/16: 11.4    6/16: 2.7    7/17: 1.4    9/19: 16.1; 17.2    2/20: 4.2    8/20: 4.5    Impression/Plan:   1. Sig worry for prostate cancer but biopsy negative, elev PSA. PSA/RTC 6 mo  2. Continue dutasteride/flomax for BPH   3. HTN: stable; discussed.  4. Frequency; discussed evening restriction

## 2020-11-30 ENCOUNTER — PATIENT OUTREACH (OUTPATIENT)
Dept: ADMINISTRATIVE | Facility: OTHER | Age: 85
End: 2020-11-30

## 2020-12-01 ENCOUNTER — OFFICE VISIT (OUTPATIENT)
Dept: OPHTHALMOLOGY | Facility: CLINIC | Age: 85
End: 2020-12-01
Payer: MEDICARE

## 2020-12-01 ENCOUNTER — TELEPHONE (OUTPATIENT)
Dept: OPHTHALMOLOGY | Facility: CLINIC | Age: 85
End: 2020-12-01

## 2020-12-01 DIAGNOSIS — H26.492 PCO (POSTERIOR CAPSULAR OPACIFICATION), LEFT: Primary | ICD-10-CM

## 2020-12-01 PROCEDURE — 99999 PR PBB SHADOW E&M-EST. PATIENT-LVL I: CPT | Mod: PBBFAC,HCNC,, | Performed by: OPTOMETRIST

## 2020-12-01 PROCEDURE — 99499 UNLISTED E&M SERVICE: CPT | Mod: HCNC,S$GLB,, | Performed by: OPTOMETRIST

## 2020-12-01 PROCEDURE — 99999 PR PBB SHADOW E&M-EST. PATIENT-LVL I: ICD-10-PCS | Mod: PBBFAC,HCNC,, | Performed by: OPTOMETRIST

## 2020-12-01 PROCEDURE — 99499 NO LOS: ICD-10-PCS | Mod: HCNC,S$GLB,, | Performed by: OPTOMETRIST

## 2020-12-01 NOTE — TELEPHONE ENCOUNTER
I left a message for the patient to call me. I was calling the patient to see if I could schedule him an appointment to see Dr. Muro for a YAG evaluation per Dr. Jiménez's request.

## 2020-12-01 NOTE — PROGRESS NOTES
Health Maintenance Due   Topic Date Due    TETANUS VACCINE  09/30/1952    Shingles Vaccine (1 of 2) 09/30/1984     Updates were requested from care everywhere.  Chart was reviewed for overdue Proactive [facility] Encounters ([facility]) topics (CRS, Breast Cancer Screening, Eye exam)  Health Maintenance has been updated.  LINKS immunization registry triggered.  Immunizations were reconciled.

## 2020-12-01 NOTE — PROGRESS NOTES
HPI     Last eye visit 09/09/2020 TRF.  Recheck vision left eye.    Last edited by Marcelino Jiménez, OD on 12/1/2020  1:21 PM. (History)            Assessment /Plan     For exam results, see Encounter Report.    PCO (posterior capsular opacification), left      PCO OS causing decreased Va OS    Consult Jina reddy.

## 2020-12-03 ENCOUNTER — OFFICE VISIT (OUTPATIENT)
Dept: OPHTHALMOLOGY | Facility: CLINIC | Age: 85
End: 2020-12-03
Payer: COMMERCIAL

## 2020-12-03 DIAGNOSIS — H26.492 PCO (POSTERIOR CAPSULAR OPACIFICATION), LEFT: Primary | ICD-10-CM

## 2020-12-03 DIAGNOSIS — Z96.1 PSEUDOPHAKIA OF BOTH EYES: ICD-10-CM

## 2020-12-03 DIAGNOSIS — D31.31 CHOROIDAL NEVUS, RIGHT: ICD-10-CM

## 2020-12-03 DIAGNOSIS — H31.012 MACULAR SCAR, LEFT: ICD-10-CM

## 2020-12-03 PROCEDURE — 66821 YAG CAPSULOTOMY - OD - RIGHT EYE: ICD-10-PCS | Mod: LT,S$GLB,, | Performed by: OPHTHALMOLOGY

## 2020-12-03 PROCEDURE — 92014 PR EYE EXAM, EST PATIENT,COMPREHESV: ICD-10-PCS | Mod: 57,25,S$GLB, | Performed by: OPHTHALMOLOGY

## 2020-12-03 PROCEDURE — 99999 PR PBB SHADOW E&M-EST. PATIENT-LVL II: CPT | Mod: PBBFAC,HCNC,, | Performed by: OPHTHALMOLOGY

## 2020-12-03 PROCEDURE — 92014 COMPRE OPH EXAM EST PT 1/>: CPT | Mod: 57,25,S$GLB, | Performed by: OPHTHALMOLOGY

## 2020-12-03 PROCEDURE — 66821 AFTER CATARACT LASER SURGERY: CPT | Mod: LT,S$GLB,, | Performed by: OPHTHALMOLOGY

## 2020-12-03 PROCEDURE — 99999 PR PBB SHADOW E&M-EST. PATIENT-LVL II: ICD-10-PCS | Mod: PBBFAC,HCNC,, | Performed by: OPHTHALMOLOGY

## 2020-12-03 RX ORDER — PREDNISOLONE ACETATE 10 MG/ML
1 SUSPENSION/ DROPS OPHTHALMIC 4 TIMES DAILY
Qty: 10 ML | Refills: 2 | Status: SHIPPED | OUTPATIENT
Start: 2020-12-03 | End: 2021-01-02

## 2020-12-03 NOTE — PROGRESS NOTES
SUBJECTIVE  Harman JANAE Johnston is 86 y.o. male  Corrected distance visual acuity was 20/20 -2 in the right eye and 20/70 +1 in the left eye.   Chief Complaint   Patient presents with    Eye Exam     YAG Eval OS          HPI     Eye Exam      Additional comments: YAG Eval OS              Comments     The patient states that he is having blurred vision in his left eye but   denies any pain.     Referred by TRF  1.PCIOL OD 03/16/16   2.PCIOL OS 4/20/16  3. Chorioretinal scars OD  4. Choroidal nevus OD          Last edited by Lyn Mohr on 12/3/2020  9:29 AM. (History)         Assessment /Plan :  1. PCO (posterior capsular opacification), left Yag Capsulotomy Procedure:    86 y.o. year old patient experiencing a symptomatic decrease in vision OS with inability to perform activities of daily living including reading.    SLE: Posterior intraocular lens implant with capsular fibrosis     Risks, benefits and alternatives of Yag Laser Capsulotomy discussed. Including risks of retinal detachment (1-3%), macular edema, dislocated implant, pain, inflammation elevated intraocular pressure and vision loss. Consent signed.    Medications given:  Apraclonidine gtt  Tetracaine gtt    Laser energy settings:  1.8  mJ / burst  78  bursts    IMPRESSION:  Yag Capsulotomy OS well tolerated    PLAN:  1. Prednisolone 1% QID over 1 week  2. Postoperative precautions discussed  3. RTC 2-3 weeks or prn with TRF        2. Pseudophakia of both eyes -stable    3. Choroidal nevus, right    4. Macular scar, left -no changes on MOCT today

## 2020-12-14 ENCOUNTER — OFFICE VISIT (OUTPATIENT)
Dept: INTERNAL MEDICINE | Facility: CLINIC | Age: 85
End: 2020-12-14
Payer: MEDICARE

## 2020-12-14 VITALS
DIASTOLIC BLOOD PRESSURE: 82 MMHG | HEART RATE: 60 BPM | RESPIRATION RATE: 20 BRPM | TEMPERATURE: 98 F | BODY MASS INDEX: 26.68 KG/M2 | WEIGHT: 180.13 LBS | HEIGHT: 69 IN | OXYGEN SATURATION: 97 % | SYSTOLIC BLOOD PRESSURE: 140 MMHG

## 2020-12-14 DIAGNOSIS — I70.0 ATHEROSCLEROSIS OF AORTA: ICD-10-CM

## 2020-12-14 DIAGNOSIS — N18.4 CKD (CHRONIC KIDNEY DISEASE), STAGE IV: Primary | ICD-10-CM

## 2020-12-14 DIAGNOSIS — E78.2 MIXED HYPERLIPIDEMIA: Chronic | ICD-10-CM

## 2020-12-14 DIAGNOSIS — I10 ESSENTIAL HYPERTENSION: Chronic | ICD-10-CM

## 2020-12-14 PROCEDURE — 1159F MED LIST DOCD IN RCRD: CPT | Mod: HCNC,S$GLB,, | Performed by: INTERNAL MEDICINE

## 2020-12-14 PROCEDURE — 1126F PR PAIN SEVERITY QUANTIFIED, NO PAIN PRESENT: ICD-10-PCS | Mod: HCNC,S$GLB,, | Performed by: INTERNAL MEDICINE

## 2020-12-14 PROCEDURE — 99213 PR OFFICE/OUTPT VISIT, EST, LEVL III, 20-29 MIN: ICD-10-PCS | Mod: HCNC,S$GLB,, | Performed by: INTERNAL MEDICINE

## 2020-12-14 PROCEDURE — 99213 OFFICE O/P EST LOW 20 MIN: CPT | Mod: HCNC,S$GLB,, | Performed by: INTERNAL MEDICINE

## 2020-12-14 PROCEDURE — 99999 PR PBB SHADOW E&M-EST. PATIENT-LVL III: ICD-10-PCS | Mod: PBBFAC,HCNC,, | Performed by: INTERNAL MEDICINE

## 2020-12-14 PROCEDURE — 99999 PR PBB SHADOW E&M-EST. PATIENT-LVL III: CPT | Mod: PBBFAC,HCNC,, | Performed by: INTERNAL MEDICINE

## 2020-12-14 PROCEDURE — 1159F PR MEDICATION LIST DOCUMENTED IN MEDICAL RECORD: ICD-10-PCS | Mod: HCNC,S$GLB,, | Performed by: INTERNAL MEDICINE

## 2020-12-14 PROCEDURE — 1126F AMNT PAIN NOTED NONE PRSNT: CPT | Mod: HCNC,S$GLB,, | Performed by: INTERNAL MEDICINE

## 2020-12-14 NOTE — PROGRESS NOTES
"HPI:  Patient is an 86-year-old man who comes today for follow-up of his hypertension and chronic kidney disease.  Patient is been doing well.  He denies anything other than a runny nose.  He states his blood pressure has been very well controlled at home.    Current meds have been verified and updated per the EMR  Exam:BP (!) 140/82   Pulse 60   Temp 97.9 °F (36.6 °C) (Temporal)   Resp 20   Ht 5' 8.75" (1.746 m)   Wt 81.7 kg (180 lb 1.9 oz)   SpO2 97%   BMI 26.79 kg/m²   Chest clear  Cardiovascular regular rate and rhythm without murmur gallop or rub  Extremities without edema  Lab Results   Component Value Date    WBC 7.89 10/21/2020    HGB 13.0 (L) 10/21/2020    HCT 42.9 10/21/2020     10/21/2020    CHOL 128 09/04/2020    TRIG 112 09/04/2020    HDL 38 (L) 09/04/2020    ALT 14 03/04/2020    AST 20 03/04/2020     10/21/2020    K 4.8 10/21/2020     10/21/2020    CREATININE 3.8 (H) 10/21/2020    BUN 51 (H) 10/21/2020    CO2 24 10/21/2020    TSH 3.020 03/04/2020    PSA 4.5 (H) 08/13/2020       Impression:  Chronic kidney disease, progressive, followed by the nephrologist  Hypertension, stable  Lipids, well controlled  Patient Active Problem List   Diagnosis    BPH (benign prostatic hyperplasia)    Hyperlipemia    Essential hypertension    Choroidal nevus, right eye    Macular chorioretinal scar of left eye    SNHL (sensorineural hearing loss)s/p implant on rt    Atherosclerosis of aorta    History of colon polyps    Bilateral hearing loss    Senile ectropion of right lower eyelid    Dry eye    Blepharitis of upper and lower eyelids of both eyes    CKD (chronic kidney disease), stage IV       Plan:     He will see me again in 6 months.  Medications remain the same.  He will keep his follow-up appointment with nephrology as well    This note is generated with speech recognition software and is subject to transcription error and sound alike phrases that may be missed by " proofreading.

## 2020-12-21 ENCOUNTER — OFFICE VISIT (OUTPATIENT)
Dept: OPHTHALMOLOGY | Facility: CLINIC | Age: 85
End: 2020-12-21
Payer: MEDICARE

## 2020-12-21 DIAGNOSIS — H26.492 PCO (POSTERIOR CAPSULAR OPACIFICATION), LEFT: Primary | ICD-10-CM

## 2020-12-21 PROCEDURE — 99024 POSTOP FOLLOW-UP VISIT: CPT | Mod: HCNC,S$GLB,, | Performed by: OPTOMETRIST

## 2020-12-21 PROCEDURE — 99999 PR PBB SHADOW E&M-EST. PATIENT-LVL II: CPT | Mod: PBBFAC,HCNC,, | Performed by: OPTOMETRIST

## 2020-12-21 PROCEDURE — 99024 PR POST-OP FOLLOW-UP VISIT: ICD-10-PCS | Mod: HCNC,S$GLB,, | Performed by: OPTOMETRIST

## 2020-12-21 PROCEDURE — 99999 PR PBB SHADOW E&M-EST. PATIENT-LVL II: ICD-10-PCS | Mod: PBBFAC,HCNC,, | Performed by: OPTOMETRIST

## 2020-12-21 NOTE — PROGRESS NOTES
HPI     Post-op Evaluation      Additional comments: S/P Yag OS               Comments     States that his vision is good and denies any pain pressure or blurred   vision.          Last edited by Riana Smyth on 12/21/2020  9:59 AM. (History)            Assessment /Plan     For exam results, see Encounter Report.    PCO (posterior capsular opacification), left      Stable IOL OU.    Much improved Va    Dispense Final Rx for glasses.  RTC 1 year  Discussed above and answered questions.

## 2021-01-22 ENCOUNTER — PATIENT MESSAGE (OUTPATIENT)
Dept: ADMINISTRATIVE | Facility: OTHER | Age: 86
End: 2021-01-22

## 2021-02-08 ENCOUNTER — PES CALL (OUTPATIENT)
Dept: ADMINISTRATIVE | Facility: CLINIC | Age: 86
End: 2021-02-08

## 2021-02-08 ENCOUNTER — PATIENT MESSAGE (OUTPATIENT)
Dept: ADMINISTRATIVE | Facility: CLINIC | Age: 86
End: 2021-02-08

## 2021-02-09 ENCOUNTER — OFFICE VISIT (OUTPATIENT)
Dept: INTERNAL MEDICINE | Facility: CLINIC | Age: 86
End: 2021-02-09
Payer: MEDICARE

## 2021-02-09 VITALS
HEART RATE: 54 BPM | RESPIRATION RATE: 20 BRPM | DIASTOLIC BLOOD PRESSURE: 80 MMHG | HEIGHT: 69 IN | TEMPERATURE: 97 F | OXYGEN SATURATION: 98 % | WEIGHT: 176.56 LBS | SYSTOLIC BLOOD PRESSURE: 112 MMHG | BODY MASS INDEX: 26.15 KG/M2

## 2021-02-09 DIAGNOSIS — H31.012 MACULAR CHORIORETINAL SCAR OF LEFT EYE: ICD-10-CM

## 2021-02-09 DIAGNOSIS — H90.5 SENSORINEURAL HEARING LOSS (SNHL) OF RIGHT EAR, UNSPECIFIED HEARING STATUS ON CONTRALATERAL SIDE: Chronic | ICD-10-CM

## 2021-02-09 DIAGNOSIS — N18.5 CHRONIC KIDNEY DISEASE, STAGE V: ICD-10-CM

## 2021-02-09 DIAGNOSIS — H01.00B BLEPHARITIS OF UPPER AND LOWER EYELIDS OF BOTH EYES, UNSPECIFIED TYPE: ICD-10-CM

## 2021-02-09 DIAGNOSIS — I10 ESSENTIAL HYPERTENSION: Chronic | ICD-10-CM

## 2021-02-09 DIAGNOSIS — H02.132 SENILE ECTROPION OF RIGHT LOWER EYELID: ICD-10-CM

## 2021-02-09 DIAGNOSIS — H04.129 DRY EYE: ICD-10-CM

## 2021-02-09 DIAGNOSIS — N18.4 CKD (CHRONIC KIDNEY DISEASE), STAGE IV: ICD-10-CM

## 2021-02-09 DIAGNOSIS — Z00.00 ENCOUNTER FOR PREVENTIVE HEALTH EXAMINATION: Primary | ICD-10-CM

## 2021-02-09 DIAGNOSIS — H91.93 BILATERAL HEARING LOSS, UNSPECIFIED HEARING LOSS TYPE: ICD-10-CM

## 2021-02-09 DIAGNOSIS — D31.31 CHOROIDAL NEVUS, RIGHT EYE: ICD-10-CM

## 2021-02-09 DIAGNOSIS — E21.3 HYPERPARATHYROIDISM: ICD-10-CM

## 2021-02-09 DIAGNOSIS — E78.2 MIXED HYPERLIPIDEMIA: Chronic | ICD-10-CM

## 2021-02-09 DIAGNOSIS — I70.0 ATHEROSCLEROSIS OF AORTA: ICD-10-CM

## 2021-02-09 DIAGNOSIS — N40.0 BENIGN PROSTATIC HYPERPLASIA WITHOUT LOWER URINARY TRACT SYMPTOMS: Chronic | ICD-10-CM

## 2021-02-09 DIAGNOSIS — H01.00A BLEPHARITIS OF UPPER AND LOWER EYELIDS OF BOTH EYES, UNSPECIFIED TYPE: ICD-10-CM

## 2021-02-09 PROCEDURE — G0439 PR MEDICARE ANNUAL WELLNESS SUBSEQUENT VISIT: ICD-10-PCS | Mod: S$GLB,,, | Performed by: NURSE PRACTITIONER

## 2021-02-09 PROCEDURE — 99499 RISK ADDL DX/OHS AUDIT: ICD-10-PCS | Mod: S$GLB,,, | Performed by: NURSE PRACTITIONER

## 2021-02-09 PROCEDURE — 1101F PT FALLS ASSESS-DOCD LE1/YR: CPT | Mod: CPTII,S$GLB,, | Performed by: NURSE PRACTITIONER

## 2021-02-09 PROCEDURE — 99999 PR PBB SHADOW E&M-EST. PATIENT-LVL IV: CPT | Mod: PBBFAC,,, | Performed by: NURSE PRACTITIONER

## 2021-02-09 PROCEDURE — 1126F PR PAIN SEVERITY QUANTIFIED, NO PAIN PRESENT: ICD-10-PCS | Mod: S$GLB,,, | Performed by: NURSE PRACTITIONER

## 2021-02-09 PROCEDURE — 3288F PR FALLS RISK ASSESSMENT DOCUMENTED: ICD-10-PCS | Mod: CPTII,S$GLB,, | Performed by: NURSE PRACTITIONER

## 2021-02-09 PROCEDURE — 1126F AMNT PAIN NOTED NONE PRSNT: CPT | Mod: S$GLB,,, | Performed by: NURSE PRACTITIONER

## 2021-02-09 PROCEDURE — 99999 PR PBB SHADOW E&M-EST. PATIENT-LVL IV: ICD-10-PCS | Mod: PBBFAC,,, | Performed by: NURSE PRACTITIONER

## 2021-02-09 PROCEDURE — G0439 PPPS, SUBSEQ VISIT: HCPCS | Mod: S$GLB,,, | Performed by: NURSE PRACTITIONER

## 2021-02-09 PROCEDURE — 3288F FALL RISK ASSESSMENT DOCD: CPT | Mod: CPTII,S$GLB,, | Performed by: NURSE PRACTITIONER

## 2021-02-09 PROCEDURE — 1101F PR PT FALLS ASSESS DOC 0-1 FALLS W/OUT INJ PAST YR: ICD-10-PCS | Mod: CPTII,S$GLB,, | Performed by: NURSE PRACTITIONER

## 2021-02-09 PROCEDURE — 99499 UNLISTED E&M SERVICE: CPT | Mod: S$GLB,,, | Performed by: NURSE PRACTITIONER

## 2021-02-10 ENCOUNTER — LAB VISIT (OUTPATIENT)
Dept: LAB | Facility: HOSPITAL | Age: 86
End: 2021-02-10
Attending: UROLOGY
Payer: MEDICARE

## 2021-02-10 ENCOUNTER — PATIENT MESSAGE (OUTPATIENT)
Dept: INTERNAL MEDICINE | Facility: CLINIC | Age: 86
End: 2021-02-10

## 2021-02-10 ENCOUNTER — TELEPHONE (OUTPATIENT)
Dept: NEPHROLOGY | Facility: CLINIC | Age: 86
End: 2021-02-10

## 2021-02-10 DIAGNOSIS — Z12.5 PROSTATE CANCER SCREENING: ICD-10-CM

## 2021-02-10 DIAGNOSIS — N40.0 BENIGN PROSTATIC HYPERPLASIA, UNSPECIFIED WHETHER LOWER URINARY TRACT SYMPTOMS PRESENT: ICD-10-CM

## 2021-02-10 DIAGNOSIS — I10 HYPERTENSION, UNSPECIFIED TYPE: ICD-10-CM

## 2021-02-10 PROCEDURE — 36415 COLL VENOUS BLD VENIPUNCTURE: CPT | Mod: PO

## 2021-02-10 PROCEDURE — 84153 ASSAY OF PSA TOTAL: CPT

## 2021-02-11 LAB — COMPLEXED PSA SERPL-MCNC: 5.3 NG/ML (ref 0–4)

## 2021-04-15 ENCOUNTER — TELEPHONE (OUTPATIENT)
Dept: UROLOGY | Facility: CLINIC | Age: 86
End: 2021-04-15

## 2021-04-19 ENCOUNTER — TELEPHONE (OUTPATIENT)
Dept: UROLOGY | Facility: CLINIC | Age: 86
End: 2021-04-19

## 2021-04-26 ENCOUNTER — LAB VISIT (OUTPATIENT)
Dept: LAB | Facility: HOSPITAL | Age: 86
End: 2021-04-26
Attending: INTERNAL MEDICINE
Payer: MEDICARE

## 2021-04-26 DIAGNOSIS — N18.4 STAGE 4 CHRONIC KIDNEY DISEASE: ICD-10-CM

## 2021-04-26 LAB
ALBUMIN SERPL BCP-MCNC: 3.3 G/DL (ref 3.5–5.2)
ANION GAP SERPL CALC-SCNC: 7 MMOL/L (ref 8–16)
BASOPHILS # BLD AUTO: 0.05 K/UL (ref 0–0.2)
BASOPHILS NFR BLD: 0.7 % (ref 0–1.9)
BUN SERPL-MCNC: 41 MG/DL (ref 8–23)
CALCIUM SERPL-MCNC: 8.7 MG/DL (ref 8.7–10.5)
CHLORIDE SERPL-SCNC: 110 MMOL/L (ref 95–110)
CO2 SERPL-SCNC: 26 MMOL/L (ref 23–29)
CREAT SERPL-MCNC: 3.7 MG/DL (ref 0.5–1.4)
DIFFERENTIAL METHOD: ABNORMAL
EOSINOPHIL # BLD AUTO: 0.8 K/UL (ref 0–0.5)
EOSINOPHIL NFR BLD: 10.2 % (ref 0–8)
ERYTHROCYTE [DISTWIDTH] IN BLOOD BY AUTOMATED COUNT: 13.5 % (ref 11.5–14.5)
EST. GFR  (AFRICAN AMERICAN): 16.1 ML/MIN/1.73 M^2
EST. GFR  (NON AFRICAN AMERICAN): 14 ML/MIN/1.73 M^2
GLUCOSE SERPL-MCNC: 89 MG/DL (ref 70–110)
HCT VFR BLD AUTO: 40.4 % (ref 40–54)
HGB BLD-MCNC: 12.4 G/DL (ref 14–18)
IMM GRANULOCYTES # BLD AUTO: 0.03 K/UL (ref 0–0.04)
IMM GRANULOCYTES NFR BLD AUTO: 0.4 % (ref 0–0.5)
LYMPHOCYTES # BLD AUTO: 1.4 K/UL (ref 1–4.8)
LYMPHOCYTES NFR BLD: 19.5 % (ref 18–48)
MCH RBC QN AUTO: 29.6 PG (ref 27–31)
MCHC RBC AUTO-ENTMCNC: 30.7 G/DL (ref 32–36)
MCV RBC AUTO: 96 FL (ref 82–98)
MONOCYTES # BLD AUTO: 0.7 K/UL (ref 0.3–1)
MONOCYTES NFR BLD: 9.4 % (ref 4–15)
NEUTROPHILS # BLD AUTO: 4.4 K/UL (ref 1.8–7.7)
NEUTROPHILS NFR BLD: 59.8 % (ref 38–73)
NRBC BLD-RTO: 0 /100 WBC
PHOSPHATE SERPL-MCNC: 3.1 MG/DL (ref 2.7–4.5)
PLATELET # BLD AUTO: 206 K/UL (ref 150–450)
PMV BLD AUTO: 11.5 FL (ref 9.2–12.9)
POTASSIUM SERPL-SCNC: 4.8 MMOL/L (ref 3.5–5.1)
PTH-INTACT SERPL-MCNC: 100 PG/ML (ref 9–77)
RBC # BLD AUTO: 4.19 M/UL (ref 4.6–6.2)
SODIUM SERPL-SCNC: 143 MMOL/L (ref 136–145)
WBC # BLD AUTO: 7.37 K/UL (ref 3.9–12.7)

## 2021-04-26 PROCEDURE — 80069 RENAL FUNCTION PANEL: CPT | Performed by: INTERNAL MEDICINE

## 2021-04-26 PROCEDURE — 85025 COMPLETE CBC W/AUTO DIFF WBC: CPT | Performed by: INTERNAL MEDICINE

## 2021-04-26 PROCEDURE — 36415 COLL VENOUS BLD VENIPUNCTURE: CPT | Mod: PO | Performed by: INTERNAL MEDICINE

## 2021-04-26 PROCEDURE — 83970 ASSAY OF PARATHORMONE: CPT | Performed by: INTERNAL MEDICINE

## 2021-05-03 ENCOUNTER — PATIENT OUTREACH (OUTPATIENT)
Dept: ADMINISTRATIVE | Facility: OTHER | Age: 86
End: 2021-05-03

## 2021-05-05 ENCOUNTER — OFFICE VISIT (OUTPATIENT)
Dept: NEPHROLOGY | Facility: CLINIC | Age: 86
End: 2021-05-05
Payer: MEDICARE

## 2021-05-05 VITALS
RESPIRATION RATE: 14 BRPM | BODY MASS INDEX: 26.38 KG/M2 | HEIGHT: 69 IN | DIASTOLIC BLOOD PRESSURE: 62 MMHG | HEART RATE: 74 BPM | WEIGHT: 178.13 LBS | SYSTOLIC BLOOD PRESSURE: 130 MMHG

## 2021-05-05 DIAGNOSIS — I12.9 STAGE 4 CHRONIC KIDNEY DISEASE DUE TO HYPERTENSION: Primary | ICD-10-CM

## 2021-05-05 DIAGNOSIS — N18.4 STAGE 4 CHRONIC KIDNEY DISEASE DUE TO HYPERTENSION: Primary | ICD-10-CM

## 2021-05-05 PROCEDURE — 1159F MED LIST DOCD IN RCRD: CPT | Mod: S$GLB,,, | Performed by: INTERNAL MEDICINE

## 2021-05-05 PROCEDURE — 99499 UNLISTED E&M SERVICE: CPT | Mod: S$GLB,,, | Performed by: INTERNAL MEDICINE

## 2021-05-05 PROCEDURE — 1159F PR MEDICATION LIST DOCUMENTED IN MEDICAL RECORD: ICD-10-PCS | Mod: S$GLB,,, | Performed by: INTERNAL MEDICINE

## 2021-05-05 PROCEDURE — 1126F PR PAIN SEVERITY QUANTIFIED, NO PAIN PRESENT: ICD-10-PCS | Mod: S$GLB,,, | Performed by: INTERNAL MEDICINE

## 2021-05-05 PROCEDURE — 99999 PR PBB SHADOW E&M-EST. PATIENT-LVL III: ICD-10-PCS | Mod: PBBFAC,,, | Performed by: INTERNAL MEDICINE

## 2021-05-05 PROCEDURE — 1101F PR PT FALLS ASSESS DOC 0-1 FALLS W/OUT INJ PAST YR: ICD-10-PCS | Mod: CPTII,S$GLB,, | Performed by: INTERNAL MEDICINE

## 2021-05-05 PROCEDURE — 1126F AMNT PAIN NOTED NONE PRSNT: CPT | Mod: S$GLB,,, | Performed by: INTERNAL MEDICINE

## 2021-05-05 PROCEDURE — 3288F FALL RISK ASSESSMENT DOCD: CPT | Mod: CPTII,S$GLB,, | Performed by: INTERNAL MEDICINE

## 2021-05-05 PROCEDURE — 99999 PR PBB SHADOW E&M-EST. PATIENT-LVL III: CPT | Mod: PBBFAC,,, | Performed by: INTERNAL MEDICINE

## 2021-05-05 PROCEDURE — 99499 RISK ADDL DX/OHS AUDIT: ICD-10-PCS | Mod: S$GLB,,, | Performed by: INTERNAL MEDICINE

## 2021-05-05 PROCEDURE — 99214 PR OFFICE/OUTPT VISIT, EST, LEVL IV, 30-39 MIN: ICD-10-PCS | Mod: S$GLB,,, | Performed by: INTERNAL MEDICINE

## 2021-05-05 PROCEDURE — 99214 OFFICE O/P EST MOD 30 MIN: CPT | Mod: S$GLB,,, | Performed by: INTERNAL MEDICINE

## 2021-05-05 PROCEDURE — 1101F PT FALLS ASSESS-DOCD LE1/YR: CPT | Mod: CPTII,S$GLB,, | Performed by: INTERNAL MEDICINE

## 2021-05-05 PROCEDURE — 3288F PR FALLS RISK ASSESSMENT DOCUMENTED: ICD-10-PCS | Mod: CPTII,S$GLB,, | Performed by: INTERNAL MEDICINE

## 2021-06-03 NOTE — PROGRESS NOTES
Harmanadebayo Johnston was seen 05/17/2017 for an audiological evaluation.  Patient here for annual audio and follow-up with hearing aid.    Results reveal a moderate-to-severe to severe sensorineural hearing loss 250-8000 Hz for the right ear, and CI for  left ear.   Speech Reception Thresholds were  60 dBHL for the right ear and could not test for the left ear.   Word recognition scores were poor for the right ear and  could not be obtained for the left ear.   Tympanograms were Type Ad, hypermobile for the right ear and did not obtain for the left ear.    Patient was counseled on the above findings.    Recommendations include:    1.  ENT followup  2.  Hearing aid follow up today  3.  Wear hearing protective devices around loud noise  4.  Annual audiograms           [facility] requesting refill on Albuterol HFA inhaler

## 2021-06-16 ENCOUNTER — LAB VISIT (OUTPATIENT)
Dept: LAB | Facility: HOSPITAL | Age: 86
End: 2021-06-16
Attending: INTERNAL MEDICINE
Payer: MEDICARE

## 2021-06-16 ENCOUNTER — OFFICE VISIT (OUTPATIENT)
Dept: INTERNAL MEDICINE | Facility: CLINIC | Age: 86
End: 2021-06-16
Payer: MEDICARE

## 2021-06-16 VITALS
HEIGHT: 70 IN | RESPIRATION RATE: 20 BRPM | HEART RATE: 56 BPM | TEMPERATURE: 98 F | DIASTOLIC BLOOD PRESSURE: 74 MMHG | OXYGEN SATURATION: 97 % | BODY MASS INDEX: 25.56 KG/M2 | WEIGHT: 178.56 LBS | SYSTOLIC BLOOD PRESSURE: 160 MMHG

## 2021-06-16 DIAGNOSIS — I10 ESSENTIAL HYPERTENSION: Chronic | ICD-10-CM

## 2021-06-16 DIAGNOSIS — N25.81 SECONDARY HYPERPARATHYROIDISM OF RENAL ORIGIN: ICD-10-CM

## 2021-06-16 DIAGNOSIS — I70.0 ATHEROSCLEROSIS OF AORTA: ICD-10-CM

## 2021-06-16 DIAGNOSIS — N18.4 CKD (CHRONIC KIDNEY DISEASE), STAGE IV: ICD-10-CM

## 2021-06-16 DIAGNOSIS — Z00.00 ROUTINE GENERAL MEDICAL EXAMINATION AT A HEALTH CARE FACILITY: Primary | ICD-10-CM

## 2021-06-16 DIAGNOSIS — E78.2 MIXED HYPERLIPIDEMIA: Chronic | ICD-10-CM

## 2021-06-16 LAB
ALBUMIN SERPL BCP-MCNC: 3.4 G/DL (ref 3.5–5.2)
ALP SERPL-CCNC: 85 U/L (ref 55–135)
ALT SERPL W/O P-5'-P-CCNC: 16 U/L (ref 10–44)
ANION GAP SERPL CALC-SCNC: 10 MMOL/L (ref 8–16)
AST SERPL-CCNC: 23 U/L (ref 10–40)
BASOPHILS # BLD AUTO: 0.06 K/UL (ref 0–0.2)
BASOPHILS NFR BLD: 0.8 % (ref 0–1.9)
BILIRUB SERPL-MCNC: 0.7 MG/DL (ref 0.1–1)
BUN SERPL-MCNC: 45 MG/DL (ref 8–23)
CALCIUM SERPL-MCNC: 9.4 MG/DL (ref 8.7–10.5)
CHLORIDE SERPL-SCNC: 111 MMOL/L (ref 95–110)
CHOLEST SERPL-MCNC: 132 MG/DL (ref 120–199)
CHOLEST/HDLC SERPL: 3.8 {RATIO} (ref 2–5)
CO2 SERPL-SCNC: 21 MMOL/L (ref 23–29)
CREAT SERPL-MCNC: 3.7 MG/DL (ref 0.5–1.4)
DIFFERENTIAL METHOD: ABNORMAL
EOSINOPHIL # BLD AUTO: 0.8 K/UL (ref 0–0.5)
EOSINOPHIL NFR BLD: 10.9 % (ref 0–8)
ERYTHROCYTE [DISTWIDTH] IN BLOOD BY AUTOMATED COUNT: 13.3 % (ref 11.5–14.5)
EST. GFR  (AFRICAN AMERICAN): 16.1 ML/MIN/1.73 M^2
EST. GFR  (NON AFRICAN AMERICAN): 14 ML/MIN/1.73 M^2
GLUCOSE SERPL-MCNC: 85 MG/DL (ref 70–110)
HCT VFR BLD AUTO: 40.7 % (ref 40–54)
HDLC SERPL-MCNC: 35 MG/DL (ref 40–75)
HDLC SERPL: 26.5 % (ref 20–50)
HGB BLD-MCNC: 12.7 G/DL (ref 14–18)
IMM GRANULOCYTES # BLD AUTO: 0.04 K/UL (ref 0–0.04)
IMM GRANULOCYTES NFR BLD AUTO: 0.5 % (ref 0–0.5)
LDLC SERPL CALC-MCNC: 72.4 MG/DL (ref 63–159)
LYMPHOCYTES # BLD AUTO: 1.5 K/UL (ref 1–4.8)
LYMPHOCYTES NFR BLD: 20.2 % (ref 18–48)
MCH RBC QN AUTO: 29.6 PG (ref 27–31)
MCHC RBC AUTO-ENTMCNC: 31.2 G/DL (ref 32–36)
MCV RBC AUTO: 95 FL (ref 82–98)
MONOCYTES # BLD AUTO: 0.7 K/UL (ref 0.3–1)
MONOCYTES NFR BLD: 9.3 % (ref 4–15)
NEUTROPHILS # BLD AUTO: 4.3 K/UL (ref 1.8–7.7)
NEUTROPHILS NFR BLD: 58.3 % (ref 38–73)
NONHDLC SERPL-MCNC: 97 MG/DL
NRBC BLD-RTO: 0 /100 WBC
PLATELET # BLD AUTO: 183 K/UL (ref 150–450)
PMV BLD AUTO: 11.8 FL (ref 9.2–12.9)
POTASSIUM SERPL-SCNC: 5.1 MMOL/L (ref 3.5–5.1)
PROT SERPL-MCNC: 7.1 G/DL (ref 6–8.4)
RBC # BLD AUTO: 4.29 M/UL (ref 4.6–6.2)
SODIUM SERPL-SCNC: 142 MMOL/L (ref 136–145)
TRIGL SERPL-MCNC: 123 MG/DL (ref 30–150)
TSH SERPL DL<=0.005 MIU/L-ACNC: 2.74 UIU/ML (ref 0.4–4)
WBC # BLD AUTO: 7.32 K/UL (ref 3.9–12.7)

## 2021-06-16 PROCEDURE — 80053 COMPREHEN METABOLIC PANEL: CPT | Performed by: INTERNAL MEDICINE

## 2021-06-16 PROCEDURE — 99397 PER PM REEVAL EST PAT 65+ YR: CPT | Mod: S$GLB,,, | Performed by: INTERNAL MEDICINE

## 2021-06-16 PROCEDURE — 1126F AMNT PAIN NOTED NONE PRSNT: CPT | Mod: S$GLB,,, | Performed by: INTERNAL MEDICINE

## 2021-06-16 PROCEDURE — 36415 COLL VENOUS BLD VENIPUNCTURE: CPT | Mod: PO | Performed by: INTERNAL MEDICINE

## 2021-06-16 PROCEDURE — 99999 PR PBB SHADOW E&M-EST. PATIENT-LVL III: ICD-10-PCS | Mod: PBBFAC,,, | Performed by: INTERNAL MEDICINE

## 2021-06-16 PROCEDURE — 84443 ASSAY THYROID STIM HORMONE: CPT | Performed by: INTERNAL MEDICINE

## 2021-06-16 PROCEDURE — 80061 LIPID PANEL: CPT | Performed by: INTERNAL MEDICINE

## 2021-06-16 PROCEDURE — 85025 COMPLETE CBC W/AUTO DIFF WBC: CPT | Performed by: INTERNAL MEDICINE

## 2021-06-16 PROCEDURE — 99999 PR PBB SHADOW E&M-EST. PATIENT-LVL III: CPT | Mod: PBBFAC,,, | Performed by: INTERNAL MEDICINE

## 2021-06-16 PROCEDURE — 99397 PR PREVENTIVE VISIT,EST,65 & OVER: ICD-10-PCS | Mod: S$GLB,,, | Performed by: INTERNAL MEDICINE

## 2021-06-16 PROCEDURE — 1126F PR PAIN SEVERITY QUANTIFIED, NO PAIN PRESENT: ICD-10-PCS | Mod: S$GLB,,, | Performed by: INTERNAL MEDICINE

## 2021-07-15 ENCOUNTER — OFFICE VISIT (OUTPATIENT)
Dept: UROLOGY | Facility: CLINIC | Age: 86
End: 2021-07-15
Payer: MEDICARE

## 2021-07-15 VITALS — BODY MASS INDEX: 25.72 KG/M2 | WEIGHT: 178 LBS

## 2021-07-15 DIAGNOSIS — R97.20 ELEVATED PSA: ICD-10-CM

## 2021-07-15 DIAGNOSIS — R39.14 BENIGN PROSTATIC HYPERPLASIA WITH INCOMPLETE BLADDER EMPTYING: Primary | ICD-10-CM

## 2021-07-15 DIAGNOSIS — N40.1 BENIGN PROSTATIC HYPERPLASIA WITH INCOMPLETE BLADDER EMPTYING: Primary | ICD-10-CM

## 2021-07-15 PROCEDURE — 1159F MED LIST DOCD IN RCRD: CPT | Mod: S$GLB,,, | Performed by: UROLOGY

## 2021-07-15 PROCEDURE — 1101F PR PT FALLS ASSESS DOC 0-1 FALLS W/OUT INJ PAST YR: ICD-10-PCS | Mod: CPTII,S$GLB,, | Performed by: UROLOGY

## 2021-07-15 PROCEDURE — 99999 PR PBB SHADOW E&M-EST. PATIENT-LVL III: ICD-10-PCS | Mod: PBBFAC,,, | Performed by: UROLOGY

## 2021-07-15 PROCEDURE — 1126F AMNT PAIN NOTED NONE PRSNT: CPT | Mod: S$GLB,,, | Performed by: UROLOGY

## 2021-07-15 PROCEDURE — 1159F PR MEDICATION LIST DOCUMENTED IN MEDICAL RECORD: ICD-10-PCS | Mod: S$GLB,,, | Performed by: UROLOGY

## 2021-07-15 PROCEDURE — 99999 PR PBB SHADOW E&M-EST. PATIENT-LVL III: CPT | Mod: PBBFAC,,, | Performed by: UROLOGY

## 2021-07-15 PROCEDURE — 99214 OFFICE O/P EST MOD 30 MIN: CPT | Mod: S$GLB,,, | Performed by: UROLOGY

## 2021-07-15 PROCEDURE — 1126F PR PAIN SEVERITY QUANTIFIED, NO PAIN PRESENT: ICD-10-PCS | Mod: S$GLB,,, | Performed by: UROLOGY

## 2021-07-15 PROCEDURE — 99214 PR OFFICE/OUTPT VISIT, EST, LEVL IV, 30-39 MIN: ICD-10-PCS | Mod: S$GLB,,, | Performed by: UROLOGY

## 2021-07-15 PROCEDURE — 3288F PR FALLS RISK ASSESSMENT DOCUMENTED: ICD-10-PCS | Mod: CPTII,S$GLB,, | Performed by: UROLOGY

## 2021-07-15 PROCEDURE — 1101F PT FALLS ASSESS-DOCD LE1/YR: CPT | Mod: CPTII,S$GLB,, | Performed by: UROLOGY

## 2021-07-15 PROCEDURE — 99499 RISK ADDL DX/OHS AUDIT: ICD-10-PCS | Mod: HCNC,S$GLB,, | Performed by: UROLOGY

## 2021-07-15 PROCEDURE — 3288F FALL RISK ASSESSMENT DOCD: CPT | Mod: CPTII,S$GLB,, | Performed by: UROLOGY

## 2021-07-15 PROCEDURE — 99499 UNLISTED E&M SERVICE: CPT | Mod: HCNC,S$GLB,, | Performed by: UROLOGY

## 2021-07-15 RX ORDER — SOLIFENACIN SUCCINATE 5 MG/1
5 TABLET, FILM COATED ORAL DAILY
Qty: 30 TABLET | Refills: 11 | Status: SHIPPED | OUTPATIENT
Start: 2021-07-15 | End: 2022-05-04

## 2021-07-28 NOTE — PROGRESS NOTES
"HPI:  Patient is a 85-year-old man who comes today for follow-up of his hypertension, lipids, chronic kidney disease and for his annual physical.  Patient has been doing fairly well.  He does have typical aches and pains from arthritis in his back and knees.  Has only been taking Tylenol recently.  He has in the past taking anti-inflammatories but he has not in over year.    Current MEDS: medcard review, verified and update  Allergies: Per the electronic medical record    Past Medical History:   Diagnosis Date    BPH (benign prostatic hyperplasia)     Cataract     CKD (chronic kidney disease), stage IV 07/15/2016    Elevated PSA     HEARING LOSS     History of colon polyps     Hyperlipemia     Hypertension     Myocardial infarction     per ekg and nuclear ett - old scar    Tobacco dependence     resolved       Past Surgical History:   Procedure Laterality Date    APPENDECTOMY  1960    CATARACT EXTRACTION W/  INTRAOCULAR LENS IMPLANT Left 4-20-16    CATARACT EXTRACTION W/  INTRAOCULAR LENS IMPLANT Right 3-16-16    EAR MASTOIDECTOMY W/ COCHLEAR IMPLANT W/ LANDMARK  2011    failed later removed    EYE SURGERY Bilateral 2016    cataract w/ IOL    PCIOL OD Right 03/`16/16    DR. CARPIO    TONSILLECTOMY         SHx: per the electronic medical record    FHx: recorded in the electronic medical record    ROS:    denies any chest pains or shortness of breath. Denies any nausea, vomiting or diarrhea. Denies any fever, chills or sweats. Denies any change in weight, voice, stool, skin or hair. Denies any dysuria, dyspepsia or dysphagia. Denies any change in vision, hearing or headaches. Denies any swollen lymph nodes or loss of memory.    PE:  /66   Pulse (!) 56   Temp 97.6 °F (36.4 °C) (Tympanic)   Resp 20   Ht 5' 9" (1.753 m)   Wt 78.6 kg (173 lb 4.5 oz)   SpO2 96%   BMI 25.59 kg/m²   Gen: Well-developed, well-nourished, male, in no acute distress, oriented x3  HEENT: neck is supple, no " adenopathy, carotids 2+ equal without bruits, thyroid exam normal size without nodules.  CHEST: clear to auscultation and percussion  CVS: regular rate and rhythm without significant murmur, gallop, or rubs  ABD: soft, benign, no rebound no guarding, no distention.  Bowel sounds are normal.     nontender.  No palpable masses.  No organomegaly and no audible bruits.  RECTAL:  Deferred  EXT: no clubbing, cyanosis, or edema  LYMPH: no cervical, inguinal, or axillary adenopathy  FEET: no loss of sensation.  No ulcers or pressure sores.  NEURO: gait normal.  Cranial nerves II- XII intact. No nystagmus.  Speech normal.   Gross motor and sensory unremarkable.    Lab Results   Component Value Date    WBC 7.57 03/04/2020    HGB 12.8 (L) 03/04/2020    HCT 41.7 03/04/2020     03/04/2020    CHOL 128 09/04/2020    TRIG 112 09/04/2020    HDL 38 (L) 09/04/2020    ALT 14 03/04/2020    AST 20 03/04/2020     09/04/2020    K 4.4 09/04/2020     09/04/2020    CREATININE 3.8 (H) 09/04/2020    BUN 52 (H) 09/04/2020    CO2 22 (L) 09/04/2020    TSH 3.020 03/04/2020    PSA 4.5 (H) 08/13/2020       Impression:  Chronic kidney disease, progressive, most likely multifactorial.  He is currently not taking any anti-inflammatories and has not for the last year.  Despite that his kidney disease is gotten worse.  Hypertension lipids both well controlled  Patient Active Problem List   Diagnosis    BPH (benign prostatic hyperplasia)    Hyperlipemia    Essential hypertension    Choroidal nevus, right eye    Macular chorioretinal scar of left eye    SNHL (sensorineural hearing loss)s/p implant on rt    Atherosclerosis of aorta    History of colon polyps    Bilateral hearing loss    Senile ectropion of right lower eyelid    Dry eye    Blepharitis of upper and lower eyelids of both eyes    CKD (chronic kidney disease), stage IV       Plan:     patient will continue with current medications.  He will see me again in about 3  months just to keep tabs on his blood pressure.    This note is generated with speech recognition software and is subject to transcription error and sound alike phrases that may be missed by proofreading.     no

## 2021-08-23 ENCOUNTER — OFFICE VISIT (OUTPATIENT)
Dept: OPHTHALMOLOGY | Facility: CLINIC | Age: 86
End: 2021-08-23
Payer: MEDICARE

## 2021-08-23 DIAGNOSIS — H52.223 REGULAR ASTIGMATISM OF BOTH EYES: ICD-10-CM

## 2021-08-23 DIAGNOSIS — H04.129 DRY EYE: Primary | ICD-10-CM

## 2021-08-23 DIAGNOSIS — H31.012 MACULAR SCAR, LEFT: ICD-10-CM

## 2021-08-23 DIAGNOSIS — H52.4 PRESBYOPIA: ICD-10-CM

## 2021-08-23 DIAGNOSIS — Z96.1 PSEUDOPHAKIA OF BOTH EYES: ICD-10-CM

## 2021-08-23 DIAGNOSIS — D31.31 CHOROIDAL NEVUS, RIGHT: ICD-10-CM

## 2021-08-23 PROCEDURE — 1159F PR MEDICATION LIST DOCUMENTED IN MEDICAL RECORD: ICD-10-PCS | Mod: CPTII,S$GLB,, | Performed by: OPTOMETRIST

## 2021-08-23 PROCEDURE — 92015 PR REFRACTION: ICD-10-PCS | Mod: S$GLB,,, | Performed by: OPTOMETRIST

## 2021-08-23 PROCEDURE — 92014 PR EYE EXAM, EST PATIENT,COMPREHESV: ICD-10-PCS | Mod: S$GLB,,, | Performed by: OPTOMETRIST

## 2021-08-23 PROCEDURE — 92015 DETERMINE REFRACTIVE STATE: CPT | Mod: S$GLB,,, | Performed by: OPTOMETRIST

## 2021-08-23 PROCEDURE — 99999 PR PBB SHADOW E&M-EST. PATIENT-LVL II: CPT | Mod: PBBFAC,,, | Performed by: OPTOMETRIST

## 2021-08-23 PROCEDURE — 99999 PR PBB SHADOW E&M-EST. PATIENT-LVL II: ICD-10-PCS | Mod: PBBFAC,,, | Performed by: OPTOMETRIST

## 2021-08-23 PROCEDURE — 1159F MED LIST DOCD IN RCRD: CPT | Mod: CPTII,S$GLB,, | Performed by: OPTOMETRIST

## 2021-08-23 PROCEDURE — 92014 COMPRE OPH EXAM EST PT 1/>: CPT | Mod: S$GLB,,, | Performed by: OPTOMETRIST

## 2021-10-26 ENCOUNTER — IMMUNIZATION (OUTPATIENT)
Dept: FAMILY MEDICINE | Facility: CLINIC | Age: 86
End: 2021-10-26
Payer: MEDICARE

## 2021-10-26 PROCEDURE — 90694 FLU VACCINE - QUADRIVALENT - ADJUVANTED: ICD-10-PCS | Mod: HCNC,S$GLB,, | Performed by: FAMILY MEDICINE

## 2021-10-26 PROCEDURE — G0008 ADMIN INFLUENZA VIRUS VAC: HCPCS | Mod: HCNC,S$GLB,, | Performed by: FAMILY MEDICINE

## 2021-10-26 PROCEDURE — 90694 VACC AIIV4 NO PRSRV 0.5ML IM: CPT | Mod: HCNC,S$GLB,, | Performed by: FAMILY MEDICINE

## 2021-10-26 PROCEDURE — G0008 FLU VACCINE - QUADRIVALENT - ADJUVANTED: ICD-10-PCS | Mod: HCNC,S$GLB,, | Performed by: FAMILY MEDICINE

## 2021-11-09 ENCOUNTER — LAB VISIT (OUTPATIENT)
Dept: LAB | Facility: HOSPITAL | Age: 86
End: 2021-11-09
Attending: INTERNAL MEDICINE
Payer: MEDICARE

## 2021-11-09 DIAGNOSIS — N18.4 STAGE 4 CHRONIC KIDNEY DISEASE DUE TO HYPERTENSION: ICD-10-CM

## 2021-11-09 DIAGNOSIS — I12.9 STAGE 4 CHRONIC KIDNEY DISEASE DUE TO HYPERTENSION: ICD-10-CM

## 2021-11-09 LAB
ALBUMIN SERPL BCP-MCNC: 3.3 G/DL (ref 3.5–5.2)
ANION GAP SERPL CALC-SCNC: 10 MMOL/L (ref 8–16)
BASOPHILS # BLD AUTO: 0.05 K/UL (ref 0–0.2)
BASOPHILS NFR BLD: 0.7 % (ref 0–1.9)
BUN SERPL-MCNC: 41 MG/DL (ref 8–23)
CALCIUM SERPL-MCNC: 9.3 MG/DL (ref 8.7–10.5)
CHLORIDE SERPL-SCNC: 106 MMOL/L (ref 95–110)
CO2 SERPL-SCNC: 24 MMOL/L (ref 23–29)
CREAT SERPL-MCNC: 3.4 MG/DL (ref 0.5–1.4)
DIFFERENTIAL METHOD: ABNORMAL
EOSINOPHIL # BLD AUTO: 0.7 K/UL (ref 0–0.5)
EOSINOPHIL NFR BLD: 9.8 % (ref 0–8)
ERYTHROCYTE [DISTWIDTH] IN BLOOD BY AUTOMATED COUNT: 13.3 % (ref 11.5–14.5)
EST. GFR  (AFRICAN AMERICAN): 17.7 ML/MIN/1.73 M^2
EST. GFR  (NON AFRICAN AMERICAN): 15.3 ML/MIN/1.73 M^2
GLUCOSE SERPL-MCNC: 93 MG/DL (ref 70–110)
HCT VFR BLD AUTO: 40.1 % (ref 40–54)
HGB BLD-MCNC: 12.6 G/DL (ref 14–18)
IMM GRANULOCYTES # BLD AUTO: 0.02 K/UL (ref 0–0.04)
IMM GRANULOCYTES NFR BLD AUTO: 0.3 % (ref 0–0.5)
LYMPHOCYTES # BLD AUTO: 1.2 K/UL (ref 1–4.8)
LYMPHOCYTES NFR BLD: 15.5 % (ref 18–48)
MCH RBC QN AUTO: 29.5 PG (ref 27–31)
MCHC RBC AUTO-ENTMCNC: 31.4 G/DL (ref 32–36)
MCV RBC AUTO: 94 FL (ref 82–98)
MONOCYTES # BLD AUTO: 0.7 K/UL (ref 0.3–1)
MONOCYTES NFR BLD: 8.8 % (ref 4–15)
NEUTROPHILS # BLD AUTO: 4.9 K/UL (ref 1.8–7.7)
NEUTROPHILS NFR BLD: 64.9 % (ref 38–73)
NRBC BLD-RTO: 0 /100 WBC
PHOSPHATE SERPL-MCNC: 3.5 MG/DL (ref 2.7–4.5)
PLATELET # BLD AUTO: 206 K/UL (ref 150–450)
PMV BLD AUTO: 11.4 FL (ref 9.2–12.9)
POTASSIUM SERPL-SCNC: 4.7 MMOL/L (ref 3.5–5.1)
RBC # BLD AUTO: 4.27 M/UL (ref 4.6–6.2)
SODIUM SERPL-SCNC: 140 MMOL/L (ref 136–145)
WBC # BLD AUTO: 7.53 K/UL (ref 3.9–12.7)

## 2021-11-09 PROCEDURE — 85025 COMPLETE CBC W/AUTO DIFF WBC: CPT | Mod: HCNC | Performed by: INTERNAL MEDICINE

## 2021-11-09 PROCEDURE — 80069 RENAL FUNCTION PANEL: CPT | Mod: HCNC | Performed by: INTERNAL MEDICINE

## 2021-11-09 PROCEDURE — 36415 COLL VENOUS BLD VENIPUNCTURE: CPT | Mod: HCNC,PO | Performed by: INTERNAL MEDICINE

## 2021-11-13 ENCOUNTER — PATIENT OUTREACH (OUTPATIENT)
Dept: ADMINISTRATIVE | Facility: OTHER | Age: 86
End: 2021-11-13
Payer: MEDICARE

## 2021-11-16 ENCOUNTER — OFFICE VISIT (OUTPATIENT)
Dept: NEPHROLOGY | Facility: CLINIC | Age: 86
End: 2021-11-16
Payer: MEDICARE

## 2021-11-16 VITALS
DIASTOLIC BLOOD PRESSURE: 77 MMHG | SYSTOLIC BLOOD PRESSURE: 163 MMHG | WEIGHT: 178.13 LBS | HEIGHT: 68 IN | HEART RATE: 55 BPM | BODY MASS INDEX: 27 KG/M2

## 2021-11-16 DIAGNOSIS — N17.9 ACUTE KIDNEY INJURY: Primary | ICD-10-CM

## 2021-11-16 PROCEDURE — 3288F PR FALLS RISK ASSESSMENT DOCUMENTED: ICD-10-PCS | Mod: HCNC,CPTII,S$GLB, | Performed by: INTERNAL MEDICINE

## 2021-11-16 PROCEDURE — 99499 UNLISTED E&M SERVICE: CPT | Mod: HCNC,S$GLB,, | Performed by: INTERNAL MEDICINE

## 2021-11-16 PROCEDURE — 99214 PR OFFICE/OUTPT VISIT, EST, LEVL IV, 30-39 MIN: ICD-10-PCS | Mod: HCNC,S$GLB,, | Performed by: INTERNAL MEDICINE

## 2021-11-16 PROCEDURE — 1101F PR PT FALLS ASSESS DOC 0-1 FALLS W/OUT INJ PAST YR: ICD-10-PCS | Mod: HCNC,CPTII,S$GLB, | Performed by: INTERNAL MEDICINE

## 2021-11-16 PROCEDURE — 3288F FALL RISK ASSESSMENT DOCD: CPT | Mod: HCNC,CPTII,S$GLB, | Performed by: INTERNAL MEDICINE

## 2021-11-16 PROCEDURE — 1159F PR MEDICATION LIST DOCUMENTED IN MEDICAL RECORD: ICD-10-PCS | Mod: HCNC,CPTII,S$GLB, | Performed by: INTERNAL MEDICINE

## 2021-11-16 PROCEDURE — 99499 RISK ADDL DX/OHS AUDIT: ICD-10-PCS | Mod: HCNC,S$GLB,, | Performed by: INTERNAL MEDICINE

## 2021-11-16 PROCEDURE — 1101F PT FALLS ASSESS-DOCD LE1/YR: CPT | Mod: HCNC,CPTII,S$GLB, | Performed by: INTERNAL MEDICINE

## 2021-11-16 PROCEDURE — 99214 OFFICE O/P EST MOD 30 MIN: CPT | Mod: HCNC,S$GLB,, | Performed by: INTERNAL MEDICINE

## 2021-11-16 PROCEDURE — 99999 PR PBB SHADOW E&M-EST. PATIENT-LVL III: CPT | Mod: PBBFAC,HCNC,, | Performed by: INTERNAL MEDICINE

## 2021-11-16 PROCEDURE — 99999 PR PBB SHADOW E&M-EST. PATIENT-LVL III: ICD-10-PCS | Mod: PBBFAC,HCNC,, | Performed by: INTERNAL MEDICINE

## 2021-11-16 PROCEDURE — 1126F PR PAIN SEVERITY QUANTIFIED, NO PAIN PRESENT: ICD-10-PCS | Mod: HCNC,CPTII,S$GLB, | Performed by: INTERNAL MEDICINE

## 2021-11-16 PROCEDURE — 1126F AMNT PAIN NOTED NONE PRSNT: CPT | Mod: HCNC,CPTII,S$GLB, | Performed by: INTERNAL MEDICINE

## 2021-11-16 PROCEDURE — 1159F MED LIST DOCD IN RCRD: CPT | Mod: HCNC,CPTII,S$GLB, | Performed by: INTERNAL MEDICINE

## 2022-01-03 ENCOUNTER — TELEPHONE (OUTPATIENT)
Dept: INTERNAL MEDICINE | Facility: CLINIC | Age: 87
End: 2022-01-03
Payer: MEDICARE

## 2022-01-03 DIAGNOSIS — H91.90 HEARING LOSS, UNSPECIFIED HEARING LOSS TYPE, UNSPECIFIED LATERALITY: Primary | ICD-10-CM

## 2022-01-03 NOTE — TELEPHONE ENCOUNTER
----- Message from Tara Banda, CCC-A sent at 1/3/2022 11:24 AM CST -----  Regarding: Audiology referral  Patient scheduled an audiogram for 1/11/22. Can you place a referral? Thanks!

## 2022-01-07 ENCOUNTER — LAB VISIT (OUTPATIENT)
Dept: LAB | Facility: HOSPITAL | Age: 87
End: 2022-01-07
Attending: UROLOGY
Payer: MEDICARE

## 2022-01-07 DIAGNOSIS — R97.20 ELEVATED PSA: ICD-10-CM

## 2022-01-07 PROCEDURE — 84153 ASSAY OF PSA TOTAL: CPT | Mod: HCNC | Performed by: UROLOGY

## 2022-01-07 PROCEDURE — 36415 COLL VENOUS BLD VENIPUNCTURE: CPT | Mod: HCNC,PO | Performed by: UROLOGY

## 2022-01-08 LAB — COMPLEXED PSA SERPL-MCNC: 6.1 NG/ML (ref 0–4)

## 2022-01-11 ENCOUNTER — CLINICAL SUPPORT (OUTPATIENT)
Dept: AUDIOLOGY | Facility: CLINIC | Age: 87
End: 2022-01-11
Payer: MEDICARE

## 2022-01-11 DIAGNOSIS — H90.3 SENSORINEURAL HEARING LOSS, ASYMMETRICAL: Primary | ICD-10-CM

## 2022-01-11 DIAGNOSIS — H91.90 HEARING LOSS, UNSPECIFIED HEARING LOSS TYPE, UNSPECIFIED LATERALITY: ICD-10-CM

## 2022-01-11 DIAGNOSIS — H93.291 IMPAIRED AUDITORY DISCRIMINATION, RIGHT: ICD-10-CM

## 2022-01-11 DIAGNOSIS — Z96.21 HISTORY OF COCHLEAR IMPLANT: ICD-10-CM

## 2022-01-11 DIAGNOSIS — Z71.89 HEARING AID CONSULTATION: Primary | ICD-10-CM

## 2022-01-11 PROCEDURE — 92590 PR HEARING AID EXAM, ONE EAR: ICD-10-PCS | Mod: HCNC,S$GLB,, | Performed by: AUDIOLOGIST-HEARING AID FITTER

## 2022-01-11 PROCEDURE — 92567 TYMPANOMETRY: CPT | Mod: HCNC,S$GLB,, | Performed by: AUDIOLOGIST-HEARING AID FITTER

## 2022-01-11 PROCEDURE — 92557 COMPREHENSIVE HEARING TEST: CPT | Mod: 52,HCNC,S$GLB, | Performed by: AUDIOLOGIST-HEARING AID FITTER

## 2022-01-11 PROCEDURE — 92567 PR TYMPA2METRY: ICD-10-PCS | Mod: HCNC,S$GLB,, | Performed by: AUDIOLOGIST-HEARING AID FITTER

## 2022-01-11 PROCEDURE — 92590 PR HEARING AID EXAM, ONE EAR: CPT | Mod: HCNC,S$GLB,, | Performed by: AUDIOLOGIST-HEARING AID FITTER

## 2022-01-11 PROCEDURE — 92557 PR COMPREHENSIVE HEARING TEST: ICD-10-PCS | Mod: 52,HCNC,S$GLB, | Performed by: AUDIOLOGIST-HEARING AID FITTER

## 2022-01-11 NOTE — PROGRESS NOTES
Harmanadebayo Johnston was seen 01/11/2022 for a hearing aid consult to discuss hearing aids. He currently wears Oticon Agil RITE with 85 dB speaker in the right ear. Hearing aid has static. Also needs 100 dB speaker. Discussed repair versus new hearing aid. Based on age of current device and audiometric needs, patient, his wife and I agree to proceed with a new BTE hearing aid. Based on communication needs, I recommend Performance level technology. Patient declined rechargeable. Rarely uses cell phone; shares with wife.     The following aid will be ordered for the right ear:    Katelyn Evolv AI 2000 BTE-13 SP  Color: Silver  Katelyn Earmold: acrylic 3/4 shell, 13H tubing      Hearing aid fitting fee was PIF and hearing aid fitting appointment was scheduled.

## 2022-01-11 NOTE — PROGRESS NOTES
Referring provider: Dr. Jose Johnston was seen 01/11/2022 for an audiological evaluation.  Patient complains of decrease in hearing. His last audiogram was in 2018. He has long history of left poorer than right SNHL. No tinnitus or dizziness. No otalgia, aural pressure, fullness or drainage from ears. Patient wore a left CI for over ten years, but discontinued use over the past few years due to limited benefit. He has been wearing a hearing aid in the right ear for at least the past ten years. He currently wears Floop RITE and no longer appreciates benefit.     Results reveal a severe sensorineural hearing loss 250-8000 Hz for the right ear, and a profound sensorineural hearing loss 250-8000 Hz for the left ear.   Speech Reception Thresholds were 75 dBHL for the right ear and could not test for the left ear.   Word recognition scores were poor for the right ear and could not test for the left ear.   Tympanograms were Type A for the right ear and Type As for the left ear.    Patient was counseled on the above findings.    Recommendations:  1. Annual audiogram  2. Hearing aid, right ear. Current hearing aid no longer adequate for his loss. Discussed repair with power upgrade vs new hearing aid. Based on age of current aid, patient opts to pursue a new hearing aid. I am in agreement.   3. CI re-evaluation for the left ear when ready. Patient stopped wearing his current CI within past year or so due to limited benefit. He wants to first trial hearing aid in the right ear before considering a new CI processor for the left ear.

## 2022-01-19 NOTE — TELEPHONE ENCOUNTER
No new care gaps identified.  Powered by Speed[employer] by [email or web address]. Reference number: [id number].   1/19/2022 4:19:44 PM CST

## 2022-01-20 ENCOUNTER — OFFICE VISIT (OUTPATIENT)
Dept: INTERNAL MEDICINE | Facility: CLINIC | Age: 87
End: 2022-01-20
Payer: MEDICARE

## 2022-01-20 VITALS
WEIGHT: 184.06 LBS | OXYGEN SATURATION: 97 % | SYSTOLIC BLOOD PRESSURE: 132 MMHG | HEART RATE: 59 BPM | BODY MASS INDEX: 27.9 KG/M2 | DIASTOLIC BLOOD PRESSURE: 84 MMHG | HEIGHT: 68 IN

## 2022-01-20 DIAGNOSIS — I70.0 ATHEROSCLEROSIS OF AORTA: ICD-10-CM

## 2022-01-20 DIAGNOSIS — N18.4 CKD (CHRONIC KIDNEY DISEASE), STAGE IV: ICD-10-CM

## 2022-01-20 DIAGNOSIS — E78.2 MIXED HYPERLIPIDEMIA: Chronic | ICD-10-CM

## 2022-01-20 DIAGNOSIS — I10 ESSENTIAL HYPERTENSION: Primary | Chronic | ICD-10-CM

## 2022-01-20 DIAGNOSIS — N25.81 SECONDARY HYPERPARATHYROIDISM OF RENAL ORIGIN: ICD-10-CM

## 2022-01-20 PROCEDURE — 99999 PR PBB SHADOW E&M-EST. PATIENT-LVL III: CPT | Mod: PBBFAC,HCNC,, | Performed by: INTERNAL MEDICINE

## 2022-01-20 PROCEDURE — 1159F PR MEDICATION LIST DOCUMENTED IN MEDICAL RECORD: ICD-10-PCS | Mod: HCNC,CPTII,S$GLB, | Performed by: INTERNAL MEDICINE

## 2022-01-20 PROCEDURE — 99999 PR PBB SHADOW E&M-EST. PATIENT-LVL III: ICD-10-PCS | Mod: PBBFAC,HCNC,, | Performed by: INTERNAL MEDICINE

## 2022-01-20 PROCEDURE — 99214 PR OFFICE/OUTPT VISIT, EST, LEVL IV, 30-39 MIN: ICD-10-PCS | Mod: HCNC,S$GLB,, | Performed by: INTERNAL MEDICINE

## 2022-01-20 PROCEDURE — 1126F AMNT PAIN NOTED NONE PRSNT: CPT | Mod: HCNC,CPTII,S$GLB, | Performed by: INTERNAL MEDICINE

## 2022-01-20 PROCEDURE — 1101F PT FALLS ASSESS-DOCD LE1/YR: CPT | Mod: HCNC,CPTII,S$GLB, | Performed by: INTERNAL MEDICINE

## 2022-01-20 PROCEDURE — 3288F FALL RISK ASSESSMENT DOCD: CPT | Mod: HCNC,CPTII,S$GLB, | Performed by: INTERNAL MEDICINE

## 2022-01-20 PROCEDURE — 1126F PR PAIN SEVERITY QUANTIFIED, NO PAIN PRESENT: ICD-10-PCS | Mod: HCNC,CPTII,S$GLB, | Performed by: INTERNAL MEDICINE

## 2022-01-20 PROCEDURE — 1101F PR PT FALLS ASSESS DOC 0-1 FALLS W/OUT INJ PAST YR: ICD-10-PCS | Mod: HCNC,CPTII,S$GLB, | Performed by: INTERNAL MEDICINE

## 2022-01-20 PROCEDURE — 3288F PR FALLS RISK ASSESSMENT DOCUMENTED: ICD-10-PCS | Mod: HCNC,CPTII,S$GLB, | Performed by: INTERNAL MEDICINE

## 2022-01-20 PROCEDURE — 1159F MED LIST DOCD IN RCRD: CPT | Mod: HCNC,CPTII,S$GLB, | Performed by: INTERNAL MEDICINE

## 2022-01-20 PROCEDURE — 99214 OFFICE O/P EST MOD 30 MIN: CPT | Mod: HCNC,S$GLB,, | Performed by: INTERNAL MEDICINE

## 2022-01-20 RX ORDER — ATORVASTATIN CALCIUM 10 MG/1
10 TABLET, FILM COATED ORAL DAILY
Qty: 90 TABLET | Refills: 3 | Status: SHIPPED | OUTPATIENT
Start: 2022-01-20 | End: 2022-11-15

## 2022-01-20 NOTE — PROGRESS NOTES
"HPI:  Patient is 87-year-old man who comes today for follow-up of his hypertension, lipids and chronic kidney disease.  His blood pressures been doing very well at home.  He continues to take only Tylenol arthritis for his osteoarthritic pain.  He has been totally avoiding NSAIDs for over year.  His creatinine has significantly improved.  Is now down to 3.4.  That is probably going to be his baseline going forward.  He has no other problems or complaints    Current meds have been verified and updated per the EMR  Exam:/84   Pulse (!) 59   Ht 5' 8" (1.727 m)   Wt 83.5 kg (184 lb 1.4 oz)   SpO2 97%   BMI 27.99 kg/m²   Carotids 2+ equal without bruits  Chest clear  Cardiovascular regular rate and rhythm without murmur gallop or rub    Lab Results   Component Value Date    WBC 7.53 11/09/2021    HGB 12.6 (L) 11/09/2021    HCT 40.1 11/09/2021     11/09/2021    CHOL 132 06/16/2021    TRIG 123 06/16/2021    HDL 35 (L) 06/16/2021    ALT 16 06/16/2021    AST 23 06/16/2021     11/09/2021    K 4.7 11/09/2021     11/09/2021    CREATININE 3.4 (H) 11/09/2021    BUN 41 (H) 11/09/2021    CO2 24 11/09/2021    TSH 2.738 06/16/2021    PSA 5.3 (H) 02/10/2021       Impression:  Multiple medical problems below, stable  Patient Active Problem List   Diagnosis    BPH (benign prostatic hyperplasia)    Hyperlipemia    Essential hypertension    Elevated PSA    Choroidal nevus, right eye    Macular chorioretinal scar of left eye    SNHL (sensorineural hearing loss)s/p implant on rt    Atherosclerosis of aorta    History of colon polyps    Bilateral hearing loss    Senile ectropion of right lower eyelid    Dry eye    Blepharitis of upper and lower eyelids of both eyes    CKD (chronic kidney disease), stage IV    Secondary hyperparathyroidism of renal origin       Plan:  Orders Placed This Encounter    Comprehensive Metabolic Panel    Lipid Panel    TSH    atorvastatin (LIPITOR) 10 MG tablet "     Medications remain the same.  He will be seen again in 6 months with above lab work    This note is generated with speech recognition software and is subject to transcription error and sound alike phrases that may be missed by proofreading.

## 2022-01-21 DIAGNOSIS — R97.20 ELEVATED PSA: ICD-10-CM

## 2022-01-21 DIAGNOSIS — N40.1 BENIGN PROSTATIC HYPERPLASIA WITH INCOMPLETE BLADDER EMPTYING: Primary | ICD-10-CM

## 2022-01-21 DIAGNOSIS — R39.14 BENIGN PROSTATIC HYPERPLASIA WITH INCOMPLETE BLADDER EMPTYING: Primary | ICD-10-CM

## 2022-01-21 RX ORDER — TAMSULOSIN HYDROCHLORIDE 0.4 MG/1
0.4 CAPSULE ORAL DAILY
Qty: 90 CAPSULE | Refills: 3 | Status: CANCELLED | OUTPATIENT
Start: 2022-01-21 | End: 2023-01-21

## 2022-01-26 ENCOUNTER — CLINICAL SUPPORT (OUTPATIENT)
Dept: AUDIOLOGY | Facility: CLINIC | Age: 87
End: 2022-01-26
Payer: MEDICARE

## 2022-01-26 DIAGNOSIS — Z46.1 ENCOUNTER FOR HEARING AID FITTING OF RIGHT EAR: Primary | ICD-10-CM

## 2022-01-26 PROCEDURE — V5257 HEARING AID, DIGIT, MON, BTE: HCPCS | Mod: HCNC,S$GLB,, | Performed by: AUDIOLOGIST-HEARING AID FITTER

## 2022-01-26 PROCEDURE — EBRTAX-P BATON ROUGE PRESCRIBED 3.0%: Mod: HCNC,S$GLB,, | Performed by: AUDIOLOGIST-HEARING AID FITTER

## 2022-01-26 PROCEDURE — EBRTAX-P BATON ROUGE PRESCRIBED 3.0%: ICD-10-PCS | Mod: HCNC,S$GLB,, | Performed by: AUDIOLOGIST-HEARING AID FITTER

## 2022-01-26 PROCEDURE — V5257 PR HEARING AID, DIGIT, MON, BTE: ICD-10-PCS | Mod: HCNC,S$GLB,, | Performed by: AUDIOLOGIST-HEARING AID FITTER

## 2022-01-26 NOTE — PROGRESS NOTES
Harman Johnston was seen 01/26/2022 for hearing aid fitting for the right ear.  He is an experienced user.  Settings were set to 100% target based on current audiogram.  Patient and his wife were counseled on insertion, cleaning and maintenance and warranties. Patient was able to manipulate hearing aid, with some difficulty inserting mold. Patient and his wife were counseled on realistic expectations considering his very poor WRS (0%), and effective communication strategies. Discussed option of remaking mold to 1/2 shell if fit presents as a problem. May trial more than one BTE pending outcome over the next weeks. Patient was given a copy of the hearing aid purchase agreement and will pay in full today.  Patient's one month trail period will begin today. Patient has been scheduled for a hearing aid follow up in one week(s).      :    Katelyn  Model:     Evolv AI 2000 BTE 13 P+  Color:    Silver  Right aid sn#:   [id number]  Left aid sn#:    N/A   Battery:    13   Repair warranty:  04/12/2025  L/D warranty:   04/12/2025  Trial ends:    02/26/2022    Earmold:  Katelyn 3/4 Shell Digital SLS Hard, Clear, Pull cord, 13H tubing  SN: [id number]  Remake warranty: 04/16/2022

## 2022-02-01 ENCOUNTER — CLINICAL SUPPORT (OUTPATIENT)
Dept: AUDIOLOGY | Facility: CLINIC | Age: 87
End: 2022-02-01
Payer: MEDICARE

## 2022-02-01 DIAGNOSIS — Z46.1 FITTING AND ADJUSTMENT OF RIGHT HEARING AID: Primary | ICD-10-CM

## 2022-02-01 NOTE — PROGRESS NOTES
Harman Johnston was seen 02/01/2022 for hearing aid follow-up. Patient was fitted last week with a JibJab Power Philip AI-13  BTE for the right ear. Patient and his wife report better hearing with the new BTE as compared to his old LISA. He has 0% discrimination and that continues to pose difficulty. I demonstrated Phonak Kirstin  Power BTE versus his recently fitted Katelyn BTE. Patient did not appreciate one aid better than the other for volume or clarity. We will continue with the Katelyn BTE. Patient has not been able to wear earmold much due to discomfort and sores. Earmold is difficult to insert and sore is noted upper meri. Impression was taken of the right ear without incidence to remake earmold from 3/4 shell to canal, reduce bulk/taper and possibly shorten (per factory discretion).  Sending 3/4 shell earmold back to [facility] with the new impression to optimize remake. Patient will wear his old Oticon LISA; program adjustments were made today for his Oticon aid. I will call patient for re-fitting when remake earmold arrives, and that date will begin a new trial period start date. I kept his [employer] hearing aid with box/bag in office.

## 2022-02-03 RX ORDER — TAMSULOSIN HYDROCHLORIDE 0.4 MG/1
1 CAPSULE ORAL DAILY
Qty: 90 CAPSULE | Refills: 3 | Status: SHIPPED | OUTPATIENT
Start: 2022-02-03 | End: 2022-12-07

## 2022-02-03 NOTE — TELEPHONE ENCOUNTER
Successfully contacted Ms. Johnston regarding message below. Ms. Johnston needed refill on Flomax for Mr. Johnston. Notified patient's wife that I will place the refill request and send to Dr. Ignacio. Patient's wife VU.   ----- Message from Rajani Ho sent at 2/1/2022  3:41 PM CST -----  Contact: wife/ [phone]  Patient wife  would like to consult with a nurse in regards to prescriptions. Please call back at [phone]. Thanks r/s

## 2022-02-06 RX ORDER — ATORVASTATIN CALCIUM 10 MG/1
TABLET, FILM COATED ORAL
Qty: 90 TABLET | Refills: 3 | OUTPATIENT
Start: 2022-02-06

## 2022-02-06 NOTE — TELEPHONE ENCOUNTER
Quick DC. Request already responded to by other means (e.g. phone or fax)   Refill Authorization Note   Harman Johnston  is requesting a refill authorization.  Brief Assessment and Rationale for Refill:  Quick Discontinue  Medication Therapy Plan:       Medication Reconciliation Completed:  No      Comments:   Pended Medication(s)       Requested Prescriptions     Refused Prescriptions Disp Refills    atorvastatin (LIPITOR) 10 MG tablet [Pharmacy Med Name: ATORVASTATIN CALCIUM 10 MG Tablet] 90 tablet 3     Sig: TAKE 1 TABLET EVERY DAY     Refused By: RITU DEXTER     Reason for Refusal: Request already responded to by other means (e.g. phone or fax)        Duplicate Pended Encounter(s)/ Last Prescribed Details: (includes pharmacy & prescriber details)   Providers    Authorizing Provider:    Bill Garcia MD   [address] [facility] [address] [address]   Phone:  [phone]   Fax:  [phone]   JOSE ALBERTO #:  [id number]   NPI:  [id number]        Ordering User:  Bill Garcia MD          Pharmacy    [facility] - [address]   [address] [facility] [address]   Phone:  [phone]  Fax:  [phone]   JOSE ALBERTO #:  --   RAMSEY Reason: --       Outpatient Medication Detail     Disp Refills Start End RAMSEY   atorvastatin (LIPITOR) 10 MG tablet 90 tablet 3 1/20/2022  No   Sig - Route: Take 1 tablet (10 mg total) by mouth once daily. - Oral   Sent to pharmacy as: atorvastatin (LIPITOR) 10 MG tablet   Class: Normal   Order: [id number]   Date/Time Signed: 1/20/2022 10:17       E-Prescribing Status: Receipt confirmed by pharmacy (1/20/2022 10:17 AM CST)     Ordering Encounter Report    Associated Reports   View Encounter                Note composed:10:55 AM 02/06/2022

## 2022-02-15 ENCOUNTER — CLINICAL SUPPORT (OUTPATIENT)
Dept: AUDIOLOGY | Facility: CLINIC | Age: 87
End: 2022-02-15
Payer: MEDICARE

## 2022-02-15 DIAGNOSIS — Z46.1 ENCOUNTER FOR FITTING AND ADJUSTMENT OF HEARING AID OF BOTH EARS: Primary | ICD-10-CM

## 2022-02-15 NOTE — PROGRESS NOTES
Harman Johnston was seen 02/15/2022 for hearing aid earmold fitting.  A new earmold was made for comfort. Patient reported comfort to fit and was able to manipulate. Retested feedback test with the new mold, good results. Patient was pleased with settings. He has follow-up in one week. Extended trial period.        Right earmold remake from 3/4 shell to canal: SLS acrylic, clear, 13H tubing, pull cord  R EM SN: [id number]  Warranty: 04/16/2022

## 2022-02-22 ENCOUNTER — CLINICAL SUPPORT (OUTPATIENT)
Dept: AUDIOLOGY | Facility: CLINIC | Age: 87
End: 2022-02-22
Payer: MEDICARE

## 2022-02-22 DIAGNOSIS — Z46.1 FITTING AND ADJUSTMENT OF RIGHT HEARING AID: Primary | ICD-10-CM

## 2022-02-22 NOTE — PROGRESS NOTES
Incr L4. Good aided. Practiced insertion.     Harman Johnston was seen 02/22/2022 for hearing aid follow-up.  Patient reports he is doing well with the new earmold and with the hearing aid. His wife appreciates an improvement in his hearing, including with the television. She now needs to TV set louder than him. Increased settings to L4 and patient preferred to L3. We practiced insertion of earmold. Aided testing revealed good results. Patient has follow-up in two weeks.

## 2022-03-09 ENCOUNTER — CLINICAL SUPPORT (OUTPATIENT)
Dept: AUDIOLOGY | Facility: CLINIC | Age: 87
End: 2022-03-09
Payer: MEDICARE

## 2022-03-09 DIAGNOSIS — Z46.1 FITTING AND ADJUSTMENT OF RIGHT HEARING AID: Primary | ICD-10-CM

## 2022-03-09 NOTE — PROGRESS NOTES
Harmanadebayo Johnston was seen 03/09/2022 for hearing aid follow-up.  Patient and his wife report he is doing well the hearing aid and is much happier with the new canal EM versus the 3/4 shell EM. He is understanding better. TV is much lowered. He is even able to understand some conversations with their house phone. He does not use cell phone. Hearing aid check reveals all is in GWO with good data logging. No adjustments were needed for today. I discussed Sheryl pittsioned home phone and gave brochure. Wife is also [place] and could benefit with Sheryl. Patient does not have internet at home but they will look it for the phone service. I also discussed option to pair cell phone (if compatible) to hearing aids when desired. They both rarely use cell phone. Patient has follow-up in three months.

## 2022-04-06 ENCOUNTER — OFFICE VISIT (OUTPATIENT)
Dept: INTERNAL MEDICINE | Facility: CLINIC | Age: 87
End: 2022-04-06
Payer: MEDICARE

## 2022-04-06 VITALS
WEIGHT: 183.88 LBS | SYSTOLIC BLOOD PRESSURE: 98 MMHG | HEIGHT: 68 IN | HEART RATE: 57 BPM | DIASTOLIC BLOOD PRESSURE: 68 MMHG | BODY MASS INDEX: 27.87 KG/M2 | OXYGEN SATURATION: 98 %

## 2022-04-06 DIAGNOSIS — R21 RASH: Primary | ICD-10-CM

## 2022-04-06 PROCEDURE — 1160F PR REVIEW ALL MEDS BY PRESCRIBER/CLIN PHARMACIST DOCUMENTED: ICD-10-PCS | Mod: CPTII,S$GLB,, | Performed by: INTERNAL MEDICINE

## 2022-04-06 PROCEDURE — 1159F MED LIST DOCD IN RCRD: CPT | Mod: CPTII,S$GLB,, | Performed by: INTERNAL MEDICINE

## 2022-04-06 PROCEDURE — 3288F PR FALLS RISK ASSESSMENT DOCUMENTED: ICD-10-PCS | Mod: CPTII,S$GLB,, | Performed by: INTERNAL MEDICINE

## 2022-04-06 PROCEDURE — 3288F FALL RISK ASSESSMENT DOCD: CPT | Mod: CPTII,S$GLB,, | Performed by: INTERNAL MEDICINE

## 2022-04-06 PROCEDURE — 99999 PR PBB SHADOW E&M-EST. PATIENT-LVL III: CPT | Mod: PBBFAC,,, | Performed by: INTERNAL MEDICINE

## 2022-04-06 PROCEDURE — 1101F PR PT FALLS ASSESS DOC 0-1 FALLS W/OUT INJ PAST YR: ICD-10-PCS | Mod: CPTII,S$GLB,, | Performed by: INTERNAL MEDICINE

## 2022-04-06 PROCEDURE — 99213 PR OFFICE/OUTPT VISIT, EST, LEVL III, 20-29 MIN: ICD-10-PCS | Mod: S$GLB,,, | Performed by: INTERNAL MEDICINE

## 2022-04-06 PROCEDURE — 1101F PT FALLS ASSESS-DOCD LE1/YR: CPT | Mod: CPTII,S$GLB,, | Performed by: INTERNAL MEDICINE

## 2022-04-06 PROCEDURE — 1126F AMNT PAIN NOTED NONE PRSNT: CPT | Mod: CPTII,S$GLB,, | Performed by: INTERNAL MEDICINE

## 2022-04-06 PROCEDURE — 1126F PR PAIN SEVERITY QUANTIFIED, NO PAIN PRESENT: ICD-10-PCS | Mod: CPTII,S$GLB,, | Performed by: INTERNAL MEDICINE

## 2022-04-06 PROCEDURE — 1160F RVW MEDS BY RX/DR IN RCRD: CPT | Mod: CPTII,S$GLB,, | Performed by: INTERNAL MEDICINE

## 2022-04-06 PROCEDURE — 1159F PR MEDICATION LIST DOCUMENTED IN MEDICAL RECORD: ICD-10-PCS | Mod: CPTII,S$GLB,, | Performed by: INTERNAL MEDICINE

## 2022-04-06 PROCEDURE — 99213 OFFICE O/P EST LOW 20 MIN: CPT | Mod: S$GLB,,, | Performed by: INTERNAL MEDICINE

## 2022-04-06 PROCEDURE — 99999 PR PBB SHADOW E&M-EST. PATIENT-LVL III: ICD-10-PCS | Mod: PBBFAC,,, | Performed by: INTERNAL MEDICINE

## 2022-04-06 RX ORDER — TRIAMCINOLONE ACETONIDE 1 MG/ML
LOTION TOPICAL 2 TIMES DAILY
Qty: 60 ML | Refills: 2 | Status: SHIPPED | OUTPATIENT
Start: 2022-04-06 | End: 2023-04-06

## 2022-04-06 NOTE — PROGRESS NOTES
"HPI:  Patient is an 87-year-old man who comes today for pruritic rash on multiple areas of his body.  He states has been present for several weeks.  He stated it happened several years ago and he used to steroid lotion any cleared up.    Current meds have been verified and updated per the EMR  Exam:BP 98/68 (BP Location: Right arm)   Pulse (!) 57   Ht 5' 8" (1.727 m)   Wt 83.4 kg (183 lb 13.8 oz)   SpO2 98%   BMI 27.96 kg/m²   He has multiple patchy areas of dry skin with fine erythematous and scaly changes consistent with just dermatitis    Lab Results   Component Value Date    WBC 7.53 11/09/2021    HGB 12.6 (L) 11/09/2021    HCT 40.1 11/09/2021     11/09/2021    CHOL 132 06/16/2021    TRIG 123 06/16/2021    HDL 35 (L) 06/16/2021    ALT 16 06/16/2021    AST 23 06/16/2021     11/09/2021    K 4.7 11/09/2021     11/09/2021    CREATININE 3.4 (H) 11/09/2021    BUN 41 (H) 11/09/2021    CO2 24 11/09/2021    TSH 2.738 06/16/2021    PSA 5.3 (H) 02/10/2021       Impression:  Dermatitis  Patient Active Problem List   Diagnosis    BPH (benign prostatic hyperplasia)    Hyperlipemia    Essential hypertension    Elevated PSA    Choroidal nevus, right eye    Macular chorioretinal scar of left eye    SNHL (sensorineural hearing loss)s/p implant on rt    Atherosclerosis of aorta    History of colon polyps    Bilateral hearing loss    Senile ectropion of right lower eyelid    Dry eye    Blepharitis of upper and lower eyelids of both eyes    CKD (chronic kidney disease), stage IV    Secondary hyperparathyroidism of renal origin       Plan:  Orders Placed This Encounter    triamcinolone acetonide 0.1% (KENALOG) 0.1 % Lotn   He was given some Kenalog lotion to apply 2 to 3 times a day as needed      This note is generated with speech recognition software and is subject to transcription error and sound alike phrases that may be missed by proofreading.      "

## 2022-05-13 ENCOUNTER — DOCUMENTATION ONLY (OUTPATIENT)
Dept: AUDIOLOGY | Facility: CLINIC | Age: 87
End: 2022-05-13
Payer: MEDICARE

## 2022-05-13 DIAGNOSIS — N18.4 CKD (CHRONIC KIDNEY DISEASE), STAGE IV: Primary | ICD-10-CM

## 2022-05-13 NOTE — PROGRESS NOTES
Cochlear Implant Registration Form      Left Ear    [employer]och[employer]   Implant Model    Internal Serial Number [id number]   Date of Implantation 08/05/2010   Date of Initial Stimulation 09/16/2010

## 2022-05-27 ENCOUNTER — CLINICAL SUPPORT (OUTPATIENT)
Dept: AUDIOLOGY | Facility: CLINIC | Age: 87
End: 2022-05-27
Payer: MEDICARE

## 2022-05-27 DIAGNOSIS — Z46.1 FITTING AND ADJUSTMENT OF RIGHT HEARING AID: Primary | ICD-10-CM

## 2022-05-27 NOTE — PROGRESS NOTES
Harmanadebayo Johnston was seen 05/27/2022 for hearing aid repair.    Patient complains of right hearing aid not working even after replacing battery. Hearing aid check reveals aid is in good working order. His hearing aid battery was not working with intermittent output. Replaced with one of my batteries and aid is working well. Possibly patient has bad pack of batteries so gave him pack of batteries to try. If problem persist, then will likely need to send aid to [facility] for check.     Earmold tubing is getting loose so replaced today with #13T Dry-tube. Patient is pleased. Otoscopy reveals scant cerumen. He will return in 6-months for clean and check.

## 2022-06-02 DIAGNOSIS — N18.4 CKD (CHRONIC KIDNEY DISEASE), STAGE IV: Primary | ICD-10-CM

## 2022-06-03 ENCOUNTER — LAB VISIT (OUTPATIENT)
Dept: LAB | Facility: HOSPITAL | Age: 87
End: 2022-06-03
Attending: INTERNAL MEDICINE
Payer: MEDICARE

## 2022-06-03 ENCOUNTER — OFFICE VISIT (OUTPATIENT)
Dept: NEPHROLOGY | Facility: CLINIC | Age: 87
End: 2022-06-03
Payer: MEDICARE

## 2022-06-03 VITALS
SYSTOLIC BLOOD PRESSURE: 140 MMHG | HEART RATE: 52 BPM | DIASTOLIC BLOOD PRESSURE: 70 MMHG | RESPIRATION RATE: 18 BRPM | HEIGHT: 68 IN | WEIGHT: 178.81 LBS | BODY MASS INDEX: 27.1 KG/M2

## 2022-06-03 DIAGNOSIS — N18.5 STAGE 5 CHRONIC KIDNEY DISEASE: ICD-10-CM

## 2022-06-03 DIAGNOSIS — R97.20 ELEVATED PSA: Primary | ICD-10-CM

## 2022-06-03 DIAGNOSIS — N18.4 CKD (CHRONIC KIDNEY DISEASE), STAGE IV: ICD-10-CM

## 2022-06-03 LAB
ALBUMIN SERPL BCP-MCNC: 3.3 G/DL (ref 3.5–5.2)
ANION GAP SERPL CALC-SCNC: 9 MMOL/L (ref 8–16)
BASOPHILS # BLD AUTO: 0.04 K/UL (ref 0–0.2)
BASOPHILS NFR BLD: 0.5 % (ref 0–1.9)
BUN SERPL-MCNC: 48 MG/DL (ref 8–23)
CALCIUM SERPL-MCNC: 9.1 MG/DL (ref 8.7–10.5)
CHLORIDE SERPL-SCNC: 109 MMOL/L (ref 95–110)
CO2 SERPL-SCNC: 24 MMOL/L (ref 23–29)
CREAT SERPL-MCNC: 4 MG/DL (ref 0.5–1.4)
DIFFERENTIAL METHOD: ABNORMAL
EOSINOPHIL # BLD AUTO: 1.3 K/UL (ref 0–0.5)
EOSINOPHIL NFR BLD: 14.5 % (ref 0–8)
ERYTHROCYTE [DISTWIDTH] IN BLOOD BY AUTOMATED COUNT: 13.8 % (ref 11.5–14.5)
EST. GFR  (AFRICAN AMERICAN): 15 ML/MIN/1.73 M^2
EST. GFR  (NON AFRICAN AMERICAN): 13 ML/MIN/1.73 M^2
GLUCOSE SERPL-MCNC: 99 MG/DL (ref 70–110)
HCT VFR BLD AUTO: 41.3 % (ref 40–54)
HGB BLD-MCNC: 13.2 G/DL (ref 14–18)
IMM GRANULOCYTES # BLD AUTO: 0.02 K/UL (ref 0–0.04)
IMM GRANULOCYTES NFR BLD AUTO: 0.2 % (ref 0–0.5)
LYMPHOCYTES # BLD AUTO: 1.2 K/UL (ref 1–4.8)
LYMPHOCYTES NFR BLD: 13.9 % (ref 18–48)
MCH RBC QN AUTO: 29.9 PG (ref 27–31)
MCHC RBC AUTO-ENTMCNC: 32 G/DL (ref 32–36)
MCV RBC AUTO: 94 FL (ref 82–98)
MONOCYTES # BLD AUTO: 0.7 K/UL (ref 0.3–1)
MONOCYTES NFR BLD: 8.3 % (ref 4–15)
NEUTROPHILS # BLD AUTO: 5.5 K/UL (ref 1.8–7.7)
NEUTROPHILS NFR BLD: 62.6 % (ref 38–73)
NRBC BLD-RTO: 0 /100 WBC
PHOSPHATE SERPL-MCNC: 3 MG/DL (ref 2.7–4.5)
PLATELET # BLD AUTO: 188 K/UL (ref 150–450)
PMV BLD AUTO: 10.7 FL (ref 9.2–12.9)
POTASSIUM SERPL-SCNC: 5.4 MMOL/L (ref 3.5–5.1)
PTH-INTACT SERPL-MCNC: 126 PG/ML (ref 9–77)
RBC # BLD AUTO: 4.41 M/UL (ref 4.6–6.2)
SODIUM SERPL-SCNC: 142 MMOL/L (ref 136–145)
WBC # BLD AUTO: 8.82 K/UL (ref 3.9–12.7)

## 2022-06-03 PROCEDURE — 1101F PR PT FALLS ASSESS DOC 0-1 FALLS W/OUT INJ PAST YR: ICD-10-PCS | Mod: CPTII,S$GLB,, | Performed by: INTERNAL MEDICINE

## 2022-06-03 PROCEDURE — 1159F MED LIST DOCD IN RCRD: CPT | Mod: CPTII,S$GLB,, | Performed by: INTERNAL MEDICINE

## 2022-06-03 PROCEDURE — 99499 UNLISTED E&M SERVICE: CPT | Mod: S$GLB,,, | Performed by: INTERNAL MEDICINE

## 2022-06-03 PROCEDURE — 99215 PR OFFICE/OUTPT VISIT, EST, LEVL V, 40-54 MIN: ICD-10-PCS | Mod: S$GLB,,, | Performed by: INTERNAL MEDICINE

## 2022-06-03 PROCEDURE — 85025 COMPLETE CBC W/AUTO DIFF WBC: CPT | Performed by: INTERNAL MEDICINE

## 2022-06-03 PROCEDURE — 99499 RISK ADDL DX/OHS AUDIT: ICD-10-PCS | Mod: S$GLB,,, | Performed by: INTERNAL MEDICINE

## 2022-06-03 PROCEDURE — 36415 COLL VENOUS BLD VENIPUNCTURE: CPT | Performed by: INTERNAL MEDICINE

## 2022-06-03 PROCEDURE — 99999 PR PBB SHADOW E&M-EST. PATIENT-LVL III: ICD-10-PCS | Mod: PBBFAC,,, | Performed by: INTERNAL MEDICINE

## 2022-06-03 PROCEDURE — 80069 RENAL FUNCTION PANEL: CPT | Performed by: INTERNAL MEDICINE

## 2022-06-03 PROCEDURE — 3288F PR FALLS RISK ASSESSMENT DOCUMENTED: ICD-10-PCS | Mod: CPTII,S$GLB,, | Performed by: INTERNAL MEDICINE

## 2022-06-03 PROCEDURE — 1159F PR MEDICATION LIST DOCUMENTED IN MEDICAL RECORD: ICD-10-PCS | Mod: CPTII,S$GLB,, | Performed by: INTERNAL MEDICINE

## 2022-06-03 PROCEDURE — 83970 ASSAY OF PARATHORMONE: CPT | Performed by: INTERNAL MEDICINE

## 2022-06-03 PROCEDURE — 99215 OFFICE O/P EST HI 40 MIN: CPT | Mod: S$GLB,,, | Performed by: INTERNAL MEDICINE

## 2022-06-03 PROCEDURE — 1101F PT FALLS ASSESS-DOCD LE1/YR: CPT | Mod: CPTII,S$GLB,, | Performed by: INTERNAL MEDICINE

## 2022-06-03 PROCEDURE — 3288F FALL RISK ASSESSMENT DOCD: CPT | Mod: CPTII,S$GLB,, | Performed by: INTERNAL MEDICINE

## 2022-06-03 PROCEDURE — 99999 PR PBB SHADOW E&M-EST. PATIENT-LVL III: CPT | Mod: PBBFAC,,, | Performed by: INTERNAL MEDICINE

## 2022-06-03 PROCEDURE — 1126F PR PAIN SEVERITY QUANTIFIED, NO PAIN PRESENT: ICD-10-PCS | Mod: CPTII,S$GLB,, | Performed by: INTERNAL MEDICINE

## 2022-06-03 PROCEDURE — 1126F AMNT PAIN NOTED NONE PRSNT: CPT | Mod: CPTII,S$GLB,, | Performed by: INTERNAL MEDICINE

## 2022-06-03 NOTE — PROGRESS NOTES
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 H/o of BPH     HPI: Pt is a 88 y/o male who presents for f/u renal function that worsened after pt had taken mobic daily x 11 months between Dec 2018 and June 2019. Since then s Cr has not returned to prior baseline that previously was c/w CKD stage 3. He also has a h/o of BPH. U/s in Oct 2020 showed no hydronephrosis. Noted today his PSA is 6.0. He has no urinary sx's, no hesitancy or frequency. Pt gets up twice at night to urinate. He saw Dr. Ignacio (urology) in July 2021.     It should also be reviewed today that despite medical advice pt did no stop mobic (see prior renal notes). He may have also taken ibuprofen. to review also, BP was somewhat low. Atenolol was reduced in past visits. Chlorthalidone was stopped. Further advice was provided on f/u visits to completely stop NSAIds, which he appears he now has.     W/u for NINO showed unremarkable u/a and microscopy.Therefore, GN was not suspected. Pt has followed with urology. Prostate biopsy did not show cancer. He is taking flomax and avodart.         PAST MEDICAL HISTORY:  CKD stage 3 in the past, worse to CKD 4 and now 5 after taking NSAIds, BPH (benign prostatic hyperplasia), Cataract, Elevated PSA, HEARING LOSS, History of colon polyps, Hyperlipemia, Hypertension, Myocardial infarction, and Tobacco dependence.     PAST SURGICAL HISTORY:  He  has a past surgical history that includes Ear mastoidectomy w/ cochlear implant w/ landmark (2011); Tonsillectomy; PCIOL OD (Right, 03/`16/16); Cataract extraction w/  intraocular lens implant (Left, 4-20-16); Cataract extraction w/  intraocular lens implant (Right, 3-16-16); Appendectomy (1960); and Eye surgery (Bilateral, 2016).     SOCIAL HISTORY:  He  reports that he quit smoking about 19 years ago. His smoking use included cigarettes. He has a 10.00 pack-year smoking history.  "He has never used smokeless tobacco. He reports that he drinks about 1.0 standard drinks of alcohol per week. He reports that he does not use drugs.     FAMILY MEDICAL HISTORY:  His family history includes Breast cancer in his sister; Cancer in his brother, sister, and sister; Hyperlipidemia in his mother; Macular degeneration in his sister and sister; Stroke in his mother.     Review of patient's allergies indicates:  No Known Allergies     Meds: reviewed    Current Outpatient Medications:     aspirin (ECOTRIN) 81 MG EC tablet, Take 81 mg by mouth once daily., Disp: , Rfl:     atenoloL (TENORMIN) 25 MG tablet, TAKE 1 TABLET EVERY DAY, Disp: 90 tablet, Rfl: 3    atorvastatin (LIPITOR) 10 MG tablet, Take 1 tablet (10 mg total) by mouth once daily., Disp: 90 tablet, Rfl: 3    dutasteride (AVODART) 0.5 mg capsule, Take 1 capsule (0.5 mg total) by mouth once daily., Disp: 90 capsule, Rfl: 3    solifenacin (VESICARE) 5 MG tablet, TAKE 1 TABLET EVERY DAY, Disp: 90 tablet, Rfl: 3    tamsulosin (FLOMAX) 0.4 mg Cap, Take 1 capsule (0.4 mg total) by mouth once daily., Disp: 90 capsule, Rfl: 3    triamcinolone acetonide 0.1% (KENALOG) 0.1 % Lotn, Apply topically 2 (two) times daily., Disp: 60 mL, Rfl: 2     REVIEW OF SYSTEMS:  Patient has no fever, fatigue, visual changes, chest pain, edema, cough, dyspnea, nausea, vomiting, constipation, diarrhea, arthralgias, pruritis, dizziness, weakness, depression, confusion.     PHYSICAL EXAM:  Blood pressure 140/70, pulse 52, repeat 60, height 5' 8" (1.727 m), weight 81.1 Kg  Gen:    WDWN male in no apparent distress  Psych: Normal mood and affect  Chest:  Clear with no rales, rhonchi, wheezing with normal effort  CV:      Regular with no murmurs, gallops or rubs  Abd:     Soft, nontender, no distension, positive bowel sounds  Ext:      no edema     Labs reviewed  BMP  Lab Results   Component Value Date     06/03/2022    K 5.4 (H) 06/03/2022     06/03/2022    CO2 24 " 06/03/2022    BUN 48 (H) 06/03/2022    CREATININE 4.0 (H) 06/03/2022    CALCIUM 9.1 06/03/2022    ANIONGAP 9 06/03/2022    ESTGFRAFRICA 15 (A) 06/03/2022    EGFRNONAA 13 (A) 06/03/2022     Lab Results   Component Value Date    WBC 8.82 06/03/2022    HGB 13.2 (L) 06/03/2022    HCT 41.3 06/03/2022    MCV 94 06/03/2022     06/03/2022       Lab Results   Component Value Date    .0 (H) 04/26/2021    CALCIUM 9.1 06/03/2022    PHOS 3.0 06/03/2022     PSA 6.1        U/a: no protein, no blood, no RBC's, no casts     U/s of the kidneys on 10/21/20: no hydronephrosis, small kidney with cortical thinning        IMPRESSION AND RECOMMENDATIONS:  88 y/o male with CKD presents for f/u:     1. Renal: s Cr is slowly worsening  CKD stage 4 bordering on 5.   Had NSAIds induced NINO on CKD stage 3, likely due to mobic and ibuprofen. Analgesic nephropathy  s Cr has not returned to prior baseline after pt stopped NSAIds  NINO likely led to worsened CKD.     DDX: vs due BPH/obstructive nephropathy (less likely, has no hematuria on recent u/a)      s Cr may not recover fully as pt had exposure to mobic for a long time (11 months)  K, very mild hyperkalemia, was given a printed list of high K foods to avoid  Na normal  Acid base stable  Secondary hyperparathyroidism, mild, will monitor  Anemia, mild     2. HTN: BP controlled  Meds reviewed     3. Has BPH: had no hydronephrosis on renal u/s Oct 2020: will repeat, was ordered  Elevated PSA (fluctuating), says is taking flomax and avodart: asked daughter to make sure to check at home  Advised f/u with urology     Plans and recommendations:   As reviewed  Above  Opportunity for questions provided  Total time spent 40 minutes including time needed to review the records, the   patient evaluation, documentation, face-to-face discussion with the patient,   more than 50% of the time was spent on coordination of care and counseling.    Level IV visit.  RTC 3-4 months     Tanmay Pereira  MD

## 2022-06-07 ENCOUNTER — TELEPHONE (OUTPATIENT)
Dept: ORTHOPEDICS | Facility: CLINIC | Age: 87
End: 2022-06-07
Payer: MEDICARE

## 2022-06-07 DIAGNOSIS — M25.562 LEFT KNEE PAIN, UNSPECIFIED CHRONICITY: Primary | ICD-10-CM

## 2022-06-07 NOTE — TELEPHONE ENCOUNTER
----- Message from Edel Muñoz MA sent at 6/7/2022 10:04 AM CDT -----  Contact: luly- spouse    ----- Message -----  From: Pratima Arriaza  Sent: 6/7/2022   9:04 AM CDT  To: [facility]    .Type:  Sooner Apoointment Request    Caller is requesting a sooner appointment.  Caller declined first available appointment listed below.  Caller will not accept being placed on the waitlist and is requesting a message be sent to doctor.    Name of Caller: PT WIFE  When is the first available appointment? 08/2022  Symptoms:left knee pain   Would the patient rather a call back or a response via [facility]? Call     Best Call Back Number: [phone] (home)    Additional Information: The pt is requesting a callback in regards to the pt need an appointment at the [facility] location please

## 2022-06-09 ENCOUNTER — CLINICAL SUPPORT (OUTPATIENT)
Dept: AUDIOLOGY | Facility: CLINIC | Age: 87
End: 2022-06-09
Payer: MEDICARE

## 2022-06-09 ENCOUNTER — OFFICE VISIT (OUTPATIENT)
Dept: OTOLARYNGOLOGY | Facility: CLINIC | Age: 87
End: 2022-06-09
Payer: MEDICARE

## 2022-06-09 VITALS — DIASTOLIC BLOOD PRESSURE: 67 MMHG | HEART RATE: 48 BPM | SYSTOLIC BLOOD PRESSURE: 145 MMHG | TEMPERATURE: 98 F

## 2022-06-09 DIAGNOSIS — Z46.1 ENCOUNTER FOR FITTING AND ADJUSTMENT OF HEARING AID OF BOTH EARS: Primary | ICD-10-CM

## 2022-06-09 DIAGNOSIS — H61.21 IMPACTED CERUMEN OF RIGHT EAR: Primary | ICD-10-CM

## 2022-06-09 PROCEDURE — 1101F PT FALLS ASSESS-DOCD LE1/YR: CPT | Mod: CPTII,S$GLB,, | Performed by: PHYSICIAN ASSISTANT

## 2022-06-09 PROCEDURE — 1126F AMNT PAIN NOTED NONE PRSNT: CPT | Mod: CPTII,S$GLB,, | Performed by: PHYSICIAN ASSISTANT

## 2022-06-09 PROCEDURE — 99999 PR PBB SHADOW E&M-EST. PATIENT-LVL III: ICD-10-PCS | Mod: PBBFAC,,, | Performed by: PHYSICIAN ASSISTANT

## 2022-06-09 PROCEDURE — EBRTAX-N BATON ROUGE NON-PRESCRIBED 9.95%: ICD-10-PCS | Mod: S$GLB,,, | Performed by: AUDIOLOGIST-HEARING AID FITTER

## 2022-06-09 PROCEDURE — EBRTAX-N BATON ROUGE NON-PRESCRIBED 9.95%: Mod: S$GLB,,, | Performed by: AUDIOLOGIST-HEARING AID FITTER

## 2022-06-09 PROCEDURE — 69210 REMOVE IMPACTED EAR WAX UNI: CPT | Mod: S$GLB,,, | Performed by: PHYSICIAN ASSISTANT

## 2022-06-09 PROCEDURE — 3288F PR FALLS RISK ASSESSMENT DOCUMENTED: ICD-10-PCS | Mod: CPTII,S$GLB,, | Performed by: PHYSICIAN ASSISTANT

## 2022-06-09 PROCEDURE — 1159F PR MEDICATION LIST DOCUMENTED IN MEDICAL RECORD: ICD-10-PCS | Mod: CPTII,S$GLB,, | Performed by: PHYSICIAN ASSISTANT

## 2022-06-09 PROCEDURE — 69210 PR REMOVAL IMPACTED CERUMEN REQUIRING INSTRUMENTATION, UNILATERAL: ICD-10-PCS | Mod: S$GLB,,, | Performed by: PHYSICIAN ASSISTANT

## 2022-06-09 PROCEDURE — 99070 PR SPECIAL SUPPLIES: ICD-10-PCS | Mod: S$GLB,,, | Performed by: AUDIOLOGIST-HEARING AID FITTER

## 2022-06-09 PROCEDURE — 99499 UNLISTED E&M SERVICE: CPT | Mod: S$GLB,,, | Performed by: PHYSICIAN ASSISTANT

## 2022-06-09 PROCEDURE — 1159F MED LIST DOCD IN RCRD: CPT | Mod: CPTII,S$GLB,, | Performed by: PHYSICIAN ASSISTANT

## 2022-06-09 PROCEDURE — 99999 PR PBB SHADOW E&M-EST. PATIENT-LVL III: CPT | Mod: PBBFAC,,, | Performed by: PHYSICIAN ASSISTANT

## 2022-06-09 PROCEDURE — 99499 NO LOS: ICD-10-PCS | Mod: S$GLB,,, | Performed by: PHYSICIAN ASSISTANT

## 2022-06-09 PROCEDURE — 1126F PR PAIN SEVERITY QUANTIFIED, NO PAIN PRESENT: ICD-10-PCS | Mod: CPTII,S$GLB,, | Performed by: PHYSICIAN ASSISTANT

## 2022-06-09 PROCEDURE — 3288F FALL RISK ASSESSMENT DOCD: CPT | Mod: CPTII,S$GLB,, | Performed by: PHYSICIAN ASSISTANT

## 2022-06-09 PROCEDURE — 1101F PR PT FALLS ASSESS DOC 0-1 FALLS W/OUT INJ PAST YR: ICD-10-PCS | Mod: CPTII,S$GLB,, | Performed by: PHYSICIAN ASSISTANT

## 2022-06-09 PROCEDURE — 99070 SPECIAL SUPPLIES PHYS/QHP: CPT | Mod: S$GLB,,, | Performed by: AUDIOLOGIST-HEARING AID FITTER

## 2022-06-09 NOTE — PROGRESS NOTES
Subjective:   Cerumen impactions     Patient ID: Harman Johnston is a 87 y.o. male.    Chief Complaint:  Excessive ear wax     Harman Johnston is a 87 y.o. male here to see me today for evaluation of a wax impaction in right ear.  He has complaints of hearing loss in the affected ear, but denies pain or drainage.  This has been an issue in the past.  The patient has not been using any sort of ear drop to soften the wax.  He had a left CI in 2012 by Dr. Caba.  He did well with this for a few years but it is no longer working.  He has profound HL on the left.  No issues with vertigo, drainage or pain.  His right ear has severe hearing loss but he uses a hearing aid AD.      HPI  Review of Systems   HENT: Positive for hearing loss. Negative for ear discharge, ear pain and tinnitus.        Objective:     Physical Exam   HENT:   Right Ear: External ear and ear canal normal. Decreased hearing is noted.   Left Ear: External ear and ear canal normal. Decreased hearing is noted.   RIGHT moderate cerumen impaction, removal described below       Procedure Note    CHIEF COMPLAINT:  Cerumen Impaction    Description:  The patient was seated in an exam chair.  An ear speculum was placed in the right EAC and was examined under the microscope.  Alligator forceps were used to remove a large cerumen impaction.  The tympanic membrane was visualized and was normal in appearance.   The patient tolerated the procedure well.           Assessment:     1. Impacted cerumen of right ear        Plan:     1.  Cerumen impaction:  Removed today without difficulty.  I would recommend the use of a wax softening drop, either over the counter Debrox or mineral oil, on a weekly basis.  I also instructed the patient to avoid Qtips.  Continue with hearing aid AD.

## 2022-06-09 NOTE — PROGRESS NOTES
Harman Johnston was seen 06/09/2022 for hearing aid follow-up.  Patient complains of feedback from right aid. He is not inserting EM fully. We practiced insertion and he performed much better with jus-ease so he purchased a bottle of jus-gel. No feedback with or without jaw movement once inserted properly. He also purchased size 13 batteries. Otoscopy revealed cerumen build-up AD and clear canal AS. He saw ENT today for cerumen removal. Return to clinic as needed.

## 2022-06-20 ENCOUNTER — OFFICE VISIT (OUTPATIENT)
Dept: ORTHOPEDICS | Facility: CLINIC | Age: 87
End: 2022-06-20
Payer: MEDICARE

## 2022-06-20 ENCOUNTER — HOSPITAL ENCOUNTER (OUTPATIENT)
Dept: RADIOLOGY | Facility: HOSPITAL | Age: 87
Discharge: HOME | End: 2022-06-20
Attending: FAMILY MEDICINE
Payer: MEDICARE

## 2022-06-20 VITALS — WEIGHT: 178 LBS | HEIGHT: 68 IN | BODY MASS INDEX: 26.98 KG/M2

## 2022-06-20 DIAGNOSIS — M17.12 PRIMARY OSTEOARTHRITIS OF LEFT KNEE: Primary | ICD-10-CM

## 2022-06-20 DIAGNOSIS — M25.362 KNEE INSTABILITY, LEFT: ICD-10-CM

## 2022-06-20 DIAGNOSIS — M25.562 LEFT MEDIAL KNEE PAIN: Primary | ICD-10-CM

## 2022-06-20 DIAGNOSIS — M17.0 PRIMARY OSTEOARTHRITIS OF BOTH KNEES: ICD-10-CM

## 2022-06-20 DIAGNOSIS — M25.562 LEFT KNEE PAIN, UNSPECIFIED CHRONICITY: ICD-10-CM

## 2022-06-20 PROCEDURE — 3288F FALL RISK ASSESSMENT DOCD: CPT | Mod: CPTII,S$GLB,, | Performed by: FAMILY MEDICINE

## 2022-06-20 PROCEDURE — 73562 X-RAY EXAM OF KNEE 3: CPT | Mod: 26,RT,, | Performed by: RADIOLOGY

## 2022-06-20 PROCEDURE — 3288F PR FALLS RISK ASSESSMENT DOCUMENTED: ICD-10-PCS | Mod: CPTII,S$GLB,, | Performed by: FAMILY MEDICINE

## 2022-06-20 PROCEDURE — 99999 PR PBB SHADOW E&M-EST. PATIENT-LVL III: ICD-10-PCS | Mod: PBBFAC,,, | Performed by: FAMILY MEDICINE

## 2022-06-20 PROCEDURE — 73562 XR KNEE ORTHO LEFT WITH FLEXION: ICD-10-PCS | Mod: 26,RT,, | Performed by: RADIOLOGY

## 2022-06-20 PROCEDURE — 1101F PT FALLS ASSESS-DOCD LE1/YR: CPT | Mod: CPTII,S$GLB,, | Performed by: FAMILY MEDICINE

## 2022-06-20 PROCEDURE — 1126F PR PAIN SEVERITY QUANTIFIED, NO PAIN PRESENT: ICD-10-PCS | Mod: CPTII,S$GLB,, | Performed by: FAMILY MEDICINE

## 2022-06-20 PROCEDURE — 1101F PR PT FALLS ASSESS DOC 0-1 FALLS W/OUT INJ PAST YR: ICD-10-PCS | Mod: CPTII,S$GLB,, | Performed by: FAMILY MEDICINE

## 2022-06-20 PROCEDURE — 1159F MED LIST DOCD IN RCRD: CPT | Mod: CPTII,S$GLB,, | Performed by: FAMILY MEDICINE

## 2022-06-20 PROCEDURE — 73562 X-RAY EXAM OF KNEE 3: CPT | Mod: 59,TC,RT

## 2022-06-20 PROCEDURE — 1159F PR MEDICATION LIST DOCUMENTED IN MEDICAL RECORD: ICD-10-PCS | Mod: CPTII,S$GLB,, | Performed by: FAMILY MEDICINE

## 2022-06-20 PROCEDURE — 73564 X-RAY EXAM KNEE 4 OR MORE: CPT | Mod: 26,LT,, | Performed by: RADIOLOGY

## 2022-06-20 PROCEDURE — 1126F AMNT PAIN NOTED NONE PRSNT: CPT | Mod: CPTII,S$GLB,, | Performed by: FAMILY MEDICINE

## 2022-06-20 PROCEDURE — 99204 OFFICE O/P NEW MOD 45 MIN: CPT | Mod: S$GLB,,, | Performed by: FAMILY MEDICINE

## 2022-06-20 PROCEDURE — 99204 PR OFFICE/OUTPT VISIT, NEW, LEVL IV, 45-59 MIN: ICD-10-PCS | Mod: S$GLB,,, | Performed by: FAMILY MEDICINE

## 2022-06-20 PROCEDURE — 73564 XR KNEE ORTHO LEFT WITH FLEXION: ICD-10-PCS | Mod: 26,LT,, | Performed by: RADIOLOGY

## 2022-06-20 PROCEDURE — 99999 PR PBB SHADOW E&M-EST. PATIENT-LVL III: CPT | Mod: PBBFAC,,, | Performed by: FAMILY MEDICINE

## 2022-06-20 NOTE — PROGRESS NOTES
Subjective:     Patient ID: Harman Johnston is a 87 y.o. male.    Chief Complaint: Pain of the Left Knee      HPI: Patient is being seen for left knee pain that has been present for a few weeks now. Denies any cause of pain. States there is intermittent throbbing pains present. Reports pain is 7/10 at its worst, but denies any pain during today's visit. Is not taking any medications to help with pain relief. Has not participated in physical therapy or received any injections for the left knee.     No recent swelling locking or fall.    Past Medical History:   Diagnosis Date    BPH (benign prostatic hyperplasia)     Cataract     CKD (chronic kidney disease), stage IV 07/15/2016    Elevated PSA     HEARING LOSS     History of colon polyps     Hyperlipemia     Hypertension     Myocardial infarction     per ekg and nuclear ett - old scar    Primary osteoarthritis of both knees 6/20/2022    Secondary hyperparathyroidism of renal origin     Tobacco dependence     resolved     Past Surgical History:   Procedure Laterality Date    APPENDECTOMY  1960    CATARACT EXTRACTION W/  INTRAOCULAR LENS IMPLANT Left 4-20-16    CATARACT EXTRACTION W/  INTRAOCULAR LENS IMPLANT Right 3-16-16    EAR MASTOIDECTOMY W/ COCHLEAR IMPLANT W/ LANDMARK  2011    failed later removed    EYE SURGERY Bilateral 2016    cataract w/ IOL    PCIOL OD Right 03/`16/16    DR. CARPIO    TONSILLECTOMY       Family History   Problem Relation Age of Onset    Macular degeneration Sister     Cancer Sister         Breast    Breast cancer Sister     Macular degeneration Sister     Cancer Sister         colon    Stroke Mother     Hyperlipidemia Mother     Cancer Brother         stomach    Prostate cancer Neg Hx     Diabetes Neg Hx     Heart disease Neg Hx     Kidney disease Neg Hx     Mental illness Neg Hx     Mental retardation Neg Hx      Social History     Socioeconomic History    Marital status:    Tobacco Use    Smoking  status: Never Smoker    Smokeless tobacco: Never Used   Substance and Sexual Activity    Alcohol use: Yes     Alcohol/week: 1.0 standard drink     Types: 1 Cans of beer per week     Comment: weekly    Drug use: No    Sexual activity: Never   Social History Narrative    . 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 He still drives.2 children - in good health. Does not have a Living Will.       Current Outpatient Medications:     aspirin (ECOTRIN) 81 MG EC tablet, Take 81 mg by mouth once daily., Disp: , Rfl:     atenoloL (TENORMIN) 25 MG tablet, TAKE 1 TABLET EVERY DAY, Disp: 90 tablet, Rfl: 3    atorvastatin (LIPITOR) 10 MG tablet, Take 1 tablet (10 mg total) by mouth once daily., Disp: 90 tablet, Rfl: 3    dutasteride (AVODART) 0.5 mg capsule, Take 1 capsule (0.5 mg total) by mouth once daily., Disp: 90 capsule, Rfl: 3    solifenacin (VESICARE) 5 MG tablet, TAKE 1 TABLET EVERY DAY, Disp: 90 tablet, Rfl: 3    tamsulosin (FLOMAX) 0.4 mg Cap, Take 1 capsule (0.4 mg total) by mouth once daily., Disp: 90 capsule, Rfl: 3    triamcinolone acetonide 0.1% (KENALOG) 0.1 % Lotn, Apply topically 2 (two) times daily., Disp: 60 mL, Rfl: 2  Review of patient's allergies indicates:  No Known Allergies  Review of Systems   Constitutional: Negative for chills, fever and weight loss.   Respiratory: Negative for shortness of breath.    Cardiovascular: Negative for chest pain and palpitations.       Objective:   Body mass index is 27.06 kg/m².  There were no vitals filed for this visit.        Ortho/SPM Exam-alert and oriented thin but well-nourished well-developed adult male in no acute distress ambulatory with mildly antalgic gait and accompanied by his daughter today.    Respiratory effort is normal    Left knee-no acute deformity but some chronic bony changes and diffuse atrophy    Strength 3/5    Range of motion 5-100 degrees    Minimal varus and valgus laxity and negative Lachman's    Tender along the medial joint  "line    Psychiatric good affect cognition    Neurovascular intact    Plan-order Visco for both arthritic knees.    Start physical therapy    Voltaren and ice    Patient Instructions   Physical therapy [place]    MEDICAL NECESSITY FOR VISCOSUPPLEMENTATION: After thorough evaluation of the patient, I have determined that visco-supplementation is medically necessary. The patient has painful DJD of the knee with failure of conservative therapy including lifestyle modifications and rehabilitation exercises. Oral analgesis/NSAIDs have not adequately controlled symptoms and there is radiographic evidence of joint space narrowing, subchondral sclerosis, and some early osteophytic changes, or in lack of radiographic evidence, there is arthroscopic or other evidence of chondrosis.  Kellgren- Dylan grade 2 or greater.  Patient Education       Chronic Knee Pain   About this topic   The knee is a large, complex joint. It is made up of 4 bones: The thigh bone, two lower leg bones, and the kneecap. There is a capsule around the joint. Cartilage acts like a shock absorber in the knee and reduces friction, which helps the knee move more smoothly. The knee also has ligaments and tendons. Ligaments are bands of tissue that join one bone to another. Tendons are bands of tissue that join muscles to the bone. There are many muscles around the knee and in front and back of the thigh. If there is a problem with any of these parts of the joint, you can have pain in your knee.       What are the causes?   · Arthritis ? There are a few types of arthritis which all cause swelling of a joint. Osteoarthritis is the most common and happens over time with "wear and tear" on the joint.  · Overuse ? Repeat motions or too much bending at the knee can lead to pain.  · Bursitis ? Bursae are small fluid-filled sacs that help tendons glide easier. These can get swollen and hurt. This often happens to people who do work on their knees, like gardeners, " roofers, and carpet layers.  · Ligament tear or sprain ? Ligament injuries can make the knee shaky or unstable and painful.  · Meniscus tear ? Injuries to the meniscus or cartilage can make your knee lock and cause pain with some movements.  · Muscle strain ? Injuries to the muscles near the knee happen often with sports. If these do not heal the right way, ongoing pain can happen.  · Patellar tendinopathy ? The tendon that goes over your kneecap can get swollen and hurt due to overuse and repetitive stress on the knee.  · Chondromalacia patella ? This is a health problem where there is pain under the kneecap from cartilage being worn.  · Dislocating kneecap ? If your kneecap slips out of its groove, pain can happen.  · Baker's cyst ? This is a lump that is behind the knee. This is also known as a popliteal cyst.  · Hip, ankle, back problems ? Problems in the back and in nearby joints, such as the hip and ankle, can cause pain in the knee.  · Fluid collection ? Fluid can collect in the knee joint after a knee injury and lead to pain if not treated.  · Osgood-Schlatter disease ? This is a health problem seen in children and teens. The front of the knee below the kneecap is bothered by repeat movements.  · Osteochondritis dissecans ? This is a health problem seen in children and teens. There is a problem with the blood flow to the bone and cartilage of the knee.  · Plica syndrome ? Plica are folds of tissue that are left over from growth before birth. These can get sore and cause pain.  · Patellofemoral pain syndrome - This happens when you have pain in front of your knee or around or behind your kneecap.  · Iliotibial band syndrome - This happens when the iliotibial band, tissues that runs down the outside of the thigh from the hip to the shin, is overused. It causes pain on the outside of the knee.  What are the main signs?   · Pain or stiffness  · Swelling  · Warmth or redness  · Visible deformity  · Hard to walk,  go up or down stairs, or put weight on your leg  · Knee locking ? not able to bend or straighten your knee fully  · Knee is weak, malik, or gives way  · Crunching and popping noises in the knee  How does the doctor diagnose this health problem?   Your doctor will look at your knee. Your doctor will feel all over your knee to find where the pain is. The doctor may move your knee and push or pull on many parts to check your motion and strength. Your doctor may have you stand and walk to see if your knee is stable. The doctor may order:  · Blood tests  · X-ray  · CT or MRI scan  · Ultrasound  · Surgery ? At times, arthroscopic surgery may be needed to find the cause of the pain.  How does the doctor treat this health problem?   Finding out the true cause of your knee pain is the most important step to fix the problem and lower your pain. Some things that may lessen your knee pain are:  · Rest  · Ice  · Compression  · Elevation - keeping the knee raised  · Braces or supports  · Heat may be used later but not right away. Heat can make swelling worse.  · Exercises to stretch and strengthen the knee  · Physical therapy (PT)  · Surgery  What drugs may be needed?   The doctor may order drugs to:  · Help with pain and swelling  · Fight an infection  The doctor may give you a shot of an anti-inflammatory drug called a corticosteroid. This will help with swelling. Talk with your doctor about the risks of this shot.  What can be done to prevent this health problem?   · If your knee pain is due to overuse, do not do repetitive movements that caused the problem if possible.  · Take breaks often when doing things that use repeat movements.  · Do not sit or keep your knee in one position for long periods of time.  · If you sleep on your side, use a pillow in between your legs. This can help take stress off of the knee.  · Always warm up and stretch before a workout and cool down after.  · If you are a runner, actively stretch  before a run. Talk to your doctor to make sure you understand the difference between active and passive stretching. Use good ways to train, such as slowly adding to how far you run.  · Run on softer surfaces such as a track. This is easier on your knee than a hard surface like cement.  · Try activities like swimming or biking rather than running. Running can put a lot of stress on your knee joint.  · Stay away from activities that could result in twisting, sudden stops and starts, and blows to the knee. Sports such as basketball, skiing, football, and jogging are some common sports that can lead to knee injuries.  · Wear shoes with good support. Replace your shoes often.  · Keep a healthy weight. Being too heavy puts more stress on the knee joint. This makes the knee more at risk for injury.  · Stay active and work out to keep your muscles strong and flexible.  Where can I learn more?   NHS Choices  http://www.nhs.uk/conditions/knee-pain/Pages/Introduction.aspx   Last Reviewed Date   2020-04-23  Consumer Information Use and Disclaimer   This information is not specific medical advice and does not replace information you receive from your health care provider. This is only a brief summary of general information. It does NOT include all information about conditions, illnesses, injuries, tests, procedures, treatments, therapies, discharge instructions or life-style choices that may apply to you. You must talk with your health care provider for complete information about your health and treatment options. This information should not be used to decide whether or not to accept your health care providers advice, instructions or recommendations. Only your health care provider has the knowledge and training to provide advice that is right for you.  Copyright   Copyright © 2021 [employer] and its affiliates and/or licensors. All rights reserved.  Patient Education       Viscosupplementation   Why is this procedure done?    Your joints are where two bones meet. The ends of the bones are covered with a protective coating of cartilage. This helps them glide smoothly during movement. A fluid also helps the joint move more smoothly. With years of use, the cartilage may begin to wear away. This causes pain in the joints. This procedure is done to relieve the pain. A special fluid is used to help the bones glide more easily. It also acts like a shock absorber. This is most often done on the knee joints.  What will the results be?   · Lubricates the joint  · Less pain in the joints during movement or weight bearing  · Improved joint mobility or movement  What happens before the procedure?   · Your doctor may ask you to try other ways to treat osteoarthritis or OA, such as exercise, physical therapy, drugs for pain, applying hot compress, and losing weight.  · Talk to your doctor about all the drugs you are taking. Be sure to include all prescription and over-the-counter (OTC) drugs, and herbal supplements. Tell the doctor about any drug allergy. Bring a list of drugs you take with you. Some drugs may interact with the injection.  What happens during the procedure?   · Your doctor will clean the skin area where the injection will be given. You will be given a drug called local anesthesia. This will numb your skin and you will not feel any pain.  · In some cases, your doctor might use imaging like x-ray or ultrasound to make sure they inject the right spot.  · If you have excess fluid in your joint, your doctor may remove the fluid before beginning.  · A needle will be used to inject the hyaluronic acid into the joint. Hyaluronic acid is a natural fluid in your body. It lubricates the joint to ease body movements.  · The doctor will cover the injection site with a small bandage to prevent infection.  · The procedure may only take a couple of minutes.  What happens after the procedure?   · You may feel slight pain, warmth, and swelling around  the joint area.  · You will be able to go home after the injection.  What care is needed at home?   · Ask your doctor what you need to do when you go home. Make sure you ask questions if you do not understand what the doctor says. This way you will know what you need to do.  · Place an ice pack or a bag of frozen peas wrapped in a towel over the painful part. Never put ice right on the skin. Do not leave the ice on more than 10 to 15 minutes at a time.  · Rest for the first few days after the procedure. Avoid strenuous activities like heavy lifting, jogging, standing for a long time, and hard exercise.  What follow-up care is needed?   Your doctor may ask you to make visits to the office to check on your progress. Be sure to keep these visits. Your doctor may want you to have more injections.  What lifestyle changes are needed?   Ask your doctor about when you can go back to your normal activities like exercise or work.  What problems could happen?   · Pain, redness, and swelling around the injection site  · Infection of the joint  · Fever  · Allergic reaction  · Itching  · Rash  · Bruising  · Bleeding in the joint  · No change in pain after injection  Where can I learn more?   American Academy [employer]  http://orthoinfo.aaos.org/topic.cfm?nujds=u04172   Last Reviewed Date   2021-03-30  Consumer Information Use and Disclaimer   This information is not specific medical advice and does not replace information you receive from your health care provider. This is only a brief summary of general information. It does NOT include all information about conditions, illnesses, injuries, tests, procedures, treatments, therapies, discharge instructions or life-style choices that may apply to you. You must talk with your health care provider for complete information about your health and treatment options. This information should not be used to decide whether or not to accept your health care providers advice,  instructions or recommendations. Only your health care provider has the knowledge and training to provide advice that is right for you.  Copyright   Copyright © 2021 [employer] and its affiliates and/or licensors. All rights reserved.        IMAGING: X-ray Knee Ortho Left with Flexion  Narrative: EXAMINATION:  XR KNEE ORTHO LEFT WITH FLEXION    CLINICAL HISTORY:  . Pain in left knee    TECHNIQUE:  AP standing view of both knees, PA flexion standing views of both knees, and Merchant views of both knees were performed. A lateral view of the left knee was also performed.    COMPARISON:  None    FINDINGS:  No left knee fracture.  Severe left knee medial compartment joint space loss.  There is also mild to moderate osteoarthritis involving the lateral and patellofemoral compartments of the left knee.  No osseous erosion or suspicious osseous lesion.  No left knee effusion identified.  Multiple calcifications project over the posterior aspect of the left knee joint suspicious for multiple loose bodies.  Atherosclerotic calcifications also seen adjacent to the left knee.    Limited evaluation of the right knee reveals mild to moderate medial compartment joint space loss.  Impression: As above.    Electronically signed by: Raheel Hook  Date:    06/20/2022  Time:    14:23       Radiographs / Imaging : Relevant imaging results reviewed by me and interpreted by me, discussed with the patient and / or family       Assessment:     Encounter Diagnoses   Name Primary?    Primary osteoarthritis of both knees     Left medial knee pain Yes        Plan:   Left medial knee pain    Primary osteoarthritis of both knees        The patient verbalized good understanding of the medical issues discussed today and expressed appreciation for the care provided.  Patient was given the opportunity to ask questions and be an active participant in their medical care. Patient had no further questions or concerns at this time.     The  patient was encouraged to participate in appropriate physical activity.      Gavino Encinas M.D.  [facility]        This note was dictated using voice recognition software and may have sound a like errors.

## 2022-06-20 NOTE — PATIENT INSTRUCTIONS
"Physical therapy [place]    MEDICAL NECESSITY FOR VISCOSUPPLEMENTATION: After thorough evaluation of the patient, I have determined that visco-supplementation is medically necessary. The patient has painful DJD of the knee with failure of conservative therapy including lifestyle modifications and rehabilitation exercises. Oral analgesis/NSAIDs have not adequately controlled symptoms and there is radiographic evidence of joint space narrowing, subchondral sclerosis, and some early osteophytic changes, or in lack of radiographic evidence, there is arthroscopic or other evidence of chondrosis.  Kellgren- Dylan grade 2 or greater.  Patient Education       Chronic Knee Pain   About this topic   The knee is a large, complex joint. It is made up of 4 bones: The thigh bone, two lower leg bones, and the kneecap. There is a capsule around the joint. Cartilage acts like a shock absorber in the knee and reduces friction, which helps the knee move more smoothly. The knee also has ligaments and tendons. Ligaments are bands of tissue that join one bone to another. Tendons are bands of tissue that join muscles to the bone. There are many muscles around the knee and in front and back of the thigh. If there is a problem with any of these parts of the joint, you can have pain in your knee.       What are the causes?   Arthritis ? There are a few types of arthritis which all cause swelling of a joint. Osteoarthritis is the most common and happens over time with "wear and tear" on the joint.  Overuse ? Repeat motions or too much bending at the knee can lead to pain.  Bursitis ? Bursae are small fluid-filled sacs that help tendons glide easier. These can get swollen and hurt. This often happens to people who do work on their knees, like gardeners, roofers, and carpet layers.  Ligament tear or sprain ? Ligament injuries can make the knee shaky or unstable and painful.  Meniscus tear ? Injuries to the meniscus or cartilage can make your " knee lock and cause pain with some movements.  Muscle strain ? Injuries to the muscles near the knee happen often with sports. If these do not heal the right way, ongoing pain can happen.  Patellar tendinopathy ? The tendon that goes over your kneecap can get swollen and hurt due to overuse and repetitive stress on the knee.  Chondromalacia patella ? This is a health problem where there is pain under the kneecap from cartilage being worn.  Dislocating kneecap ? If your kneecap slips out of its groove, pain can happen.  Baker's cyst ? This is a lump that is behind the knee. This is also known as a popliteal cyst.  Hip, ankle, back problems ? Problems in the back and in nearby joints, such as the hip and ankle, can cause pain in the knee.  Fluid collection ? Fluid can collect in the knee joint after a knee injury and lead to pain if not treated.  Osgood-Schlatter disease ? This is a health problem seen in children and teens. The front of the knee below the kneecap is bothered by repeat movements.  Osteochondritis dissecans ? This is a health problem seen in children and teens. There is a problem with the blood flow to the bone and cartilage of the knee.  Plica syndrome ? Plica are folds of tissue that are left over from growth before birth. These can get sore and cause pain.  Patellofemoral pain syndrome - This happens when you have pain in front of your knee or around or behind your kneecap.  Iliotibial band syndrome - This happens when the iliotibial band, tissues that runs down the outside of the thigh from the hip to the shin, is overused. It causes pain on the outside of the knee.  What are the main signs?   Pain or stiffness  Swelling  Warmth or redness  Visible deformity  Hard to walk, go up or down stairs, or put weight on your leg  Knee locking ? not able to bend or straighten your knee fully  Knee is weak, malik, or gives way  Crunching and popping noises in the knee  How does the doctor diagnose this  health problem?   Your doctor will look at your knee. Your doctor will feel all over your knee to find where the pain is. The doctor may move your knee and push or pull on many parts to check your motion and strength. Your doctor may have you stand and walk to see if your knee is stable. The doctor may order:  Blood tests  X-ray  CT or MRI scan  Ultrasound  Surgery ? At times, arthroscopic surgery may be needed to find the cause of the pain.  How does the doctor treat this health problem?   Finding out the true cause of your knee pain is the most important step to fix the problem and lower your pain. Some things that may lessen your knee pain are:  Rest  Ice  Compression  Elevation - keeping the knee raised  Braces or supports  Heat may be used later but not right away. Heat can make swelling worse.  Exercises to stretch and strengthen the knee  Physical therapy (PT)  Surgery  What drugs may be needed?   The doctor may order drugs to:  Help with pain and swelling  Fight an infection  The doctor may give you a shot of an anti-inflammatory drug called a corticosteroid. This will help with swelling. Talk with your doctor about the risks of this shot.  What can be done to prevent this health problem?   If your knee pain is due to overuse, do not do repetitive movements that caused the problem if possible.  Take breaks often when doing things that use repeat movements.  Do not sit or keep your knee in one position for long periods of time.  If you sleep on your side, use a pillow in between your legs. This can help take stress off of the knee.  Always warm up and stretch before a workout and cool down after.  If you are a runner, actively stretch before a run. Talk to your doctor to make sure you understand the difference between active and passive stretching. Use good ways to train, such as slowly adding to how far you run.  Run on softer surfaces such as a track. This is easier on your knee than a hard surface like  cement.  Try activities like swimming or biking rather than running. Running can put a lot of stress on your knee joint.  Stay away from activities that could result in twisting, sudden stops and starts, and blows to the knee. Sports such as basketball, skiing, football, and jogging are some common sports that can lead to knee injuries.  Wear shoes with good support. Replace your shoes often.  Keep a healthy weight. Being too heavy puts more stress on the knee joint. This makes the knee more at risk for injury.  Stay active and work out to keep your muscles strong and flexible.  Where can I learn more?   NHS Choices  http://www.nhs.uk/conditions/knee-pain/Pages/Introduction.aspx   Last Reviewed Date   2020-04-23  Consumer Information Use and Disclaimer   This information is not specific medical advice and does not replace information you receive from your health care provider. This is only a brief summary of general information. It does NOT include all information about conditions, illnesses, injuries, tests, procedures, treatments, therapies, discharge instructions or life-style choices that may apply to you. You must talk with your health care provider for complete information about your health and treatment options. This information should not be used to decide whether or not to accept your health care providers advice, instructions or recommendations. Only your health care provider has the knowledge and training to provide advice that is right for you.  Copyright   Copyright © 2021 [employer] and its affiliates and/or licensors. All rights reserved.  Patient Education       Viscosupplementation   Why is this procedure done?   Your joints are where two bones meet. The ends of the bones are covered with a protective coating of cartilage. This helps them glide smoothly during movement. A fluid also helps the joint move more smoothly. With years of use, the cartilage may begin to wear away. This causes pain in  the joints. This procedure is done to relieve the pain. A special fluid is used to help the bones glide more easily. It also acts like a shock absorber. This is most often done on the knee joints.  What will the results be?   Lubricates the joint  Less pain in the joints during movement or weight bearing  Improved joint mobility or movement  What happens before the procedure?   Your doctor may ask you to try other ways to treat osteoarthritis or OA, such as exercise, physical therapy, drugs for pain, applying hot compress, and losing weight.  Talk to your doctor about all the drugs you are taking. Be sure to include all prescription and over-the-counter (OTC) drugs, and herbal supplements. Tell the doctor about any drug allergy. Bring a list of drugs you take with you. Some drugs may interact with the injection.  What happens during the procedure?   Your doctor will clean the skin area where the injection will be given. You will be given a drug called local anesthesia. This will numb your skin and you will not feel any pain.  In some cases, your doctor might use imaging like x-ray or ultrasound to make sure they inject the right spot.  If you have excess fluid in your joint, your doctor may remove the fluid before beginning.  A needle will be used to inject the hyaluronic acid into the joint. Hyaluronic acid is a natural fluid in your body. It lubricates the joint to ease body movements.  The doctor will cover the injection site with a small bandage to prevent infection.  The procedure may only take a couple of minutes.  What happens after the procedure?   You may feel slight pain, warmth, and swelling around the joint area.  You will be able to go home after the injection.  What care is needed at home?   Ask your doctor what you need to do when you go home. Make sure you ask questions if you do not understand what the doctor says. This way you will know what you need to do.  Place an ice pack or a bag of frozen  peas wrapped in a towel over the painful part. Never put ice right on the skin. Do not leave the ice on more than 10 to 15 minutes at a time.  Rest for the first few days after the procedure. Avoid strenuous activities like heavy lifting, jogging, standing for a long time, and hard exercise.  What follow-up care is needed?   Your doctor may ask you to make visits to the office to check on your progress. Be sure to keep these visits. Your doctor may want you to have more injections.  What lifestyle changes are needed?   Ask your doctor about when you can go back to your normal activities like exercise or work.  What problems could happen?   Pain, redness, and swelling around the injection site  Infection of the joint  Fever  Allergic reaction  Itching  Rash  Bruising  Bleeding in the joint  No change in pain after injection  Where can I learn more?   American Academy of Orth[employer]geons  http://orthoinfo.aaos.org/topic.cfm?ljguz=d62892   Last Reviewed Date   2021-03-30  Consumer Information Use and Disclaimer   This information is not specific medical advice and does not replace information you receive from your health care provider. This is only a brief summary of general information. It does NOT include all information about conditions, illnesses, injuries, tests, procedures, treatments, therapies, discharge instructions or life-style choices that may apply to you. You must talk with your health care provider for complete information about your health and treatment options. This information should not be used to decide whether or not to accept your health care providers advice, instructions or recommendations. Only your health care provider has the knowledge and training to provide advice that is right for you.  Copyright   Copyright © 2021 [employer] and its affiliates and/or licensors. All rights reserved.

## 2022-06-23 DIAGNOSIS — M17.0 BILATERAL PRIMARY OSTEOARTHRITIS OF KNEE: Primary | ICD-10-CM

## 2022-06-27 ENCOUNTER — CLINICAL SUPPORT (OUTPATIENT)
Dept: REHABILITATION | Facility: HOSPITAL | Age: 87
End: 2022-06-27
Payer: MEDICARE

## 2022-06-27 DIAGNOSIS — Z74.09 DECREASED FUNCTIONAL MOBILITY AND ENDURANCE: ICD-10-CM

## 2022-06-27 DIAGNOSIS — R26.9 GAIT DIFFICULTY: ICD-10-CM

## 2022-06-27 DIAGNOSIS — M25.562 PAIN IN LATERAL PORTION OF LEFT KNEE: ICD-10-CM

## 2022-06-27 DIAGNOSIS — M17.0 BILATERAL PRIMARY OSTEOARTHRITIS OF KNEE: ICD-10-CM

## 2022-06-27 PROCEDURE — 97161 PT EVAL LOW COMPLEX 20 MIN: CPT

## 2022-06-27 PROCEDURE — 97110 THERAPEUTIC EXERCISES: CPT

## 2022-06-27 NOTE — PLAN OF CARE
[facility]  Physical Therapy Initial Evaluation    Name: Harman Johnston  Clinic Number: [id number]    Therapy Diagnosis:   Encounter Diagnoses   Name Primary?    Bilateral primary osteoarthritis of knee     Pain in lateral portion of left knee     Gait difficulty     Decreased functional mobility and endurance      Physician: Gavino Encinas MD    Physician Orders: PT Eval and Treat   Medical Diagnosis from Referral: M17.0 (ICD-10-CM) - Bilateral primary osteoarthritis of knee  Evaluation Date: 6/27/2022  Authorization Period Expiration: 6/30/23  Plan of Care Expiration: 9/5/22  Visit # / Visits authorized: 1/ 1  FOTO 1/3  Progress Note due 30 days from 6/27/22    Time In: 12:03 pm  Time Out: 12:45 pm  Total Billable Time: 42 minutes    Precautions: Standard VERY HARD of HEARING, Kidney disease stage 4 (not on dialysis), HTN    Subjective   Date of onset: about 6 months ago  History of current condition - [facility] reports: pt states he was walking outside and lost his balance falling into a hole and injured his L knee. Previously he would mow the yard etc.     Pain:  Current 2/10, worst 5/10, best 2/10   Location: L knee  Description: sore ache  Aggravating Factors: squatting, rising out of a chair and kneeling  Easing Factors: massage, tylenol    Prior Therapy: yes  Social History: lives home with spouse; no steps  Occupation: retired from plant work in maintenance  Prior Level of Function: independent  Current Level of Function: limited with walking, climbing, kneeling and rising out of chairs    Imaging: No left knee fracture.  Severe left knee medial compartment joint space loss.  There is also mild to moderate osteoarthritis involving the lateral and patellofemoral compartments of the left knee.  No osseous erosion or suspicious osseous lesion.  No left knee effusion identified.  Multiple calcifications project over the posterior aspect of the left knee joint suspicious for multiple  loose bodies.  Atherosclerotic calcifications also seen adjacent to the left knee.    Medical History:   Past Medical History:   Diagnosis Date    BPH (benign prostatic hyperplasia)     Cataract     CKD (chronic kidney disease), stage IV 07/15/2016    Elevated PSA     HEARING LOSS     History of colon polyps     Hyperlipemia     Hypertension     Myocardial infarction     per ekg and nuclear ett - old scar    Primary osteoarthritis of both knees 6/20/2022    Secondary hyperparathyroidism of renal origin     Tobacco dependence     resolved       Surgical History:   Harman Johnston  has a past surgical history that includes Ear mastoidectomy w/ cochlear implant w/ landmark (2011); Tonsillectomy; PCIOL OD (Right, 03/`16/16); Cataract extraction w/  intraocular lens implant (Left, 4-20-16); Cataract extraction w/  intraocular lens implant (Right, 3-16-16); Appendectomy (1960); and Eye surgery (Bilateral, 2016).    Medications:   Harman has a current medication list which includes the following prescription(s): aspirin, atenolol, atorvastatin, dutasteride, solifenacin, tamsulosin, and triamcinolone acetonide 0.1%.    Allergies:   Review of patient's allergies indicates:  No Known Allergies     Pts goals: walk with less pain and kneel without difficulty    Objective       CMS Impairment/Limitation/Restriction for FOTO knee Survey    Therapist reviewed FOTO scores for Harman Johnston on 6/27/2022.   FOTO documents entered into [employer] - see Media section.    Limitation Score: 47%       Gait: antalgic with L hip drop when in R single leg stand, pt is shuffling and trips on L LE at times so PT suggested  RW until we can advance him otherwise he was holding onto family members for stability.    Balance: R LE single leg stand= 1 sec, L LE single leg stand = 3 sec,  Sit to stand test for 30 sec 7 reps  Modified CTSIB 3/4, 30 ,30, 30, 11 sec     Sensation: Sensation intact to light touch B LE's .     Knee  AROM:     (R) (L)     Flexion   125 125     Extension  5 8          Hip AROM:  Flexion   110 110     ER   45 45     IR   20 20     ABD   30 30     Extension  0 0    Ankle AROM:  Ankle DF(katie) 0 0       Trunk flexion loss of balance attempting reaching to the floor from standing.    Strength:     R L  Hip flexors L2   4+/5 4/5  Quadriceps L3   4+/5 4/5    Hamstrings S1  4/5 4/5    Dorsiflexion L4  4/5 4/5      Plantar flexion S1  2/5 2/5    Hip Internal Rotation  3/5 3/5    Gluteus Medius L45S1 3/5 3+/5    Gluteus Chandan L5S12 3/5 3+/5      Joint Mobility: patellar motion is good, tib/fib motions are good and femoral motions are fair    Muscle Length: Pt presents with limited with tight hip flexors B and quads       Palpation: tight calves, hip flexors and quads    TREATMENT   Treatment Time In: 12:20 pm  Treatment Time Out: 12:45 pm  Total Treatment time separate from Evaluation: 25 minutes    Harman received therapeutic exercises to develop strength, flexibility and core stabilization for 25 minutes including:    Tail gators 3 min 2 #  LAQ L 2x 10 2#  Bridges 2x 10  SLR 1x10 (R is very weak vs L which has pain)  Side lying hip abduction 2# 1x 10 B  Prone hamstring curls 2# 2x 10 B  Sit to stand 7 reps  Standing on foam eyes open/closed 2 min      Home Exercises and Patient Education Provided    Education provided:   -Education on condition, HEP, and PT educated pt and family in the findings that the R LE is so weak he is dragging the L LE and stumbling on the L LE at times so PT will have to work on both legs to help with pain relief.     Written Home Exercises Provided: Patient instructed to cont prior HEP.  Exercises were reviewed and Harman was able to demonstrate them prior to the end of the session.  Harman demonstrated good  understanding of the education provided.     See EMR under Patient Instructions for exercises provided 6/27/2022.    Assessment   Harman is a 87 y.o. male referred to outpatient Physical  Therapy with a medical diagnosis of M17.0 (ICD-10-CM) - Bilateral primary osteoarthritis of knee. The patient presents with signs and symptoms consistent with diagnosis along with knee pain, decreased functional mobility and endurance and gait difficulty and impairments which include decreased ROM, decreased strength, decreased joint mobility, decreased muscle length, impaired balance, postural abnormalities, gait abnormalities and decreased overall function.  These impairments are limiting patient's ability to kneel, squat, walk on level and unlevel jordan without LOB.     Pt prognosis is Good.   Pt will benefit from skilled outpatient Physical Therapy to address the deficits stated above and in the chart below, provide pt/family education, and to maximize pt's level of independence.     Plan of care discussed with patient: Yes  Pt's spiritual, cultural and educational needs considered and patient is agreeable to the plan of care and goals as stated below:     Anticipated Barriers for therapy: very hard of hearing    Medical Necessity is demonstrated by the following  History  Co-morbidities and personal factors that may impact the plan of care Co-morbidities:   See medical history  HTN, chronic kidney disease stage 4, falls    Personal Factors:   social background     moderate   Examination  Body Structures and Functions, activity limitations and participation restrictions that may impact the plan of care Body Regions:   back  lower extremities  trunk    Body Systems:    ROM  strength  balance  gait  transfers  transitions  motor control    Participation Restrictions:   See current restrictions    Activity limitations:   Learning and applying knowledge  watching  listening    General Tasks and Commands  undertaking a single task  undertaking multiple tasks    Communication  communicating with/receiving spoken language  communicating with/receiving non-verbal language    Mobility  lifting and carrying  objects  walking  driving (bike, car, motorcycle)    Self care  washing oneself (bathing, drying, washing hands)  caring for body parts (brushing teeth, shaving, grooming)  toileting  dressing  eating  drinking  looking after one's health    Domestic Life  shopping  cooking  doing house work (cleaning house, washing dishes, laundry)  assisting others    Interactions/Relationships  basic interpersonal interactions  formal relationships  family relationships    Life Areas  basic economic transactions    Community and Social Life  community life  recreation and leisure  human rights         high   Clinical Presentation stable and uncomplicated low   Decision Making/ Complexity Score: low     Goals:  Short Term Goals: In 4 weeks:  1.Pt to be educated on HEP.  2.Patient to demo increased AROM to 5 degrees with hip extension  3.Patient to increase strength to allow for 10 reps or better on the sit to stand test in 30 sec  4.Patient to have decreased pain to 3/10 or better.  5.Patient to increase LE balance to allow for modified CTSIB score of 4/4.  6.Patient to improve score on the FOTO by 10%.      Long Term Goals: In 10 weeks 9/5/22  1. Patient to perform daily activities including walking up/down steps without a hand rail up to 2 flights and rising from floor to stand without family support.  2. Patient to demonstrate increased ankle DF to 8 degrees or better.  3. Patient to demonstrate increased LE strength to 4/5 with hip extension .  4. Patient to have decreased pain to 3/10 or better with kneeling.  5. Patient to improve score on the FOTO to 36% limitation.      Plan   Plan of care Certification: 6/27/2022 to 9/5/22.    Outpatient Physical Therapy 2 times weekly for 10 weeks to include the following interventions: Cervical/Lumbar Traction, Electrical Stimulation IFC, NMES, Gait Training, Manual Therapy, Moist Heat/ Ice, Neuromuscular Re-ed, Patient Education, Self Care, Therapeutic Activities and Therapeutic  Exercise.     Kiesha Musa, PT, CIDN, SFMA    Thank you for this referral.    These services are reasonable and necessary for the conditions set forth above while under my care.

## 2022-06-28 NOTE — PROGRESS NOTES
Physical Therapy Daily Treatment Note     Name: Harman Johnston  Clinic Number: [id number]    Therapy Diagnosis: No diagnosis found.  Physician: Gavino Encinas MD    Visit Date: 7/1/2022    Physician Orders: PT Eval and Treat   Medical Diagnosis from Referral: M17.0 (ICD-10-CM) - Bilateral primary osteoarthritis of knee  Evaluation Date: 6/27/2022  Authorization Period Expiration: 6/30/23  Plan of Care Expiration: 9/5/22  Visit # / Visits authorized: 1/ 1  FOTO 1/3  Progress Note due 30 days from 6/27/22  Precautions: Standard VERY HARD of HEARING, Kidney disease stage 4 (not on dialysis), HTN    Time In: 12:03 pm  Time Out: 12:45 pm  Total Billable Time: 42 minutes     SUBJECTIVE     Today, pt reports: ***.  He {Actions; was/was not:64280} compliant with home exercise program.  Response to previous treatment: ***  Functional change: ***    Pre-Treatment Pain: ***10  Post-Treatment Pain: ***/10  Location: ***  TREATMENT     Harman received therapeutic exercises to develop {AMB PT PROGRESS OBJECTIVE:86467} for *** minutes including:        Harman received the following manual therapy techniques: {AMB PT PROGRESS MANUAL THERAPY:41920} were applied to the: *** for *** minutes, including:  STM of {RIGHT/LEFT:43024} {Muscle Palpation List:56823}      Harman participated in neuromuscular re-education activities to improve: {AMB PT PROGRESS NEURO RE-ED:42423} for *** minutes. The following activities were included:    Harman participated in dynamic functional therapeutic activities to improve functional performance for ***  minutes, including:          Harman participated in gait training to improve functional mobility and safety for ***  minutes, including:      Harman received the following direct contact modalities after being cleared for contraindications:     Harman received the following supervised modalities after being cleared for contradictions:     Harman received hot pack for *** minutes to ***.    Harman received cold pack for  "*** minutes to ***.  Harman received electrical stimulation for *** minutes to ***      Home Exercises Provided and Patient Education Provided     Education/Self-Care provided: (*** minutes)   Patient educated on biomechanical justification for therapeutic exercise and importance of compliance with HEP in order to improve overall impairments and QOL    Patient educated on postural awareness and the use of a lumbar roll when in a seated position to reduce stress and maintain optimal alignment of the spine.    Patient educated on proper ergonomics at the work station in order to maintain optimal alignment of the musculoskeletal system and improve efficiency in the work environment.   Patient educated on the importance of improved core and {upper/lower:05184:a:"upper","lower"} extremity strength in order to improve alignment of the spine and {upper/lower:26475:a:"upper","lower"} extremities with static positions and dynamic movement.    Patient educated on the importance of strong core and lower extremity musculature in order to improve both static and dynamic balance, improve gait mechanics, reduce fall risk and improve household and community mobility.       Written Home Exercises Provided: {Blank single:77372::"yes","Patient instructed to cont prior HEP"}.  Exercises were reviewed and Harman was able to demonstrate them prior to the end of the session.  Harman demonstrated {Desc; good/fair/poor:11044} understanding of the education provided.     See EMR under {Blank single:79430::"Media","Patient Instructions"} for exercises provided {Blank single:99706::"7/1/2022","prior visit"}.    ASSESSMENT   Pt tolerated therex manual therapy well with reports of decreased pain and tension in musculature following intervention. Pt tolerated exercise well with reports of increased fatigue but no increased pain. Pt demonstrated good understanding of exercises and required minimal cueing to maintain proper form.  ***    Harman {IS/IS " NOT:51005} progressing well towards his goals.   Pt prognosis is {REHAB PROGNOSIS OHS:89731}.     Pt will continue to benefit from skilled outpatient physical therapy to address the deficits listed in the problem list box on initial evaluation, provide pt/family education and to maximize pt's level of independence in the home and community environment.     Pt's spiritual, cultural and educational needs considered and pt agreeable to plan of care and goals.     Anticipated barriers to physical therapy: very hard of hearing    Goals:   Short Term Goals: In 4 weeks:  1.Pt to be educated on HEP.  2.Patient to demo increased AROM to 5 degrees with hip extension  3.Patient to increase strength to allow for 10 reps or better on the sit to stand test in 30 sec  4.Patient to have decreased pain to 3/10 or better.  5.Patient to increase LE balance to allow for modified CTSIB score of 4/4.  6.Patient to improve score on the FOTO by 10%.        Long Term Goals: In 10 weeks 9/5/22  1. Patient to perform daily activities including walking up/down steps without a hand rail up to 2 flights and rising from floor to stand without family support.  2. Patient to demonstrate increased ankle DF to 8 degrees or better.  3. Patient to demonstrate increased LE strength to 4/5 with hip extension .  4. Patient to have decreased pain to 3/10 or better with kneeling.  5. Patient to improve score on the FOTO to 36% limitation.        Plan   Plan of care Certification: 6/27/2022 to 9/5/22.     Outpatient Physical Therapy 2 times weekly for 10 weeks to include the following interventions: Cervical/Lumbar Traction, Electrical Stimulation IFC, NMES, Gait Training, Manual Therapy, Moist Heat/ Ice, Neuromuscular Re-ed, Patient Education, Self Care, Therapeutic Activities and Therapeutic Exercise.     Continue Plan of Care (POC) and progress per patient tolerance.    Kiesha Musa, PT, CIDN, SFMA

## 2022-07-01 ENCOUNTER — CLINICAL SUPPORT (OUTPATIENT)
Dept: REHABILITATION | Facility: HOSPITAL | Age: 87
End: 2022-07-01
Payer: MEDICARE

## 2022-07-01 ENCOUNTER — DOCUMENTATION ONLY (OUTPATIENT)
Dept: REHABILITATION | Facility: HOSPITAL | Age: 87
End: 2022-07-01

## 2022-07-01 DIAGNOSIS — Z74.09 DECREASED FUNCTIONAL MOBILITY AND ENDURANCE: ICD-10-CM

## 2022-07-01 DIAGNOSIS — R26.9 GAIT DIFFICULTY: ICD-10-CM

## 2022-07-01 DIAGNOSIS — M25.562 PAIN IN LATERAL PORTION OF LEFT KNEE: Primary | ICD-10-CM

## 2022-07-01 PROCEDURE — 97110 THERAPEUTIC EXERCISES: CPT | Mod: CQ

## 2022-07-01 NOTE — PROGRESS NOTES
PT/PTA met face to face to discuss pt's treatment plan and progress towards established goals. Pt will be seen by a physical therapist minimally every 6th visit or every 30 days.    .    Lenora Harper PTA

## 2022-07-01 NOTE — PROGRESS NOTES
"[facility]   Physical Therapist Assistant Treatment Note     Name: Harman Johnston  Clinic Number: [id number]    Therapy Diagnosis:   Encounter Diagnoses   Name Primary?    Pain in lateral portion of left knee Yes    Gait difficulty     Decreased functional mobility and endurance      Physician: Gavino Encinas MD    Visit Date: 7/1/2022   Physician Orders: PT Eval and Treat   Medical Diagnosis from Referral: M17.0 (ICD-10-CM) - Bilateral primary osteoarthritis of knee  Evaluation Date: 6/27/2022  Authorization Period Expiration: 6/30/23  Plan of Care Expiration: 9/5/22  Visit # / Visits authorized: 1/ 40 + eval    FOTO 1/3    Progress Note due 30 days from 6/27/22    Precautions: Standard VERY HARD of HEARING, Kidney disease stage 4 (not on dialysis), HTN    PTA Visit #: 1/5     Time In: 3:30  Time Out: 4:25  Total Billable Time: 55 minutes    SUBJECTIVE     Pt reports: he has no pain currently  He was compliant with home exercise program.  Response to previous treatment: no issues  Functional change: na at this time    Pain: 0/10  Location: --  OBJECTIVE     Objective Measures updated at progress report unless specified.     Treatment     Harman received the treatments listed below:      therapeutic exercises to develop strength, endurance, ROM, flexibility, posture and core stabilization for 55 minutes including:  NuStep form mobility 5 min  Seated tail gators 3# 3 min  Quad sets over small roll 3" 2x10  Bridges w/ball squeeze 2x10  Side lying clams 3x10 ea leg  Supine assisted dynamic HSS x10 ea leg  Unsupported sit to stands from approx 21" on high-low mat b66--TAQ  Seated assisted gastroc stretch 30" x 2 ea leg  Unilateral supported step taps on 4" 2x10 ea leg      manual therapy techniques:  for 0 minutes, including:  --    neuromuscular re-education activities to improve: Balance, Coordination, Kinesthetic, Sense and Proprioception for 0 minutes. The following activities were " included:  --    supervised modalities after being cleared for contradictions:     hot pack for 0 minutes to --.    cold pack for 0 minutes to --.        Patient Education and Home Exercises     Home Exercises Provided and Patient Education Provided     Education provided:   - HEP review    Written Home Exercises Provided: Patient instructed to cont prior HEP. Exercises were reviewed and Harman was able to demonstrate them prior to the end of the session.  Harman demonstrated fair  understanding of the education provided. He will have family assistance.  See EMR under Patient Instructions for exercises provided during therapy sessions    ASSESSMENT     Harman presented to therapy with his wife and daughter. His daughter was present during the session as he has difficulty with his hearing and she assists.   Today general strengthening and mobility acitivities were initiated with moderate cueing required for technique/alignment. He has mobility deficits in his hips, knees and ankles impacting his gait and stability. He maintains a posterior center of gravity due to lack of ankle dorsiflexion which also contributes to lacking foot clearance with swing to stance. Overall his mobility and strength impairments place him at a fall risk. He did not have any knee pain today with his activities and demonstrated a positive response to his first session after his evaluation. He was mildly fatigued end of session.     Harman Is progressing well towards his goals.   Pt prognosis is Good.     Pt will continue to benefit from skilled outpatient physical therapy to address the deficits listed in the problem list box on initial evaluation, provide pt/family education and to maximize pt's level of independence in the home and community environment.     Pt's spiritual, cultural and educational needs considered and pt agreeable to plan of care and goals.     Anticipated barriers to physical therapy: none    Goals:   Short Term Goals: In 4  weeks:  1.Pt to be educated on HEP.  2.Patient to demo increased AROM to 5 degrees with hip extension  3.Patient to increase strength to allow for 10 reps or better on the sit to stand test in 30 sec  4.Patient to have decreased pain to 3/10 or better.  5.Patient to increase LE balance to allow for modified CTSIB score of 4/4.  6.Patient to improve score on the FOTO by 10%.        Long Term Goals: In 10 weeks 9/5/22  1. Patient to perform daily activities including walking up/down steps without a hand rail up to 2 flights and rising from floor to stand without family support.  2. Patient to demonstrate increased ankle DF to 8 degrees or better.  3. Patient to demonstrate increased LE strength to 4/5 with hip extension .  4. Patient to have decreased pain to 3/10 or better with kneeling.  5. Patient to improve score on the FOTO to 36% limitation.       PLAN     Plan of care Certification: 6/27/2022 to 9/5/22.     Outpatient Physical Therapy 2 times weekly for 10 weeks to include the following interventions: Cervical/Lumbar Traction, Electrical Stimulation IFC, NMES, Gait Training, Manual Therapy, Moist Heat/ Ice, Neuromuscular Re-ed, Patient Education, Self Care, Therapeutic Activities and Therapeutic Exercise.       Lenora Harper, PTA

## 2022-07-06 ENCOUNTER — CLINICAL SUPPORT (OUTPATIENT)
Dept: REHABILITATION | Facility: HOSPITAL | Age: 87
End: 2022-07-06
Payer: MEDICARE

## 2022-07-06 ENCOUNTER — TELEPHONE (OUTPATIENT)
Dept: ADMINISTRATIVE | Facility: HOSPITAL | Age: 87
End: 2022-07-06
Payer: MEDICARE

## 2022-07-06 DIAGNOSIS — R26.9 GAIT DIFFICULTY: ICD-10-CM

## 2022-07-06 DIAGNOSIS — M25.562 PAIN IN LATERAL PORTION OF LEFT KNEE: Primary | ICD-10-CM

## 2022-07-06 DIAGNOSIS — Z74.09 DECREASED FUNCTIONAL MOBILITY AND ENDURANCE: ICD-10-CM

## 2022-07-06 PROCEDURE — 97110 THERAPEUTIC EXERCISES: CPT | Mod: CQ

## 2022-07-06 NOTE — PROGRESS NOTES
"[facility]   Physical Therapist Assistant Treatment Note     Name: Harman Johnston  Clinic Number: [id number]    Therapy Diagnosis:   Encounter Diagnoses   Name Primary?    Pain in lateral portion of left knee Yes    Gait difficulty     Decreased functional mobility and endurance      Physician: Gavino Encinas MD    Visit Date: 7/6/2022   Physician Orders: PT Eval and Treat   Medical Diagnosis from Referral: M17.0 (ICD-10-CM) - Bilateral primary osteoarthritis of knee  Evaluation Date: 6/27/2022  Authorization Period Expiration: 6/30/23  Plan of Care Expiration: 9/5/22  Visit # / Visits authorized: 2/ 40 + eval    FOTO 1/3    Progress Note due 30 days from 6/27/22    Precautions: Standard VERY HARD of HEARING, Kidney disease stage 4 (not on dialysis), HTN    PTA Visit #: 2/5     Time In: 1:00  Time Out: 2:06  Total Billable Time: 55 minutes    SUBJECTIVE     Pt reports: no knee pain currently; daughter reports he is doing well  He was compliant with home exercise program.  Response to previous treatment: no issues  Functional change: na at this time    Pain: 0/10  Location: --  OBJECTIVE     Objective Measures updated at progress report unless specified.     Treatment     Harman received the treatments listed below:      therapeutic exercises to develop strength, endurance, ROM, flexibility, posture and core stabilization for 55 minutes including:  NuStep form mobility 6 min  Seated tail gators 4# 3 min  Seated LAQ 3# 3 sec hold 2x10   Modified long sitting hamstring stretch x5 ea leg  Bridges w/ball squeeze 2x10  Side lying clams RTB 2x10 ea leg  Side lying reverse clams 2x10 ea leg  Unsupported sit to stands from approx 20" on high-low mat d40--JIY  Unsupported standing with ball lifts (yellow swiss ball)  Shuttle DL squats 4B 3 min    manual therapy techniques:  for 0 minutes, including:  --    neuromuscular re-education activities to improve: Balance, Coordination, Kinesthetic, " Sense and Proprioception for 0 minutes. The following activities were included:  --    supervised modalities after being cleared for contradictions:     hot pack for 0 minutes to --.    cold pack for 0 minutes to --.        Patient Education and Home Exercises     Home Exercises Provided and Patient Education Provided     Education provided:   - HEP review    Written Home Exercises Provided: Patient instructed to cont prior HEP. Exercises were reviewed and Harman was able to demonstrate them prior to the end of the session.  Harman demonstrated fair  understanding of the education provided. He will have family assistance.  See EMR under Patient Instructions for exercises provided during therapy sessions    ASSESSMENT     Harman presented to therapy with his daughter who was present during the session as he has difficulty with his hearing and she assists. He ambulated with mild instability today and often his daughter assists him by holding his arm.He had one episode of minor loss of balance today and required CGA to recover. He does not want an assistive device. He maintains a flexed trunk and standing trunk alignment w/balance was initiated today. Today general strengthening and mobility acitivities were continued with moderate cueing required for technique/alignment. Addition of new activities today without issue.  He continues to have mobility deficits in his hips, knees and ankles impacting his gait and stability. Noted a little more knee extension today with his mat activities .  He maintains a posterior center of gravity due to lack of ankle dorsiflexion which also contributes to lacking foot clearance with swing to stance. Training for ankle stretching is challenging for the patient as he lacks good comprehension due to his hearing impairment.  Overall his mobility and strength impairments place him at a fall risk.  He did not have any knee pain today with his activities and demonstrated a positive response to his  session.  He was mildly fatigued end of session.   Harman Is progressing well towards his goals.   Pt prognosis is Good.     Pt will continue to benefit from skilled outpatient physical therapy to address the deficits listed in the problem list box on initial evaluation, provide pt/family education and to maximize pt's level of independence in the home and community environment.     Pt's spiritual, cultural and educational needs considered and pt agreeable to plan of care and goals.     Anticipated barriers to physical therapy: none    Goals:   Short Term Goals: In 4 weeks:  1.Pt to be educated on HEP.  2.Patient to demo increased AROM to 5 degrees with hip extension  3.Patient to increase strength to allow for 10 reps or better on the sit to stand test in 30 sec  4.Patient to have decreased pain to 3/10 or better.  5.Patient to increase LE balance to allow for modified CTSIB score of 4/4.  6.Patient to improve score on the FOTO by 10%.        Long Term Goals: In 10 weeks 9/5/22  1. Patient to perform daily activities including walking up/down steps without a hand rail up to 2 flights and rising from floor to stand without family support.  2. Patient to demonstrate increased ankle DF to 8 degrees or better.  3. Patient to demonstrate increased LE strength to 4/5 with hip extension .  4. Patient to have decreased pain to 3/10 or better with kneeling.  5. Patient to improve score on the FOTO to 36% limitation.       PLAN     Plan of care Certification: 6/27/2022 to 9/5/22.     Outpatient Physical Therapy 2 times weekly for 10 weeks to include the following interventions: Cervical/Lumbar Traction, Electrical Stimulation IFC, NMES, Gait Training, Manual Therapy, Moist Heat/ Ice, Neuromuscular Re-ed, Patient Education, Self Care, Therapeutic Activities and Therapeutic Exercise.       Lenora Harper, PTA

## 2022-07-12 ENCOUNTER — CLINICAL SUPPORT (OUTPATIENT)
Dept: REHABILITATION | Facility: HOSPITAL | Age: 87
End: 2022-07-12
Payer: MEDICARE

## 2022-07-12 DIAGNOSIS — Z74.09 DECREASED FUNCTIONAL MOBILITY AND ENDURANCE: ICD-10-CM

## 2022-07-12 DIAGNOSIS — R26.9 GAIT DIFFICULTY: ICD-10-CM

## 2022-07-12 DIAGNOSIS — M25.562 PAIN IN LATERAL PORTION OF LEFT KNEE: Primary | ICD-10-CM

## 2022-07-12 PROCEDURE — 97110 THERAPEUTIC EXERCISES: CPT

## 2022-07-12 PROCEDURE — 97112 NEUROMUSCULAR REEDUCATION: CPT

## 2022-07-12 NOTE — PROGRESS NOTES
[facility]   Physical Therapist Treatment Note     Name: Harman Johnston  Clinic Number: [id number]    Therapy Diagnosis:   Encounter Diagnoses   Name Primary?    Pain in lateral portion of left knee Yes    Gait difficulty     Decreased functional mobility and endurance      Physician: Gavino Encinas MD    Visit Date: 7/12/2022   Physician Orders: PT Eval and Treat   Medical Diagnosis from Referral: M17.0 (ICD-10-CM) - Bilateral primary osteoarthritis of knee  Evaluation Date: 6/27/2022  Authorization Period Expiration: 6/30/23  Plan of Care Expiration: 9/5/22  Visit # / Visits authorized: 4/ 40     FOTO 2/3 (40% limitation)    Progress Note due 30 days from 6/27/22    Precautions: Standard VERY HARD of HEARING, Kidney disease stage 4 (not on dialysis), HTN    PTA Visit #: 2/5     Time In: 1:45  Time Out: 2:46  Total Billable Time: 61 minutes    SUBJECTIVE     Pt reports: L knee stiff at times still with occasions of soreness after prolonged bike riding.   He was compliant with home exercise program.  Response to previous treatment: no issues  Functional change: na at this time    Pain: 0/10 up to a 6 /10 at times when at home at the end of the day  Location: R knee/L knee  OBJECTIVE     Objective Measures updated at progress report unless specified.     Treatment     Harman received the treatments listed below:      therapeutic exercises to develop strength, endurance, ROM, flexibility, posture and core stabilization for 31 minutes including:    Ankle strategy at wall  Standing on wedge calf stretch 2 min  NuStep form mobility 6 min  Sit to stand from 24 inch box with 10 # KB goblet hold 1x 10  Sit to stand from 20 inch box 1x 10 then add 10 # KB goblet 2x 8  Alfie curls 10 # 3x 10  Ankle strategy at wall 2x 10  Standing on wedge static 30 sec  Standing on wedge with ball toss (yellow weighted ball) 2x 8  Standing down hip on wedge with ball toss (same ball) 2x  "8    Deferred:  Seated tail gators 4# 3 min  Seated LAQ 3# 3 sec hold 2x10   Modified long sitting hamstring stretch x5 ea leg  Bridges w/ball squeeze 2x10  Side lying clams RTB 2x10 ea leg  Side lying reverse clams 2x10 ea leg  Unsupported sit to stands from approx 20" on high-low mat k72--AMX  Unsupported standing with ball lifts (yellow swiss ball)  Shuttle DL squats 4B 3 min    manual therapy techniques:  for 0 minutes, including:  --    neuromuscular re-education activities to improve: Balance, Coordination, Kinesthetic, Sense and Proprioception for 30 minutes. The following activities were included:  Seated gaze stabilization 1x 10 (VOR 1)  Standing on level floor gaze stabilization 2x 10 up/down (VOR x1)  Standing on foam gaze stabilization up/down 2x 10 (VORx 1)  Standing on foam gaze stabilization r/L 2x 10 (VOR x 1)  Follow the ball eye and head with pt lifting ball knuckle to overhead 2x 8 then R/L 2x 8    supervised modalities after being cleared for contradictions:     hot pack for 0 minutes to --.    cold pack for 0 minutes to --.      Patient Education and Home Exercises     Home Exercises Provided and Patient Education Provided     Education provided:   - HEP review    Written Home Exercises Provided: Patient instructed to cont prior HEP. Exercises were reviewed and Harman was able to demonstrate them prior to the end of the session.  Harman demonstrated fair  understanding of the education provided. He will have family assistance.  See EMR under Patient Instructions for exercises provided during therapy sessions    ASSESSMENT     Harman presented to therapy without a cane still. Family states he still declines to use a RW. PT worked on tasks to challenge increase anterior/posterior control at the hip/ankles and trunk. PT noted when pt is performing Alfie curls, he is shifted to the R (not pushing well with R LE toward the L). PT encouraged pt to shift into the L LE with cues but he had a difficult " time sustaining the shift and reported mild L knee pain with this task. Pt had some reports of low back pain with standing upright tasks and maintains mild knee flexion. PT to work on PPT on the follow up and increase lumbar and hp ROM to help reduce strain on the knees as he is still lagging in hip extension to allow for upright posture.   PT continued with gait belt use throughout the session. Catching his balance improved with practice but at times required MIN A on dynamic asks. Pt had no increase in knee pain by end of the session.  He was mildly fatigued end of session.   Harman Is progressing well towards his goals.   Pt prognosis is Good.     Pt will continue to benefit from skilled outpatient physical therapy to address the deficits listed in the problem list box on initial evaluation, provide pt/family education and to maximize pt's level of independence in the home and community environment.     Pt's spiritual, cultural and educational needs considered and pt agreeable to plan of care and goals.     Anticipated barriers to physical therapy: none    Goals:   Short Term Goals: In 4 weeks:  1.Pt to be educated on HEP. progressing  2.Patient to demo increased AROM to 5 degrees with hip extension. progressing  3.Patient to increase strength to allow for 10 reps or better on the sit to stand test in 30 sec  4.Patient to have decreased pain to 3/10 or better.  5.Patient to increase LE balance to allow for modified CTSIB score of 4/4.  6.Patient to improve score on the FOTO by 10%.        Long Term Goals: In 10 weeks 9/5/22  1. Patient to perform daily activities including walking up/down steps without a hand rail up to 2 flights and rising from floor to stand without family support.  2. Patient to demonstrate increased ankle DF to 8 degrees or better.  3. Patient to demonstrate increased LE strength to 4/5 with hip extension .  4. Patient to have decreased pain to 3/10 or better with kneeling.  5. Patient to  improve score on the FOTO to 36% limitation.       PLAN     Plan of care Certification: 6/27/2022 to 9/5/22.     Outpatient Physical Therapy 2 times weekly for 10 weeks to include the following interventions: Cervical/Lumbar Traction, Electrical Stimulation IFC, NMES, Gait Training, Manual Therapy, Moist Heat/ Ice, Neuromuscular Re-ed, Patient Education, Self Care, Therapeutic Activities and Therapeutic Exercise.       Kiesha Musa, PT

## 2022-07-19 ENCOUNTER — CLINICAL SUPPORT (OUTPATIENT)
Dept: REHABILITATION | Facility: HOSPITAL | Age: 87
End: 2022-07-19
Payer: MEDICARE

## 2022-07-19 DIAGNOSIS — Z74.09 DECREASED FUNCTIONAL MOBILITY AND ENDURANCE: ICD-10-CM

## 2022-07-19 DIAGNOSIS — M25.562 PAIN IN LATERAL PORTION OF LEFT KNEE: Primary | ICD-10-CM

## 2022-07-19 DIAGNOSIS — R26.9 GAIT DIFFICULTY: ICD-10-CM

## 2022-07-19 PROCEDURE — 97110 THERAPEUTIC EXERCISES: CPT

## 2022-07-19 NOTE — PROGRESS NOTES
[facility]   Physical Therapist Treatment Note     Name: Harman Johnston  Clinic Number: [id number]    Therapy Diagnosis:   Encounter Diagnoses   Name Primary?    Pain in lateral portion of left knee Yes    Gait difficulty     Decreased functional mobility and endurance      Physician: Gavino Encinas MD    Visit Date: 7/19/2022   Physician Orders: PT Eval and Treat   Medical Diagnosis from Referral: M17.0 (ICD-10-CM) - Bilateral primary osteoarthritis of knee  Evaluation Date: 6/27/2022  Authorization Period Expiration: 6/30/23  Plan of Care Expiration: 9/5/22  Visit # / Visits authorized: 5/ 40     FOTO 2/3 (40% limitation)    Progress Note due 30 days from 6/27/22    Precautions: Standard VERY HARD of HEARING, Kidney disease stage 4 (not on dialysis), HTN    PTA Visit #: 2/5     Time In: 12:16 pm  Time Out: 1:18 pm  Total Billable Time: 62 minutes    SUBJECTIVE     Pt reports: L knee is not painful today.   He was compliant with home exercise program.  Response to previous treatment: no issues  Functional change: na at this time    Pain: 0/10 up to a 4 /10 at times when at home at the end of the day  Location: R knee/L knee  OBJECTIVE   Pt arrived with a narrow SUNITA and nearly scissoring at times with feet shuffling and catching the floor due to poor glute activation in single leg stand. PT to work on more glute extension and trunk extension tasks to improve gait and step length, hip and open up SUNITA.  Objective Measures updated at progress report unless specified.     Treatment     Harman received the treatments listed below:      therapeutic exercises to develop strength, endurance, ROM, flexibility, posture and core stabilization for 62 minutes including:    Shuttle  squats 4B 3 min  Shuttle heel raises 3 bands 3x 10  Sit to stand from 20 inch box 1x 5 then 15 # goblet hold 1x 8  Dead lifting 15 # with R farmer's hold 2x 10 with cues to facilitate EROM extension at the hips or he  "avoids the last few degrees and stays forward some  Dead lifting 15# L hand and 20 # R hand for 3x 5 with intermittent cues to finish EROM (manually)  Alfie curls 10 # 1x 10 on step with MIN A for safety  Toe taps to stool with overhead press up on opposite hand 5# to encourage upright posture 3x 5 per leg  Standing on wedge static 30 sec   Standing on wedge with ball toss (yellow weighted ball) 1x20  Standing down hip on wedge with ball toss (same ball) 1x 20  Seated soleus heel raise with 15 # on knee (foot on wedge to increase ankle DF) 2x 15 per leg  Seated tail gators 3# 3 min  Seated LAQ 3# 1x 10  Standing hamstring curls slow eccentric 3 # 1x 10    Deferred:  Seated LAQ 3# 3 sec hold 2x10   Ankle strategy at wall  Modified long sitting hamstring stretch x5 ea leg  Bridges w/ball squeeze 2x10  Side lying clams RTB 2x10 ea leg  Side lying reverse clams 2x10 ea leg  Unsupported sit to stands from approx 20" on high-low mat h39--RWZ  Unsupported standing with ball lifts (yellow swiss ball)    manual therapy techniques:  for 0 minutes, including:  --    neuromuscular re-education activities to improve: Balance, Coordination, Kinesthetic, Sense and Proprioception for 0 minutes. The following activities were included:    Deferred:  Seated gaze stabilization 1x 10 (VOR 1)  Standing on level floor gaze stabilization 2x 10 up/down (VOR x1)  Standing on foam gaze stabilization up/down 2x 10 (VORx 1)  Standing on foam gaze stabilization r/L 2x 10 (VOR x 1)  Follow the ball eye and head with pt lifting ball knuckle to overhead 2x 8 then R/L 2x 8    supervised modalities after being cleared for contradictions:     hot pack for 0 minutes to --.    cold pack for 0 minutes to --.      Patient Education and Home Exercises     Home Exercises Provided and Patient Education Provided     Education provided:   - HEP review    Written Home Exercises Provided: Patient instructed to cont prior HEP. Exercises were reviewed and Harman " was able to demonstrate them prior to the end of the session.  Harman demonstrated fair  understanding of the education provided. He will have family assistance.  See EMR under Patient Instructions for exercises provided during therapy sessions    ASSESSMENT     Harman presented to therapy without a cane still. Pt arrived with a narrow SUNITA and nearly scissoring at times with feet shuffling and catching the floor due to poor glute activation in single leg stand. PT to work on more glute extension and trunk extension tasks to improve gait and step length, hip and open up SUNITA. PT focused on tasks to increase hamstring ROM, strength and glute strength to reduce his tendency to anterior tilt with walking. Pt has some stiffness in the back so we also worked on improving spinal extension and flexion ROM. Pt had a lot less LOB today but PT still used SBA to CGA due to hearing loss and slow processing and mild LOB. Pt had no increase in knee pain by end of the session.  He was mildly fatigued end of session.   Harman Is progressing well towards his goals.   Pt prognosis is Good.     Pt will continue to benefit from skilled outpatient physical therapy to address the deficits listed in the problem list box on initial evaluation, provide pt/family education and to maximize pt's level of independence in the home and community environment.     Pt's spiritual, cultural and educational needs considered and pt agreeable to plan of care and goals.     Anticipated barriers to physical therapy: none    Goals:   Short Term Goals: In 4 weeks:  1.Pt to be educated on HEP. progressing  2.Patient to demo increased AROM to 5 degrees with hip extension. progressing  3.Patient to increase strength to allow for 10 reps or better on the sit to stand test in 30 sec  4.Patient to have decreased pain to 3/10 or better.  5.Patient to increase LE balance to allow for modified CTSIB score of 4/4.  6.Patient to improve score on the FOTO by 10%.        Long  Term Goals: In 10 weeks 9/5/22  1. Patient to perform daily activities including walking up/down steps without a hand rail up to 2 flights and rising from floor to stand without family support.  2. Patient to demonstrate increased ankle DF to 8 degrees or better.  3. Patient to demonstrate increased LE strength to 4/5 with hip extension .  4. Patient to have decreased pain to 3/10 or better with kneeling.  5. Patient to improve score on the FOTO to 36% limitation.       PLAN     Plan of care Certification: 6/27/2022 to 9/5/22.     Outpatient Physical Therapy 2 times weekly for 10 weeks to include the following interventions: Cervical/Lumbar Traction, Electrical Stimulation IFC, NMES, Gait Training, Manual Therapy, Moist Heat/ Ice, Neuromuscular Re-ed, Patient Education, Self Care, Therapeutic Activities and Therapeutic Exercise.       Kiesha Musa, PT

## 2022-07-21 ENCOUNTER — TELEPHONE (OUTPATIENT)
Dept: SPORTS MEDICINE | Facility: CLINIC | Age: 87
End: 2022-07-21
Payer: MEDICARE

## 2022-07-21 ENCOUNTER — CLINICAL SUPPORT (OUTPATIENT)
Dept: REHABILITATION | Facility: HOSPITAL | Age: 87
End: 2022-07-21
Payer: MEDICARE

## 2022-07-21 DIAGNOSIS — Z74.09 DECREASED FUNCTIONAL MOBILITY AND ENDURANCE: ICD-10-CM

## 2022-07-21 DIAGNOSIS — M25.562 PAIN IN LATERAL PORTION OF LEFT KNEE: Primary | ICD-10-CM

## 2022-07-21 DIAGNOSIS — R26.9 GAIT DIFFICULTY: ICD-10-CM

## 2022-07-21 PROCEDURE — 97110 THERAPEUTIC EXERCISES: CPT

## 2022-07-21 NOTE — PROGRESS NOTES
[facility]   Physical Therapist Treatment Note     Name: Harman Johnston  Clinic Number: [id number]    Therapy Diagnosis:   Encounter Diagnoses   Name Primary?    Pain in lateral portion of left knee Yes    Gait difficulty     Decreased functional mobility and endurance      Physician: Gavino Encinas MD    Visit Date: 7/21/2022   Physician Orders: PT Eval and Treat   Medical Diagnosis from Referral: M17.0 (ICD-10-CM) - Bilateral primary osteoarthritis of knee  Evaluation Date: 6/27/2022  Authorization Period Expiration: 6/30/23  Plan of Care Expiration: 9/5/22  Visit # / Visits authorized: 6/ 40     FOTO 2/3 (40% limitation)    Progress Note due 30 days from 6/27/22    Precautions: Standard VERY HARD of HEARING, Kidney disease stage 4 (not on dialysis), HTN    PTA Visit #: 0/5     Time In: 12:16 pm  Time Out: 1:00 pm  Total Billable Time: 44 minutes    SUBJECTIVE     Pt reports: L knee is not painful today but on the shuttle in full flexion it is mildly sore.    He was compliant with home exercise program.  Response to previous treatment: no issues  Functional change: na at this time    Pain: 0/10 up to a 4 /10 at times when at home at the end of the day  Location: R knee/L knee  OBJECTIVE   Pt arrived with better SUNITA and no scissoring  Objective Measures updated at progress report unless specified.     Treatment   Hip extension 5 degrees  Harman received the treatments listed below:      therapeutic exercises to develop strength, endurance, ROM, flexibility, posture and core stabilization for 44 minutes including:    Shuttle  squats 4B 3 min  Shuttle heel raises 3 bands 3x 10  Seated tail gators 3# 3 min  Standing with toes on 1/2 foam Alfie curls 15 # 3x 10 with gait belt for safety  Seated soleus strength work on wedge with 15# on knee 1x 20 per leg  Standing on 1/2 foam at toes with 15# KB pass around 1x 10 R then L with cues on hip extension followed by 1x 10 of waist to  "shoulder   Staggered leg Dead lifting 15# L hand and 20 # R hand for 1x 10 with intermittent cues to finish EROM (manually)  Goblet carry at chest 120 ft x 2 to encourage lean back for improved step length as this activates the trunk and hip extensors more (SOB limits tolerance)  Toe taps to stool with 15 # KB at knuckle height 2x 10    Deferred:  Sit to stand from 20 inch box 1x 5 then 15 # goblet hold 1x 8  Dead lifting 15 # with R farmer's hold 2x 10 with cues to facilitate EROM extension at the hips or he avoids the last few degrees and stays forward some  Standing on wedge with ball toss (yellow weighted ball) 1x20  Standing down hip on wedge with ball toss (same ball) 1x 20  Seated soleus heel raise with 15 # on knee (foot on wedge to increase ankle DF) 2x 15 per leg  Seated LAQ 3# 1x 10  Seated LAQ 3# 3 sec hold 2x10   Ankle strategy at wall  Modified long sitting hamstring stretch x5 ea leg  Bridges w/ball squeeze 2x10  Side lying clams RTB 2x10 ea leg  Side lying reverse clams 2x10 ea leg  Unsupported sit to stands from approx 20" on high-low mat v64--LPD  Unsupported standing with ball lifts (yellow swiss ball)    manual therapy techniques:  for 0 minutes, including:  --    neuromuscular re-education activities to improve: Balance, Coordination, Kinesthetic, Sense and Proprioception for 0 minutes. The following activities were included:    Deferred:  Seated gaze stabilization 1x 10 (VOR 1)  Standing on level floor gaze stabilization 2x 10 up/down (VOR x1)  Standing on foam gaze stabilization up/down 2x 10 (VORx 1)  Standing on foam gaze stabilization r/L 2x 10 (VOR x 1)  Follow the ball eye and head with pt lifting ball knuckle to overhead 2x 8 then R/L 2x 8    supervised modalities after being cleared for contradictions:     hot pack for 0 minutes to --.    cold pack for 0 minutes to --.      Patient Education and Home Exercises     Home Exercises Provided and Patient Education Provided     Education " provided:   - HEP review    Written Home Exercises Provided: Patient instructed to cont prior HEP. Exercises were reviewed and Harman was able to demonstrate them prior to the end of the session.  Harman demonstrated fair  understanding of the education provided. He will have family assistance.  See EMR under Patient Instructions for exercises provided during therapy sessions    ASSESSMENT     Harman presented to therapy without a cane still but gait was more upright with a longer stride length and little to no shuffling. Pt is in a dress shoe versus heavier tennis shoe so PT will see if this changes on follow up as the tennis shoe may have more resistance. PT worked on stretch the calves to help reduce his tendency to stay in knee flexion with walking and standing. Pt had mild challenges keeping his balance when in good DF on the foam cushion so PT to continue working on this to challenge balance and posterior chain activation while working knee extension and ankle DF to reduce fall hazards. Pt SOB was a limiting factor as challenges became more demanding.     He was mildly fatigued end of session.   Harman Is progressing well towards his goals.   Pt prognosis is Good.     Pt will continue to benefit from skilled outpatient physical therapy to address the deficits listed in the problem list box on initial evaluation, provide pt/family education and to maximize pt's level of independence in the home and community environment.     Pt's spiritual, cultural and educational needs considered and pt agreeable to plan of care and goals.     Anticipated barriers to physical therapy: none    Goals:   Short Term Goals: In 4 weeks:  1.Pt to be educated on HEP. met  2.Patient to demo increased AROM to 5 degrees with hip extension. met  3.Patient to increase strength to allow for 10 reps or better on the sit to stand test in 30 sec  4.Patient to have decreased pain to 3/10 or better.  5.Patient to increase LE balance to allow for  modified CTSIB score of 4/4.  6.Patient to improve score on the FOTO by 10%.        Long Term Goals: In 10 weeks 9/5/22  1. Patient to perform daily activities including walking up/down steps without a hand rail up to 2 flights and rising from floor to stand without family support.  2. Patient to demonstrate increased ankle DF to 8 degrees or better.  3. Patient to demonstrate increased LE strength to 4/5 with hip extension .  4. Patient to have decreased pain to 3/10 or better with kneeling.  5. Patient to improve score on the FOTO to 36% limitation.       PLAN     Plan of care Certification: 6/27/2022 to 9/5/22.     Outpatient Physical Therapy 2 times weekly for 10 weeks to include the following interventions: Cervical/Lumbar Traction, Electrical Stimulation IFC, NMES, Gait Training, Manual Therapy, Moist Heat/ Ice, Neuromuscular Re-ed, Patient Education, Self Care, Therapeutic Activities and Therapeutic Exercise.       Kiesha Musa, PT

## 2022-07-21 NOTE — TELEPHONE ENCOUNTER
Spoke with pt's spouse to reschedule 8/1 appt. Agreeable to 8/22 1:40pm appt for 3/3 synvisc injection.

## 2022-07-26 ENCOUNTER — CLINICAL SUPPORT (OUTPATIENT)
Dept: REHABILITATION | Facility: HOSPITAL | Age: 87
End: 2022-07-26
Payer: MEDICARE

## 2022-07-26 DIAGNOSIS — Z74.09 DECREASED FUNCTIONAL MOBILITY AND ENDURANCE: ICD-10-CM

## 2022-07-26 DIAGNOSIS — M25.562 PAIN IN LATERAL PORTION OF LEFT KNEE: Primary | ICD-10-CM

## 2022-07-26 DIAGNOSIS — R26.9 GAIT DIFFICULTY: ICD-10-CM

## 2022-07-26 PROCEDURE — 97110 THERAPEUTIC EXERCISES: CPT

## 2022-07-26 NOTE — PROGRESS NOTES
[facility] OUTPATIENT THERAPY AND WELLNESS   Physical Therapist Treatment Note     Name: Harman Johnston  Clinic Number: [id number]    Therapy Diagnosis:   Encounter Diagnoses   Name Primary?    Pain in lateral portion of left knee Yes    Gait difficulty     Decreased functional mobility and endurance      Physician: Gavino Encinas MD    Visit Date: 7/26/2022   Physician Orders: PT Eval and Treat   Medical Diagnosis from Referral: M17.0 (ICD-10-CM) - Bilateral primary osteoarthritis of knee  Evaluation Date: 6/27/2022  Authorization Period Expiration: 6/30/23  Plan of Care Expiration: 9/5/22  Visit # / Visits authorized: 7/ 40     FOTO 2/3 (40% limitation)    Progress Note due 30 days from 6/27/22    Precautions: Standard VERY HARD of HEARING, Kidney disease stage 4 (not on dialysis), HTN    PTA Visit #: 0/5     Time In: 1:00 pm  Time Out: 1:42 pm  Total Billable Time: 42 minutes    SUBJECTIVE     Pt reports: shuffling mildly today.    He was compliant with home exercise program.  Response to previous treatment: no issues  Functional change: na at this time    Pain: 0/10 up to a 3 /10 at times when at home at the end of the day  Location: R knee/L knee  OBJECTIVE     Objective Measures updated at progress report unless specified.     Treatment     Harman received the treatments listed below:      therapeutic exercises to develop strength, endurance, ROM, flexibility, posture and core stabilization for 40 minutes including:    Upright Bike 5 min   Seated LAQ 2# 2x 10 B  Seated LAQ hold EROM with 2x10 ankle pumps   SLR 2x 10  Seated hip flexion 2# per leg 2x 10  Standing posterior chain activation with foam under toes 3x 10  Standing rows 30 # per arm 2x 10  Waist to overhead 4# 2x 10  (try plyometric ball toss)  D2  4# ball 2x 10 B      Deferred:  Shuttle  squats 4B 3 min  Shuttle heel raises 3 bands 3x 10  Standing with toes on 1/2 foam Alfie curls 15 # 3x 10 with gait belt for safety  Seated soleus strength work  "on wedge with 15# on knee 1x 20 per leg  Standing on 1/2 foam at toes with 15# KB pass around 1x 10 R then L with cues on hip extension followed by 1x 10 of waist to shoulder   Staggered leg Dead lifting 15# L hand and 20 # R hand for 1x 10 with intermittent cues to finish EROM (manually)Sit to stand from 20 inch box 1x 5 then 15 # goblet hold 1x 8  Bridges w/ball squeeze 2x10  Unsupported sit to stands from approx 20" on high-low mat w19--IOA  Unsupported standing with ball lifts (yellow swiss ball)    manual therapy techniques:  for 0 minutes, including:  --    neuromuscular re-education activities to improve: Balance, Coordination, Kinesthetic, Sense and Proprioception for 0 minutes. The following activities were included:    Deferred:  Seated gaze stabilization 1x 10 (VOR 1)  Standing on level floor gaze stabilization 2x 10 up/down (VOR x1)  Standing on foam gaze stabilization up/down 2x 10 (VORx 1)  Standing on foam gaze stabilization r/L 2x 10 (VOR x 1)  Follow the ball eye and head with pt lifting ball knuckle to overhead 2x 8 then R/L 2x 8    supervised modalities after being cleared for contradictions:     hot pack for 0 minutes to --.    cold pack for 0 minutes to --.      Patient Education and Home Exercises     Home Exercises Provided and Patient Education Provided     Education provided:   - HEP review    Written Home Exercises Provided: Patient instructed to cont prior HEP. Exercises were reviewed and Harman was able to demonstrate them prior to the end of the session.  Harman demonstrated fair  understanding of the education provided. He will have family assistance.  See EMR under Patient Instructions for exercises provided during therapy sessions    ASSESSMENT     Harman presented to therapy without a cane still and mildly forward gait. In tennis shoes today he does shuffle more showing a need for increased hip and trunk extension and hip flexion to allow for foot clearance more in the swing through stage. " PT worked more on improving those issues today but he will need more tasks to advance him more.   Pt SOB was still a limiting factor as challenges became more demanding.    He was mildly fatigued end of session.   Harman Is progressing well towards his goals.   Pt prognosis is Good.     Pt will continue to benefit from skilled outpatient physical therapy to address the deficits listed in the problem list box on initial evaluation, provide pt/family education and to maximize pt's level of independence in the home and community environment.     Pt's spiritual, cultural and educational needs considered and pt agreeable to plan of care and goals.     Anticipated barriers to physical therapy: none    Goals:   Short Term Goals: In 4 weeks:  1.Pt to be educated on HEP. met  2.Patient to demo increased AROM to 5 degrees with hip extension. met  3.Patient to increase strength to allow for 10 reps or better on the sit to stand test in 30 sec. progressing  4.Patient to have decreased pain to 3/10 or better. progressing  5.Patient to increase LE balance to allow for modified CTSIB score of 4/4.  6.Patient to improve score on the FOTO by 10%.        Long Term Goals: In 10 weeks 9/5/22  1. Patient to perform daily activities including walking up/down steps without a hand rail up to 2 flights and rising from floor to stand without family support.  2. Patient to demonstrate increased ankle DF to 8 degrees or better.  3. Patient to demonstrate increased LE strength to 4/5 with hip extension .  4. Patient to have decreased pain to 3/10 or better with kneeling.  5. Patient to improve score on the FOTO to 36% limitation.       PLAN     Plan of care Certification: 6/27/2022 to 9/5/22.     Outpatient Physical Therapy 2 times weekly for 10 weeks to include the following interventions: Cervical/Lumbar Traction, Electrical Stimulation IFC, NMES, Gait Training, Manual Therapy, Moist Heat/ Ice, Neuromuscular Re-ed, Patient Education, Self Care,  Therapeutic Activities and Therapeutic Exercise.       Kiesha Musa, PT

## 2022-07-29 DIAGNOSIS — M17.0 BILATERAL PRIMARY OSTEOARTHRITIS OF KNEE: Primary | ICD-10-CM

## 2022-08-02 ENCOUNTER — CLINICAL SUPPORT (OUTPATIENT)
Dept: REHABILITATION | Facility: HOSPITAL | Age: 87
End: 2022-08-02
Payer: MEDICARE

## 2022-08-02 DIAGNOSIS — M25.562 PAIN IN LATERAL PORTION OF LEFT KNEE: Primary | ICD-10-CM

## 2022-08-02 DIAGNOSIS — Z74.09 DECREASED FUNCTIONAL MOBILITY AND ENDURANCE: ICD-10-CM

## 2022-08-02 DIAGNOSIS — R26.9 GAIT DIFFICULTY: ICD-10-CM

## 2022-08-02 PROCEDURE — 97110 THERAPEUTIC EXERCISES: CPT

## 2022-08-02 NOTE — PROGRESS NOTES
[facility]   Physical Therapist Treatment Note     Name: Harman Johnston  Clinic Number: [id number]    Therapy Diagnosis:   Encounter Diagnoses   Name Primary?    Pain in lateral portion of left knee Yes    Gait difficulty     Decreased functional mobility and endurance      Physician: Gavino Encinas MD    Visit Date: 8/2/2022   Physician Orders: PT Eval and Treat   Medical Diagnosis from Referral: M17.0 (ICD-10-CM) - Bilateral primary osteoarthritis of knee  Evaluation Date: 6/27/2022  Authorization Period Expiration: 6/30/23  Plan of Care Expiration: 9/5/22  Visit # / Visits authorized: 8/ 40     FOTO 2/3 (40% limitation)    Progress Note due 30 days from 6/27/22    Precautions: Standard VERY HARD of HEARING, Kidney disease stage 4 (not on dialysis), HTN    PTA Visit #: 0/5     Time In: 12:50 pm  Time Out: 1:30 pm  Total Billable Time: 40 minutes    SUBJECTIVE     Pt reports: no new complaints. Pt has new tennis shoes and is not shuffling on arrival as they are lighter in composition.    He was compliant with home exercise program.  Response to previous treatment: no issues  Functional change: na at this time    Pain: 0/10   Location: R knee/L knee  OBJECTIVE     Objective Measures updated at progress report unless specified.     Treatment     Harman received the treatments listed below:      therapeutic exercises to develop strength, endurance, ROM, flexibility, posture and core stabilization for 40 minutes including:    Shuttle 5 bands 3 min squats  Leaning on 24 inch box hip extension 3x 5 per leg (return to int next session as this was challenging too much)  Posterior chain activation when on 1/2 foam (gait belt) 1x 12  Alfie curls with deficit 15# 2x 10  Staggered db dead lift 15# R then L leg leading 1x 12 each  Sit to stand from 20 inch box 15 # 1x 12  Around the waist 15# KB pass 1x 8 per direction  Chest press 15# KB 1x 8  Static hold heel raises 2x 10 5 sec hold  "(facing wall for mild balance assistance)  wall push up for core activation 1x 12  Seated LAQ with ankle pumps while holding LAQ in extension 2# 2x 10 B    Deferred:  Upright Bike 5 min   Seated LAQ 2# 2x 10 B  Seated LAQ hold EROM with 2x10 ankle pumps   SLR 2x 10  Waist to overhead 4# 2x 10  (try plyometric ball toss)  D2  4# ball 2x 10 B  Standing with toes on 1/2 foam Alfie curls 15 # 3x 10 with gait belt for safetyBridges w/ball squeeze 2x10  Unsupported sit to stands from approx 20" on high-low mat i31--BSW  Unsupported standing with ball lifts (yellow swiss ball)    manual therapy techniques:  for 0 minutes, including:  --    neuromuscular re-education activities to improve: Balance, Coordination, Kinesthetic, Sense and Proprioception for 0 minutes. The following activities were included:    Deferred:  Seated gaze stabilization 1x 10 (VOR 1)  Standing on level floor gaze stabilization 2x 10 up/down (VOR x1)  Standing on foam gaze stabilization up/down 2x 10 (VORx 1)  Standing on foam gaze stabilization r/L 2x 10 (VOR x 1)  Follow the ball eye and head with pt lifting ball knuckle to overhead 2x 8 then R/L 2x 8    supervised modalities after being cleared for contradictions:     hot pack for 0 minutes to --.    cold pack for 0 minutes to --.      Patient Education and Home Exercises     Home Exercises Provided and Patient Education Provided     Education provided:   - HEP review    Written Home Exercises Provided: Patient instructed to cont prior HEP. Exercises were reviewed and Harman was able to demonstrate them prior to the end of the session.  Harman demonstrated fair  understanding of the education provided. He will have family assistance.  See EMR under Patient Instructions for exercises provided during therapy sessions    ASSESSMENT     Harman presented to therapy without a cane still and mildly forward gait at the trunk. Pt had no shuffle today. PT continued with therex to increase posterior chain to " increase glute strength and reduce tension on the calves allowing for more upright posture. Pt is progressing well and should be ready for d/c in 1-2 visits as discussed with family today.     He was mildly fatigued end of session.   Harman Is progressing well towards his goals.   Pt prognosis is Good.     Pt will continue to benefit from skilled outpatient physical therapy to address the deficits listed in the problem list box on initial evaluation, provide pt/family education and to maximize pt's level of independence in the home and community environment.     Pt's spiritual, cultural and educational needs considered and pt agreeable to plan of care and goals.     Anticipated barriers to physical therapy: none    Goals:   Short Term Goals: In 4 weeks:  1.Pt to be educated on HEP. met  2.Patient to demo increased AROM to 5 degrees with hip extension. met  3.Patient to increase strength to allow for 10 reps or better on the sit to stand test in 30 sec. progressing  4.Patient to have decreased pain to 3/10 or better. progressing  5.Patient to increase LE balance to allow for modified CTSIB score of 4/4.  6.Patient to improve score on the FOTO by 10%.        Long Term Goals: In 10 weeks 9/5/22  1. Patient to perform daily activities including walking up/down steps without a hand rail up to 2 flights and rising from floor to stand without family support.  2. Patient to demonstrate increased ankle DF to 8 degrees or better.  3. Patient to demonstrate increased LE strength to 4/5 with hip extension .  4. Patient to have decreased pain to 3/10 or better with kneeling.  5. Patient to improve score on the FOTO to 36% limitation.       PLAN     Plan of care Certification: 6/27/2022 to 9/5/22.     Outpatient Physical Therapy 2 times weekly for 10 weeks to include the following interventions: Cervical/Lumbar Traction, Electrical Stimulation IFC, NMES, Gait Training, Manual Therapy, Moist Heat/ Ice, Neuromuscular Re-ed, Patient  Education, Self Care, Therapeutic Activities and Therapeutic Exercise.       Kiesha Musa, PT

## 2022-08-08 ENCOUNTER — OFFICE VISIT (OUTPATIENT)
Dept: ORTHOPEDICS | Facility: CLINIC | Age: 87
End: 2022-08-08
Payer: MEDICARE

## 2022-08-08 VITALS — HEIGHT: 68 IN | WEIGHT: 178 LBS | BODY MASS INDEX: 26.98 KG/M2

## 2022-08-08 DIAGNOSIS — M17.0 PRIMARY OSTEOARTHRITIS OF BOTH KNEES: Primary | ICD-10-CM

## 2022-08-08 DIAGNOSIS — M25.562 LEFT MEDIAL KNEE PAIN: ICD-10-CM

## 2022-08-08 PROCEDURE — 1125F PR PAIN SEVERITY QUANTIFIED, PAIN PRESENT: ICD-10-PCS | Mod: CPTII,S$GLB,, | Performed by: FAMILY MEDICINE

## 2022-08-08 PROCEDURE — 1125F AMNT PAIN NOTED PAIN PRSNT: CPT | Mod: CPTII,S$GLB,, | Performed by: FAMILY MEDICINE

## 2022-08-08 PROCEDURE — 1101F PT FALLS ASSESS-DOCD LE1/YR: CPT | Mod: CPTII,S$GLB,, | Performed by: FAMILY MEDICINE

## 2022-08-08 PROCEDURE — 99999 PR PBB SHADOW E&M-EST. PATIENT-LVL III: CPT | Mod: PBBFAC,,, | Performed by: FAMILY MEDICINE

## 2022-08-08 PROCEDURE — 1159F PR MEDICATION LIST DOCUMENTED IN MEDICAL RECORD: ICD-10-PCS | Mod: CPTII,S$GLB,, | Performed by: FAMILY MEDICINE

## 2022-08-08 PROCEDURE — 3288F FALL RISK ASSESSMENT DOCD: CPT | Mod: CPTII,S$GLB,, | Performed by: FAMILY MEDICINE

## 2022-08-08 PROCEDURE — 3288F PR FALLS RISK ASSESSMENT DOCUMENTED: ICD-10-PCS | Mod: CPTII,S$GLB,, | Performed by: FAMILY MEDICINE

## 2022-08-08 PROCEDURE — 20610 LARGE JOINT ASPIRATION/INJECTION: L KNEE: ICD-10-PCS | Mod: LT,S$GLB,, | Performed by: FAMILY MEDICINE

## 2022-08-08 PROCEDURE — 99499 NO LOS: ICD-10-PCS | Mod: S$GLB,,, | Performed by: FAMILY MEDICINE

## 2022-08-08 PROCEDURE — 1159F MED LIST DOCD IN RCRD: CPT | Mod: CPTII,S$GLB,, | Performed by: FAMILY MEDICINE

## 2022-08-08 PROCEDURE — 99999 PR PBB SHADOW E&M-EST. PATIENT-LVL III: ICD-10-PCS | Mod: PBBFAC,,, | Performed by: FAMILY MEDICINE

## 2022-08-08 PROCEDURE — 1101F PR PT FALLS ASSESS DOC 0-1 FALLS W/OUT INJ PAST YR: ICD-10-PCS | Mod: CPTII,S$GLB,, | Performed by: FAMILY MEDICINE

## 2022-08-08 PROCEDURE — 20610 DRAIN/INJ JOINT/BURSA W/O US: CPT | Mod: LT,S$GLB,, | Performed by: FAMILY MEDICINE

## 2022-08-08 PROCEDURE — 99499 UNLISTED E&M SERVICE: CPT | Mod: S$GLB,,, | Performed by: FAMILY MEDICINE

## 2022-08-08 NOTE — PROCEDURES
Large Joint Aspiration/Injection: L knee    Date/Time: 8/8/2022 3:20 PM  Performed by: Gavino Encinas MD  Authorized by: Gavino Encinas MD     Consent Done?:  Yes (Verbal)  Indications:  Arthritis and pain  Site marked: the procedure site was marked    Timeout: prior to procedure the correct patient, procedure, and site was verified    Prep: patient was prepped and draped in usual sterile fashion    Approach:  Anterolateral  Location:  Knee  Site:  L knee  Medications:  30 mg sodium hyaluronate (orthovisc) 30 mg/2 mL  Patient tolerance:  Patient tolerated the procedure well with no immediate complications     Does not think PT is helping so far.  Pain presently 1/10.  Only wants Visco in the left knee for now and not the right.

## 2022-08-09 ENCOUNTER — CLINICAL SUPPORT (OUTPATIENT)
Dept: REHABILITATION | Facility: HOSPITAL | Age: 87
End: 2022-08-09
Payer: MEDICARE

## 2022-08-09 DIAGNOSIS — M25.562 PAIN IN LATERAL PORTION OF LEFT KNEE: Primary | ICD-10-CM

## 2022-08-09 DIAGNOSIS — Z74.09 DECREASED FUNCTIONAL MOBILITY AND ENDURANCE: ICD-10-CM

## 2022-08-09 DIAGNOSIS — R26.9 GAIT DIFFICULTY: ICD-10-CM

## 2022-08-09 PROCEDURE — 97110 THERAPEUTIC EXERCISES: CPT

## 2022-08-09 NOTE — PROGRESS NOTES
[facility]   Physical Therapist Treatment Note     Name: Harman Johnston  Clinic Number: [id number]    Therapy Diagnosis:   Encounter Diagnoses   Name Primary?    Pain in lateral portion of left knee Yes    Gait difficulty     Decreased functional mobility and endurance      Physician: Gavino Encinas MD    Visit Date: 8/9/2022   Physician Orders: PT Eval and Treat   Medical Diagnosis from Referral: M17.0 (ICD-10-CM) - Bilateral primary osteoarthritis of knee  Evaluation Date: 6/27/2022  Authorization Period Expiration: 6/30/23  Plan of Care Expiration: 9/5/22  Visit # / Visits authorized: 8/ 40     FOTO 2/3 (40% limitation)    Progress Note due 30 days from 6/27/22    Precautions: Standard VERY HARD of HEARING, Kidney disease stage 4 (not on dialysis), HTN    PTA Visit #: 0/5     Time In: 12:50 pm  Time Out: 1:32 pm  Total Billable Time: 42 minutes    SUBJECTIVE     Pt reports: he got an injection in the L knee and a knee brace from the doctor yesterday.    He was compliant with home exercise program.   Response to previous treatment: no issues since last session. Pt report knee pain has improved from 5 to 1/10 prior to seeing the Doctor, with PT help.  Functional change: na at this time    Pain: 0/10   Location: R knee/L knee  OBJECTIVE     Objective Measures updated at progress report unless specified.     Treatment     Harman received the treatments listed below:      therapeutic exercises to develop strength, endurance, ROM, flexibility, posture and core stabilization for 42 minutes including:    Shuttle 5 bands 3 min squats  Seated LAQ 2x 10 R then 1x 10 3#  Side lying hip abduction 4x 10 (mod to max cues to maintain proper muscle recruitment)  Prone hamstring curls 1x 10 then PT added 3# 3x15   Leaning on plinth hip extension 3x10 per leg  Side lying clams with green theraband 2x 10 L    Deferred:  Posterior chain activation when on 1/2 foam (gait belt) 1x 12  Alfie curls with  deficit 15# 2x 10  Staggered db dead lift 15# R then L leg leading 1x 12 each  Sit to stand from 20 inch box 15 # 1x 12  Around the waist 15# KB pass 1x 8 per direction  Chest press 15# KB 1x 8  Static hold heel raises 2x 10 5 sec hold (facing wall for mild balance assistance)  wall push up for core activation 1x 12Upright Bike 5 min   Waist to overhead 4# 2x 10  (try plyometric ball toss)  D2  4# ball 2x 10 B  Standing with toes on 1/2 foam Alfie curls 15 # 3x 10 with gait belt for safetyBridges w/ball squeeze 2x10    manual therapy techniques:  for 0 minutes, including:  --    neuromuscular re-education activities to improve: Balance, Coordination, Kinesthetic, Sense and Proprioception for 0 minutes. The following activities were included:    Deferred:  Seated gaze stabilization 1x 10 (VOR 1)  Standing on level floor gaze stabilization 2x 10 up/down (VOR x1)  Standing on foam gaze stabilization up/down 2x 10 (VORx 1)  Standing on foam gaze stabilization r/L 2x 10 (VOR x 1)  Follow the ball eye and head with pt lifting ball knuckle to overhead 2x 8 then R/L 2x 8    supervised modalities after being cleared for contradictions:     hot pack for 0 minutes to --.    cold pack for 0 minutes to --.      Patient Education and Home Exercises     Home Exercises Provided and Patient Education Provided     Education provided:   - HEP review    Written Home Exercises Provided: Patient instructed to cont prior HEP. Exercises were reviewed and Harman was able to demonstrate them prior to the end of the session.  Harman demonstrated fair  understanding of the education provided. He will have family assistance.  See EMR under Patient Instructions for exercises provided during therapy sessions    ASSESSMENT     Harman presented to therapy without a cane still but with a knee brace a mild antalgic gait. Pt had an injection so we did less loaded therex today and more open chain work. PT noted the lateral hips are very weak with  abduction and he still walks with mild knee flexion so PT continued tasks to increase posterior chain activation, calf ROM and hip extension strength to help clear the foot when walking. PT added in more side lying work but he will need considerable assistance to progress this as he needs nearly maximal cues and is quick to fatigue.    Pt is progressing well and should be ready for d/c in 1-2 visits as discussed with family unless issues arise from the recent injection. Pt is still lagging in full knee extension with gait and shuffles at times but this is improved.     He was mildly fatigued end of session.   Harman Is progressing well towards his goals.   Pt prognosis is Good.     Pt will continue to benefit from skilled outpatient physical therapy to address the deficits listed in the problem list box on initial evaluation, provide pt/family education and to maximize pt's level of independence in the home and community environment.     Pt's spiritual, cultural and educational needs considered and pt agreeable to plan of care and goals.     Anticipated barriers to physical therapy: none    Goals:   Short Term Goals: In 4 weeks:  1.Pt to be educated on HEP. met  2.Patient to demo increased AROM to 5 degrees with hip extension. met  3.Patient to increase strength to allow for 10 reps or better on the sit to stand test in 30 sec. progressing  4.Patient to have decreased pain to 3/10 or better. progressing  5.Patient to increase LE balance to allow for modified CTSIB score of 4/4.  6.Patient to improve score on the FOTO by 10%.        Long Term Goals: In 10 weeks 9/5/22  1. Patient to perform daily activities including walking up/down steps without a hand rail up to 2 flights and rising from floor to stand without family support.  2. Patient to demonstrate increased ankle DF to 8 degrees or better.  3. Patient to demonstrate increased LE strength to 4/5 with hip extension .  4. Patient to have decreased pain to 3/10 or  better with kneeling.  5. Patient to improve score on the FOTO to 36% limitation.       PLAN     Plan of care Certification: 6/27/2022 to 9/5/22.     Outpatient Physical Therapy 2 times weekly for 10 weeks to include the following interventions: Cervical/Lumbar Traction, Electrical Stimulation IFC, NMES, Gait Training, Manual Therapy, Moist Heat/ Ice, Neuromuscular Re-ed, Patient Education, Self Care, Therapeutic Activities and Therapeutic Exercise.       Kiesha Musa, PT

## 2022-08-10 ENCOUNTER — TELEPHONE (OUTPATIENT)
Dept: ADMINISTRATIVE | Facility: HOSPITAL | Age: 87
End: 2022-08-10
Payer: MEDICARE

## 2022-08-12 ENCOUNTER — TELEPHONE (OUTPATIENT)
Dept: SPORTS MEDICINE | Facility: CLINIC | Age: 87
End: 2022-08-12
Payer: MEDICARE

## 2022-08-15 ENCOUNTER — OFFICE VISIT (OUTPATIENT)
Dept: ORTHOPEDICS | Facility: CLINIC | Age: 87
End: 2022-08-15
Payer: MEDICARE

## 2022-08-15 VITALS — BODY MASS INDEX: 26.97 KG/M2 | WEIGHT: 177.94 LBS | HEIGHT: 68 IN

## 2022-08-15 DIAGNOSIS — M25.562 LEFT MEDIAL KNEE PAIN: Primary | ICD-10-CM

## 2022-08-15 DIAGNOSIS — M17.0 PRIMARY OSTEOARTHRITIS OF BOTH KNEES: ICD-10-CM

## 2022-08-15 PROCEDURE — 20610 DRAIN/INJ JOINT/BURSA W/O US: CPT | Mod: LT,S$GLB,, | Performed by: FAMILY MEDICINE

## 2022-08-15 PROCEDURE — 99499 NO LOS: ICD-10-PCS | Mod: S$GLB,,, | Performed by: FAMILY MEDICINE

## 2022-08-15 PROCEDURE — 99999 PR PBB SHADOW E&M-EST. PATIENT-LVL III: ICD-10-PCS | Mod: PBBFAC,,, | Performed by: FAMILY MEDICINE

## 2022-08-15 PROCEDURE — 99499 UNLISTED E&M SERVICE: CPT | Mod: S$GLB,,, | Performed by: FAMILY MEDICINE

## 2022-08-15 PROCEDURE — 1159F MED LIST DOCD IN RCRD: CPT | Mod: CPTII,S$GLB,, | Performed by: FAMILY MEDICINE

## 2022-08-15 PROCEDURE — 1101F PT FALLS ASSESS-DOCD LE1/YR: CPT | Mod: CPTII,S$GLB,, | Performed by: FAMILY MEDICINE

## 2022-08-15 PROCEDURE — 3288F FALL RISK ASSESSMENT DOCD: CPT | Mod: CPTII,S$GLB,, | Performed by: FAMILY MEDICINE

## 2022-08-15 PROCEDURE — 20610 LARGE JOINT ASPIRATION/INJECTION: L KNEE: ICD-10-PCS | Mod: LT,S$GLB,, | Performed by: FAMILY MEDICINE

## 2022-08-15 PROCEDURE — 1125F AMNT PAIN NOTED PAIN PRSNT: CPT | Mod: CPTII,S$GLB,, | Performed by: FAMILY MEDICINE

## 2022-08-15 PROCEDURE — 1159F PR MEDICATION LIST DOCUMENTED IN MEDICAL RECORD: ICD-10-PCS | Mod: CPTII,S$GLB,, | Performed by: FAMILY MEDICINE

## 2022-08-15 PROCEDURE — 1101F PR PT FALLS ASSESS DOC 0-1 FALLS W/OUT INJ PAST YR: ICD-10-PCS | Mod: CPTII,S$GLB,, | Performed by: FAMILY MEDICINE

## 2022-08-15 PROCEDURE — 1125F PR PAIN SEVERITY QUANTIFIED, PAIN PRESENT: ICD-10-PCS | Mod: CPTII,S$GLB,, | Performed by: FAMILY MEDICINE

## 2022-08-15 PROCEDURE — 99999 PR PBB SHADOW E&M-EST. PATIENT-LVL III: CPT | Mod: PBBFAC,,, | Performed by: FAMILY MEDICINE

## 2022-08-15 PROCEDURE — 3288F PR FALLS RISK ASSESSMENT DOCUMENTED: ICD-10-PCS | Mod: CPTII,S$GLB,, | Performed by: FAMILY MEDICINE

## 2022-08-15 NOTE — PROCEDURES
Large Joint Aspiration/Injection: L knee    Date/Time: 8/15/2022 3:20 PM  Performed by: Gavino Encinas MD  Authorized by: Gavino Encinas MD     Consent Done?:  Yes (Verbal)  Indications:  Arthritis and pain  Site marked: the procedure site was marked    Timeout: prior to procedure the correct patient, procedure, and site was verified    Prep: patient was prepped and draped in usual sterile fashion    Approach:  Anterolateral  Location:  Knee  Site:  L knee  Medications:  30 mg sodium hyaluronate (orthovisc) 30 mg/2 mL  Patient tolerance:  Patient tolerated the procedure well with no immediate complications

## 2022-08-15 NOTE — PATIENT INSTRUCTIONS
Apply ice to affected area three times a day for (15) fifteen minutes for the next 48 hours, and reduce activity during that time.  Voltaren gel three times a day to affected area if recommended.  Oral medication if recommended.  Physical therapy if recommended at home or at clinic.  Keep recheck visit. Patient Education       Viscosupplementation   Why is this procedure done?   Your joints are where two bones meet. The ends of the bones are covered with a protective coating of cartilage. This helps them glide smoothly during movement. A fluid also helps the joint move more smoothly. With years of use, the cartilage may begin to wear away. This causes pain in the joints. This procedure is done to relieve the pain. A special fluid is used to help the bones glide more easily. It also acts like a shock absorber. This is most often done on the knee joints.  What will the results be?   Lubricates the joint  Less pain in the joints during movement or weight bearing  Improved joint mobility or movement  What happens before the procedure?   Your doctor may ask you to try other ways to treat osteoarthritis or OA, such as exercise, physical therapy, drugs for pain, applying hot compress, and losing weight.  Talk to your doctor about all the drugs you are taking. Be sure to include all prescription and over-the-counter (OTC) drugs, and herbal supplements. Tell the doctor about any drug allergy. Bring a list of drugs you take with you. Some drugs may interact with the injection.  What happens during the procedure?   Your doctor will clean the skin area where the injection will be given. You will be given a drug called local anesthesia. This will numb your skin and you will not feel any pain.  In some cases, your doctor might use imaging like x-ray or ultrasound to make sure they inject the right spot.  If you have excess fluid in your joint, your doctor may remove the fluid before beginning.  A needle will be used to inject the  hyaluronic acid into the joint. Hyaluronic acid is a natural fluid in your body. It lubricates the joint to ease body movements.  The doctor will cover the injection site with a small bandage to prevent infection.  The procedure may only take a couple of minutes.  What happens after the procedure?   You may feel slight pain, warmth, and swelling around the joint area.  You will be able to go home after the injection.  What care is needed at home?   Ask your doctor what you need to do when you go home. Make sure you ask questions if you do not understand what the doctor says. This way you will know what you need to do.  Place an ice pack or a bag of frozen peas wrapped in a towel over the painful part. Never put ice right on the skin. Do not leave the ice on more than 10 to 15 minutes at a time.  Rest for the first few days after the procedure. Avoid strenuous activities like heavy lifting, jogging, standing for a long time, and hard exercise.  What follow-up care is needed?   Your doctor may ask you to make visits to the office to check on your progress. Be sure to keep these visits. Your doctor may want you to have more injections.  What lifestyle changes are needed?   Ask your doctor about when you can go back to your normal activities like exercise or work.  What problems could happen?   Pain, redness, and swelling around the injection site  Infection of the joint  Fever  Allergic reaction  Itching  Rash  Bruising  Bleeding in the joint  No change in pain after injection  Where can I learn more?   [employer]  http://orthoinfo.aaos.org/topic.cfm?mkimi=o53404   Last Reviewed Date   2021-03-30  Consumer Information Use and Disclaimer   This information is not specific medical advice and does not replace information you receive from your health care provider. This is only a brief summary of general information. It does NOT include all information about conditions, illnesses, injuries,  tests, procedures, treatments, therapies, discharge instructions or life-style choices that may apply to you. You must talk with your health care provider for complete information about your health and treatment options. This information should not be used to decide whether or not to accept your health care providers advice, instructions or recommendations. Only your health care provider has the knowledge and training to provide advice that is right for you.  Copyright   Copyright © 2021 [employer] and its affiliates and/or licensors. All rights reserved.

## 2022-08-16 ENCOUNTER — CLINICAL SUPPORT (OUTPATIENT)
Dept: REHABILITATION | Facility: HOSPITAL | Age: 87
End: 2022-08-16
Payer: MEDICARE

## 2022-08-16 DIAGNOSIS — M25.562 PAIN IN LATERAL PORTION OF LEFT KNEE: Primary | ICD-10-CM

## 2022-08-16 DIAGNOSIS — R26.9 GAIT DIFFICULTY: ICD-10-CM

## 2022-08-16 DIAGNOSIS — Z74.09 DECREASED FUNCTIONAL MOBILITY AND ENDURANCE: ICD-10-CM

## 2022-08-16 PROCEDURE — 97110 THERAPEUTIC EXERCISES: CPT

## 2022-08-16 NOTE — PROGRESS NOTES
[facility]   Physical Therapist Treatment Note and Discharge Note    Name: Harman Johnston  Clinic Number: [id number]    Therapy Diagnosis:   Encounter Diagnoses   Name Primary?    Pain in lateral portion of left knee Yes    Gait difficulty     Decreased functional mobility and endurance      Physician: Gavino Encinas MD    Visit Date: 8/16/2022   Physician Orders: PT Eval and Treat   Medical Diagnosis from Referral: M17.0 (ICD-10-CM) - Bilateral primary osteoarthritis of knee  Evaluation Date: 6/27/2022  Authorization Period Expiration: 6/30/23  Plan of Care Expiration: 9/5/22  Visit # / Visits authorized: 10/ 40     FOTO 3/3 (21% limitation)      Precautions: Standard VERY HARD of HEARING, Kidney disease stage 4 (not on dialysis), HTN    PTA Visit #: 0/5     Time In: 1:00 pm  Time Out: 1:41 pm  Total Billable Time: 39 minutes    SUBJECTIVE     Pt reports: he got an injection in the L knee again and will have another on Friday. Pt feels he is ready for D/C to self care.    He was compliant with home exercise program.   Response to previous treatment: denies pain at this time; mild soreness after therex.  Functional change: no pain in a few days per patient. Pt can't recall the last time the knee hurt as it is getting better    Pain: 0/10   Location: R knee/L knee  OBJECTIVE        CMS Impairment/Limitation/Restriction for FOTO knee Survey     Therapist reviewed FOTO scores for Harman Johnston on 8/16/2022.   FOTO documents entered into [employer] - see Media section.     Limitation Score: 21%         Gait: antalgic with L hip drop when in R single leg stand, pt is shuffling and trips on L LE at times so PT suggested  RW until we can advance him otherwise he was holding onto family members for stability at eval.  Today: no need for an assistive device as stride length is improving and no shuffling noted in two session.     Balance: R LE single leg stand= 1 sec, L LE single leg stand = 3 sec  at eval; Today R 3; L 4    Sit to stand test for 30 sec 7 reps at eval: today 10    Modified CTSIB 3/4, 30 ,30, 30, 11 sec at eval; Today 30,30,30,30 4/4 met                Sensation: Sensation intact to light touch B LE's .          At eval  Today  Knee AROM:                                                  (R)       (L) R L                                      Flexion                         125      125 125 125                                      Extension                    5          8 3 3                                                                                         Trunk flexion loss of balance attempting reaching to the floor from standing at eval; today no loss of balance reaching down but knees are flexed mildly still           At eval  Today  Strength:                                                        R          L R L  Hip flexors L2                          4+/5     4/5 5 5  Quadriceps L3                        4+/5     4/5 5 5                          Hamstrings S1                        4/5       4/5 5 4+                          Dorsiflexion L4                        4/5       4/5        5 5                                     Plantar flexion S1                    2/5       2/5 2+ 2+                           Hip Internal Rotation               3/5       3/5 4 4                           Gluteus Medius L45S1           3/5       3+/5 4 4                          Gluteus Chandan [id number]        3/5       3+/5 4 4                              Objective Measures updated at progress report unless specified.     Treatment     Harman received the treatments listed below:      therapeutic exercises to develop strength, endurance, ROM, flexibility, posture and core stabilization for 39 minutes including:    Bike 5 min  Tail gators 2# 3 min  seated LAQ 2x 10 2# with 3 sec hold at top  Prone hamstring curls 2x 10 2#   Leaning on plinth hip extension 3x10 per leg 2# per leg  Side lying clams with green  theraband 2x 10 L  Heel raises 1x10 then 5x 20 sec hold  Wall push up 2x 10  Standing rows with green theraband 2x 10  Steps x 20          Patient Education and Home Exercises     Home Exercises Provided and Patient Education Provided     Education provided:   - HEP review    Written Home Exercises Provided: Patient instructed to cont prior HEP. Exercises were reviewed and Harman was able to demonstrate them prior to the end of the session.  Harman demonstrated fair  understanding of the education provided. He will have family assistance.  See EMR under Patient Instructions for exercises provided during therapy sessions    ASSESSMENT     Harman presented to therapy without a cane and continues to advance in pain relief and strength . Pt is ready to D/C to his HEP. PT updated appropriately for maintenance at home. Most goals met so PT to D/C to HEP.  Harman Is progressing well towards his goals.     Pt prognosis is Good.     Pt's spiritual, cultural and educational needs considered and pt agreeable to plan of care and goals.     Anticipated barriers to physical therapy: none    Goals:   Short Term Goals: In 4 weeks:  1.Pt to be educated on HEP. met  2.Patient to demo increased AROM to 5 degrees with hip extension. met  3.Patient to increase strength to allow for 10 reps or better on the sit to stand test in 30 sec. met  4.Patient to have decreased pain to 3/10 or better. met  5.Patient to increase LE balance to allow for modified CTSIB score of 4/4. met  6.Patient to improve score on the FOTO by 10%. met        Long Term Goals: In 10 weeks 9/5/22  1. Patient to perform daily activities including walking up/down steps without a hand rail up to 2 flights and rising from floor to stand without family support. met  2. Patient to demonstrate increased ankle DF to 8 degrees or better. met  3. Patient to demonstrate increased LE strength to 4/5 with hip extension .met  4. Patient to have decreased pain to 3/10 or better with  kneeling. Not met  5. Patient to improve score on the FOTO to 36% limitation.met       PLAN   D/C to self care.        Kiesha Musa, PT

## 2022-08-18 ENCOUNTER — CLINICAL SUPPORT (OUTPATIENT)
Dept: AUDIOLOGY | Facility: CLINIC | Age: 87
End: 2022-08-18
Payer: MEDICARE

## 2022-08-18 DIAGNOSIS — Z46.1 FITTING AND ADJUSTMENT OF RIGHT HEARING AID: Primary | ICD-10-CM

## 2022-08-18 NOTE — PROGRESS NOTES
Harman CARDOSO Preston was seen 08/18/2022 for hearing aid repair. Earmold tubing keeps slipping off E earhook for the past one month. Last tubing change was three months ago in May with Westone #13T Dry-tube. EM was made with Katelyn 13H tube. Tubing is not hardened. Re-tubed earmold using Westone #13T pre-formed tubing. Hearing aid is in [facility]. Patient will return as needed.

## 2022-08-29 ENCOUNTER — OFFICE VISIT (OUTPATIENT)
Dept: ORTHOPEDICS | Facility: CLINIC | Age: 87
End: 2022-08-29
Payer: MEDICARE

## 2022-08-29 VITALS — HEIGHT: 68 IN | WEIGHT: 177.94 LBS | BODY MASS INDEX: 26.97 KG/M2

## 2022-08-29 DIAGNOSIS — M17.0 PRIMARY OSTEOARTHRITIS OF BOTH KNEES: Primary | ICD-10-CM

## 2022-08-29 PROCEDURE — 3288F PR FALLS RISK ASSESSMENT DOCUMENTED: ICD-10-PCS | Mod: CPTII,S$GLB,, | Performed by: FAMILY MEDICINE

## 2022-08-29 PROCEDURE — 1126F AMNT PAIN NOTED NONE PRSNT: CPT | Mod: CPTII,S$GLB,, | Performed by: FAMILY MEDICINE

## 2022-08-29 PROCEDURE — 99999 PR PBB SHADOW E&M-EST. PATIENT-LVL III: CPT | Mod: PBBFAC,,, | Performed by: FAMILY MEDICINE

## 2022-08-29 PROCEDURE — 99499 NO LOS: ICD-10-PCS | Mod: S$GLB,,, | Performed by: FAMILY MEDICINE

## 2022-08-29 PROCEDURE — 1101F PR PT FALLS ASSESS DOC 0-1 FALLS W/OUT INJ PAST YR: ICD-10-PCS | Mod: CPTII,S$GLB,, | Performed by: FAMILY MEDICINE

## 2022-08-29 PROCEDURE — 1159F MED LIST DOCD IN RCRD: CPT | Mod: CPTII,S$GLB,, | Performed by: FAMILY MEDICINE

## 2022-08-29 PROCEDURE — 20610 LARGE JOINT ASPIRATION/INJECTION: L KNEE: ICD-10-PCS | Mod: LT,S$GLB,, | Performed by: FAMILY MEDICINE

## 2022-08-29 PROCEDURE — 1159F PR MEDICATION LIST DOCUMENTED IN MEDICAL RECORD: ICD-10-PCS | Mod: CPTII,S$GLB,, | Performed by: FAMILY MEDICINE

## 2022-08-29 PROCEDURE — 20610 DRAIN/INJ JOINT/BURSA W/O US: CPT | Mod: LT,S$GLB,, | Performed by: FAMILY MEDICINE

## 2022-08-29 PROCEDURE — 1126F PR PAIN SEVERITY QUANTIFIED, NO PAIN PRESENT: ICD-10-PCS | Mod: CPTII,S$GLB,, | Performed by: FAMILY MEDICINE

## 2022-08-29 PROCEDURE — 99499 UNLISTED E&M SERVICE: CPT | Mod: S$GLB,,, | Performed by: FAMILY MEDICINE

## 2022-08-29 PROCEDURE — 99999 PR PBB SHADOW E&M-EST. PATIENT-LVL III: ICD-10-PCS | Mod: PBBFAC,,, | Performed by: FAMILY MEDICINE

## 2022-08-29 PROCEDURE — 1101F PT FALLS ASSESS-DOCD LE1/YR: CPT | Mod: CPTII,S$GLB,, | Performed by: FAMILY MEDICINE

## 2022-08-29 PROCEDURE — 3288F FALL RISK ASSESSMENT DOCD: CPT | Mod: CPTII,S$GLB,, | Performed by: FAMILY MEDICINE

## 2022-08-29 NOTE — PROCEDURES
Large Joint Aspiration/Injection: L knee    Date/Time: 8/29/2022 1:20 PM  Performed by: Gavino Encinas MD  Authorized by: Gavino Encinas MD     Consent Done?:  Yes (Verbal)  Indications:  Arthritis and pain  Site marked: the procedure site was marked    Timeout: prior to procedure the correct patient, procedure, and site was verified    Prep: patient was prepped and draped in usual sterile fashion    Approach:  Anterolateral  Location:  Knee  Site:  L knee  Medications:  30 mg sodium hyaluronate (orthovisc) 30 mg/2 mL  Patient tolerance:  Patient tolerated the procedure well with no immediate complications

## 2022-09-07 ENCOUNTER — PATIENT MESSAGE (OUTPATIENT)
Dept: OPHTHALMOLOGY | Facility: CLINIC | Age: 87
End: 2022-09-07

## 2022-09-07 ENCOUNTER — OFFICE VISIT (OUTPATIENT)
Dept: OPHTHALMOLOGY | Facility: CLINIC | Age: 87
End: 2022-09-07
Payer: MEDICARE

## 2022-09-07 DIAGNOSIS — H52.223 REGULAR ASTIGMATISM OF BOTH EYES: ICD-10-CM

## 2022-09-07 DIAGNOSIS — Z96.1 PSEUDOPHAKIA OF BOTH EYES: Primary | ICD-10-CM

## 2022-09-07 DIAGNOSIS — H31.012 MACULAR SCAR, LEFT: ICD-10-CM

## 2022-09-07 DIAGNOSIS — D31.31 CHOROIDAL NEVUS, RIGHT: ICD-10-CM

## 2022-09-07 PROCEDURE — 1159F MED LIST DOCD IN RCRD: CPT | Mod: CPTII,S$GLB,, | Performed by: OPTOMETRIST

## 2022-09-07 PROCEDURE — 92014 COMPRE OPH EXAM EST PT 1/>: CPT | Mod: S$GLB,,, | Performed by: OPTOMETRIST

## 2022-09-07 PROCEDURE — 92014 PR EYE EXAM, EST PATIENT,COMPREHESV: ICD-10-PCS | Mod: S$GLB,,, | Performed by: OPTOMETRIST

## 2022-09-07 PROCEDURE — 92015 PR REFRACTION: ICD-10-PCS | Mod: S$GLB,,, | Performed by: OPTOMETRIST

## 2022-09-07 PROCEDURE — 99999 PR PBB SHADOW E&M-EST. PATIENT-LVL II: CPT | Mod: PBBFAC,,, | Performed by: OPTOMETRIST

## 2022-09-07 PROCEDURE — 99999 PR PBB SHADOW E&M-EST. PATIENT-LVL II: ICD-10-PCS | Mod: PBBFAC,,, | Performed by: OPTOMETRIST

## 2022-09-07 PROCEDURE — 92015 DETERMINE REFRACTIVE STATE: CPT | Mod: S$GLB,,, | Performed by: OPTOMETRIST

## 2022-09-07 PROCEDURE — 1159F PR MEDICATION LIST DOCUMENTED IN MEDICAL RECORD: ICD-10-PCS | Mod: CPTII,S$GLB,, | Performed by: OPTOMETRIST

## 2022-09-07 NOTE — PROGRESS NOTES
HPI    No visual complaints  Last eye exam 08/23/2021 TRF/////TRF.  Update glasses RX.  Last edited by Hannah Felix MA on 9/7/2022 10:17 AM.            Assessment /Plan     For exam results, see Encounter Report.    Pseudophakia of both eyes    Choroidal nevus, right    Macular scar, left    Regular astigmatism of both eyes      Stable IOL OU.    Unchanged nevus and macular scar    Dispense Final Rx for glasses.  RTC 1 year  Discussed above and answered questions.

## 2022-09-09 ENCOUNTER — TELEPHONE (OUTPATIENT)
Dept: INTERNAL MEDICINE | Facility: CLINIC | Age: 87
End: 2022-09-09
Payer: MEDICARE

## 2022-09-09 NOTE — TELEPHONE ENCOUNTER
Patient called requesting you orders in  for labs, patient would like to come in for a check up  Please advise

## 2022-09-09 NOTE — TELEPHONE ENCOUNTER
----- Message from Caitlin Brandt sent at 9/9/2022  1:59 PM CDT -----  Regarding: Lab orders  Contact: Patient  Please put orders in  for labs, patient would like to come in for a check up. Please call to schedule at [phone] .[phone] (home)

## 2022-09-16 ENCOUNTER — OFFICE VISIT (OUTPATIENT)
Dept: DERMATOLOGY | Facility: CLINIC | Age: 87
End: 2022-09-16
Payer: MEDICARE

## 2022-09-16 DIAGNOSIS — L30.9 ECZEMA, UNSPECIFIED TYPE: Primary | ICD-10-CM

## 2022-09-16 PROCEDURE — 99203 OFFICE O/P NEW LOW 30 MIN: CPT | Mod: S$GLB,,, | Performed by: STUDENT IN AN ORGANIZED HEALTH CARE EDUCATION/TRAINING PROGRAM

## 2022-09-16 PROCEDURE — 1159F PR MEDICATION LIST DOCUMENTED IN MEDICAL RECORD: ICD-10-PCS | Mod: CPTII,S$GLB,, | Performed by: STUDENT IN AN ORGANIZED HEALTH CARE EDUCATION/TRAINING PROGRAM

## 2022-09-16 PROCEDURE — 99999 PR PBB SHADOW E&M-EST. PATIENT-LVL II: ICD-10-PCS | Mod: PBBFAC,,, | Performed by: STUDENT IN AN ORGANIZED HEALTH CARE EDUCATION/TRAINING PROGRAM

## 2022-09-16 PROCEDURE — 99203 PR OFFICE/OUTPT VISIT, NEW, LEVL III, 30-44 MIN: ICD-10-PCS | Mod: S$GLB,,, | Performed by: STUDENT IN AN ORGANIZED HEALTH CARE EDUCATION/TRAINING PROGRAM

## 2022-09-16 PROCEDURE — 1160F RVW MEDS BY RX/DR IN RCRD: CPT | Mod: CPTII,S$GLB,, | Performed by: STUDENT IN AN ORGANIZED HEALTH CARE EDUCATION/TRAINING PROGRAM

## 2022-09-16 PROCEDURE — 99999 PR PBB SHADOW E&M-EST. PATIENT-LVL II: CPT | Mod: PBBFAC,,, | Performed by: STUDENT IN AN ORGANIZED HEALTH CARE EDUCATION/TRAINING PROGRAM

## 2022-09-16 PROCEDURE — 1160F PR REVIEW ALL MEDS BY PRESCRIBER/CLIN PHARMACIST DOCUMENTED: ICD-10-PCS | Mod: CPTII,S$GLB,, | Performed by: STUDENT IN AN ORGANIZED HEALTH CARE EDUCATION/TRAINING PROGRAM

## 2022-09-16 PROCEDURE — 1159F MED LIST DOCD IN RCRD: CPT | Mod: CPTII,S$GLB,, | Performed by: STUDENT IN AN ORGANIZED HEALTH CARE EDUCATION/TRAINING PROGRAM

## 2022-09-16 RX ORDER — BETAMETHASONE VALERATE 1.2 MG/G
OINTMENT TOPICAL 2 TIMES DAILY
Qty: 90 G | Refills: 1 | Status: SHIPPED | OUTPATIENT
Start: 2022-09-16 | End: 2022-10-14 | Stop reason: SDUPTHER

## 2022-09-16 NOTE — PROGRESS NOTES
Patient Information  Name: Harman Johnston  : 1934  MRN: [id number]     Referring Physician:  Dr. Acosta   Primary Care Physician:  Dr. Bill Garcia MD   Date of Visit: 2022      Subjective:       Harman Johnston is a 87 y.o. male who presents for   Chief Complaint   Patient presents with    Rash     On stomach and back. X months. Tx over the counter treatment, triamcinolone cream      HPI  Patient with new complaint of lesion(s)  Location: chest, back  Duration: 2 months  Symptoms: itchy  Relieving factors/Previous treatments: TAC cream     Patient was last seen:Visit date not found     Prior notes by myself reviewed.   Clinical documentation obtained by nursing staff reviewed.    Review of Systems   Skin:  Positive for itching and rash.      Objective:    Physical Exam   Constitutional: He appears well-developed and well-nourished. No distress.   Neurological: He is alert and oriented to person, place, and time. He is not disoriented.   Psychiatric: He has a normal mood and affect.   Skin:   Areas Examined (abnormalities noted in diagram):   Chest / Axilla Inspection Performed  Back Inspection Performed            Diagram Legend     Erythematous scaling macule/papule c/w actinic keratosis       Vascular papule c/w angioma      Pigmented verrucoid papule/plaque c/w seborrheic keratosis      Yellow umbilicated papule c/w sebaceous hyperplasia      Irregularly shaped tan macule c/w lentigo     1-2 mm smooth white papules consistent with Milia      Movable subcutaneous cyst with punctum c/w epidermal inclusion cyst      Subcutaneous movable cyst c/w pilar cyst      Firm pink to brown papule c/w dermatofibroma      Pedunculated fleshy papule(s) c/w skin tag(s)      Evenly pigmented macule c/w junctional nevus     Mildly variegated pigmented, slightly irregular-bordered macule c/w mildly atypical nevus      Flesh colored to evenly pigmented papule c/w intradermal nevus       Pink pearly papule/plaque c/w basal  cell carcinoma      Erythematous hyperkeratotic cursted plaque c/w SCC      Surgical scar with no sign of skin cancer recurrence      Open and closed comedones      Inflammatory papules and pustules      Verrucoid papule consistent consistent with wart     Erythematous eczematous patches and plaques     Dystrophic onycholytic nail with subungual debris c/w onychomycosis     Umbilicated papule    Erythematous-base heme-crusted tan verrucoid plaque consistent with inflamed seborrheic keratosis     Erythematous Silvery Scaling Plaque c/w Psoriasis     See annotation      No images are attached to the encounter or orders placed in the encounter.    [] Data reviewed  [] Independent review of test  [] Management discussed with another provider    Assessment / Plan:        Eczema, unspecified type  -     betamethasone valerate 0.1% (VALISONE) 0.1 % Oint; Apply topically 2 (two) times daily.  Dispense: 90 g; Refill: 1  Counseling on topical steroids:  Patient counseled that the prolonged use of topical steroids can result in the increased appearance superficial blood vessels (telangiectasias) lightening (hypopigmentation), and   thinning of the skin ( atrophy).  Patient understands to avoid using high potency steroids in skin folds, the groin or the face.  The patient verbalized understanding of proper use and possible adverse effects of topical steroids.  All patient's questions and concerns were addressed.  - Dry skin care discussed         LOS NUMBER AND COMPLEXITY OF PROBLEMS    COMPLEXITY OF DATA RISK TOTAL TIME (m)   68196  91906 [] 1 self-limited or minor problem [x] Minimal to none [] No treatment recommended or patient to monitor 15-29  10-19   35676  30337 Low  [] 2 or > self limited or minor problems  [] 1 stable chronic illness  [x] 1 acute, uncomplicated illness or injury Limited (2)  [] Prior external notes from each unique source  [] Review result of each unique test  [] Order each unique test []  Low  OTC  medications, minor skin biopsy 30-44  20-29   01089  43271 Moderate  []  1 or > chronic illness with progression, exacerbation or SE of treatment  []  2 or more stable chronic illnesses  []  1 acute illness with systemic symptoms  []  1 acute complicated injury  []  1 undiagnosed new problem with uncertain prognosis Moderate (1/3 below)  []  3 or more data items        *Now includes assessment requiring independent historian  []  Independent interpretation of a test  []  Discuss management/test with another provider Moderate  [x]  Prescription drug mgmt  []  Minor surgery with risk discussed  []  Mgmt limited by social determinates 45-59  30-39   19796  17710 High  []  1 or more chronic illness with severe exacerbation, progression or SE of treatment  []  1 acute or chronic illness/injury that poses a threat to life or bodily function Extensive (2/3 below)  []  3 or more data items        *Now includes assessment requiring independent historian.  []  Independent interpretation of a test  []  Discuss management/test with another provider High  []  Major surgery with risk discussed  []  Drug therapy requiring intensive monitoring for toxicity  []  Hospitalization  []  Decision for DNR 60-74  40-54      No follow-ups on file.    Corie Hernandes MD, FAAD  [facility]

## 2022-09-27 ENCOUNTER — LAB VISIT (OUTPATIENT)
Dept: LAB | Facility: HOSPITAL | Age: 87
End: 2022-09-27
Attending: INTERNAL MEDICINE
Payer: MEDICARE

## 2022-09-27 DIAGNOSIS — I10 ESSENTIAL HYPERTENSION: Chronic | ICD-10-CM

## 2022-09-27 LAB
ALBUMIN SERPL BCP-MCNC: 3.5 G/DL (ref 3.5–5.2)
ALP SERPL-CCNC: 94 U/L (ref 55–135)
ALT SERPL W/O P-5'-P-CCNC: 11 U/L (ref 10–44)
ANION GAP SERPL CALC-SCNC: 11 MMOL/L (ref 8–16)
AST SERPL-CCNC: 20 U/L (ref 10–40)
BILIRUB SERPL-MCNC: 0.7 MG/DL (ref 0.1–1)
BUN SERPL-MCNC: 42 MG/DL (ref 8–23)
CALCIUM SERPL-MCNC: 8.9 MG/DL (ref 8.7–10.5)
CHLORIDE SERPL-SCNC: 113 MMOL/L (ref 95–110)
CHOLEST SERPL-MCNC: 134 MG/DL (ref 120–199)
CHOLEST/HDLC SERPL: 4.2 {RATIO} (ref 2–5)
CO2 SERPL-SCNC: 19 MMOL/L (ref 23–29)
CREAT SERPL-MCNC: 3.4 MG/DL (ref 0.5–1.4)
EST. GFR  (NO RACE VARIABLE): 16.8 ML/MIN/1.73 M^2
GLUCOSE SERPL-MCNC: 81 MG/DL (ref 70–110)
HDLC SERPL-MCNC: 32 MG/DL (ref 40–75)
HDLC SERPL: 23.9 % (ref 20–50)
LDLC SERPL CALC-MCNC: 79.6 MG/DL (ref 63–159)
NONHDLC SERPL-MCNC: 102 MG/DL
POTASSIUM SERPL-SCNC: 4.7 MMOL/L (ref 3.5–5.1)
PROT SERPL-MCNC: 7.3 G/DL (ref 6–8.4)
SODIUM SERPL-SCNC: 143 MMOL/L (ref 136–145)
TRIGL SERPL-MCNC: 112 MG/DL (ref 30–150)
TSH SERPL DL<=0.005 MIU/L-ACNC: 2.15 UIU/ML (ref 0.4–4)

## 2022-09-27 PROCEDURE — 36415 COLL VENOUS BLD VENIPUNCTURE: CPT | Mod: PO | Performed by: INTERNAL MEDICINE

## 2022-09-27 PROCEDURE — 80061 LIPID PANEL: CPT | Performed by: INTERNAL MEDICINE

## 2022-09-27 PROCEDURE — 80053 COMPREHEN METABOLIC PANEL: CPT | Performed by: INTERNAL MEDICINE

## 2022-09-27 PROCEDURE — 84443 ASSAY THYROID STIM HORMONE: CPT | Performed by: INTERNAL MEDICINE

## 2022-10-03 ENCOUNTER — OFFICE VISIT (OUTPATIENT)
Dept: INTERNAL MEDICINE | Facility: CLINIC | Age: 87
End: 2022-10-03
Payer: MEDICARE

## 2022-10-03 VITALS
DIASTOLIC BLOOD PRESSURE: 68 MMHG | BODY MASS INDEX: 27.63 KG/M2 | HEART RATE: 55 BPM | HEIGHT: 68 IN | OXYGEN SATURATION: 95 % | TEMPERATURE: 98 F | WEIGHT: 182.31 LBS | SYSTOLIC BLOOD PRESSURE: 118 MMHG

## 2022-10-03 DIAGNOSIS — N25.81 SECONDARY HYPERPARATHYROIDISM OF RENAL ORIGIN: ICD-10-CM

## 2022-10-03 DIAGNOSIS — N18.4 CKD (CHRONIC KIDNEY DISEASE), STAGE IV: ICD-10-CM

## 2022-10-03 DIAGNOSIS — I70.0 ATHEROSCLEROSIS OF AORTA: ICD-10-CM

## 2022-10-03 DIAGNOSIS — Z00.00 ROUTINE GENERAL MEDICAL EXAMINATION AT A HEALTH CARE FACILITY: Primary | ICD-10-CM

## 2022-10-03 DIAGNOSIS — Z86.010 HISTORY OF COLON POLYPS: ICD-10-CM

## 2022-10-03 DIAGNOSIS — I10 ESSENTIAL HYPERTENSION: Chronic | ICD-10-CM

## 2022-10-03 DIAGNOSIS — R97.20 ELEVATED PSA: ICD-10-CM

## 2022-10-03 DIAGNOSIS — E78.2 MIXED HYPERLIPIDEMIA: Chronic | ICD-10-CM

## 2022-10-03 PROCEDURE — 90694 FLU VACCINE - QUADRIVALENT - ADJUVANTED: ICD-10-PCS | Mod: S$GLB,,, | Performed by: INTERNAL MEDICINE

## 2022-10-03 PROCEDURE — 90694 VACC AIIV4 NO PRSRV 0.5ML IM: CPT | Mod: S$GLB,,, | Performed by: INTERNAL MEDICINE

## 2022-10-03 PROCEDURE — 1159F PR MEDICATION LIST DOCUMENTED IN MEDICAL RECORD: ICD-10-PCS | Mod: CPTII,S$GLB,, | Performed by: INTERNAL MEDICINE

## 2022-10-03 PROCEDURE — 99999 PR PBB SHADOW E&M-EST. PATIENT-LVL III: CPT | Mod: PBBFAC,,, | Performed by: INTERNAL MEDICINE

## 2022-10-03 PROCEDURE — 1126F AMNT PAIN NOTED NONE PRSNT: CPT | Mod: CPTII,S$GLB,, | Performed by: INTERNAL MEDICINE

## 2022-10-03 PROCEDURE — 1159F MED LIST DOCD IN RCRD: CPT | Mod: CPTII,S$GLB,, | Performed by: INTERNAL MEDICINE

## 2022-10-03 PROCEDURE — 3288F PR FALLS RISK ASSESSMENT DOCUMENTED: ICD-10-PCS | Mod: CPTII,S$GLB,, | Performed by: INTERNAL MEDICINE

## 2022-10-03 PROCEDURE — 1101F PR PT FALLS ASSESS DOC 0-1 FALLS W/OUT INJ PAST YR: ICD-10-PCS | Mod: CPTII,S$GLB,, | Performed by: INTERNAL MEDICINE

## 2022-10-03 PROCEDURE — 99999 PR PBB SHADOW E&M-EST. PATIENT-LVL III: ICD-10-PCS | Mod: PBBFAC,,, | Performed by: INTERNAL MEDICINE

## 2022-10-03 PROCEDURE — 1126F PR PAIN SEVERITY QUANTIFIED, NO PAIN PRESENT: ICD-10-PCS | Mod: CPTII,S$GLB,, | Performed by: INTERNAL MEDICINE

## 2022-10-03 PROCEDURE — G0008 FLU VACCINE - QUADRIVALENT - ADJUVANTED: ICD-10-PCS | Mod: S$GLB,,, | Performed by: INTERNAL MEDICINE

## 2022-10-03 PROCEDURE — G0008 ADMIN INFLUENZA VIRUS VAC: HCPCS | Mod: S$GLB,,, | Performed by: INTERNAL MEDICINE

## 2022-10-03 PROCEDURE — 99397 PR PREVENTIVE VISIT,EST,65 & OVER: ICD-10-PCS | Mod: 25,S$GLB,, | Performed by: INTERNAL MEDICINE

## 2022-10-03 PROCEDURE — 3288F FALL RISK ASSESSMENT DOCD: CPT | Mod: CPTII,S$GLB,, | Performed by: INTERNAL MEDICINE

## 2022-10-03 PROCEDURE — 1101F PT FALLS ASSESS-DOCD LE1/YR: CPT | Mod: CPTII,S$GLB,, | Performed by: INTERNAL MEDICINE

## 2022-10-03 PROCEDURE — 99397 PER PM REEVAL EST PAT 65+ YR: CPT | Mod: 25,S$GLB,, | Performed by: INTERNAL MEDICINE

## 2022-10-03 NOTE — PROGRESS NOTES
"HPI:  Patient is an 88-year-old man who comes in today for follow-up of his hypertension, lipids, chronic kidney disease and for his annual physical.  He is doing about the same.  There has been no major changes.  He is avoiding all NSAIDs at this time.  He only takes Tylenol for his arthritis and aches and pains.  Her blood pressures been well controlled at home.      Current MEDS: medcard review, verified and update  Allergies: Per the electronic medical record    Past Medical History:   Diagnosis Date    BPH (benign prostatic hyperplasia)     Cataract     CKD (chronic kidney disease), stage IV 07/15/2016    Elevated PSA     HEARING LOSS     History of colon polyps     Hyperlipemia     Hypertension     Myocardial infarction     per ekg and nuclear ett - old scar    Primary osteoarthritis of both knees 6/20/2022    Secondary hyperparathyroidism of renal origin     Tobacco dependence     resolved       Past Surgical History:   Procedure Laterality Date    APPENDECTOMY  1960    CATARACT EXTRACTION W/  INTRAOCULAR LENS IMPLANT Left 4-20-16    CATARACT EXTRACTION W/  INTRAOCULAR LENS IMPLANT Right 3-16-16    EAR MASTOIDECTOMY W/ COCHLEAR IMPLANT W/ LANDMARK  2011    failed later removed    EYE SURGERY Bilateral 2016    cataract w/ IOL    PCIOL OD Right 03/`16/16    DR. CARPIO    TONSILLECTOMY         SHx: per the electronic medical record    FHx: recorded in the electronic medical record    ROS:    denies any chest pains or shortness of breath. Denies any nausea, vomiting or diarrhea. Denies any fever, chills or sweats. Denies any change in weight, voice, stool, skin or hair. Denies any dysuria, dyspepsia or dysphagia. Denies any change in vision, hearing or headaches. Denies any swollen lymph nodes or loss of memory.    PE:  /68   Pulse (!) 55   Temp 97.6 °F (36.4 °C) (Temporal)   Ht 5' 8" (1.727 m)   Wt 82.7 kg (182 lb 5.1 oz)   SpO2 95%   BMI 27.72 kg/m²   Gen: Well-developed, well-nourished, male, in " no acute distress, oriented x3  HEENT: neck is supple, no adenopathy, carotids 2+ equal without bruits, thyroid exam normal size without nodules.  CHEST: clear to auscultation and percussion  CVS: regular rate and rhythm without significant murmur, gallop, or rubs  ABD: soft, benign, no rebound no guarding, no distention.  Bowel sounds are normal.     nontender.  No palpable masses.  No organomegaly and no audible bruits.  RECTAL:  Deferred.  EXT: no clubbing, cyanosis, or edema  LYMPH: no cervical, inguinal, or axillary adenopathy  FEET: no loss of sensation.  No ulcers or pressure sores.  NEURO: gait normal.  Cranial nerves II- XII intact. No nystagmus.  Speech normal.   Gross motor and sensory unremarkable.    Lab Results   Component Value Date    WBC 8.82 06/03/2022    HGB 13.2 (L) 06/03/2022    HCT 41.3 06/03/2022     06/03/2022    CHOL 134 09/27/2022    TRIG 112 09/27/2022    HDL 32 (L) 09/27/2022    ALT 11 09/27/2022    AST 20 09/27/2022     09/27/2022    K 4.7 09/27/2022     (H) 09/27/2022    CREATININE 3.4 (H) 09/27/2022    BUN 42 (H) 09/27/2022    CO2 19 (L) 09/27/2022    TSH 2.153 09/27/2022    PSA 5.3 (H) 02/10/2021       Impression:  Stable problems below  Patient Active Problem List   Diagnosis    BPH (benign prostatic hyperplasia)    Hyperlipemia    Essential hypertension    Elevated PSA    Choroidal nevus, right eye    Macular chorioretinal scar of left eye    SNHL (sensorineural hearing loss)s/p implant on rt    Atherosclerosis of aorta    History of colon polyps    Bilateral hearing loss    Senile ectropion of right lower eyelid    Dry eye    Blepharitis of upper and lower eyelids of both eyes    CKD (chronic kidney disease), stage IV    Secondary hyperparathyroidism of renal origin    Primary osteoarthritis of both knees    Gait difficulty    Decreased functional mobility and endurance       Plan:   Orders Placed This Encounter    Influenza - Quadrivalent (Adjuvanted)     Comprehensive Metabolic Panel    TSH    Lipid Panel   He is given flu shot today.  He will see me again in 6 months with above lab work.  Medications remain same    This note is generated with speech recognition software and is subject to transcription error and sound alike phrases that may be missed by proofreading.

## 2022-10-14 ENCOUNTER — OFFICE VISIT (OUTPATIENT)
Dept: DERMATOLOGY | Facility: CLINIC | Age: 87
End: 2022-10-14
Payer: MEDICARE

## 2022-10-14 DIAGNOSIS — L30.9 ECZEMA, UNSPECIFIED TYPE: ICD-10-CM

## 2022-10-14 PROCEDURE — 1159F PR MEDICATION LIST DOCUMENTED IN MEDICAL RECORD: ICD-10-PCS | Mod: CPTII,S$GLB,, | Performed by: STUDENT IN AN ORGANIZED HEALTH CARE EDUCATION/TRAINING PROGRAM

## 2022-10-14 PROCEDURE — 1159F MED LIST DOCD IN RCRD: CPT | Mod: CPTII,S$GLB,, | Performed by: STUDENT IN AN ORGANIZED HEALTH CARE EDUCATION/TRAINING PROGRAM

## 2022-10-14 PROCEDURE — 1160F PR REVIEW ALL MEDS BY PRESCRIBER/CLIN PHARMACIST DOCUMENTED: ICD-10-PCS | Mod: CPTII,S$GLB,, | Performed by: STUDENT IN AN ORGANIZED HEALTH CARE EDUCATION/TRAINING PROGRAM

## 2022-10-14 PROCEDURE — 99999 PR PBB SHADOW E&M-EST. PATIENT-LVL II: CPT | Mod: PBBFAC,,, | Performed by: STUDENT IN AN ORGANIZED HEALTH CARE EDUCATION/TRAINING PROGRAM

## 2022-10-14 PROCEDURE — 99213 OFFICE O/P EST LOW 20 MIN: CPT | Mod: S$GLB,,, | Performed by: STUDENT IN AN ORGANIZED HEALTH CARE EDUCATION/TRAINING PROGRAM

## 2022-10-14 PROCEDURE — 99999 PR PBB SHADOW E&M-EST. PATIENT-LVL II: ICD-10-PCS | Mod: PBBFAC,,, | Performed by: STUDENT IN AN ORGANIZED HEALTH CARE EDUCATION/TRAINING PROGRAM

## 2022-10-14 PROCEDURE — 1160F RVW MEDS BY RX/DR IN RCRD: CPT | Mod: CPTII,S$GLB,, | Performed by: STUDENT IN AN ORGANIZED HEALTH CARE EDUCATION/TRAINING PROGRAM

## 2022-10-14 PROCEDURE — 99213 PR OFFICE/OUTPT VISIT, EST, LEVL III, 20-29 MIN: ICD-10-PCS | Mod: S$GLB,,, | Performed by: STUDENT IN AN ORGANIZED HEALTH CARE EDUCATION/TRAINING PROGRAM

## 2022-10-14 RX ORDER — BETAMETHASONE VALERATE 1.2 MG/G
OINTMENT TOPICAL 2 TIMES DAILY
Qty: 90 G | Refills: 1 | Status: SHIPPED | OUTPATIENT
Start: 2022-10-14

## 2022-10-14 NOTE — PROGRESS NOTES
Patient Information  Name: Harman Johnston  : 1934  MRN: [id number]     Referring Physician:  Dr. Ontiveros ref. provider found   Primary Care Physician:  Dr. Bill Garcia MD   Date of Visit: 10/14/2022      Subjective:       Harman Johnston is a 88 y.o. male who presents for   Chief Complaint   Patient presents with    Follow-up     Rash improving      HPI  Patient with suspected hx of eczema, here for follow up. Has been using betamethasone with vaseline with improvement. No complaints at this time.     Patient was last seen:2022     Prior notes by myself reviewed.   Clinical documentation obtained by nursing staff reviewed.    Review of Systems   Skin:  Negative for itching and rash.      Objective:    Physical Exam   Constitutional: He appears well-developed and well-nourished. No distress.   Neurological: He is alert and oriented to person, place, and time. He is not disoriented.   Psychiatric: He has a normal mood and affect.   Skin:   Areas Examined (abnormalities noted in diagram):   Chest / Axilla Inspection Performed  Back Inspection Performed            Diagram Legend     Erythematous scaling macule/papule c/w actinic keratosis       Vascular papule c/w angioma      Pigmented verrucoid papule/plaque c/w seborrheic keratosis      Yellow umbilicated papule c/w sebaceous hyperplasia      Irregularly shaped tan macule c/w lentigo     1-2 mm smooth white papules consistent with Milia      Movable subcutaneous cyst with punctum c/w epidermal inclusion cyst      Subcutaneous movable cyst c/w pilar cyst      Firm pink to brown papule c/w dermatofibroma      Pedunculated fleshy papule(s) c/w skin tag(s)      Evenly pigmented macule c/w junctional nevus     Mildly variegated pigmented, slightly irregular-bordered macule c/w mildly atypical nevus      Flesh colored to evenly pigmented papule c/w intradermal nevus       Pink pearly papule/plaque c/w basal cell carcinoma      Erythematous hyperkeratotic cursted  plaque c/w SCC      Surgical scar with no sign of skin cancer recurrence      Open and closed comedones      Inflammatory papules and pustules      Verrucoid papule consistent consistent with wart     Erythematous eczematous patches and plaques     Dystrophic onycholytic nail with subungual debris c/w onychomycosis     Umbilicated papule    Erythematous-base heme-crusted tan verrucoid plaque consistent with inflamed seborrheic keratosis     Erythematous Silvery Scaling Plaque c/w Psoriasis     See annotation      No images are attached to the encounter or orders placed in the encounter.    [] Data reviewed  [] Independent review of test  [] Management discussed with another provider    Assessment / Plan:        Eczema, unspecified type, chronic stable  -     betamethasone valerate 0.1% (VALISONE) 0.1 % Oint; Apply topically 2 (two) times daily.  Dispense: 90 g; Refill: 1  - Recommend continuing vaseline daily and only use topical betamethasone only as needed for itching  Counseling on topical steroids:  Patient counseled that the prolonged use of topical steroids can result in the increased appearance superficial blood vessels (telangiectasias) lightening (hypopigmentation), and   thinning of the skin ( atrophy).  Patient understands to avoid using high potency steroids in skin folds, the groin or the face.  The patient verbalized understanding of proper use and possible adverse effects of topical steroids.  All patient's questions and concerns were addressed.         LOS NUMBER AND COMPLEXITY OF PROBLEMS    COMPLEXITY OF DATA RISK TOTAL TIME (m)   74297  50451 [] 1 self-limited or minor problem [x] Minimal to none [] No treatment recommended or patient to monitor 15-29  10-19   20835  46732 Low  [] 2 or > self limited or minor problems  [x] 1 stable chronic illness  [] 1 acute, uncomplicated illness or injury Limited (2)  [] Prior external notes from each unique source  [] Review result of each unique test  []  Order each unique test []  Low  OTC medications, minor skin biopsy 30-44  20-29   97650  50741 Moderate  []  1 or > chronic illness with progression, exacerbation or SE of treatment  []  2 or more stable chronic illnesses  []  1 acute illness with systemic symptoms  []  1 acute complicated injury  []  1 undiagnosed new problem with uncertain prognosis Moderate (1/3 below)  []  3 or more data items        *Now includes assessment requiring independent historian  []  Independent interpretation of a test  []  Discuss management/test with another provider Moderate  [x]  Prescription drug mgmt  []  Minor surgery with risk discussed  []  Mgmt limited by social determinates 45-59  30-39   92470  98445 High  []  1 or more chronic illness with severe exacerbation, progression or SE of treatment  []  1 acute or chronic illness/injury that poses a threat to life or bodily function Extensive (2/3 below)  []  3 or more data items        *Now includes assessment requiring independent historian.  []  Independent interpretation of a test  []  Discuss management/test with another provider High  []  Major surgery with risk discussed  []  Drug therapy requiring intensive monitoring for toxicity  []  Hospitalization  []  Decision for DNR 60-74  40-54      No follow-ups on file.    Corie Hernandes MD, FAAD  [facility]

## 2022-10-17 ENCOUNTER — IMMUNIZATION (OUTPATIENT)
Dept: PHARMACY | Facility: CLINIC | Age: 87
End: 2022-10-17
Payer: MEDICARE

## 2022-10-17 ENCOUNTER — OFFICE VISIT (OUTPATIENT)
Dept: ORTHOPEDICS | Facility: CLINIC | Age: 87
End: 2022-10-17
Payer: MEDICARE

## 2022-10-17 VITALS — HEIGHT: 68 IN | WEIGHT: 182.31 LBS | BODY MASS INDEX: 27.63 KG/M2

## 2022-10-17 DIAGNOSIS — R26.9 GAIT DIFFICULTY: ICD-10-CM

## 2022-10-17 DIAGNOSIS — M17.0 PRIMARY OSTEOARTHRITIS OF BOTH KNEES: Primary | ICD-10-CM

## 2022-10-17 DIAGNOSIS — Z23 NEED FOR VACCINATION: Primary | ICD-10-CM

## 2022-10-17 PROCEDURE — 1125F PR PAIN SEVERITY QUANTIFIED, PAIN PRESENT: ICD-10-PCS | Mod: CPTII,S$GLB,, | Performed by: FAMILY MEDICINE

## 2022-10-17 PROCEDURE — 99999 PR PBB SHADOW E&M-EST. PATIENT-LVL III: ICD-10-PCS | Mod: PBBFAC,,, | Performed by: FAMILY MEDICINE

## 2022-10-17 PROCEDURE — 99214 OFFICE O/P EST MOD 30 MIN: CPT | Mod: S$GLB,,, | Performed by: FAMILY MEDICINE

## 2022-10-17 PROCEDURE — 99999 PR PBB SHADOW E&M-EST. PATIENT-LVL III: CPT | Mod: PBBFAC,,, | Performed by: FAMILY MEDICINE

## 2022-10-17 PROCEDURE — 3288F PR FALLS RISK ASSESSMENT DOCUMENTED: ICD-10-PCS | Mod: CPTII,S$GLB,, | Performed by: FAMILY MEDICINE

## 2022-10-17 PROCEDURE — 1159F MED LIST DOCD IN RCRD: CPT | Mod: CPTII,S$GLB,, | Performed by: FAMILY MEDICINE

## 2022-10-17 PROCEDURE — 3288F FALL RISK ASSESSMENT DOCD: CPT | Mod: CPTII,S$GLB,, | Performed by: FAMILY MEDICINE

## 2022-10-17 PROCEDURE — 99214 PR OFFICE/OUTPT VISIT, EST, LEVL IV, 30-39 MIN: ICD-10-PCS | Mod: S$GLB,,, | Performed by: FAMILY MEDICINE

## 2022-10-17 PROCEDURE — 1101F PR PT FALLS ASSESS DOC 0-1 FALLS W/OUT INJ PAST YR: ICD-10-PCS | Mod: CPTII,S$GLB,, | Performed by: FAMILY MEDICINE

## 2022-10-17 PROCEDURE — 1159F PR MEDICATION LIST DOCUMENTED IN MEDICAL RECORD: ICD-10-PCS | Mod: CPTII,S$GLB,, | Performed by: FAMILY MEDICINE

## 2022-10-17 PROCEDURE — 1125F AMNT PAIN NOTED PAIN PRSNT: CPT | Mod: CPTII,S$GLB,, | Performed by: FAMILY MEDICINE

## 2022-10-17 PROCEDURE — 1101F PT FALLS ASSESS-DOCD LE1/YR: CPT | Mod: CPTII,S$GLB,, | Performed by: FAMILY MEDICINE

## 2022-10-17 NOTE — PROGRESS NOTES
Subjective:     Patient ID: Harman Johnston is a 88 y.o. male.    Chief Complaint: Pain of the Left Knee      HPI: Patient is being seen for left knee follow up after receiving orthovisc injections at his last office visit on 8/29/22. Feels the injections helped some. Still having some pain when walking for prolonged periods of time. Rating pain 4/10 during today's office visit. Not taking anything by mouth to help with pain relief. Participated and completed physical therapy at the [facility] location.     Accompanied by wife during today's visit.   Does not feel that he needs further help for the pain and says only difficulty he has is when he is turning around while walking the knee feels like it wants to twist and he has to be careful with that.  His braces help but sometimes slipped down and half to be read positioned.    Past Medical History:   Diagnosis Date    BPH (benign prostatic hyperplasia)     Cataract     CKD (chronic kidney disease), stage IV 07/15/2016    Elevated PSA     HEARING LOSS     History of colon polyps     Hyperlipemia     Hypertension     Myocardial infarction     per ekg and nuclear ett - old scar    Primary osteoarthritis of both knees 6/20/2022    Secondary hyperparathyroidism of renal origin     Tobacco dependence     resolved     Past Surgical History:   Procedure Laterality Date    APPENDECTOMY  1960    CATARACT EXTRACTION W/  INTRAOCULAR LENS IMPLANT Left 4-20-16    CATARACT EXTRACTION W/  INTRAOCULAR LENS IMPLANT Right 3-16-16    EAR MASTOIDECTOMY W/ COCHLEAR IMPLANT W/ LANDMARK  2011    failed later removed    EYE SURGERY Bilateral 2016    cataract w/ IOL    PCIOL OD Right 03/`16/16    DR. CARPIO    TONSILLECTOMY       Family History   Problem Relation Age of Onset    Macular degeneration Sister     Cancer Sister         Breast    Breast cancer Sister     Macular degeneration Sister     Cancer Sister         colon    Stroke Mother     Hyperlipidemia Mother     Cancer Brother          stomach    Prostate cancer Neg Hx     Diabetes Neg Hx     Heart disease Neg Hx     Kidney disease Neg Hx     Mental illness Neg Hx     Mental retardation Neg Hx      Social History     Socioeconomic History    Marital status:    Tobacco Use    Smoking status: Never    Smokeless tobacco: Never   Substance and Sexual Activity    Alcohol use: Yes     Alcohol/week: 1.0 standard drink     Types: 1 Cans of beer per week     Comment: weekly    Drug use: No    Sexual activity: Never   Social History Narrative    . 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 He still drives.2 children - in good health. Does not have a Living Will.       Current Outpatient Medications:     aspirin (ECOTRIN) 81 MG EC tablet, Take 81 mg by mouth once daily., Disp: , Rfl:     atenoloL (TENORMIN) 25 MG tablet, TAKE 1 TABLET EVERY DAY, Disp: 90 tablet, Rfl: 3    atorvastatin (LIPITOR) 10 MG tablet, Take 1 tablet (10 mg total) by mouth once daily., Disp: 90 tablet, Rfl: 3    betamethasone valerate 0.1% (VALISONE) 0.1 % Oint, Apply topically 2 (two) times daily., Disp: 90 g, Rfl: 1    dutasteride (AVODART) 0.5 mg capsule, TAKE 1 CAPSULE EVERY DAY, Disp: 90 capsule, Rfl: 3    solifenacin (VESICARE) 5 MG tablet, TAKE 1 TABLET EVERY DAY, Disp: 90 tablet, Rfl: 3    tamsulosin (FLOMAX) 0.4 mg Cap, Take 1 capsule (0.4 mg total) by mouth once daily., Disp: 90 capsule, Rfl: 3    triamcinolone acetonide 0.1% (KENALOG) 0.1 % Lotn, Apply topically 2 (two) times daily., Disp: 60 mL, Rfl: 2  Review of patient's allergies indicates:  No Known Allergies  Review of Systems   Constitutional:  Negative for chills, fever and weight loss.   Respiratory:  Negative for shortness of breath.    Cardiovascular:  Negative for chest pain and palpitations.     Objective:   Body mass index is 27.72 kg/m².  There were no vitals filed for this visit.        Ortho/SPM Exam-thin but well-nourished well-developed alert and oriented pleasant adult male in no acute distress,  ambulatory in here with his wife today.      Respiratory effort normal     Left knee-no acute deformity, chronic bony changes noted     1+ valgus laxity and negative Lachman's    Strength 4/5     Range of motion 0-100 degrees with crepitus     Mild tenderness at the medial joint line     Neurovascular intact     Psychiatric good affect and cognition     Plan-patient will have to take care when walking and turning and should use his brace whenever he is away from home.  Continue home exercise program and plan for Visco supplementation every 6 months since this is helping.  MEDICAL NECESSITY FOR VISCOSUPPLEMENTATION: After thorough evaluation of the patient, I have determined that visco-supplementation is medically necessary. The patient has painful DJD of the knee with failure of conservative therapy including lifestyle modifications and rehabilitation exercises. Oral analgesis/NSAIDs have not adequately controlled symptoms and there is radiographic evidence of joint space narrowing, subchondral sclerosis, and some early osteophytic changes, or in lack of radiographic evidence, there is arthroscopic or other evidence of chondrosis.  Kellgren- Dylan grade 2 or greater.      Patient Instructions   Patient Education       Chronic Knee Pain   About this topic   The knee is a large, complex joint. It is made up of 4 bones: The thigh bone, two lower leg bones, and the kneecap. There is a capsule around the joint. Cartilage acts like a shock absorber in the knee and reduces friction, which helps the knee move more smoothly. The knee also has ligaments and tendons. Ligaments are bands of tissue that join one bone to another. Tendons are bands of tissue that join muscles to the bone. There are many muscles around the knee and in front and back of the thigh. If there is a problem with any of these parts of the joint, you can have pain in your knee.       What are the causes?   Arthritis ? There are a few types of arthritis  "which all cause swelling of a joint. Osteoarthritis is the most common and happens over time with "wear and tear" on the joint.  Overuse ? Repeat motions or too much bending at the knee can lead to pain.  Bursitis ? Bursae are small fluid-filled sacs that help tendons glide easier. These can get swollen and hurt. This often happens to people who do work on their knees, like gardeners, roofers, and carpet layers.  Ligament tear or sprain ? Ligament injuries can make the knee shaky or unstable and painful.  Meniscus tear ? Injuries to the meniscus or cartilage can make your knee lock and cause pain with some movements.  Muscle strain ? Injuries to the muscles near the knee happen often with sports. If these do not heal the right way, ongoing pain can happen.  Patellar tendinopathy ? The tendon that goes over your kneecap can get swollen and hurt due to overuse and repetitive stress on the knee.  Chondromalacia patella ? This is a health problem where there is pain under the kneecap from cartilage being worn.  Dislocating kneecap ? If your kneecap slips out of its groove, pain can happen.  Baker's cyst ? This is a lump that is behind the knee. This is also known as a popliteal cyst.  Hip, ankle, back problems ? Problems in the back and in nearby joints, such as the hip and ankle, can cause pain in the knee.  Fluid collection ? Fluid can collect in the knee joint after a knee injury and lead to pain if not treated.  Osgood-Schlatter disease ? This is a health problem seen in children and teens. The front of the knee below the kneecap is bothered by repeat movements.  Osteochondritis dissecans ? This is a health problem seen in children and teens. There is a problem with the blood flow to the bone and cartilage of the knee.  Plica syndrome ? Plica are folds of tissue that are left over from growth before birth. These can get sore and cause pain.  Patellofemoral pain syndrome - This happens when you have pain in front of " your knee or around or behind your kneecap.  Iliotibial band syndrome - This happens when the iliotibial band, tissues that runs down the outside of the thigh from the hip to the shin, is overused. It causes pain on the outside of the knee.  What are the main signs?   Pain or stiffness  Swelling  Warmth or redness  Visible deformity  Hard to walk, go up or down stairs, or put weight on your leg  Knee locking ? not able to bend or straighten your knee fully  Knee is weak, malik, or gives way  Crunching and popping noises in the knee  How does the doctor diagnose this health problem?   Your doctor will look at your knee. Your doctor will feel all over your knee to find where the pain is. The doctor may move your knee and push or pull on many parts to check your motion and strength. Your doctor may have you stand and walk to see if your knee is stable. The doctor may order:  Blood tests  X-ray  CT or MRI scan  Ultrasound  Surgery ? At times, arthroscopic surgery may be needed to find the cause of the pain.  How does the doctor treat this health problem?   Finding out the true cause of your knee pain is the most important step to fix the problem and lower your pain. Some things that may lessen your knee pain are:  Rest  Ice  Compression  Elevation - keeping the knee raised  Braces or supports  Heat may be used later but not right away. Heat can make swelling worse.  Exercises to stretch and strengthen the knee  Physical therapy (PT)  Surgery  What drugs may be needed?   The doctor may order drugs to:  Help with pain and swelling  Fight an infection  The doctor may give you a shot of an anti-inflammatory drug called a corticosteroid. This will help with swelling. Talk with your doctor about the risks of this shot.  What can be done to prevent this health problem?   If your knee pain is due to overuse, do not do repetitive movements that caused the problem if possible.  Take breaks often when doing things that use  repeat movements.  Do not sit or keep your knee in one position for long periods of time.  If you sleep on your side, use a pillow in between your legs. This can help take stress off of the knee.  Always warm up and stretch before a workout and cool down after.  If you are a runner, actively stretch before a run. Talk to your doctor to make sure you understand the difference between active and passive stretching. Use good ways to train, such as slowly adding to how far you run.  Run on softer surfaces such as a track. This is easier on your knee than a hard surface like cement.  Try activities like swimming or biking rather than running. Running can put a lot of stress on your knee joint.  Stay away from activities that could result in twisting, sudden stops and starts, and blows to the knee. Sports such as basketball, skiing, football, and jogging are some common sports that can lead to knee injuries.  Wear shoes with good support. Replace your shoes often.  Keep a healthy weight. Being too heavy puts more stress on the knee joint. This makes the knee more at risk for injury.  Stay active and work out to keep your muscles strong and flexible.  Where can I learn more?   NHS Choices  http://www.nhs.uk/conditions/knee-pain/Pages/Introduction.aspx   Last Reviewed Date   2020-04-23  Consumer Information Use and Disclaimer   This information is not specific medical advice and does not replace information you receive from your health care provider. This is only a brief summary of general information. It does NOT include all information about conditions, illnesses, injuries, tests, procedures, treatments, therapies, discharge instructions or life-style choices that may apply to you. You must talk with your health care provider for complete information about your health and treatment options. This information should not be used to decide whether or not to accept your health care providers advice, instructions or  recommendations. Only your health care provider has the knowledge and training to provide advice that is right for you.  Copyright   Copyright © 2021 [employer], [employer]. and its affiliates and/or licensors. All rights reserved.  Patient Education       Viscosupplementation   Why is this procedure done?   Your joints are where two bones meet. The ends of the bones are covered with a protective coating of cartilage. This helps them glide smoothly during movement. A fluid also helps the joint move more smoothly. With years of use, the cartilage may begin to wear away. This causes pain in the joints. This procedure is done to relieve the pain. A special fluid is used to help the bones glide more easily. It also acts like a shock absorber. This is most often done on the knee joints.  What will the results be?   Lubricates the joint  Less pain in the joints during movement or weight bearing  Improved joint mobility or movement  What happens before the procedure?   Your doctor may ask you to try other ways to treat osteoarthritis or OA, such as exercise, physical therapy, drugs for pain, applying hot compress, and losing weight.  Talk to your doctor about all the drugs you are taking. Be sure to include all prescription and over-the-counter (OTC) drugs, and herbal supplements. Tell the doctor about any drug allergy. Bring a list of drugs you take with you. Some drugs may interact with the injection.  What happens during the procedure?   Your doctor will clean the skin area where the injection will be given. You will be given a drug called local anesthesia. This will numb your skin and you will not feel any pain.  In some cases, your doctor might use imaging like x-ray or ultrasound to make sure they inject the right spot.  If you have excess fluid in your joint, your doctor may remove the fluid before beginning.  A needle will be used to inject the hyaluronic acid into the joint. Hyaluronic acid is a natural fluid in your body.  It lubricates the joint to ease body movements.  The doctor will cover the injection site with a small bandage to prevent infection.  The procedure may only take a couple of minutes.  What happens after the procedure?   You may feel slight pain, warmth, and swelling around the joint area.  You will be able to go home after the injection.  What care is needed at home?   Ask your doctor what you need to do when you go home. Make sure you ask questions if you do not understand what the doctor says. This way you will know what you need to do.  Place an ice pack or a bag of frozen peas wrapped in a towel over the painful part. Never put ice right on the skin. Do not leave the ice on more than 10 to 15 minutes at a time.  Rest for the first few days after the procedure. Avoid strenuous activities like heavy lifting, jogging, standing for a long time, and hard exercise.  What follow-up care is needed?   Your doctor may ask you to make visits to the office to check on your progress. Be sure to keep these visits. Your doctor may want you to have more injections.  What lifestyle changes are needed?   Ask your doctor about when you can go back to your normal activities like exercise or work.  What problems could happen?   Pain, redness, and swelling around the injection site  Infection of the joint  Fever  Allergic reaction  Itching  Rash  Bruising  Bleeding in the joint  No change in pain after injection  Where can I learn more?   American Academy [employer]  http://orthoinfo.aaos.org/topic.cfm?akgiv=n09696   Last Reviewed Date   2021-03-30  Consumer Information Use and Disclaimer   This information is not specific medical advice and does not replace information you receive from your health care provider. This is only a brief summary of general information. It does NOT include all information about conditions, illnesses, injuries, tests, procedures, treatments, therapies, discharge instructions or life-style  choices that may apply to you. You must talk with your health care provider for complete information about your health and treatment options. This information should not be used to decide whether or not to accept your health care providers advice, instructions or recommendations. Only your health care provider has the knowledge and training to provide advice that is right for you.  Copyright   Copyright © 2021 [employer], [employer]. and its affiliates and/or licensors. All rights reserved.    IMAGING: X-ray Knee Ortho Left with Flexion  Narrative: EXAMINATION:  XR KNEE ORTHO LEFT WITH FLEXION    CLINICAL HISTORY:  . Pain in left knee    TECHNIQUE:  AP standing view of both knees, PA flexion standing views of both knees, and Merchant views of both knees were performed. A lateral view of the left knee was also performed.    COMPARISON:  None    FINDINGS:  No left knee fracture.  Severe left knee medial compartment joint space loss.  There is also mild to moderate osteoarthritis involving the lateral and patellofemoral compartments of the left knee.  No osseous erosion or suspicious osseous lesion.  No left knee effusion identified.  Multiple calcifications project over the posterior aspect of the left knee joint suspicious for multiple loose bodies.  Atherosclerotic calcifications also seen adjacent to the left knee.    Limited evaluation of the right knee reveals mild to moderate medial compartment joint space loss.  Impression: As above.    Electronically signed by: Raheel Hook  Date:    06/20/2022  Time:    14:23       Radiographs / Imaging : Relevant imaging results reviewed by me and interpreted by me, discussed with the patient and / or family -agree with x-ray report      Assessment:     Encounter Diagnoses   Name Primary?    Primary osteoarthritis of both knees Yes    Gait difficulty         Plan:   Primary osteoarthritis of both knees    Gait difficulty      The patient verbalized good understanding of the  medical issues discussed today and expressed appreciation for the care provided.  Patient was given the opportunity to ask questions and be an active participant in their medical care. Patient had no further questions or concerns at this time.     The patient was encouraged to participate in appropriate physical activity.      Gavino Encinas M.D.  [facility]        This note was dictated using voice recognition software and may have sound a like errors.

## 2022-10-17 NOTE — PATIENT INSTRUCTIONS
"Patient Education       Chronic Knee Pain   About this topic   The knee is a large, complex joint. It is made up of 4 bones: The thigh bone, two lower leg bones, and the kneecap. There is a capsule around the joint. Cartilage acts like a shock absorber in the knee and reduces friction, which helps the knee move more smoothly. The knee also has ligaments and tendons. Ligaments are bands of tissue that join one bone to another. Tendons are bands of tissue that join muscles to the bone. There are many muscles around the knee and in front and back of the thigh. If there is a problem with any of these parts of the joint, you can have pain in your knee.       What are the causes?   Arthritis ? There are a few types of arthritis which all cause swelling of a joint. Osteoarthritis is the most common and happens over time with "wear and tear" on the joint.  Overuse ? Repeat motions or too much bending at the knee can lead to pain.  Bursitis ? Bursae are small fluid-filled sacs that help tendons glide easier. These can get swollen and hurt. This often happens to people who do work on their knees, like gardeners, roofers, and carpet layers.  Ligament tear or sprain ? Ligament injuries can make the knee shaky or unstable and painful.  Meniscus tear ? Injuries to the meniscus or cartilage can make your knee lock and cause pain with some movements.  Muscle strain ? Injuries to the muscles near the knee happen often with sports. If these do not heal the right way, ongoing pain can happen.  Patellar tendinopathy ? The tendon that goes over your kneecap can get swollen and hurt due to overuse and repetitive stress on the knee.  Chondromalacia patella ? This is a health problem where there is pain under the kneecap from cartilage being worn.  Dislocating kneecap ? If your kneecap slips out of its groove, pain can happen.  Baker's cyst ? This is a lump that is behind the knee. This is also known as a popliteal cyst.  Hip, ankle, back " problems ? Problems in the back and in nearby joints, such as the hip and ankle, can cause pain in the knee.  Fluid collection ? Fluid can collect in the knee joint after a knee injury and lead to pain if not treated.  Osgood-Schlatter disease ? This is a health problem seen in children and teens. The front of the knee below the kneecap is bothered by repeat movements.  Osteochondritis dissecans ? This is a health problem seen in children and teens. There is a problem with the blood flow to the bone and cartilage of the knee.  Plica syndrome ? Plica are folds of tissue that are left over from growth before birth. These can get sore and cause pain.  Patellofemoral pain syndrome - This happens when you have pain in front of your knee or around or behind your kneecap.  Iliotibial band syndrome - This happens when the iliotibial band, tissues that runs down the outside of the thigh from the hip to the shin, is overused. It causes pain on the outside of the knee.  What are the main signs?   Pain or stiffness  Swelling  Warmth or redness  Visible deformity  Hard to walk, go up or down stairs, or put weight on your leg  Knee locking ? not able to bend or straighten your knee fully  Knee is weak, malik, or gives way  Crunching and popping noises in the knee  How does the doctor diagnose this health problem?   Your doctor will look at your knee. Your doctor will feel all over your knee to find where the pain is. The doctor may move your knee and push or pull on many parts to check your motion and strength. Your doctor may have you stand and walk to see if your knee is stable. The doctor may order:  Blood tests  X-ray  CT or MRI scan  Ultrasound  Surgery ? At times, arthroscopic surgery may be needed to find the cause of the pain.  How does the doctor treat this health problem?   Finding out the true cause of your knee pain is the most important step to fix the problem and lower your pain. Some things that may lessen your  knee pain are:  Rest  Ice  Compression  Elevation - keeping the knee raised  Braces or supports  Heat may be used later but not right away. Heat can make swelling worse.  Exercises to stretch and strengthen the knee  Physical therapy (PT)  Surgery  What drugs may be needed?   The doctor may order drugs to:  Help with pain and swelling  Fight an infection  The doctor may give you a shot of an anti-inflammatory drug called a corticosteroid. This will help with swelling. Talk with your doctor about the risks of this shot.  What can be done to prevent this health problem?   If your knee pain is due to overuse, do not do repetitive movements that caused the problem if possible.  Take breaks often when doing things that use repeat movements.  Do not sit or keep your knee in one position for long periods of time.  If you sleep on your side, use a pillow in between your legs. This can help take stress off of the knee.  Always warm up and stretch before a workout and cool down after.  If you are a runner, actively stretch before a run. Talk to your doctor to make sure you understand the difference between active and passive stretching. Use good ways to train, such as slowly adding to how far you run.  Run on softer surfaces such as a track. This is easier on your knee than a hard surface like cement.  Try activities like swimming or biking rather than running. Running can put a lot of stress on your knee joint.  Stay away from activities that could result in twisting, sudden stops and starts, and blows to the knee. Sports such as basketball, skiing, football, and jogging are some common sports that can lead to knee injuries.  Wear shoes with good support. Replace your shoes often.  Keep a healthy weight. Being too heavy puts more stress on the knee joint. This makes the knee more at risk for injury.  Stay active and work out to keep your muscles strong and flexible.  Where can I learn more?   NHS  Choices  http://www.nhs.uk/conditions/knee-pain/Pages/Introduction.aspx   Last Reviewed Date   2020-04-23  Consumer Information Use and Disclaimer   This information is not specific medical advice and does not replace information you receive from your health care provider. This is only a brief summary of general information. It does NOT include all information about conditions, illnesses, injuries, tests, procedures, treatments, therapies, discharge instructions or life-style choices that may apply to you. You must talk with your health care provider for complete information about your health and treatment options. This information should not be used to decide whether or not to accept your health care providers advice, instructions or recommendations. Only your health care provider has the knowledge and training to provide advice that is right for you.  Copyright   Copyright © 2021 [employer], [employer] and its affiliates and/or licensors. All rights reserved.  Patient Education       Viscosupplementation   Why is this procedure done?   Your joints are where two bones meet. The ends of the bones are covered with a protective coating of cartilage. This helps them glide smoothly during movement. A fluid also helps the joint move more smoothly. With years of use, the cartilage may begin to wear away. This causes pain in the joints. This procedure is done to relieve the pain. A special fluid is used to help the bones glide more easily. It also acts like a shock absorber. This is most often done on the knee joints.  What will the results be?   Lubricates the joint  Less pain in the joints during movement or weight bearing  Improved joint mobility or movement  What happens before the procedure?   Your doctor may ask you to try other ways to treat osteoarthritis or OA, such as exercise, physical therapy, drugs for pain, applying hot compress, and losing weight.  Talk to your doctor about all the drugs you are taking. Be sure to  include all prescription and over-the-counter (OTC) drugs, and herbal supplements. Tell the doctor about any drug allergy. Bring a list of drugs you take with you. Some drugs may interact with the injection.  What happens during the procedure?   Your doctor will clean the skin area where the injection will be given. You will be given a drug called local anesthesia. This will numb your skin and you will not feel any pain.  In some cases, your doctor might use imaging like x-ray or ultrasound to make sure they inject the right spot.  If you have excess fluid in your joint, your doctor may remove the fluid before beginning.  A needle will be used to inject the hyaluronic acid into the joint. Hyaluronic acid is a natural fluid in your body. It lubricates the joint to ease body movements.  The doctor will cover the injection site with a small bandage to prevent infection.  The procedure may only take a couple of minutes.  What happens after the procedure?   You may feel slight pain, warmth, and swelling around the joint area.  You will be able to go home after the injection.  What care is needed at home?   Ask your doctor what you need to do when you go home. Make sure you ask questions if you do not understand what the doctor says. This way you will know what you need to do.  Place an ice pack or a bag of frozen peas wrapped in a towel over the painful part. Never put ice right on the skin. Do not leave the ice on more than 10 to 15 minutes at a time.  Rest for the first few days after the procedure. Avoid strenuous activities like heavy lifting, jogging, standing for a long time, and hard exercise.  What follow-up care is needed?   Your doctor may ask you to make visits to the office to check on your progress. Be sure to keep these visits. Your doctor may want you to have more injections.  What lifestyle changes are needed?   Ask your doctor about when you can go back to your normal activities like exercise or  work.  What problems could happen?   Pain, redness, and swelling around the injection site  Infection of the joint  Fever  Allergic reaction  Itching  Rash  Bruising  Bleeding in the joint  No change in pain after injection  Where can I learn more?   [employer]  http://orthoinfo.aaos.org/topic.cfm?ogwkd=v65429   Last Reviewed Date   2021-03-30  Consumer Information Use and Disclaimer   This information is not specific medical advice and does not replace information you receive from your health care provider. This is only a brief summary of general information. It does NOT include all information about conditions, illnesses, injuries, tests, procedures, treatments, therapies, discharge instructions or life-style choices that may apply to you. You must talk with your health care provider for complete information about your health and treatment options. This information should not be used to decide whether or not to accept your health care providers advice, instructions or recommendations. Only your health care provider has the knowledge and training to provide advice that is right for you.  Copyright   Copyright © 2021 [employer]To[employer], [employer]. and its affiliates and/or licensors. All rights reserved.

## 2022-10-18 ENCOUNTER — HOSPITAL ENCOUNTER (OUTPATIENT)
Dept: RADIOLOGY | Facility: HOSPITAL | Age: 87
Discharge: HOME | End: 2022-10-18
Attending: INTERNAL MEDICINE
Payer: MEDICARE

## 2022-10-18 DIAGNOSIS — R97.20 ELEVATED PSA: ICD-10-CM

## 2022-10-18 PROCEDURE — 76770 US RETROPERITONEAL COMPLETE: ICD-10-PCS | Mod: 26,,, | Performed by: RADIOLOGY

## 2022-10-18 PROCEDURE — 76770 US EXAM ABDO BACK WALL COMP: CPT | Mod: 26,,, | Performed by: RADIOLOGY

## 2022-10-18 PROCEDURE — 76770 US EXAM ABDO BACK WALL COMP: CPT | Mod: TC

## 2022-10-26 ENCOUNTER — OFFICE VISIT (OUTPATIENT)
Dept: NEPHROLOGY | Facility: CLINIC | Age: 87
End: 2022-10-26
Payer: MEDICARE

## 2022-10-26 VITALS
DIASTOLIC BLOOD PRESSURE: 84 MMHG | HEIGHT: 68 IN | HEART RATE: 46 BPM | RESPIRATION RATE: 20 BRPM | SYSTOLIC BLOOD PRESSURE: 122 MMHG | WEIGHT: 177.69 LBS | BODY MASS INDEX: 26.93 KG/M2

## 2022-10-26 DIAGNOSIS — N18.4 STAGE 4 CHRONIC KIDNEY DISEASE: Primary | ICD-10-CM

## 2022-10-26 PROCEDURE — 99215 PR OFFICE/OUTPT VISIT, EST, LEVL V, 40-54 MIN: ICD-10-PCS | Mod: S$GLB,,, | Performed by: INTERNAL MEDICINE

## 2022-10-26 PROCEDURE — 1101F PT FALLS ASSESS-DOCD LE1/YR: CPT | Mod: CPTII,S$GLB,, | Performed by: INTERNAL MEDICINE

## 2022-10-26 PROCEDURE — 1126F PR PAIN SEVERITY QUANTIFIED, NO PAIN PRESENT: ICD-10-PCS | Mod: CPTII,S$GLB,, | Performed by: INTERNAL MEDICINE

## 2022-10-26 PROCEDURE — 3288F FALL RISK ASSESSMENT DOCD: CPT | Mod: CPTII,S$GLB,, | Performed by: INTERNAL MEDICINE

## 2022-10-26 PROCEDURE — 99215 OFFICE O/P EST HI 40 MIN: CPT | Mod: S$GLB,,, | Performed by: INTERNAL MEDICINE

## 2022-10-26 PROCEDURE — 99499 UNLISTED E&M SERVICE: CPT | Mod: HCNC,S$GLB,, | Performed by: INTERNAL MEDICINE

## 2022-10-26 PROCEDURE — 3288F PR FALLS RISK ASSESSMENT DOCUMENTED: ICD-10-PCS | Mod: CPTII,S$GLB,, | Performed by: INTERNAL MEDICINE

## 2022-10-26 PROCEDURE — 99999 PR PBB SHADOW E&M-EST. PATIENT-LVL III: CPT | Mod: PBBFAC,,, | Performed by: INTERNAL MEDICINE

## 2022-10-26 PROCEDURE — 1159F PR MEDICATION LIST DOCUMENTED IN MEDICAL RECORD: ICD-10-PCS | Mod: CPTII,S$GLB,, | Performed by: INTERNAL MEDICINE

## 2022-10-26 PROCEDURE — 1159F MED LIST DOCD IN RCRD: CPT | Mod: CPTII,S$GLB,, | Performed by: INTERNAL MEDICINE

## 2022-10-26 PROCEDURE — 99499 RISK ADDL DX/OHS AUDIT: ICD-10-PCS | Mod: HCNC,S$GLB,, | Performed by: INTERNAL MEDICINE

## 2022-10-26 PROCEDURE — 99999 PR PBB SHADOW E&M-EST. PATIENT-LVL III: ICD-10-PCS | Mod: PBBFAC,,, | Performed by: INTERNAL MEDICINE

## 2022-10-26 PROCEDURE — 1126F AMNT PAIN NOTED NONE PRSNT: CPT | Mod: CPTII,S$GLB,, | Performed by: INTERNAL MEDICINE

## 2022-10-26 PROCEDURE — 1101F PR PT FALLS ASSESS DOC 0-1 FALLS W/OUT INJ PAST YR: ICD-10-PCS | Mod: CPTII,S$GLB,, | Performed by: INTERNAL MEDICINE

## 2022-10-26 RX ORDER — ATENOLOL 25 MG/1
TABLET ORAL
Qty: 45 TABLET | Refills: 3 | Status: SHIPPED | OUTPATIENT
Start: 2022-10-26 | End: 2023-11-06

## 2022-10-26 NOTE — PROGRESS NOTES
Renal clinic f/u note:  Date of clinic visit: 10/26/22  Reason for f/u and chief c/o: CKD. 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 H/o of BPH     HPI: Pt is a 89 y/o male who presents for f/u of worsened renal failure that worsened after pt had taken mobic daily x 11 months between Dec 2018 and June 2019. Pt previously had quite stable CKD stage 3. As documented before, he had been advised by me to stop all NSAIds. He did for a while, but he would soon re-start such meds. He was last seen by us in June 2022. He was still taking mobid. He was asked to stop, his daughter-in-law was present.     He also has a h/o of BPH. U/s in Oct 2020 showed no hydronephrosis. PSA is 6.0. He saw Dr. Ignacio (urology) in July 2021.     To review further, Atenolol was reduced in past visits as BP was controlled to low. Chlorthalidone was stopped. W/u for NINO showed unremarkable u/a and microscopy.Therefore, GN was not suspected. Pt has followed with urology. Prostate biopsy did not show cancer. He is taking flomax and avodart.         PAST MEDICAL HISTORY:  CKD stage 3 in the past, worse to CKD 4 and now 5 after taking NSAIds, BPH (benign prostatic hyperplasia), Cataract, Elevated PSA, HEARING LOSS, History of colon polyps, Hyperlipemia, Hypertension, Myocardial infarction, and Tobacco dependence.     PAST SURGICAL HISTORY:  He  has a past surgical history that includes Ear mastoidectomy w/ cochlear implant w/ landmark (2011); Tonsillectomy; PCIOL OD (Right, 03/`16/16); Cataract extraction w/  intraocular lens implant (Left, 4-20-16); Cataract extraction w/  intraocular lens implant (Right, 3-16-16); Appendectomy (1960); and Eye surgery (Bilateral, 2016).     SOCIAL HISTORY:  He  reports that he quit smoking about 19 years ago. His smoking use included cigarettes. He has a 10.00 pack-year smoking history. He has never used smokeless tobacco. He reports that he drinks about 1.0 standard drinks of alcohol per week. He  "reports that he does not use drugs.     FAMILY MEDICAL HISTORY:  His family history includes Breast cancer in his sister; Cancer in his brother, sister, and sister; Hyperlipidemia in his mother; Macular degeneration in his sister and sister; Stroke in his mother.     Review of patient's allergies indicates:  No Known Allergies     Meds: reviewed    Current Outpatient Medications:     aspirin (ECOTRIN) 81 MG EC tablet, Take 81 mg by mouth once daily., Disp: , Rfl:     atenoloL (TENORMIN) 25 MG tablet, TAKE 1 TABLET EVERY DAY, Disp: 90 tablet, Rfl: 3    atorvastatin (LIPITOR) 10 MG tablet, Take 1 tablet (10 mg total) by mouth once daily., Disp: 90 tablet, Rfl: 3    betamethasone valerate 0.1% (VALISONE) 0.1 % Oint, Apply topically 2 (two) times daily., Disp: 90 g, Rfl: 1    dutasteride (AVODART) 0.5 mg capsule, TAKE 1 CAPSULE EVERY DAY, Disp: 90 capsule, Rfl: 3    solifenacin (VESICARE) 5 MG tablet, TAKE 1 TABLET EVERY DAY, Disp: 90 tablet, Rfl: 3    tamsulosin (FLOMAX) 0.4 mg Cap, Take 1 capsule (0.4 mg total) by mouth once daily., Disp: 90 capsule, Rfl: 3    triamcinolone acetonide 0.1% (KENALOG) 0.1 % Lotn, Apply topically 2 (two) times daily., Disp: 60 mL, Rfl: 2     REVIEW OF SYSTEMS:  Patient has no fever, fatigue, visual changes, chest pain, edema, cough, dyspnea, nausea, vomiting, constipation, diarrhea, arthralgias, pruritis, dizziness, weakness, depression, confusion.     PHYSICAL EXAM:  Blood pressure 122/84, pulse 48, height 5' 8" (1.727 m), weight 80.6 Kg, from 81.1 Kg  Gen:    WDWN male in no apparent distress  Psych: Normal mood and affect  Chest:  Clear with no rales, rhonchi, wheezing with normal effort  CV:      Regular with no murmurs, gallops or rubs  Abd:     Soft, nontender, no distension, positive bowel sounds  Ext:      no edema     Labs reviewed   BMP  Lab Results   Component Value Date     10/18/2022    K 4.8 10/18/2022     (H) 10/18/2022    CO2 25 10/18/2022    BUN 36 (H) " 10/18/2022    CREATININE 3.2 (H) 10/18/2022    CALCIUM 8.8 10/18/2022    ANIONGAP 5 (L) 10/18/2022    ESTGFRAFRICA 15 (A) 06/03/2022    EGFRNONAA 13 (A) 06/03/2022     Lab Results   Component Value Date    WBC 7.98 10/18/2022    HGB 13.5 (L) 10/18/2022    HCT 44.1 10/18/2022    MCV 97 10/18/2022     10/18/2022       Lab Results   Component Value Date    .0 (H) 06/03/2022    CALCIUM 8.8 10/18/2022    PHOS 3.4 10/18/2022          PSA 6.1        U/a: no protein, no blood, no RBC's, no casts     U/s of the kidneys on 10/21/20: no hydronephrosis, small kidney with cortical thinning        IMPRESSION AND RECOMMENDATIONS:  89 y/o male with NINO on CKD presents for f/u:     1. Renal: NINO on CKD stage 3 (prior baseline, now worse). 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     DDX: vs due BPH/obstructive nephropathy (less likely, has no hematuria on recent u/a)      s Cr may not recover fully as pt had exposure to mobic for a long time (11 months)  K normal  Na normal  Acid base stable  Secondary hyperparathyroidism, mild, will monitor  Anemia, mild     2. HTN: BP controlled  Meds reviewed     3. Has BPH: had no hydronephrosis on renal u/s Oct 2020: will repeat, was ordered  Elevated PSA (fluctuating), says is taking flomax and avodart: asked daughter to make sure to check at home  Advised f/u with urology    4. Bradycardia: on atenolol 25 mg po qd.  No sx's  Meds reviewed  Advised to cut atenolol by 1/2 po qd     Plans and recommendations:   As reviewed  Above  Opportunity for questions provided  Total time spent 40 minutes including time needed to review the records, the   patient evaluation, documentation, face-to-face discussion with the patient,   more than 50% of the  time was spent on coordination of care and counseling.    Level V visit.  RTC 3-4 months     Tanmay Pereira MD

## 2022-11-14 ENCOUNTER — PES CALL (OUTPATIENT)
Dept: ADMINISTRATIVE | Facility: CLINIC | Age: 87
End: 2022-11-14
Payer: MEDICARE

## 2022-11-14 NOTE — TELEPHONE ENCOUNTER
No new care gaps identified.  [facility] Embedded Care Gaps. Reference number: [id number]. 11/14/2022   12:49:11 PM CST

## 2022-11-15 ENCOUNTER — CLINICAL SUPPORT (OUTPATIENT)
Dept: AUDIOLOGY | Facility: CLINIC | Age: 87
End: 2022-11-15
Payer: MEDICARE

## 2022-11-15 DIAGNOSIS — Z46.1 FITTING AND ADJUSTMENT OF RIGHT HEARING AID: Primary | ICD-10-CM

## 2022-11-15 RX ORDER — ATORVASTATIN CALCIUM 10 MG/1
TABLET, FILM COATED ORAL
Qty: 90 TABLET | Refills: 3 | Status: SHIPPED | OUTPATIENT
Start: 2022-11-15 | End: 2023-12-05

## 2022-11-15 NOTE — TELEPHONE ENCOUNTER
Refill Decision Note   Harman Johnston  is requesting a refill authorization.  Brief Assessment and Rationale for Refill:  Approve     Medication Therapy Plan:       Medication Reconciliation Completed: No   Comments:     No Care Gaps recommended.     Note composed:11:52 AM 11/15/2022

## 2022-11-15 NOTE — PROGRESS NOTES
Harmanadebayo Johnston was seen 11/15/2022 for hearing aid clean and check.  He is doing well with the hearing aid; aided AD only.  Otoscopy was unremarkable.   Hearing aid was cleaned ( and found to be in good working order. EM tubing does not need to be replaced. Microphone wind screen is missing from the BTE. I placed an order for replacement manuel screen under warranty and will contact patient when it arrives.

## 2022-11-23 ENCOUNTER — CLINICAL SUPPORT (OUTPATIENT)
Dept: AUDIOLOGY | Facility: CLINIC | Age: 87
End: 2022-11-23
Payer: MEDICARE

## 2022-11-23 DIAGNOSIS — Z97.4 WEARS HEARING AID: Primary | ICD-10-CM

## 2022-11-23 NOTE — PROGRESS NOTES
Harman CARDOSO Preston's wife, Mrs. Skinner, was seen 11/23/2022 to  patient's Katelyn BTE hearing aid microphone cover while she was here for another doctor appointment.  I tried reaching her earlier in morning to bring patient's hearing aid but no answer. She is accompanied by her daughter. I instructed them how to snap the microphone cover onto the hearing aid, and feel confident they may be able to do this at home. If they have any difficulty, they will call me for an appointment. Appreciation was voiced.

## 2022-12-01 ENCOUNTER — CLINICAL SUPPORT (OUTPATIENT)
Dept: AUDIOLOGY | Facility: CLINIC | Age: 87
End: 2022-12-01
Payer: MEDICARE

## 2022-12-01 DIAGNOSIS — Z97.4 WEARS HEARING AID IN RIGHT EAR: Primary | ICD-10-CM

## 2022-12-01 NOTE — PROGRESS NOTES
Harman CARDOSO Preston was seen 12/01/2022 for hearing aid repair. Microphone screen fell off patient's BTE and I gave his wife a replacement screen with instructions last week when she was in the building for another doctor's appointment. She did not have patient's BTE with her. They were unable to attach manuel screen at home and return today for repair. I attached manuel screen, cleaned hearing aid and earmold; and all is in good working order. Otoscopy revealed clear ear canal. Return to clinic AU.

## 2022-12-13 ENCOUNTER — TELEPHONE (OUTPATIENT)
Dept: ADMINISTRATIVE | Facility: HOSPITAL | Age: 87
End: 2022-12-13
Payer: MEDICARE

## 2023-01-10 ENCOUNTER — OFFICE VISIT (OUTPATIENT)
Dept: INTERNAL MEDICINE | Facility: CLINIC | Age: 88
End: 2023-01-10
Payer: MEDICARE

## 2023-01-10 ENCOUNTER — TELEPHONE (OUTPATIENT)
Dept: INTERNAL MEDICINE | Facility: CLINIC | Age: 88
End: 2023-01-10

## 2023-01-10 VITALS
HEIGHT: 70 IN | BODY MASS INDEX: 25.88 KG/M2 | WEIGHT: 180.75 LBS | HEART RATE: 60 BPM | TEMPERATURE: 97 F | RESPIRATION RATE: 18 BRPM | DIASTOLIC BLOOD PRESSURE: 72 MMHG | SYSTOLIC BLOOD PRESSURE: 120 MMHG | OXYGEN SATURATION: 98 %

## 2023-01-10 DIAGNOSIS — I70.0 ATHEROSCLEROSIS OF AORTA: ICD-10-CM

## 2023-01-10 DIAGNOSIS — R26.9 ABNORMALITY OF GAIT AND MOBILITY: ICD-10-CM

## 2023-01-10 DIAGNOSIS — N18.5 STAGE 5 CHRONIC KIDNEY DISEASE: ICD-10-CM

## 2023-01-10 DIAGNOSIS — M17.0 PRIMARY OSTEOARTHRITIS OF BOTH KNEES: ICD-10-CM

## 2023-01-10 DIAGNOSIS — R26.9 GAIT DIFFICULTY: ICD-10-CM

## 2023-01-10 DIAGNOSIS — N25.81 SECONDARY HYPERPARATHYROIDISM OF RENAL ORIGIN: ICD-10-CM

## 2023-01-10 DIAGNOSIS — H90.5 SENSORINEURAL HEARING LOSS (SNHL) OF RIGHT EAR, UNSPECIFIED HEARING STATUS ON CONTRALATERAL SIDE: Chronic | ICD-10-CM

## 2023-01-10 DIAGNOSIS — I10 ESSENTIAL HYPERTENSION: Chronic | ICD-10-CM

## 2023-01-10 DIAGNOSIS — E78.2 MIXED HYPERLIPIDEMIA: Chronic | ICD-10-CM

## 2023-01-10 DIAGNOSIS — N18.4 CKD (CHRONIC KIDNEY DISEASE), STAGE IV: ICD-10-CM

## 2023-01-10 DIAGNOSIS — Z00.00 ENCOUNTER FOR PREVENTIVE HEALTH EXAMINATION: Primary | ICD-10-CM

## 2023-01-10 PROCEDURE — 1126F AMNT PAIN NOTED NONE PRSNT: CPT | Mod: HCNC,CPTII,S$GLB, | Performed by: NURSE PRACTITIONER

## 2023-01-10 PROCEDURE — 1159F MED LIST DOCD IN RCRD: CPT | Mod: HCNC,CPTII,S$GLB, | Performed by: NURSE PRACTITIONER

## 2023-01-10 PROCEDURE — G0439 PPPS, SUBSEQ VISIT: HCPCS | Mod: HCNC,S$GLB,, | Performed by: NURSE PRACTITIONER

## 2023-01-10 PROCEDURE — 1101F PR PT FALLS ASSESS DOC 0-1 FALLS W/OUT INJ PAST YR: ICD-10-PCS | Mod: HCNC,CPTII,S$GLB, | Performed by: NURSE PRACTITIONER

## 2023-01-10 PROCEDURE — 3288F FALL RISK ASSESSMENT DOCD: CPT | Mod: HCNC,CPTII,S$GLB, | Performed by: NURSE PRACTITIONER

## 2023-01-10 PROCEDURE — 1170F FXNL STATUS ASSESSED: CPT | Mod: HCNC,CPTII,S$GLB, | Performed by: NURSE PRACTITIONER

## 2023-01-10 PROCEDURE — 3288F PR FALLS RISK ASSESSMENT DOCUMENTED: ICD-10-PCS | Mod: HCNC,CPTII,S$GLB, | Performed by: NURSE PRACTITIONER

## 2023-01-10 PROCEDURE — 1160F PR REVIEW ALL MEDS BY PRESCRIBER/CLIN PHARMACIST DOCUMENTED: ICD-10-PCS | Mod: HCNC,CPTII,S$GLB, | Performed by: NURSE PRACTITIONER

## 2023-01-10 PROCEDURE — 1159F PR MEDICATION LIST DOCUMENTED IN MEDICAL RECORD: ICD-10-PCS | Mod: HCNC,CPTII,S$GLB, | Performed by: NURSE PRACTITIONER

## 2023-01-10 PROCEDURE — G0439 PR MEDICARE ANNUAL WELLNESS SUBSEQUENT VISIT: ICD-10-PCS | Mod: HCNC,S$GLB,, | Performed by: NURSE PRACTITIONER

## 2023-01-10 PROCEDURE — 99999 PR PBB SHADOW E&M-EST. PATIENT-LVL V: CPT | Mod: PBBFAC,HCNC,, | Performed by: NURSE PRACTITIONER

## 2023-01-10 PROCEDURE — 1170F PR FUNCTIONAL STATUS ASSESSED: ICD-10-PCS | Mod: HCNC,CPTII,S$GLB, | Performed by: NURSE PRACTITIONER

## 2023-01-10 PROCEDURE — 1101F PT FALLS ASSESS-DOCD LE1/YR: CPT | Mod: HCNC,CPTII,S$GLB, | Performed by: NURSE PRACTITIONER

## 2023-01-10 PROCEDURE — 99999 PR PBB SHADOW E&M-EST. PATIENT-LVL V: ICD-10-PCS | Mod: PBBFAC,HCNC,, | Performed by: NURSE PRACTITIONER

## 2023-01-10 PROCEDURE — 1160F RVW MEDS BY RX/DR IN RCRD: CPT | Mod: HCNC,CPTII,S$GLB, | Performed by: NURSE PRACTITIONER

## 2023-01-10 PROCEDURE — 1126F PR PAIN SEVERITY QUANTIFIED, NO PAIN PRESENT: ICD-10-PCS | Mod: HCNC,CPTII,S$GLB, | Performed by: NURSE PRACTITIONER

## 2023-01-10 NOTE — PROGRESS NOTES
"Harman Johnston presented for a  Medicare AWV and comprehensive Health Risk Assessment today. The following components were reviewed and updated:    Medical history  Family History  Social history  Allergies and Current Medications  Health Risk Assessment  Health Maintenance  Care Team         ** See Completed Assessments for Annual Wellness Visit within the encounter summary.**         The following assessments were completed:  Living Situation  CAGE  Depression Screening  Timed Get Up and Go  Whisper Test  Cognitive Function Screening    Nutrition Screening  ADL Screening  PAQ Screening        Vitals:    01/10/23 0954   BP: 120/72   Pulse: 60   Resp: 18   Temp: 97 °F (36.1 °C)   TempSrc: Temporal   SpO2: 98%   Weight: 82 kg (180 lb 12.4 oz)   Height: 5' 9.5" (1.765 m)     Body mass index is 26.31 kg/m².  Physical Exam  Constitutional:       General: He is not in acute distress.     Appearance: Normal appearance. He is well-developed. He is not ill-appearing, toxic-appearing or diaphoretic.      Comments: Right hearing aid   HENT:      Head: Normocephalic and atraumatic.      Right Ear: External ear normal.      Left Ear: External ear normal.   Eyes:      Extraocular Movements: Extraocular movements intact.      Conjunctiva/sclera: Conjunctivae normal.   Neck:      Vascular: No carotid bruit.   Cardiovascular:      Rate and Rhythm: Normal rate and regular rhythm.      Pulses: Normal pulses.      Heart sounds: Normal heart sounds. No murmur heard.    No friction rub. No gallop.   Pulmonary:      Effort: Pulmonary effort is normal. No respiratory distress.      Breath sounds: Normal breath sounds. No stridor. No wheezing, rhonchi or rales.   Chest:      Chest wall: No tenderness.   Abdominal:      General: Bowel sounds are normal. There is no distension.      Palpations: There is no mass.      Tenderness: There is no abdominal tenderness.      Hernia: No hernia is present.   Musculoskeletal:         General: No " tenderness. Normal range of motion.      Cervical back: Neck supple. No tenderness.   Lymphadenopathy:      Cervical: No cervical adenopathy.   Skin:     General: Skin is warm and dry.   Neurological:      Mental Status: He is alert and oriented to person, place, and time.      Cranial Nerves: No cranial nerve deficit.   Psychiatric:         Mood and Affect: Mood normal.         Behavior: Behavior normal.             Diagnoses and health risks identified today and associated recommendations/orders:    1. Encounter for preventive health examination  Screenings performed, as noted above.  Personal preventative testing needs reviewed.      2. Stage 4 chronic kidney disease  Monitored, stable, GFR 17.9, follow up with pcp    3. Secondary hyperparathyroidism of renal origin  Monitored, stable last , follow up with nephrology    4. Atherosclerosis of aorta  Monitored, stable, no chest pain, follow up with pcp    5. Abnormality of gait and mobility  Monitored, stable, no recent falls, has left knee pain, seeing ortho for injections    6. Sensorineural hearing loss (SNHL) of right ear, unspecified hearing status on contralateral side  Monitored, stable, continues with [facility], states has had several left ear cochlear implants with no benefit, wears hearing aid on the right side    7. Mixed hyperlipidemia  Treated with statin, stable, cont tx    8. Essential hypertension  Treated with atenolol, stable, cont tx    9. Primary osteoarthritis of both knees  Monitored, stable, no recent falls, has left knee pain, seeing ortho for injections    10. Gait difficulty  Monitored, stable, no recent falls, has left knee pain, seeing ortho for injections    Review for opioid screening: Patient is not prescribed opioids   Review for Substance Use Disorders: patient does not use substances per chart        Provided Harman with a 5-10 year written screening schedule and personal prevention plan. Recommendations were developed using the  USPSTF age appropriate recommendations. Education, counseling, and referrals were provided as needed. After Visit Summary printed and given to patient which includes a list of additional screenings\tests needed.    Follow up in about 1 year (around 1/10/2024) for your next annual [facility] visit.    Vasile Reed, NP    I offered to discuss advanced care planning, including how to pick a person who would make decisions for you if you were unable to make them for yourself, called a health care power of , and what kind of decisions you might make such as use of life sustaining treatments such as ventilators and tube feeding when faced with a life limiting illness recorded on a living will that they will need to know. (How you want to be cared for as you near the end of your natural life)     X Patient is interested in learning more about how to make advanced directives.  I provided them paperwork and offered to discuss this with them.

## 2023-01-10 NOTE — TELEPHONE ENCOUNTER
----- Message from Vasile Reed NP sent at 1/10/2023 10:53 AM CST -----  Regarding: appt  Please call him, speak to his wife, he is very [place], I noticed in charting he is due to see nephrology in Feb.   Saw Dr Pereira, please schedule appt to follow up on stage 4 CKD.  yosvany

## 2023-01-10 NOTE — PATIENT INSTRUCTIONS
Counseling and Referral of Other Preventative  (Italic type indicates deductible and co-insurance are waived)    Patient Name: Harman Johnston  Today's Date: 1/10/2023    Health Maintenance       Date Due Completion Date    TETANUS VACCINE Never done ---    Shingles Vaccine (2 of 2) 11/16/2022 9/21/2022    Lipid Panel 09/27/2023 9/27/2022        No orders of the defined types were placed in this encounter.      The following information is provided to all patients.  This information is to help you find resources for any of the problems found today that may be affecting your health:                Living healthy guide: www.[facility].[place].[facility]      Understanding Diabetes: www.diabetes.org      Eating healthy: www.cdc.gov/healthyweight      CDC home safety checklist: www.cdc.gov/steadi/patient.html      Agency on Aging: www.goea.[place].[facility]      Alcoholics anonymous (AA): www.aa.org      Physical Activity: www.bradley.nih.gov/yt8qshu      Tobacco use: www.quitwithusla.org

## 2023-01-23 ENCOUNTER — OFFICE VISIT (OUTPATIENT)
Dept: ORTHOPEDICS | Facility: CLINIC | Age: 88
End: 2023-01-23
Payer: MEDICARE

## 2023-01-23 VITALS — WEIGHT: 180.75 LBS | HEIGHT: 70 IN | BODY MASS INDEX: 25.88 KG/M2

## 2023-01-23 DIAGNOSIS — M17.0 BILATERAL PRIMARY OSTEOARTHRITIS OF KNEE: Primary | ICD-10-CM

## 2023-01-23 DIAGNOSIS — H90.5 SENSORINEURAL HEARING LOSS (SNHL), UNSPECIFIED LATERALITY: Primary | Chronic | ICD-10-CM

## 2023-01-23 DIAGNOSIS — M17.0 PRIMARY OSTEOARTHRITIS OF BOTH KNEES: ICD-10-CM

## 2023-01-23 PROCEDURE — 99999 PR PBB SHADOW E&M-EST. PATIENT-LVL III: CPT | Mod: PBBFAC,HCNC,, | Performed by: FAMILY MEDICINE

## 2023-01-23 PROCEDURE — 99999 PR PBB SHADOW E&M-EST. PATIENT-LVL III: ICD-10-PCS | Mod: PBBFAC,HCNC,, | Performed by: FAMILY MEDICINE

## 2023-01-23 PROCEDURE — 1159F MED LIST DOCD IN RCRD: CPT | Mod: HCNC,CPTII,S$GLB, | Performed by: FAMILY MEDICINE

## 2023-01-23 PROCEDURE — 1126F PR PAIN SEVERITY QUANTIFIED, NO PAIN PRESENT: ICD-10-PCS | Mod: HCNC,CPTII,S$GLB, | Performed by: FAMILY MEDICINE

## 2023-01-23 PROCEDURE — 1159F PR MEDICATION LIST DOCUMENTED IN MEDICAL RECORD: ICD-10-PCS | Mod: HCNC,CPTII,S$GLB, | Performed by: FAMILY MEDICINE

## 2023-01-23 PROCEDURE — 3288F PR FALLS RISK ASSESSMENT DOCUMENTED: ICD-10-PCS | Mod: HCNC,CPTII,S$GLB, | Performed by: FAMILY MEDICINE

## 2023-01-23 PROCEDURE — 1101F PT FALLS ASSESS-DOCD LE1/YR: CPT | Mod: HCNC,CPTII,S$GLB, | Performed by: FAMILY MEDICINE

## 2023-01-23 PROCEDURE — 3288F FALL RISK ASSESSMENT DOCD: CPT | Mod: HCNC,CPTII,S$GLB, | Performed by: FAMILY MEDICINE

## 2023-01-23 PROCEDURE — 99214 PR OFFICE/OUTPT VISIT, EST, LEVL IV, 30-39 MIN: ICD-10-PCS | Mod: HCNC,S$GLB,, | Performed by: FAMILY MEDICINE

## 2023-01-23 PROCEDURE — 99214 OFFICE O/P EST MOD 30 MIN: CPT | Mod: HCNC,S$GLB,, | Performed by: FAMILY MEDICINE

## 2023-01-23 PROCEDURE — 1126F AMNT PAIN NOTED NONE PRSNT: CPT | Mod: HCNC,CPTII,S$GLB, | Performed by: FAMILY MEDICINE

## 2023-01-23 PROCEDURE — 1101F PR PT FALLS ASSESS DOC 0-1 FALLS W/OUT INJ PAST YR: ICD-10-PCS | Mod: HCNC,CPTII,S$GLB, | Performed by: FAMILY MEDICINE

## 2023-01-23 NOTE — PATIENT INSTRUCTIONS
ice for knee for 15 minutes if needed for pain or swelling    Wear knee brace if pain or needs more support    Handicap parking tag    Left Visco gel injections to start again per protocol next month    MEDICAL NECESSITY FOR VISCOSUPPLEMENTATION: After thorough evaluation of the patient, I have determined that visco-supplementation is medically necessary. The patient has painful DJD of the knee with failure of conservative therapy including lifestyle modifications and rehabilitation exercises. Oral analgesis/NSAIDs have not adequately controlled symptoms and there is radiographic evidence of joint space narrowing, subchondral sclerosis, and some early osteophytic changes Kellgren- Dylan grade 2 or greater, or in lack of radiographic evidence, there is arthroscopic or other evidence of chondrosis. Patient Education       Viscosupplementation   Why is this procedure done?   Your joints are where two bones meet. The ends of the bones are covered with a protective coating of cartilage. This helps them glide smoothly during movement. A fluid also helps the joint move more smoothly. With years of use, the cartilage may begin to wear away. This causes pain in the joints. This procedure is done to relieve the pain. A special fluid is used to help the bones glide more easily. It also acts like a shock absorber. This is most often done on the knee joints.  What will the results be?   Lubricates the joint  Less pain in the joints during movement or weight bearing  Improved joint mobility or movement  What happens before the procedure?   Your doctor may ask you to try other ways to treat osteoarthritis or OA, such as exercise, physical therapy, drugs for pain, applying hot compress, and losing weight.  Talk to your doctor about all the drugs you are taking. Be sure to include all prescription and over-the-counter (OTC) drugs, and herbal supplements. Tell the doctor about any drug allergy. Bring a list of drugs you take with  you. Some drugs may interact with the injection.  What happens during the procedure?   Your doctor will clean the skin area where the injection will be given. You will be given a drug called local anesthesia. This will numb your skin and you will not feel any pain.  In some cases, your doctor might use imaging like x-ray or ultrasound to make sure they inject the right spot.  If you have excess fluid in your joint, your doctor may remove the fluid before beginning.  A needle will be used to inject the hyaluronic acid into the joint. Hyaluronic acid is a natural fluid in your body. It lubricates the joint to ease body movements.  The doctor will cover the injection site with a small bandage to prevent infection.  The procedure may only take a couple of minutes.  What happens after the procedure?   You may feel slight pain, warmth, and swelling around the joint area.  You will be able to go home after the injection.  What care is needed at home?   Ask your doctor what you need to do when you go home. Make sure you ask questions if you do not understand what the doctor says. This way you will know what you need to do.  Place an ice pack or a bag of frozen peas wrapped in a towel over the painful part. Never put ice right on the skin. Do not leave the ice on more than 10 to 15 minutes at a time.  Rest for the first few days after the procedure. Avoid strenuous activities like heavy lifting, jogging, standing for a long time, and hard exercise.  What follow-up care is needed?   Your doctor may ask you to make visits to the office to check on your progress. Be sure to keep these visits. Your doctor may want you to have more injections.  What lifestyle changes are needed?   Ask your doctor about when you can go back to your normal activities like exercise or work.  What problems could happen?   Pain, redness, and swelling around the injection site  Infection of the joint  Fever  Allergic  reaction  Itching  Rash  Bruising  Bleeding in the joint  No change in pain after injection  Where can I learn more?   American Academy of Orthopaedic Surgeons  http://orthoinfo.aaos.org/topic.cfm?kpwiv=e59424   Last Reviewed Date   2021-03-30  Consumer Information Use and Disclaimer   This information is not specific medical advice and does not replace information you receive from your health care provider. This is only a brief summary of general information. It does NOT include all information about conditions, illnesses, injuries, tests, procedures, treatments, therapies, discharge instructions or life-style choices that may apply to you. You must talk with your health care provider for complete information about your health and treatment options. This information should not be used to decide whether or not to accept your health care providers advice, instructions or recommendations. Only your health care provider has the knowledge and training to provide advice that is right for you.  Copyright   Copyright © 2021 [employer] and its affiliates and/or licensors. All rights reserved.

## 2023-01-23 NOTE — PROGRESS NOTES
Subjective:     Patient ID: Harman Johnston is a 88 y.o. male.    Chief Complaint: Pain of the Left Knee      HPI: Patient is being seen for left knee follow up since last office visit on 10/17/22. Received orthovisc injections in August 2022. Says the injections worked really well for him and that he would like to receive them again, if possible. Denies any pain at today's office visit. Says he does not have to take anything by mouth to help with pain relief. Participated in physical therapy twice a week at the [facility] location. Finished sessions in September 2022.     Will plan to perform HEP.  Uses brace only if he has pain or needs extra support.    Past Medical History:   Diagnosis Date    BPH (benign prostatic hyperplasia)     Cataract     CKD (chronic kidney disease), stage IV 07/15/2016    Elevated PSA     HEARING LOSS     History of colon polyps     Hyperlipemia     Hypertension     Myocardial infarction     per ekg and nuclear ett - old scar    Primary osteoarthritis of both knees 6/20/2022    Secondary hyperparathyroidism of renal origin     Tobacco dependence     resolved     Past Surgical History:   Procedure Laterality Date    APPENDECTOMY  1960    CATARACT EXTRACTION W/  INTRAOCULAR LENS IMPLANT Left 4-20-16    CATARACT EXTRACTION W/  INTRAOCULAR LENS IMPLANT Right 3-16-16    EAR MASTOIDECTOMY W/ COCHLEAR IMPLANT W/ LANDMARK  2011    failed later removed    EYE SURGERY Bilateral 2016    cataract w/ IOL    PCIOL OD Right 03/`16/16    DR. CARPIO    TONSILLECTOMY       Family History   Problem Relation Age of Onset    Macular degeneration Sister     Cancer Sister         Breast    Breast cancer Sister     Macular degeneration Sister     Cancer Sister         colon    Stroke Mother     Hyperlipidemia Mother     Cancer Brother         stomach    Prostate cancer Neg Hx     Diabetes Neg Hx     Heart disease Neg Hx     Kidney disease Neg Hx     Mental illness Neg Hx     Mental retardation Neg Hx      Social  History     Socioeconomic History    Marital status:    Tobacco Use    Smoking status: Never    Smokeless tobacco: Never   Substance and Sexual Activity    Alcohol use: Yes     Alcohol/week: 1.0 standard drink     Types: 1 Cans of beer per week     Comment: weekly    Drug use: No    Sexual activity: Never   Social History Narrative    . 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 Does not have a Living Will.     Social Determinants of Health     Financial Resource Strain: Low Risk     Difficulty of Paying Living Expenses: Not hard at all   Food Insecurity: No Food Insecurity    Worried About Running Out of Food in the Last Year: Never true    Ran Out of Food in the Last Year: Never true   Transportation Needs: No Transportation Needs    Lack of Transportation (Medical): No    Lack of Transportation (Non-Medical): No   Physical Activity: Inactive    Days of Exercise per Week: 0 days    Minutes of Exercise per Session: 0 min   Stress: No Stress Concern Present    Feeling of Stress : Not at all   Social Connections: Moderately Integrated    Frequency of Communication with Friends and Family: Never    Frequency of Social Gatherings with Friends and Family: Three times a week    Attends [employer] Services: More than 4 times per year    Active Member of Clubs or Organizations: No    Attends Club or Organization Meetings: Never    Marital Status:    Housing Stability: Low Risk     Unable to Pay for Housing in the Last Year: No    Number of Places Lived in the Last Year: 1    Unstable Housing in the Last Year: No       Current Outpatient Medications:     aspirin (ECOTRIN) 81 MG EC tablet, Take 81 mg by mouth once daily., Disp: , Rfl:     atenoloL (TENORMIN) 25 MG tablet, Take 1/2 (12.5 mg) po qd, Disp: 45 tablet, Rfl: 3    atorvastatin (LIPITOR) 10 MG tablet, TAKE 1 TABLET ONE TIME DAILY, Disp: 90 tablet, Rfl: 3    betamethasone valerate 0.1% (VALISONE) 0.1 %  Oint, Apply topically 2 (two) times daily., Disp: 90 g, Rfl: 1    dutasteride (AVODART) 0.5 mg capsule, TAKE 1 CAPSULE EVERY DAY, Disp: 90 capsule, Rfl: 3    solifenacin (VESICARE) 5 MG tablet, TAKE 1 TABLET EVERY DAY, Disp: 90 tablet, Rfl: 3    tamsulosin (FLOMAX) 0.4 mg Cap, TAKE 1 CAPSULE BY MOUTH ONCE DAILY., Disp: 90 capsule, Rfl: 3    triamcinolone acetonide 0.1% (KENALOG) 0.1 % Lotn, Apply topically 2 (two) times daily., Disp: 60 mL, Rfl: 2  Review of patient's allergies indicates:  No Known Allergies  Review of Systems   Constitutional:  Negative for chills, fever and weight loss.   Respiratory:  Negative for shortness of breath.    Cardiovascular:  Negative for chest pain and palpitations.     Objective:   Body mass index is 26.31 kg/m².  There were no vitals filed for this visit.        Ortho/SPM Exam-alert and oriented well-nourished well-developed pleasant adult male ambulatory with evident hearing loss and accompanied by his wife today in no acute distress    Respiratory effort normal    Left knee-no acute deformity or swelling  1+ valgus laxity otherwise stable with negative Lachman's  Strength is 3 to 4/5  Nontender to palpation  Neurovascular intact    Psychiatric good affect and cognition    Plan-wear brace when needed 1st stability and support.  Continue home exercise program  Restart Visco gel injections next month per protocol    Patient Instructions   ice for knee for 15 minutes if needed for pain or swelling    Wear knee brace if pain or needs more support    Handicap parking tag    Left Visco gel injections to start again per protocol next month    MEDICAL NECESSITY FOR VISCOSUPPLEMENTATION: After thorough evaluation of the patient, I have determined that visco-supplementation is medically necessary. The patient has painful DJD of the knee with failure of conservative therapy including lifestyle modifications and rehabilitation exercises. Oral analgesis/NSAIDs have not adequately controlled  symptoms and there is radiographic evidence of joint space narrowing, subchondral sclerosis, and some early osteophytic changes Kellgren- Dylan grade 2 or greater, or in lack of radiographic evidence, there is arthroscopic or other evidence of chondrosis. Patient Education       Viscosupplementation   Why is this procedure done?   Your joints are where two bones meet. The ends of the bones are covered with a protective coating of cartilage. This helps them glide smoothly during movement. A fluid also helps the joint move more smoothly. With years of use, the cartilage may begin to wear away. This causes pain in the joints. This procedure is done to relieve the pain. A special fluid is used to help the bones glide more easily. It also acts like a shock absorber. This is most often done on the knee joints.  What will the results be?   Lubricates the joint  Less pain in the joints during movement or weight bearing  Improved joint mobility or movement  What happens before the procedure?   Your doctor may ask you to try other ways to treat osteoarthritis or OA, such as exercise, physical therapy, drugs for pain, applying hot compress, and losing weight.  Talk to your doctor about all the drugs you are taking. Be sure to include all prescription and over-the-counter (OTC) drugs, and herbal supplements. Tell the doctor about any drug allergy. Bring a list of drugs you take with you. Some drugs may interact with the injection.  What happens during the procedure?   Your doctor will clean the skin area where the injection will be given. You will be given a drug called local anesthesia. This will numb your skin and you will not feel any pain.  In some cases, your doctor might use imaging like x-ray or ultrasound to make sure they inject the right spot.  If you have excess fluid in your joint, your doctor may remove the fluid before beginning.  A needle will be used to inject the hyaluronic acid into the joint. Hyaluronic  acid is a natural fluid in your body. It lubricates the joint to ease body movements.  The doctor will cover the injection site with a small bandage to prevent infection.  The procedure may only take a couple of minutes.  What happens after the procedure?   You may feel slight pain, warmth, and swelling around the joint area.  You will be able to go home after the injection.  What care is needed at home?   Ask your doctor what you need to do when you go home. Make sure you ask questions if you do not understand what the doctor says. This way you will know what you need to do.  Place an ice pack or a bag of frozen peas wrapped in a towel over the painful part. Never put ice right on the skin. Do not leave the ice on more than 10 to 15 minutes at a time.  Rest for the first few days after the procedure. Avoid strenuous activities like heavy lifting, jogging, standing for a long time, and hard exercise.  What follow-up care is needed?   Your doctor may ask you to make visits to the office to check on your progress. Be sure to keep these visits. Your doctor may want you to have more injections.  What lifestyle changes are needed?   Ask your doctor about when you can go back to your normal activities like exercise or work.  What problems could happen?   Pain, redness, and swelling around the injection site  Infection of the joint  Fever  Allergic reaction  Itching  Rash  Bruising  Bleeding in the joint  No change in pain after injection  Where can I learn more?   American Academy of Orthopaedic Surgeons  http://orthoinfo.aaos.org/topic.cfm?chyjw=h29346   Last Reviewed Date   2021-03-30  Consumer Information Use and Disclaimer   This information is not specific medical advice and does not replace information you receive from your health care provider. This is only a brief summary of general information. It does NOT include all information about conditions, illnesses, injuries, tests, procedures, treatments, therapies,  discharge instructions or life-style choices that may apply to you. You must talk with your health care provider for complete information about your health and treatment options. This information should not be used to decide whether or not to accept your health care providers advice, instructions or recommendations. Only your health care provider has the knowledge and training to provide advice that is right for you.  Copyright   Copyright © 2021 [employer] Inc[employer] and its affiliates and/or licensors. All rights reserved.          IMAGING: US Retroperitoneal Complete  Narrative: EXAMINATION:  US RETROPERITONEAL COMPLETE    CLINICAL HISTORY:  r/o hydronephrosis; Elevated prostate specific antigen (PSA)    TECHNIQUE:  Ultrasound of the kidneys and urinary bladder was performed including color flow and Doppler evaluation of the kidneys.    COMPARISON:  None    FINDINGS:  The right kidney measures 9.8 cm. The left kidney measures 10.5 cm.  The renal parenchymal echogenicity is increased bilaterally.  There is also thinning of the renal parenchyma bilaterally.  There are 2 simple right renal cysts including 1 interpolar cyst measuring up to 1.5 cm in size and 1 lower pole cyst measuring up to 1.9 cm in size.  No hydronephrosis.The bladder is unremarkable in appearance.  Impression: 1. Findings suggesting chronic medical renal disease.  No hydronephrosis.  2. Simple right renal cysts.    Electronically signed by: Ernie Luther DO  Date:    10/18/2022  Time:    10:57       Radiographs / Imaging : Relevant imaging results reviewed by me and interpreted by me, discussed with the patient and / or family -agree with knee x-ray report and viewed and discussed again with patient today and patient has bone medial compartment left      Assessment:     Encounter Diagnoses   Name Primary?    Primary osteoarthritis of both knees     Sensorineural hearing loss (SNHL), unspecified laterality Yes        Plan:   Sensorineural hearing loss  (SNHL), unspecified laterality    Primary osteoarthritis of both knees        The patient verbalized good understanding of the medical issues discussed today and expressed appreciation for the care provided.  Patient was given the opportunity to ask questions and be an active participant in their medical care. Patient had no further questions or concerns at this time.     The patient was encouraged to participate in appropriate physical activity.      Gavino Encinas M.D.  [facility]        This note was dictated using voice recognition software and may have sound a like errors.

## 2023-02-02 NOTE — PROGRESS NOTES
Patient Harman Johnston, MRN [id number], was dependent on dialysis (ICD10 Z99.2) at the time of this visit on 1/23/23. This addendum is made to the medical record on 02/02/2023.

## 2023-02-09 DIAGNOSIS — Z00.00 ENCOUNTER FOR MEDICARE ANNUAL WELLNESS EXAM: ICD-10-CM

## 2023-02-20 ENCOUNTER — OFFICE VISIT (OUTPATIENT)
Dept: ORTHOPEDICS | Facility: CLINIC | Age: 88
End: 2023-02-20
Payer: MEDICARE

## 2023-02-20 VITALS — BODY MASS INDEX: 25.88 KG/M2 | WEIGHT: 180.75 LBS | HEIGHT: 70 IN

## 2023-02-20 DIAGNOSIS — M17.0 PRIMARY OSTEOARTHRITIS OF BOTH KNEES: Primary | ICD-10-CM

## 2023-02-20 PROCEDURE — 1159F MED LIST DOCD IN RCRD: CPT | Mod: HCNC,CPTII,S$GLB, | Performed by: FAMILY MEDICINE

## 2023-02-20 PROCEDURE — 20610 LARGE JOINT ASPIRATION/INJECTION: L KNEE: ICD-10-PCS | Mod: HCNC,LT,S$GLB, | Performed by: FAMILY MEDICINE

## 2023-02-20 PROCEDURE — 99999 PR PBB SHADOW E&M-EST. PATIENT-LVL III: CPT | Mod: PBBFAC,HCNC,, | Performed by: FAMILY MEDICINE

## 2023-02-20 PROCEDURE — 99999 PR PBB SHADOW E&M-EST. PATIENT-LVL III: ICD-10-PCS | Mod: PBBFAC,HCNC,, | Performed by: FAMILY MEDICINE

## 2023-02-20 PROCEDURE — 20610 DRAIN/INJ JOINT/BURSA W/O US: CPT | Mod: HCNC,LT,S$GLB, | Performed by: FAMILY MEDICINE

## 2023-02-20 PROCEDURE — 3288F FALL RISK ASSESSMENT DOCD: CPT | Mod: HCNC,CPTII,S$GLB, | Performed by: FAMILY MEDICINE

## 2023-02-20 PROCEDURE — 1101F PR PT FALLS ASSESS DOC 0-1 FALLS W/OUT INJ PAST YR: ICD-10-PCS | Mod: HCNC,CPTII,S$GLB, | Performed by: FAMILY MEDICINE

## 2023-02-20 PROCEDURE — 99499 NO LOS: ICD-10-PCS | Mod: HCNC,S$GLB,, | Performed by: FAMILY MEDICINE

## 2023-02-20 PROCEDURE — 1126F AMNT PAIN NOTED NONE PRSNT: CPT | Mod: HCNC,CPTII,S$GLB, | Performed by: FAMILY MEDICINE

## 2023-02-20 PROCEDURE — 1159F PR MEDICATION LIST DOCUMENTED IN MEDICAL RECORD: ICD-10-PCS | Mod: HCNC,CPTII,S$GLB, | Performed by: FAMILY MEDICINE

## 2023-02-20 PROCEDURE — 3288F PR FALLS RISK ASSESSMENT DOCUMENTED: ICD-10-PCS | Mod: HCNC,CPTII,S$GLB, | Performed by: FAMILY MEDICINE

## 2023-02-20 PROCEDURE — 1101F PT FALLS ASSESS-DOCD LE1/YR: CPT | Mod: HCNC,CPTII,S$GLB, | Performed by: FAMILY MEDICINE

## 2023-02-20 PROCEDURE — 99499 UNLISTED E&M SERVICE: CPT | Mod: HCNC,S$GLB,, | Performed by: FAMILY MEDICINE

## 2023-02-20 PROCEDURE — 1126F PR PAIN SEVERITY QUANTIFIED, NO PAIN PRESENT: ICD-10-PCS | Mod: HCNC,CPTII,S$GLB, | Performed by: FAMILY MEDICINE

## 2023-02-20 NOTE — PATIENT INSTRUCTIONS
Apply ice to affected area three times a day for (15) fifteen minutes for the next 48 hours, and reduce activity during that time.  Voltaren gel three times a day to affected area if recommended.  Oral medication if recommended.  Physical therapy if recommended at home or at clinic.  Keep recheck visit.     Patient Education       Viscosupplementation   Why is this procedure done?   Your joints are where two bones meet. The ends of the bones are covered with a protective coating of cartilage. This helps them glide smoothly during movement. A fluid also helps the joint move more smoothly. With years of use, the cartilage may begin to wear away. This causes pain in the joints. This procedure is done to relieve the pain. A special fluid is used to help the bones glide more easily. It also acts like a shock absorber. This is most often done on the knee joints.  What will the results be?   Lubricates the joint  Less pain in the joints during movement or weight bearing  Improved joint mobility or movement  What happens before the procedure?   Your doctor may ask you to try other ways to treat osteoarthritis or OA, such as exercise, physical therapy, drugs for pain, applying hot compress, and losing weight.  Talk to your doctor about all the drugs you are taking. Be sure to include all prescription and over-the-counter (OTC) drugs, and herbal supplements. Tell the doctor about any drug allergy. Bring a list of drugs you take with you. Some drugs may interact with the injection.  What happens during the procedure?   Your doctor will clean the skin area where the injection will be given. You will be given a drug called local anesthesia. This will numb your skin and you will not feel any pain.  In some cases, your doctor might use imaging like x-ray or ultrasound to make sure they inject the right spot.  If you have excess fluid in your joint, your doctor may remove the fluid before beginning.  A needle will be used to inject  the hyaluronic acid into the joint. Hyaluronic acid is a natural fluid in your body. It lubricates the joint to ease body movements.  The doctor will cover the injection site with a small bandage to prevent infection.  The procedure may only take a couple of minutes.  What happens after the procedure?   You may feel slight pain, warmth, and swelling around the joint area.  You will be able to go home after the injection.  What care is needed at home?   Ask your doctor what you need to do when you go home. Make sure you ask questions if you do not understand what the doctor says. This way you will know what you need to do.  Place an ice pack or a bag of frozen peas wrapped in a towel over the painful part. Never put ice right on the skin. Do not leave the ice on more than 10 to 15 minutes at a time.  Rest for the first few days after the procedure. Avoid strenuous activities like heavy lifting, jogging, standing for a long time, and hard exercise.  What follow-up care is needed?   Your doctor may ask you to make visits to the office to check on your progress. Be sure to keep these visits. Your doctor may want you to have more injections.  What lifestyle changes are needed?   Ask your doctor about when you can go back to your normal activities like exercise or work.  What problems could happen?   Pain, redness, and swelling around the injection site  Infection of the joint  Fever  Allergic reaction  Itching  Rash  Bruising  Bleeding in the joint  No change in pain after injection  Where can I learn more?   American Academy of Orthopaedic Surgeons  http://orthoinfo.aaos.org/topic.cfm?qvssf=z03791   Last Reviewed Date   2021-03-30  Consumer Information Use and Disclaimer   This information is not specific medical advice and does not replace information you receive from your health care provider. This is only a brief summary of general information. It does NOT include all information about conditions, illnesses,  injuries, tests, procedures, treatments, therapies, discharge instructions or life-style choices that may apply to you. You must talk with your health care provider for complete information about your health and treatment options. This information should not be used to decide whether or not to accept your health care providers advice, instructions or recommendations. Only your health care provider has the knowledge and training to provide advice that is right for you.  Copyright   Copyright © 2021 [employer] Inc. and its affiliates and/or licensors. All rights reserved.

## 2023-02-20 NOTE — PROCEDURES
Large Joint Aspiration/Injection: L knee    Date/Time: 2/20/2023 12:20 PM  Performed by: Gavino Encinas MD  Authorized by: Gavino Encinas MD     Consent Done?:  Yes (Verbal)  Indications:  Arthritis and pain  Site marked: the procedure site was marked    Timeout: prior to procedure the correct patient, procedure, and site was verified    Prep: patient was prepped and draped in usual sterile fashion    Approach:  Anterolateral  Location:  Knee  Site:  L knee  Medications:  30 mg sodium hyaluronate (orthovisc) 30 mg/2 mL  Patient tolerance:  Patient tolerated the procedure well with no immediate complications

## 2023-02-27 ENCOUNTER — OFFICE VISIT (OUTPATIENT)
Dept: ORTHOPEDICS | Facility: CLINIC | Age: 88
End: 2023-02-27
Payer: MEDICARE

## 2023-02-27 VITALS — WEIGHT: 180.75 LBS | HEIGHT: 70 IN | BODY MASS INDEX: 25.88 KG/M2

## 2023-02-27 DIAGNOSIS — M17.0 PRIMARY OSTEOARTHRITIS OF BOTH KNEES: Primary | ICD-10-CM

## 2023-02-27 PROCEDURE — 3288F PR FALLS RISK ASSESSMENT DOCUMENTED: ICD-10-PCS | Mod: HCNC,CPTII,S$GLB, | Performed by: FAMILY MEDICINE

## 2023-02-27 PROCEDURE — 1159F MED LIST DOCD IN RCRD: CPT | Mod: HCNC,CPTII,S$GLB, | Performed by: FAMILY MEDICINE

## 2023-02-27 PROCEDURE — 20610 DRAIN/INJ JOINT/BURSA W/O US: CPT | Mod: HCNC,LT,S$GLB, | Performed by: FAMILY MEDICINE

## 2023-02-27 PROCEDURE — 1126F PR PAIN SEVERITY QUANTIFIED, NO PAIN PRESENT: ICD-10-PCS | Mod: HCNC,CPTII,S$GLB, | Performed by: FAMILY MEDICINE

## 2023-02-27 PROCEDURE — 1159F PR MEDICATION LIST DOCUMENTED IN MEDICAL RECORD: ICD-10-PCS | Mod: HCNC,CPTII,S$GLB, | Performed by: FAMILY MEDICINE

## 2023-02-27 PROCEDURE — 1101F PT FALLS ASSESS-DOCD LE1/YR: CPT | Mod: HCNC,CPTII,S$GLB, | Performed by: FAMILY MEDICINE

## 2023-02-27 PROCEDURE — 99499 UNLISTED E&M SERVICE: CPT | Mod: HCNC,S$GLB,, | Performed by: FAMILY MEDICINE

## 2023-02-27 PROCEDURE — 1101F PR PT FALLS ASSESS DOC 0-1 FALLS W/OUT INJ PAST YR: ICD-10-PCS | Mod: HCNC,CPTII,S$GLB, | Performed by: FAMILY MEDICINE

## 2023-02-27 PROCEDURE — 99499 NO LOS: ICD-10-PCS | Mod: HCNC,S$GLB,, | Performed by: FAMILY MEDICINE

## 2023-02-27 PROCEDURE — 3288F FALL RISK ASSESSMENT DOCD: CPT | Mod: HCNC,CPTII,S$GLB, | Performed by: FAMILY MEDICINE

## 2023-02-27 PROCEDURE — 99999 PR PBB SHADOW E&M-EST. PATIENT-LVL III: CPT | Mod: PBBFAC,HCNC,, | Performed by: FAMILY MEDICINE

## 2023-02-27 PROCEDURE — 20610 LARGE JOINT ASPIRATION/INJECTION: L KNEE: ICD-10-PCS | Mod: HCNC,LT,S$GLB, | Performed by: FAMILY MEDICINE

## 2023-02-27 PROCEDURE — 99999 PR PBB SHADOW E&M-EST. PATIENT-LVL III: ICD-10-PCS | Mod: PBBFAC,HCNC,, | Performed by: FAMILY MEDICINE

## 2023-02-27 PROCEDURE — 1126F AMNT PAIN NOTED NONE PRSNT: CPT | Mod: HCNC,CPTII,S$GLB, | Performed by: FAMILY MEDICINE

## 2023-02-27 NOTE — PATIENT INSTRUCTIONS
Patient Education       Viscosupplementation   Why is this procedure done?   Your joints are where two bones meet. The ends of the bones are covered with a protective coating of cartilage. This helps them glide smoothly during movement. A fluid also helps the joint move more smoothly. With years of use, the cartilage may begin to wear away. This causes pain in the joints. This procedure is done to relieve the pain. A special fluid is used to help the bones glide more easily. It also acts like a shock absorber. This is most often done on the knee joints.  What will the results be?   Lubricates the joint  Less pain in the joints during movement or weight bearing  Improved joint mobility or movement  What happens before the procedure?   Your doctor may ask you to try other ways to treat osteoarthritis or OA, such as exercise, physical therapy, drugs for pain, applying hot compress, and losing weight.  Talk to your doctor about all the drugs you are taking. Be sure to include all prescription and over-the-counter (OTC) drugs, and herbal supplements. Tell the doctor about any drug allergy. Bring a list of drugs you take with you. Some drugs may interact with the injection.  What happens during the procedure?   Your doctor will clean the skin area where the injection will be given. You will be given a drug called local anesthesia. This will numb your skin and you will not feel any pain.  In some cases, your doctor might use imaging like x-ray or ultrasound to make sure they inject the right spot.  If you have excess fluid in your joint, your doctor may remove the fluid before beginning.  A needle will be used to inject the hyaluronic acid into the joint. Hyaluronic acid is a natural fluid in your body. It lubricates the joint to ease body movements.  The doctor will cover the injection site with a small bandage to prevent infection.  The procedure may only take a couple of minutes.  What happens after the procedure?   You  may feel slight pain, warmth, and swelling around the joint area.  You will be able to go home after the injection.  What care is needed at home?   Ask your doctor what you need to do when you go home. Make sure you ask questions if you do not understand what the doctor says. This way you will know what you need to do.  Place an ice pack or a bag of frozen peas wrapped in a towel over the painful part. Never put ice right on the skin. Do not leave the ice on more than 10 to 15 minutes at a time.  Rest for the first few days after the procedure. Avoid strenuous activities like heavy lifting, jogging, standing for a long time, and hard exercise.  What follow-up care is needed?   Your doctor may ask you to make visits to the office to check on your progress. Be sure to keep these visits. Your doctor may want you to have more injections.  What lifestyle changes are needed?   Ask your doctor about when you can go back to your normal activities like exercise or work.  What problems could happen?   Pain, redness, and swelling around the injection site  Infection of the joint  Fever  Allergic reaction  Itching  Rash  Bruising  Bleeding in the joint  No change in pain after injection  Where can I learn more?   American Academy of Orthopaedic Surgeons  http://orthoinfo.aaos.org/topic.cfm?vnuwb=w10914   Last Reviewed Date   2021-03-30  Consumer Information Use and Disclaimer   This information is not specific medical advice and does not replace information you receive from your health care provider. This is only a brief summary of general information. It does NOT include all information about conditions, illnesses, injuries, tests, procedures, treatments, therapies, discharge instructions or life-style choices that may apply to you. You must talk with your health care provider for complete information about your health and treatment options. This information should not be used to decide whether or not to accept your health  care providers advice, instructions or recommendations. Only your health care provider has the knowledge and training to provide advice that is right for you.  Copyright   Copyright © 2021 [employer] and its affiliates and/or licensors. All rights reserved.

## 2023-02-27 NOTE — PROCEDURES
Large Joint Aspiration/Injection: L knee    Date/Time: 2/27/2023 12:20 PM  Performed by: Gavino Encinas MD  Authorized by: Gavino Encinas MD     Consent Done?:  Yes (Verbal)  Indications:  Arthritis and pain  Site marked: the procedure site was marked    Timeout: prior to procedure the correct patient, procedure, and site was verified    Prep: patient was prepped and draped in usual sterile fashion    Approach:  Anterolateral  Location:  Knee  Site:  L knee  Medications:  30 mg sodium hyaluronate (orthovisc) 30 mg/2 mL  Patient tolerance:  Patient tolerated the procedure well with no immediate complications    2 of 3.

## 2023-03-06 ENCOUNTER — CLINICAL SUPPORT (OUTPATIENT)
Dept: AUDIOLOGY | Facility: CLINIC | Age: 88
End: 2023-03-06
Payer: MEDICARE

## 2023-03-06 ENCOUNTER — OFFICE VISIT (OUTPATIENT)
Dept: ORTHOPEDICS | Facility: CLINIC | Age: 88
End: 2023-03-06
Payer: MEDICARE

## 2023-03-06 VITALS — BODY MASS INDEX: 25.88 KG/M2 | HEIGHT: 70 IN | WEIGHT: 180.75 LBS

## 2023-03-06 DIAGNOSIS — M17.0 PRIMARY OSTEOARTHRITIS OF BOTH KNEES: Primary | ICD-10-CM

## 2023-03-06 DIAGNOSIS — Z46.1 FITTING AND ADJUSTMENT OF RIGHT HEARING AID: Primary | ICD-10-CM

## 2023-03-06 PROCEDURE — 20610 DRAIN/INJ JOINT/BURSA W/O US: CPT | Mod: HCNC,LT,S$GLB, | Performed by: FAMILY MEDICINE

## 2023-03-06 PROCEDURE — 1126F PR PAIN SEVERITY QUANTIFIED, NO PAIN PRESENT: ICD-10-PCS | Mod: HCNC,CPTII,S$GLB, | Performed by: FAMILY MEDICINE

## 2023-03-06 PROCEDURE — 1159F MED LIST DOCD IN RCRD: CPT | Mod: HCNC,CPTII,S$GLB, | Performed by: FAMILY MEDICINE

## 2023-03-06 PROCEDURE — 20610 LARGE JOINT ASPIRATION/INJECTION: L KNEE: ICD-10-PCS | Mod: HCNC,LT,S$GLB, | Performed by: FAMILY MEDICINE

## 2023-03-06 PROCEDURE — 1101F PT FALLS ASSESS-DOCD LE1/YR: CPT | Mod: HCNC,CPTII,S$GLB, | Performed by: FAMILY MEDICINE

## 2023-03-06 PROCEDURE — 99999 PR PBB SHADOW E&M-EST. PATIENT-LVL III: ICD-10-PCS | Mod: PBBFAC,HCNC,, | Performed by: FAMILY MEDICINE

## 2023-03-06 PROCEDURE — 99999 PR PBB SHADOW E&M-EST. PATIENT-LVL III: CPT | Mod: PBBFAC,HCNC,, | Performed by: FAMILY MEDICINE

## 2023-03-06 PROCEDURE — 1159F PR MEDICATION LIST DOCUMENTED IN MEDICAL RECORD: ICD-10-PCS | Mod: HCNC,CPTII,S$GLB, | Performed by: FAMILY MEDICINE

## 2023-03-06 PROCEDURE — 3288F PR FALLS RISK ASSESSMENT DOCUMENTED: ICD-10-PCS | Mod: HCNC,CPTII,S$GLB, | Performed by: FAMILY MEDICINE

## 2023-03-06 PROCEDURE — 99499 NO LOS: ICD-10-PCS | Mod: HCNC,S$GLB,, | Performed by: FAMILY MEDICINE

## 2023-03-06 PROCEDURE — 1126F AMNT PAIN NOTED NONE PRSNT: CPT | Mod: HCNC,CPTII,S$GLB, | Performed by: FAMILY MEDICINE

## 2023-03-06 PROCEDURE — 99499 UNLISTED E&M SERVICE: CPT | Mod: HCNC,S$GLB,, | Performed by: FAMILY MEDICINE

## 2023-03-06 PROCEDURE — 3288F FALL RISK ASSESSMENT DOCD: CPT | Mod: HCNC,CPTII,S$GLB, | Performed by: FAMILY MEDICINE

## 2023-03-06 PROCEDURE — 1101F PR PT FALLS ASSESS DOC 0-1 FALLS W/OUT INJ PAST YR: ICD-10-PCS | Mod: HCNC,CPTII,S$GLB, | Performed by: FAMILY MEDICINE

## 2023-03-06 NOTE — PROCEDURES
Large Joint Aspiration/Injection: L knee    Date/Time: 3/6/2023 12:20 PM  Performed by: Gavino Encinas MD  Authorized by: Gavino Encinas MD     Consent Done?:  Yes (Verbal)  Indications:  Arthritis and pain  Site marked: the procedure site was marked    Timeout: prior to procedure the correct patient, procedure, and site was verified    Prep: patient was prepped and draped in usual sterile fashion    Approach:  Anterolateral  Location:  Knee  Site:  L knee  Medications:  30 mg sodium hyaluronate (orthovisc) 30 mg/2 mL  Patient tolerance:  Patient tolerated the procedure well with no immediate complications    3/3

## 2023-03-06 NOTE — PATIENT INSTRUCTIONS
Patient Education       Viscosupplementation   Why is this procedure done?   Your joints are where two bones meet. The ends of the bones are covered with a protective coating of cartilage. This helps them glide smoothly during movement. A fluid also helps the joint move more smoothly. With years of use, the cartilage may begin to wear away. This causes pain in the joints. This procedure is done to relieve the pain. A special fluid is used to help the bones glide more easily. It also acts like a shock absorber. This is most often done on the knee joints.  What will the results be?   Lubricates the joint  Less pain in the joints during movement or weight bearing  Improved joint mobility or movement  What happens before the procedure?   Your doctor may ask you to try other ways to treat osteoarthritis or OA, such as exercise, physical therapy, drugs for pain, applying hot compress, and losing weight.  Talk to your doctor about all the drugs you are taking. Be sure to include all prescription and over-the-counter (OTC) drugs, and herbal supplements. Tell the doctor about any drug allergy. Bring a list of drugs you take with you. Some drugs may interact with the injection.  What happens during the procedure?   Your doctor will clean the skin area where the injection will be given. You will be given a drug called local anesthesia. This will numb your skin and you will not feel any pain.  In some cases, your doctor might use imaging like x-ray or ultrasound to make sure they inject the right spot.  If you have excess fluid in your joint, your doctor may remove the fluid before beginning.  A needle will be used to inject the hyaluronic acid into the joint. Hyaluronic acid is a natural fluid in your body. It lubricates the joint to ease body movements.  The doctor will cover the injection site with a small bandage to prevent infection.  The procedure may only take a couple of minutes.  What happens after the procedure?   You  may feel slight pain, warmth, and swelling around the joint area.  You will be able to go home after the injection.  What care is needed at home?   Ask your doctor what you need to do when you go home. Make sure you ask questions if you do not understand what the doctor says. This way you will know what you need to do.  Place an ice pack or a bag of frozen peas wrapped in a towel over the painful part. Never put ice right on the skin. Do not leave the ice on more than 10 to 15 minutes at a time.  Rest for the first few days after the procedure. Avoid strenuous activities like heavy lifting, jogging, standing for a long time, and hard exercise.  What follow-up care is needed?   Your doctor may ask you to make visits to the office to check on your progress. Be sure to keep these visits. Your doctor may want you to have more injections.  What lifestyle changes are needed?   Ask your doctor about when you can go back to your normal activities like exercise or work.  What problems could happen?   Pain, redness, and swelling around the injection site  Infection of the joint  Fever  Allergic reaction  Itching  Rash  Bruising  Bleeding in the joint  No change in pain after injection  Where can I learn more?   American Academy of Orthopaedic Surgeons  http://orthoinfo.aaos.org/topic.cfm?ytfir=h21712   Last Reviewed Date   2021-03-30  Consumer Information Use and Disclaimer   This information is not specific medical advice and does not replace information you receive from your health care provider. This is only a brief summary of general information. It does NOT include all information about conditions, illnesses, injuries, tests, procedures, treatments, therapies, discharge instructions or life-style choices that may apply to you. You must talk with your health care provider for complete information about your health and treatment options. This information should not be used to decide whether or not to accept your health  care providers advice, instructions or recommendations. Only your health care provider has the knowledge and training to provide advice that is right for you.  Copyright   Copyright © 2021 [employer] and its affiliates and/or licensors. All rights reserved.

## 2023-03-06 NOTE — PROGRESS NOTES
Harman Diaze was seen 03/06/2023 for hearing aid clean and check.  He voices no concerns.  Otoscopy was unremarkable.  Right aid was cleaned and found to be in good working order. Earmold tubing is in good condition. Return as needed.

## 2023-04-03 ENCOUNTER — LAB VISIT (OUTPATIENT)
Dept: LAB | Facility: HOSPITAL | Age: 88
End: 2023-04-03
Attending: INTERNAL MEDICINE
Payer: MEDICARE

## 2023-04-03 DIAGNOSIS — I10 ESSENTIAL HYPERTENSION: Chronic | ICD-10-CM

## 2023-04-03 PROCEDURE — 80053 COMPREHEN METABOLIC PANEL: CPT | Mod: HCNC | Performed by: INTERNAL MEDICINE

## 2023-04-03 PROCEDURE — 36415 COLL VENOUS BLD VENIPUNCTURE: CPT | Mod: HCNC,PO | Performed by: INTERNAL MEDICINE

## 2023-04-03 PROCEDURE — 84443 ASSAY THYROID STIM HORMONE: CPT | Mod: HCNC | Performed by: INTERNAL MEDICINE

## 2023-04-03 PROCEDURE — 80061 LIPID PANEL: CPT | Mod: HCNC | Performed by: INTERNAL MEDICINE

## 2023-04-04 ENCOUNTER — TELEPHONE (OUTPATIENT)
Dept: INTERNAL MEDICINE | Facility: CLINIC | Age: 88
End: 2023-04-04

## 2023-04-04 ENCOUNTER — OFFICE VISIT (OUTPATIENT)
Dept: NEPHROLOGY | Facility: CLINIC | Age: 88
End: 2023-04-04
Payer: MEDICARE

## 2023-04-04 VITALS
SYSTOLIC BLOOD PRESSURE: 120 MMHG | DIASTOLIC BLOOD PRESSURE: 80 MMHG | BODY MASS INDEX: 25.62 KG/M2 | RESPIRATION RATE: 20 BRPM | WEIGHT: 179 LBS | HEART RATE: 56 BPM | HEIGHT: 70 IN

## 2023-04-04 DIAGNOSIS — N18.4 STAGE 4 CHRONIC KIDNEY DISEASE: Primary | ICD-10-CM

## 2023-04-04 LAB
ALBUMIN SERPL BCP-MCNC: 3.3 G/DL (ref 3.5–5.2)
ALP SERPL-CCNC: 99 U/L (ref 55–135)
ALT SERPL W/O P-5'-P-CCNC: 12 U/L (ref 10–44)
ANION GAP SERPL CALC-SCNC: 11 MMOL/L (ref 8–16)
AST SERPL-CCNC: 18 U/L (ref 10–40)
BILIRUB SERPL-MCNC: 0.5 MG/DL (ref 0.1–1)
BUN SERPL-MCNC: 45 MG/DL (ref 8–23)
CALCIUM SERPL-MCNC: 9.1 MG/DL (ref 8.7–10.5)
CHLORIDE SERPL-SCNC: 112 MMOL/L (ref 95–110)
CHOLEST SERPL-MCNC: 132 MG/DL (ref 120–199)
CHOLEST/HDLC SERPL: 3.7 {RATIO} (ref 2–5)
CO2 SERPL-SCNC: 20 MMOL/L (ref 23–29)
CREAT SERPL-MCNC: 3.5 MG/DL (ref 0.5–1.4)
EST. GFR  (NO RACE VARIABLE): 16.1 ML/MIN/1.73 M^2
GLUCOSE SERPL-MCNC: 82 MG/DL (ref 70–110)
HDLC SERPL-MCNC: 36 MG/DL (ref 40–75)
HDLC SERPL: 27.3 % (ref 20–50)
LDLC SERPL CALC-MCNC: 74.6 MG/DL (ref 63–159)
NONHDLC SERPL-MCNC: 96 MG/DL
POTASSIUM SERPL-SCNC: 4.8 MMOL/L (ref 3.5–5.1)
PROT SERPL-MCNC: 7.1 G/DL (ref 6–8.4)
SODIUM SERPL-SCNC: 143 MMOL/L (ref 136–145)
TRIGL SERPL-MCNC: 107 MG/DL (ref 30–150)
TSH SERPL DL<=0.005 MIU/L-ACNC: 3.09 UIU/ML (ref 0.4–4)

## 2023-04-04 PROCEDURE — 99215 OFFICE O/P EST HI 40 MIN: CPT | Mod: HCNC,S$GLB,, | Performed by: INTERNAL MEDICINE

## 2023-04-04 PROCEDURE — 99999 PR PBB SHADOW E&M-EST. PATIENT-LVL II: CPT | Mod: PBBFAC,HCNC,, | Performed by: INTERNAL MEDICINE

## 2023-04-04 PROCEDURE — 3288F FALL RISK ASSESSMENT DOCD: CPT | Mod: HCNC,CPTII,S$GLB, | Performed by: INTERNAL MEDICINE

## 2023-04-04 PROCEDURE — 1126F PR PAIN SEVERITY QUANTIFIED, NO PAIN PRESENT: ICD-10-PCS | Mod: HCNC,CPTII,S$GLB, | Performed by: INTERNAL MEDICINE

## 2023-04-04 PROCEDURE — 99215 PR OFFICE/OUTPT VISIT, EST, LEVL V, 40-54 MIN: ICD-10-PCS | Mod: HCNC,S$GLB,, | Performed by: INTERNAL MEDICINE

## 2023-04-04 PROCEDURE — 1101F PT FALLS ASSESS-DOCD LE1/YR: CPT | Mod: HCNC,CPTII,S$GLB, | Performed by: INTERNAL MEDICINE

## 2023-04-04 PROCEDURE — 1101F PR PT FALLS ASSESS DOC 0-1 FALLS W/OUT INJ PAST YR: ICD-10-PCS | Mod: HCNC,CPTII,S$GLB, | Performed by: INTERNAL MEDICINE

## 2023-04-04 PROCEDURE — 1126F AMNT PAIN NOTED NONE PRSNT: CPT | Mod: HCNC,CPTII,S$GLB, | Performed by: INTERNAL MEDICINE

## 2023-04-04 PROCEDURE — 99999 PR PBB SHADOW E&M-EST. PATIENT-LVL II: ICD-10-PCS | Mod: PBBFAC,HCNC,, | Performed by: INTERNAL MEDICINE

## 2023-04-04 PROCEDURE — 3288F PR FALLS RISK ASSESSMENT DOCUMENTED: ICD-10-PCS | Mod: HCNC,CPTII,S$GLB, | Performed by: INTERNAL MEDICINE

## 2023-04-04 NOTE — PROGRESS NOTES
Renal clinic f/u note:  Date of clinic visit: 4/4/23  Reason for f/u and chief c/o: advanced CKD. 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 H/o of BPH     HPI: Pt is a 89 y/o male who presents for f/u of CKD stage 4. As documented before, s Cr worsened after pt took mobic daily x 11 months between Dec 2018 and June 2019. Pt previously had quite stable CKD stage 3. He had been advised by me to stop all NSAIds. He did so for a while, but he soon re-start such meds. New baseline Cr appears to be above 3 mg/dl. He was last seen by us in Nov 2022. 2 visits ago in June 2022, he was still taking mobic. He was asked to stop, his daughter-in-law was present.      He also has a h/o of BPH. Pt saw Dr. Ignacio (urology) in the past. He has elevated PSA in the past. Prostate biopsy did not show cancer. He is taking flomax and avodart.     Chart was reviewed. Recent right knee aspiration noted.    On f/u today, pt has no c/o's, no new issues. His daughter is present.        PAST MEDICAL HISTORY:  CKD stage 3 in the past, worse to CKD 4 bordering on 5 after taking NSAIds, BPH (benign prostatic hyperplasia), Cataract, Elevated PSA, HEARING LOSS, History of colon polyps, Hyperlipemia, Hypertension, Myocardial infarction, and Tobacco dependence.     PAST SURGICAL HISTORY:  He  has a past surgical history that includes Ear mastoidectomy w/ cochlear implant w/ landmark (2011); Tonsillectomy; PCIOL OD (Right, 03/`16/16); Cataract extraction w/  intraocular lens implant (Left, 4-20-16); Cataract extraction w/  intraocular lens implant (Right, 3-16-16); Appendectomy (1960); and Eye surgery (Bilateral, 2016).     SOCIAL HISTORY:  He  reports that he quit smoking about 19 years ago. His smoking use included cigarettes. He has a 10.00 pack-year smoking history. He has never used smokeless tobacco. He reports that he drinks about 1.0 standard drinks of alcohol per week. He reports that he does not use drugs.     FAMILY MEDICAL  "HISTORY:  His family history includes Breast cancer in his sister; Cancer in his brother, sister, and sister; Hyperlipidemia in his mother; Macular degeneration in his sister and sister; Stroke in his mother.     Review of patient's allergies indicates:  No Known Allergies     Meds: reviewed     Current Outpatient Medications:     aspirin (ECOTRIN) 81 MG EC tablet, Take 81 mg by mouth once daily., Disp: , Rfl:     atenoloL (TENORMIN) 25 MG tablet, Take 1/2 (12.5 mg) po qd, Disp: 45 tablet, Rfl: 3    atorvastatin (LIPITOR) 10 MG tablet, TAKE 1 TABLET ONE TIME DAILY, Disp: 90 tablet, Rfl: 3    betamethasone valerate 0.1% (VALISONE) 0.1 % Oint, Apply topically 2 (two) times daily., Disp: 90 g, Rfl: 1    dutasteride (AVODART) 0.5 mg capsule, TAKE 1 CAPSULE EVERY DAY, Disp: 90 capsule, Rfl: 3    solifenacin (VESICARE) 5 MG tablet, TAKE 1 TABLET EVERY DAY, Disp: 90 tablet, Rfl: 3    tamsulosin (FLOMAX) 0.4 mg Cap, TAKE 1 CAPSULE BY MOUTH ONCE DAILY., Disp: 90 capsule, Rfl: 3    triamcinolone acetonide 0.1% (KENALOG) 0.1 % Lotn, Apply topically 2 (two) times daily., Disp: 60 mL, Rfl: 2     REVIEW OF SYSTEMS:  Patient has no fever, fatigue, visual changes, chest pain, edema, cough, dyspnea, nausea, vomiting, constipation, diarrhea, arthralgias, pruritis, dizziness, weakness, depression, confusion.     PHYSICAL EXAM:  Blood pressure 120/80, pulse 56, height 5' 8" (1.727 m), weight 80.6 Kg, from 81.1 Kg  Gen:    WDWN male in no apparent distress  Psych: Normal mood and affect  Chest:  Clear with no rales, rhonchi, wheezing with normal effort  CV:      bradycardia, regular with no murmurs, gallops or rubs  Abd:     Soft, nontender, no distension, positive bowel sounds  Ext:      no edema     Labs reviewed   BMP  Lab Results   Component Value Date     04/03/2023    K 4.8 04/03/2023     (H) 04/03/2023    CO2 20 (L) 04/03/2023    BUN 45 (H) 04/03/2023    CREATININE 3.5 (H) 04/03/2023    CALCIUM 9.1 04/03/2023    " ANIONGAP 11 04/03/2023    EGFRNORACEVR 16.1 (A) 04/03/2023     Lab Results   Component Value Date    WBC 7.98 10/18/2022    HGB 13.5 (L) 10/18/2022    HCT 44.1 10/18/2022    MCV 97 10/18/2022     10/18/2022      Lab Results   Component Value Date    .0 (H) 06/03/2022    CALCIUM 9.1 04/03/2023    PHOS 3.4 10/18/2022       PSA 6.1        U/a: no protein, no blood, no RBC's, no casts     U/s of the kidneys on 10/21/20: no hydronephrosis, small kidney with cortical thinning     Repeat u/s June 2022 reviewed:  The right kidney measures 9.8 cm. The left kidney measures 10.5 cm.  The renal parenchymal echogenicity is increased bilaterally.  There is also thinning of the renal parenchyma bilaterally.  There are 2 simple right renal cysts including 1 interpolar cyst measuring up to 1.5 cm in size and 1 lower pole cyst measuring up to 1.9 cm in size.  No hydronephrosis.The bladder is unremarkable in appearance         IMPRESSION AND RECOMMENDATIONS:  89 y/o male with worsened CKD presents for f/u:     1. Renal: prior CKD stage 3 worsened after pt re-took NSAIds. NINO due to taking mobic and other NSAIds now has become chronic.  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 HTN: BP controlled  Meds reviewed  On minimal meds     3. Has BPH: had no hydronephrosis on multiple repeat renal u/s's  Elevated PSA (fluctuating), says is taking flomax and avodart  Advised f/u with urology     4. Bradycardia: on atenolol 25 mg po qd.  No sx's  Meds reviewed      Plans and recommendations:   As reviewed  Above  Opportunity for questions provided  Total time spent 40 minutes including time needed to review the records, the   patient evaluation, documentation, face-to-face discussion with the patient,   more than 50% of the time was spent on coordination of care and counseling.    Level V visit.  RTC 6 months     Tanmay Pereira MD

## 2023-04-06 ENCOUNTER — OFFICE VISIT (OUTPATIENT)
Dept: FAMILY MEDICINE | Facility: CLINIC | Age: 88
End: 2023-04-06
Attending: FAMILY MEDICINE
Payer: MEDICARE

## 2023-04-06 ENCOUNTER — LAB VISIT (OUTPATIENT)
Dept: LAB | Facility: HOSPITAL | Age: 88
End: 2023-04-06
Attending: FAMILY MEDICINE
Payer: MEDICARE

## 2023-04-06 ENCOUNTER — TELEPHONE (OUTPATIENT)
Dept: FAMILY MEDICINE | Facility: CLINIC | Age: 88
End: 2023-04-06

## 2023-04-06 VITALS
DIASTOLIC BLOOD PRESSURE: 74 MMHG | HEIGHT: 70 IN | HEART RATE: 56 BPM | OXYGEN SATURATION: 97 % | WEIGHT: 178.88 LBS | BODY MASS INDEX: 25.61 KG/M2 | TEMPERATURE: 98 F | SYSTOLIC BLOOD PRESSURE: 118 MMHG

## 2023-04-06 DIAGNOSIS — N18.5 CHRONIC KIDNEY DISEASE (CKD), STAGE V: ICD-10-CM

## 2023-04-06 DIAGNOSIS — H90.42 SENSORINEURAL HEARING LOSS (SNHL) OF LEFT EAR WITH UNRESTRICTED HEARING OF RIGHT EAR: ICD-10-CM

## 2023-04-06 DIAGNOSIS — Z87.891 FORMER SMOKER: ICD-10-CM

## 2023-04-06 DIAGNOSIS — I10 PRIMARY HYPERTENSION: ICD-10-CM

## 2023-04-06 DIAGNOSIS — N40.0 BENIGN PROSTATIC HYPERPLASIA WITHOUT LOWER URINARY TRACT SYMPTOMS: ICD-10-CM

## 2023-04-06 DIAGNOSIS — R41.3 MEMORY LOSS: ICD-10-CM

## 2023-04-06 DIAGNOSIS — R41.3 MEMORY LOSS: Primary | ICD-10-CM

## 2023-04-06 PROBLEM — Z74.09 DECREASED FUNCTIONAL MOBILITY AND ENDURANCE: Status: RESOLVED | Noted: 2022-06-27 | Resolved: 2023-04-06

## 2023-04-06 LAB
BASOPHILS # BLD AUTO: 0.05 K/UL (ref 0–0.2)
BASOPHILS NFR BLD: 0.6 % (ref 0–1.9)
DIFFERENTIAL METHOD: ABNORMAL
EOSINOPHIL # BLD AUTO: 1.3 K/UL (ref 0–0.5)
EOSINOPHIL NFR BLD: 15.9 % (ref 0–8)
ERYTHROCYTE [DISTWIDTH] IN BLOOD BY AUTOMATED COUNT: 13.6 % (ref 11.5–14.5)
FOLATE SERPL-MCNC: 8.2 NG/ML (ref 4–24)
HCT VFR BLD AUTO: 42.1 % (ref 40–54)
HGB BLD-MCNC: 13.1 G/DL (ref 14–18)
IMM GRANULOCYTES # BLD AUTO: 0.02 K/UL (ref 0–0.04)
IMM GRANULOCYTES NFR BLD AUTO: 0.2 % (ref 0–0.5)
LYMPHOCYTES # BLD AUTO: 1.4 K/UL (ref 1–4.8)
LYMPHOCYTES NFR BLD: 16.7 % (ref 18–48)
MCH RBC QN AUTO: 29.6 PG (ref 27–31)
MCHC RBC AUTO-ENTMCNC: 31.1 G/DL (ref 32–36)
MCV RBC AUTO: 95 FL (ref 82–98)
MONOCYTES # BLD AUTO: 0.8 K/UL (ref 0.3–1)
MONOCYTES NFR BLD: 9.1 % (ref 4–15)
NEUTROPHILS # BLD AUTO: 4.8 K/UL (ref 1.8–7.7)
NEUTROPHILS NFR BLD: 57.5 % (ref 38–73)
NRBC BLD-RTO: 0 /100 WBC
PLATELET # BLD AUTO: 213 K/UL (ref 150–450)
PMV BLD AUTO: 11.4 FL (ref 9.2–12.9)
RBC # BLD AUTO: 4.42 M/UL (ref 4.6–6.2)
VIT B12 SERPL-MCNC: 306 PG/ML (ref 210–950)
WBC # BLD AUTO: 8.32 K/UL (ref 3.9–12.7)

## 2023-04-06 PROCEDURE — 82746 ASSAY OF FOLIC ACID SERUM: CPT | Mod: HCNC | Performed by: FAMILY MEDICINE

## 2023-04-06 PROCEDURE — 1101F PT FALLS ASSESS-DOCD LE1/YR: CPT | Mod: HCNC,CPTII,S$GLB, | Performed by: FAMILY MEDICINE

## 2023-04-06 PROCEDURE — 99499 UNLISTED E&M SERVICE: CPT | Mod: S$GLB,,, | Performed by: FAMILY MEDICINE

## 2023-04-06 PROCEDURE — 99999 PR PBB SHADOW E&M-EST. PATIENT-LVL IV: CPT | Mod: PBBFAC,HCNC,, | Performed by: FAMILY MEDICINE

## 2023-04-06 PROCEDURE — 3288F PR FALLS RISK ASSESSMENT DOCUMENTED: ICD-10-PCS | Mod: HCNC,CPTII,S$GLB, | Performed by: FAMILY MEDICINE

## 2023-04-06 PROCEDURE — 82607 VITAMIN B-12: CPT | Mod: HCNC | Performed by: FAMILY MEDICINE

## 2023-04-06 PROCEDURE — 1159F MED LIST DOCD IN RCRD: CPT | Mod: HCNC,CPTII,S$GLB, | Performed by: FAMILY MEDICINE

## 2023-04-06 PROCEDURE — 1160F PR REVIEW ALL MEDS BY PRESCRIBER/CLIN PHARMACIST DOCUMENTED: ICD-10-PCS | Mod: HCNC,CPTII,S$GLB, | Performed by: FAMILY MEDICINE

## 2023-04-06 PROCEDURE — 1126F PR PAIN SEVERITY QUANTIFIED, NO PAIN PRESENT: ICD-10-PCS | Mod: HCNC,CPTII,S$GLB, | Performed by: FAMILY MEDICINE

## 2023-04-06 PROCEDURE — 99999 PR PBB SHADOW E&M-EST. PATIENT-LVL IV: ICD-10-PCS | Mod: PBBFAC,HCNC,, | Performed by: FAMILY MEDICINE

## 2023-04-06 PROCEDURE — 99214 OFFICE O/P EST MOD 30 MIN: CPT | Mod: HCNC,S$GLB,, | Performed by: FAMILY MEDICINE

## 2023-04-06 PROCEDURE — 1101F PR PT FALLS ASSESS DOC 0-1 FALLS W/OUT INJ PAST YR: ICD-10-PCS | Mod: HCNC,CPTII,S$GLB, | Performed by: FAMILY MEDICINE

## 2023-04-06 PROCEDURE — 1126F AMNT PAIN NOTED NONE PRSNT: CPT | Mod: HCNC,CPTII,S$GLB, | Performed by: FAMILY MEDICINE

## 2023-04-06 PROCEDURE — 1159F PR MEDICATION LIST DOCUMENTED IN MEDICAL RECORD: ICD-10-PCS | Mod: HCNC,CPTII,S$GLB, | Performed by: FAMILY MEDICINE

## 2023-04-06 PROCEDURE — 36415 COLL VENOUS BLD VENIPUNCTURE: CPT | Mod: HCNC,PO | Performed by: FAMILY MEDICINE

## 2023-04-06 PROCEDURE — 85025 COMPLETE CBC W/AUTO DIFF WBC: CPT | Mod: HCNC | Performed by: FAMILY MEDICINE

## 2023-04-06 PROCEDURE — 3288F FALL RISK ASSESSMENT DOCD: CPT | Mod: HCNC,CPTII,S$GLB, | Performed by: FAMILY MEDICINE

## 2023-04-06 PROCEDURE — 1160F RVW MEDS BY RX/DR IN RCRD: CPT | Mod: HCNC,CPTII,S$GLB, | Performed by: FAMILY MEDICINE

## 2023-04-06 PROCEDURE — 99214 PR OFFICE/OUTPT VISIT, EST, LEVL IV, 30-39 MIN: ICD-10-PCS | Mod: HCNC,S$GLB,, | Performed by: FAMILY MEDICINE

## 2023-04-06 NOTE — PROGRESS NOTES
Subjective:       Patient ID: Harman Johnston is a 88 y.o. male.    Chief Complaint: Establish Care    88 y old male here to get est . Seeing Ortho for knee oa . He has not been screen for AAA. Struggling with memory . Upon asking year and current president,  he could not give us correct answer . Lives with wife . Stays busy reading newspaper . No recent falls.No hx of CAD . Current opth exma . Sees Dr Ignacio for BPH . Retired. He used to work in maintenance . He has complete hearing loss our of L ear after failed procedure with Dr Garber . No depression   Review of Systems   Constitutional: Negative.    HENT: Negative.     Eyes: Negative.    Respiratory: Negative.     Cardiovascular: Negative.    Gastrointestinal: Negative.    Genitourinary: Negative.    Musculoskeletal: Negative.    Skin: Negative.    Hematological: Negative.    Psychiatric/Behavioral: Negative.       Objective:      Physical Exam  Constitutional:       General: He is not in acute distress.     Appearance: He is well-developed. He is not diaphoretic.   HENT:      Head: Normocephalic and atraumatic.      Right Ear: External ear normal.      Left Ear: External ear normal.      Nose: Nose normal.      Mouth/Throat:      Pharynx: No oropharyngeal exudate.   Eyes:      General: No scleral icterus.        Right eye: No discharge.         Left eye: No discharge.      Conjunctiva/sclera: Conjunctivae normal.      Pupils: Pupils are equal, round, and reactive to light.   Neck:      Thyroid: No thyromegaly.      Vascular: No JVD.      Trachea: No tracheal deviation.   Cardiovascular:      Rate and Rhythm: Normal rate and regular rhythm.      Heart sounds: Normal heart sounds. No murmur heard.    No friction rub. No gallop.   Pulmonary:      Effort: Pulmonary effort is normal. No respiratory distress.      Breath sounds: Normal breath sounds. No stridor. No wheezing or rales.   Chest:      Chest wall: No tenderness.   Abdominal:      General: Bowel sounds are  normal. There is no distension.      Palpations: Abdomen is soft.      Tenderness: There is no abdominal tenderness. There is no guarding or rebound.   Musculoskeletal:         General: No tenderness. Normal range of motion.      Cervical back: Normal range of motion and neck supple.   Lymphadenopathy:      Cervical: No cervical adenopathy.   Skin:     General: Skin is warm and dry.      Coloration: Skin is not pale.      Findings: No erythema or rash.   Neurological:      Mental Status: He is alert and oriented to person, place, and time.      Cranial Nerves: No cranial nerve deficit.      Motor: No abnormal muscle tone.      Coordination: Coordination normal.      Deep Tendon Reflexes: Reflexes are normal and symmetric. Reflexes normal.   Psychiatric:         Behavior: Behavior normal.         Thought Content: Thought content normal.         Cognition and Memory: Memory is impaired. He exhibits impaired remote memory.         Judgment: Judgment normal.       Assessment:          Harman was seen today for establish care.    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    Diagnoses and all orders for this visit:    Memory loss  -     Vitamin B12; Future  -     Folate; Future  -     CBC Auto Differential; Future    Former smoker  -     US Abdominal Aorta; Future     We will cont to f.u   AAA screening   Controlled. Cont med   F.u with nephrology   ENT   Urology

## 2023-04-06 NOTE — TELEPHONE ENCOUNTER
----- Message from Christy Durant sent at 4/6/2023  4:09 PM CDT -----  Contact: TYLER/Yue SCOTT with [employer] is calling to speak with a nurse regarding prior authorization. Reports receiving a prior authorization for diagnostic and reports is not needed and will void request. Void number provided is [phone]. Please give [employer] a call back at [phone], if necessary.  Thank you,  GH

## 2023-04-12 ENCOUNTER — CLINICAL SUPPORT (OUTPATIENT)
Dept: AUDIOLOGY | Facility: CLINIC | Age: 88
End: 2023-04-12
Payer: MEDICARE

## 2023-04-12 ENCOUNTER — APPOINTMENT (OUTPATIENT)
Dept: RADIOLOGY | Facility: HOSPITAL | Age: 88
End: 2023-04-12
Attending: FAMILY MEDICINE
Payer: MEDICARE

## 2023-04-12 DIAGNOSIS — Z87.891 FORMER SMOKER: ICD-10-CM

## 2023-04-12 DIAGNOSIS — Z46.1 FITTING AND ADJUSTMENT OF RIGHT HEARING AID: Primary | ICD-10-CM

## 2023-04-12 PROCEDURE — 76775 US EXAM ABDO BACK WALL LIM: CPT | Mod: 26,HCNC,, | Performed by: RADIOLOGY

## 2023-04-12 PROCEDURE — 76775 US EXAM ABDO BACK WALL LIM: CPT | Mod: TC,HCNC,PO

## 2023-04-12 PROCEDURE — 76775 US ABDOMINAL AORTA: ICD-10-PCS | Mod: 26,HCNC,, | Performed by: RADIOLOGY

## 2023-04-12 NOTE — PROGRESS NOTES
Harman JANAE Preston was seen 04/12/2023 for hearing aid follow-up.  Patient does not appreciate change in hearing but wife has noted he isn't hearing as well. Audio screen was completed today with no change in hearing. He wears a hearing aid AD and has CI AS (doesn't use-no benefit). Hearing aid/earmold/tubing was cleaned and found to be in good working order. Updated firmware. Reprogrammed aid to todays audiogram, level 4. Patient is pleased. He has follow-up for six months.

## 2023-04-17 ENCOUNTER — OFFICE VISIT (OUTPATIENT)
Dept: ORTHOPEDICS | Facility: CLINIC | Age: 88
End: 2023-04-17
Payer: MEDICARE

## 2023-04-17 VITALS — WEIGHT: 178.81 LBS | HEIGHT: 69 IN | BODY MASS INDEX: 26.48 KG/M2

## 2023-04-17 DIAGNOSIS — M17.0 PRIMARY OSTEOARTHRITIS OF BOTH KNEES: Primary | ICD-10-CM

## 2023-04-17 PROCEDURE — 3288F FALL RISK ASSESSMENT DOCD: CPT | Mod: HCNC,CPTII,S$GLB, | Performed by: FAMILY MEDICINE

## 2023-04-17 PROCEDURE — 1126F AMNT PAIN NOTED NONE PRSNT: CPT | Mod: HCNC,CPTII,S$GLB, | Performed by: FAMILY MEDICINE

## 2023-04-17 PROCEDURE — 3288F PR FALLS RISK ASSESSMENT DOCUMENTED: ICD-10-PCS | Mod: HCNC,CPTII,S$GLB, | Performed by: FAMILY MEDICINE

## 2023-04-17 PROCEDURE — 99999 PR PBB SHADOW E&M-EST. PATIENT-LVL III: CPT | Mod: PBBFAC,HCNC,, | Performed by: FAMILY MEDICINE

## 2023-04-17 PROCEDURE — 1126F PR PAIN SEVERITY QUANTIFIED, NO PAIN PRESENT: ICD-10-PCS | Mod: HCNC,CPTII,S$GLB, | Performed by: FAMILY MEDICINE

## 2023-04-17 PROCEDURE — 1159F PR MEDICATION LIST DOCUMENTED IN MEDICAL RECORD: ICD-10-PCS | Mod: HCNC,CPTII,S$GLB, | Performed by: FAMILY MEDICINE

## 2023-04-17 PROCEDURE — 99999 PR PBB SHADOW E&M-EST. PATIENT-LVL III: ICD-10-PCS | Mod: PBBFAC,HCNC,, | Performed by: FAMILY MEDICINE

## 2023-04-17 PROCEDURE — 1101F PR PT FALLS ASSESS DOC 0-1 FALLS W/OUT INJ PAST YR: ICD-10-PCS | Mod: HCNC,CPTII,S$GLB, | Performed by: FAMILY MEDICINE

## 2023-04-17 PROCEDURE — 99214 OFFICE O/P EST MOD 30 MIN: CPT | Mod: HCNC,S$GLB,, | Performed by: FAMILY MEDICINE

## 2023-04-17 PROCEDURE — 99214 PR OFFICE/OUTPT VISIT, EST, LEVL IV, 30-39 MIN: ICD-10-PCS | Mod: HCNC,S$GLB,, | Performed by: FAMILY MEDICINE

## 2023-04-17 PROCEDURE — 1101F PT FALLS ASSESS-DOCD LE1/YR: CPT | Mod: HCNC,CPTII,S$GLB, | Performed by: FAMILY MEDICINE

## 2023-04-17 PROCEDURE — 1159F MED LIST DOCD IN RCRD: CPT | Mod: HCNC,CPTII,S$GLB, | Performed by: FAMILY MEDICINE

## 2023-04-17 NOTE — PATIENT INSTRUCTIONS
"Apply ice to affected area three times a day for (15) fifteen minutes for the next 48 hours, and reduce activity during that time.  Voltaren gel three times a day to affected area if recommended.  Oral medication if recommended.  Physical therapy if recommended at home or at clinic.  Keep recheck visit. Patient Education       Chronic Knee Pain   About this topic   The knee is a large, complex joint. It is made up of 4 bones: The thigh bone, two lower leg bones, and the kneecap. There is a capsule around the joint. Cartilage acts like a shock absorber in the knee and reduces friction, which helps the knee move more smoothly. The knee also has ligaments and tendons. Ligaments are bands of tissue that join one bone to another. Tendons are bands of tissue that join muscles to the bone. There are many muscles around the knee and in front and back of the thigh. If there is a problem with any of these parts of the joint, you can have pain in your knee.       What are the causes?   Arthritis ? There are a few types of arthritis which all cause swelling of a joint. Osteoarthritis is the most common and happens over time with "wear and tear" on the joint.  Overuse ? Repeat motions or too much bending at the knee can lead to pain.  Bursitis ? Bursae are small fluid-filled sacs that help tendons glide easier. These can get swollen and hurt. This often happens to people who do work on their knees, like gardeners, roofers, and carpet layers.  Ligament tear or sprain ? Ligament injuries can make the knee shaky or unstable and painful.  Meniscus tear ? Injuries to the meniscus or cartilage can make your knee lock and cause pain with some movements.  Muscle strain ? Injuries to the muscles near the knee happen often with sports. If these do not heal the right way, ongoing pain can happen.  Patellar tendinopathy ? The tendon that goes over your kneecap can get swollen and hurt due to overuse and repetitive stress on the " knee.  Chondromalacia patella ? This is a health problem where there is pain under the kneecap from cartilage being worn.  Dislocating kneecap ? If your kneecap slips out of its groove, pain can happen.  Baker's cyst ? This is a lump that is behind the knee. This is also known as a popliteal cyst.  Hip, ankle, back problems ? Problems in the back and in nearby joints, such as the hip and ankle, can cause pain in the knee.  Fluid collection ? Fluid can collect in the knee joint after a knee injury and lead to pain if not treated.  Osgood-Schlatter disease ? This is a health problem seen in children and teens. The front of the knee below the kneecap is bothered by repeat movements.  Osteochondritis dissecans ? This is a health problem seen in children and teens. There is a problem with the blood flow to the bone and cartilage of the knee.  Plica syndrome ? Plica are folds of tissue that are left over from growth before birth. These can get sore and cause pain.  Patellofemoral pain syndrome - This happens when you have pain in front of your knee or around or behind your kneecap.  Iliotibial band syndrome - This happens when the iliotibial band, tissues that runs down the outside of the thigh from the hip to the shin, is overused. It causes pain on the outside of the knee.  What are the main signs?   Pain or stiffness  Swelling  Warmth or redness  Visible deformity  Hard to walk, go up or down stairs, or put weight on your leg  Knee locking ? not able to bend or straighten your knee fully  Knee is weak, malik, or gives way  Crunching and popping noises in the knee  How does the doctor diagnose this health problem?   Your doctor will look at your knee. Your doctor will feel all over your knee to find where the pain is. The doctor may move your knee and push or pull on many parts to check your motion and strength. Your doctor may have you stand and walk to see if your knee is stable. The doctor may order:  Blood  tests  X-ray  CT or MRI scan  Ultrasound  Surgery ? At times, arthroscopic surgery may be needed to find the cause of the pain.  How does the doctor treat this health problem?   Finding out the true cause of your knee pain is the most important step to fix the problem and lower your pain. Some things that may lessen your knee pain are:  Rest  Ice  Compression  Elevation - keeping the knee raised  Braces or supports  Heat may be used later but not right away. Heat can make swelling worse.  Exercises to stretch and strengthen the knee  Physical therapy (PT)  Surgery  What drugs may be needed?   The doctor may order drugs to:  Help with pain and swelling  Fight an infection  The doctor may give you a shot of an anti-inflammatory drug called a corticosteroid. This will help with swelling. Talk with your doctor about the risks of this shot.  What can be done to prevent this health problem?   If your knee pain is due to overuse, do not do repetitive movements that caused the problem if possible.  Take breaks often when doing things that use repeat movements.  Do not sit or keep your knee in one position for long periods of time.  If you sleep on your side, use a pillow in between your legs. This can help take stress off of the knee.  Always warm up and stretch before a workout and cool down after.  If you are a runner, actively stretch before a run. Talk to your doctor to make sure you understand the difference between active and passive stretching. Use good ways to train, such as slowly adding to how far you run.  Run on softer surfaces such as a track. This is easier on your knee than a hard surface like cement.  Try activities like swimming or biking rather than running. Running can put a lot of stress on your knee joint.  Stay away from activities that could result in twisting, sudden stops and starts, and blows to the knee. Sports such as basketball, skiing, football, and jogging are some common sports that can lead  to knee injuries.  Wear shoes with good support. Replace your shoes often.  Keep a healthy weight. Being too heavy puts more stress on the knee joint. This makes the knee more at risk for injury.  Stay active and work out to keep your muscles strong and flexible.  Where can I learn more?   NHS Choices  http://www.nhs.uk/conditions/knee-pain/Pages/Introduction.aspx   Last Reviewed Date   2020-04-23  Consumer Information Use and Disclaimer   This information is not specific medical advice and does not replace information you receive from your health care provider. This is only a brief summary of general information. It does NOT include all information about conditions, illnesses, injuries, tests, procedures, treatments, therapies, discharge instructions or life-style choices that may apply to you. You must talk with your health care provider for complete information about your health and treatment options. This information should not be used to decide whether or not to accept your health care providers advice, instructions or recommendations. Only your health care provider has the knowledge and training to provide advice that is right for you.  Copyright   Copyright © 2021 [employer] and its affiliates and/or licensors. All rights reserved.  Patient Education       Viscosupplementation   Why is this procedure done?   Your joints are where two bones meet. The ends of the bones are covered with a protective coating of cartilage. This helps them glide smoothly during movement. A fluid also helps the joint move more smoothly. With years of use, the cartilage may begin to wear away. This causes pain in the joints. This procedure is done to relieve the pain. A special fluid is used to help the bones glide more easily. It also acts like a shock absorber. This is most often done on the knee joints.  What will the results be?   Lubricates the joint  Less pain in the joints during movement or weight bearing  Improved joint  mobility or movement  What happens before the procedure?   Your doctor may ask you to try other ways to treat osteoarthritis or OA, such as exercise, physical therapy, drugs for pain, applying hot compress, and losing weight.  Talk to your doctor about all the drugs you are taking. Be sure to include all prescription and over-the-counter (OTC) drugs, and herbal supplements. Tell the doctor about any drug allergy. Bring a list of drugs you take with you. Some drugs may interact with the injection.  What happens during the procedure?   Your doctor will clean the skin area where the injection will be given. You will be given a drug called local anesthesia. This will numb your skin and you will not feel any pain.  In some cases, your doctor might use imaging like x-ray or ultrasound to make sure they inject the right spot.  If you have excess fluid in your joint, your doctor may remove the fluid before beginning.  A needle will be used to inject the hyaluronic acid into the joint. Hyaluronic acid is a natural fluid in your body. It lubricates the joint to ease body movements.  The doctor will cover the injection site with a small bandage to prevent infection.  The procedure may only take a couple of minutes.  What happens after the procedure?   You may feel slight pain, warmth, and swelling around the joint area.  You will be able to go home after the injection.  What care is needed at home?   Ask your doctor what you need to do when you go home. Make sure you ask questions if you do not understand what the doctor says. This way you will know what you need to do.  Place an ice pack or a bag of frozen peas wrapped in a towel over the painful part. Never put ice right on the skin. Do not leave the ice on more than 10 to 15 minutes at a time.  Rest for the first few days after the procedure. Avoid strenuous activities like heavy lifting, jogging, standing for a long time, and hard exercise.  What follow-up care is needed?    Your doctor may ask you to make visits to the office to check on your progress. Be sure to keep these visits. Your doctor may want you to have more injections.  What lifestyle changes are needed?   Ask your doctor about when you can go back to your normal activities like exercise or work.  What problems could happen?   Pain, redness, and swelling around the injection site  Infection of the joint  Fever  Allergic reaction  Itching  Rash  Bruising  Bleeding in the joint  No change in pain after injection  Where can I learn more?   American Academy [employer]  http://orthoinfo.aaos.org/topic.cfm?rtdgx=h24081   Last Reviewed Date   2021-03-30  Consumer Information Use and Disclaimer   This information is not specific medical advice and does not replace information you receive from your health care provider. This is only a brief summary of general information. It does NOT include all information about conditions, illnesses, injuries, tests, procedures, treatments, therapies, discharge instructions or life-style choices that may apply to you. You must talk with your health care provider for complete information about your health and treatment options. This information should not be used to decide whether or not to accept your health care providers advice, instructions or recommendations. Only your health care provider has the knowledge and training to provide advice that is right for you.  Copyright   Copyright © 2021 [employer], [employer]. and its affiliates and/or licensors. All rights reserved.

## 2023-04-17 NOTE — PROGRESS NOTES
Subjective:     Patient ID: Harman Johnston is a 88 y.o. male.    Chief Complaint: Pain of the Left Knee and Pain of the Right Knee      HPI: Patient is being seen for bilateral knee follow up since receiving Orthovisc injections at his office visits in March. Says the injections worked very well. Denies any pain at today's office visit. Does not have to takes anything by mouth for pain. Participated and completed physical therapy in 2022.     Once our brace specialist to help him make sure he is wearing his brace correctly and he brought it in today.  He anticipates wanting to continue Visco in another 6 months.  Occasionally has a twinge of pain in the left knee if he steps wrong.  Likes to do work outside in his yd.    Past Medical History:   Diagnosis Date    BPH (benign prostatic hyperplasia)     Cataract     CKD (chronic kidney disease), stage IV 07/15/2016    Elevated PSA     HEARING LOSS     History of colon polyps     Hyperlipemia     Hypertension     Myocardial infarction     per ekg and nuclear ett - old scar    Primary osteoarthritis of both knees 6/20/2022    Secondary hyperparathyroidism of renal origin     Tobacco dependence     resolved     Past Surgical History:   Procedure Laterality Date    APPENDECTOMY  1960    CATARACT EXTRACTION W/  INTRAOCULAR LENS IMPLANT Left 4-20-16    CATARACT EXTRACTION W/  INTRAOCULAR LENS IMPLANT Right 3-16-16    EAR MASTOIDECTOMY W/ COCHLEAR IMPLANT W/ LANDMARK  2011    failed later removed    EYE SURGERY Bilateral 2016    cataract w/ IOL    PCIOL OD Right 03/`16/16    DR. CARPIO    TONSILLECTOMY       Family History   Problem Relation Age of Onset    Macular degeneration Sister     Cancer Sister         Breast    Breast cancer Sister     Macular degeneration Sister     Cancer Sister         colon    Stroke Mother     Hyperlipidemia Mother     Cancer Brother         stomach    Prostate cancer Neg Hx     Diabetes Neg Hx     Heart disease Neg Hx     Kidney disease Neg  Hx     Mental illness Neg Hx     Mental retardation Neg Hx      Social History     Socioeconomic History    Marital status:    Tobacco Use    Smoking status: Former     Packs/day: 0.50     Years: 20.00     Pack years: 10.00     Types: Cigarettes     Quit date: 7/15/2000     Years since quittin.7    Smokeless tobacco: Never   Substance and Sexual Activity    Alcohol use: Yes     Alcohol/week: 1.0 standard drink     Types: 1 Cans of beer per week     Comment: weekly    Drug use: No    Sexual activity: Never   Social History Narrative    . 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 Does not have a Living Will.     Social Determinants of Health     Financial Resource Strain: Low Risk     Difficulty of Paying Living Expenses: Not hard at all   Food Insecurity: No Food Insecurity    Worried About Running Out of Food in the Last Year: Never true    Ran Out of Food in the Last Year: Never true   Transportation Needs: No Transportation Needs    Lack of Transportation (Medical): No    Lack of Transportation (Non-Medical): No   Physical Activity: Inactive    Days of Exercise per Week: 0 days    Minutes of Exercise per Session: 0 min   Stress: No Stress Concern Present    Feeling of Stress : Not at all   Social Connections: Moderately Integrated    Frequency of Communication with Friends and Family: Never    Frequency of Social Gatherings with Friends and Family: Three times a week    Attends [employer] Services: More than 4 times per year    Active Member of Clubs or Organizations: No    Attends Club or Organization Meetings: Never    Marital Status:    Housing Stability: Low Risk     Unable to Pay for Housing in the Last Year: No    Number of Places Lived in the Last Year: 1    Unstable Housing in the Last Year: No       Current Outpatient Medications:     aspirin (ECOTRIN) 81 MG EC tablet, Take 81 mg by mouth once daily., Disp: , Rfl:     atenoloL (TENORMIN)  25 MG tablet, Take 1/2 (12.5 mg) po qd, Disp: 45 tablet, Rfl: 3    atorvastatin (LIPITOR) 10 MG tablet, TAKE 1 TABLET ONE TIME DAILY, Disp: 90 tablet, Rfl: 3    betamethasone valerate 0.1% (VALISONE) 0.1 % Oint, Apply topically 2 (two) times daily., Disp: 90 g, Rfl: 1    dutasteride (AVODART) 0.5 mg capsule, TAKE 1 CAPSULE EVERY DAY, Disp: 90 capsule, Rfl: 3    solifenacin (VESICARE) 5 MG tablet, TAKE 1 TABLET EVERY DAY, Disp: 90 tablet, Rfl: 3    tamsulosin (FLOMAX) 0.4 mg Cap, TAKE 1 CAPSULE BY MOUTH ONCE DAILY., Disp: 90 capsule, Rfl: 3  Review of patient's allergies indicates:  No Known Allergies  Review of Systems   Constitutional:  Negative for chills, fever and weight loss.   Respiratory:  Negative for shortness of breath.    Cardiovascular:  Negative for chest pain and palpitations.     Objective:   Body mass index is 26.4 kg/m².  There were no vitals filed for this visit.        Ortho/SPM Exam-alert and oriented well-nourished well-developed ambulatory with slightly antalgic gait in no acute distress and accompanied by his wife today     Respiratory effort appears normal     Left knee-no acute deformity or swelling   Chronic bony changes noted   Mild tenderness palpation medial joint line   1+ varus and valgus laxity and negative Lachman's  Strength is 4/5   Neurovascular intact  Psychiatric good affect and cognition     Plan-have brace evaluated today.  Patient to continue home exercise program 2-3 times per week.  Keep on protocol schedule for viscosupplementation    MEDICAL NECESSITY FOR VISCOSUPPLEMENTATION: After thorough evaluation of the patient, I have determined that visco-supplementation is medically necessary. The patient has painful DJD of the knee with failure of conservative therapy including lifestyle modifications and rehabilitation exercises. Oral analgesis/NSAIDs have not adequately controlled symptoms and there is radiographic evidence of joint space narrowing, subchondral sclerosis, and  some early osteophytic changes Kellgren- Dylan grade 2 or greater, or in lack of radiographic evidence, there is arthroscopic or other evidence of chondrosis.     There are no Patient Instructions on file for this visit.    IMAGING: US Abdominal Aorta  Narrative: EXAMINATION:  US ABDOMINAL AORTA    CLINICAL HISTORY:  Personal history of nicotine dependence    TECHNIQUE:  Limited ultrasound was performed of the abdominal aorta, with cross sectional diameter measurements obtained.    COMPARISON:  None.    FINDINGS:  Aorta measures 2.4 cm, 1.8 cm, and 1.5 cm respectively at the proximal, mid, and distal aspect.  Common iliac arteries measure 1 cm bilaterally.  Impression: No abdominal aortic aneurysm    Electronically signed by: Elise Walker MD  Date:    04/12/2023  Time:    08:52       Radiographs / Imaging : Relevant imaging results reviewed by me and interpreted by me, discussed with the patient and / or family -agree with knee x-ray report      Assessment:     Encounter Diagnosis   Name Primary?    Primary osteoarthritis of both knees Yes        Plan:   Primary osteoarthritis of both knees        The patient verbalized good understanding of the medical issues discussed today and expressed appreciation for the care provided.  Patient was given the opportunity to ask questions and be an active participant in their medical care. Patient had no further questions or concerns at this time.     The patient was encouraged to participate in appropriate physical activity.      Gavino Encinas M.D.  [facility]        This note was dictated using voice recognition software and may have sound a like errors.

## 2023-06-05 DIAGNOSIS — R39.14 BENIGN PROSTATIC HYPERPLASIA WITH INCOMPLETE BLADDER EMPTYING: ICD-10-CM

## 2023-06-05 DIAGNOSIS — N40.1 BENIGN PROSTATIC HYPERPLASIA WITH INCOMPLETE BLADDER EMPTYING: ICD-10-CM

## 2023-06-10 ENCOUNTER — TELEPHONE (OUTPATIENT)
Dept: URGENT CARE | Facility: CLINIC | Age: 88
End: 2023-06-10
Payer: MEDICARE

## 2023-06-10 ENCOUNTER — HOSPITAL ENCOUNTER (OUTPATIENT)
Dept: RADIOLOGY | Facility: CLINIC | Age: 88
Discharge: HOME | End: 2023-06-10
Attending: NURSE PRACTITIONER
Payer: MEDICARE

## 2023-06-10 ENCOUNTER — OFFICE VISIT (OUTPATIENT)
Dept: URGENT CARE | Facility: CLINIC | Age: 88
End: 2023-06-10
Payer: MEDICARE

## 2023-06-10 VITALS
RESPIRATION RATE: 16 BRPM | BODY MASS INDEX: 26.36 KG/M2 | SYSTOLIC BLOOD PRESSURE: 140 MMHG | HEIGHT: 69 IN | HEART RATE: 52 BPM | WEIGHT: 178 LBS | OXYGEN SATURATION: 97 % | DIASTOLIC BLOOD PRESSURE: 66 MMHG | TEMPERATURE: 98 F

## 2023-06-10 DIAGNOSIS — M25.522 LEFT ELBOW PAIN: ICD-10-CM

## 2023-06-10 DIAGNOSIS — L03.90 CELLULITIS, UNSPECIFIED CELLULITIS SITE: ICD-10-CM

## 2023-06-10 DIAGNOSIS — I10 ESSENTIAL HYPERTENSION: Chronic | ICD-10-CM
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
## 2023-06-10 DIAGNOSIS — S40.812A ABRASION OF LEFT UPPER EXTREMITY, INITIAL ENCOUNTER: ICD-10-CM
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
## 2023-06-10 PROCEDURE — 73070 XR ELBOW 2 VIEWS LEFT: ICD-10-PCS | Mod: LT,S$GLB,, | Performed by: RADIOLOGY

## 2023-06-10 PROCEDURE — 73070 X-RAY EXAM OF ELBOW: CPT | Mod: LT,S$GLB,, | Performed by: RADIOLOGY

## 2023-06-10 PROCEDURE — 99214 PR OFFICE/OUTPT VISIT, EST, LEVL IV, 30-39 MIN: ICD-10-PCS | Mod: 25,S$GLB,, | Performed by: NURSE PRACTITIONER

## 2023-06-10 PROCEDURE — 99214 OFFICE O/P EST MOD 30 MIN: CPT | Mod: 25,S$GLB,, | Performed by: NURSE PRACTITIONER

## 2023-06-10 PROCEDURE — 90471 TDAP VACCINE GREATER THAN OR EQUAL TO 7YO IM: ICD-10-PCS | Mod: S$GLB,,, | Performed by: EMERGENCY MEDICINE

## 2023-06-10 PROCEDURE — 90715 TDAP VACCINE GREATER THAN OR EQUAL TO 7YO IM: ICD-10-PCS | Mod: S$GLB,,, | Performed by: EMERGENCY MEDICINE

## 2023-06-10 PROCEDURE — 90715 TDAP VACCINE 7 YRS/> IM: CPT | Mod: S$GLB,,, | Performed by: EMERGENCY MEDICINE

## 2023-06-10 PROCEDURE — 90471 IMMUNIZATION ADMIN: CPT | Mod: S$GLB,,, | Performed by: EMERGENCY MEDICINE

## 2023-06-10 RX ORDER — CEPHALEXIN 500 MG/1
500 CAPSULE ORAL EVERY 12 HOURS
Qty: 20 CAPSULE | Refills: 0 | Status: SHIPPED | OUTPATIENT
Start: 2023-06-10 | End: 2023-06-20

## 2023-06-10 NOTE — PROGRESS NOTES
"Subjective:      Patient ID: Harman Johnston is a 88 y.o. male.    Vitals:  height is 5' 9" (1.753 m) and weight is 80.7 kg (178 lb). His tympanic temperature is 98 °F (36.7 °C). His blood pressure is 140/66 (abnormal) and his pulse is 52 (abnormal). His respiration is 16 and oxygen saturation is 97%.     Chief Complaint: Fall    Patient presents with fall that occurred 2 days ago at home.  Patient fell backwards and landed on bottom.  Complains of pain in the left elbow and tailbone.    Fall  The accident occurred 2 days ago. The fall occurred while walking. He fell from a height of 1 to 2 ft. He landed on [place]. There was no blood loss. The point of impact was the left elbow and buttocks. The pain is at a severity of 0/10. The symptoms are aggravated by flexion and ambulation. Pertinent negatives include no abdominal pain, bowel incontinence, fever, headaches, hearing loss, hematuria, loss of consciousness, nausea, numbness, tingling, visual change or vomiting. He has tried acetaminophen for the symptoms. The treatment provided mild relief.     Constitution: Negative for fever.   Gastrointestinal:  Negative for abdominal pain, nausea, vomiting and bowel incontinence.   Genitourinary:  Negative for hematuria.   Neurological:  Negative for headaches, loss of consciousness and numbness.      CHIEF COMPLAINT/REASON FOR VISIT: Reports fall, left elbow pain    HISTORY OF PRESENT ILLNESS:  88-year-old male with son complains of fall onset 2 days ago, landed on left elbow & buttocks. Complains of left elbow pain increasing. Denies tailbone pain at present time.  Admits tried over-the-counter Tylenol with little relief.  Patient denies chest pain, LOC, shortness of breath, dizziness, blurred vision, nausea, vomiting, diarrhea, flu-like symptoms, sore throat, weakness, fatigue.          Past Medical History:   Diagnosis Date    BPH (benign prostatic hyperplasia)     Cataract     CKD (chronic kidney disease), stage IV " 07/15/2016    Elevated PSA     HEARING LOSS     History of colon polyps     Hyperlipemia     Hypertension     Myocardial infarction     per ekg and nuclear ett - old scar    Primary osteoarthritis of both knees 2022    Secondary hyperparathyroidism of renal origin     Tobacco dependence     resolved       Past Surgical History:   Procedure Laterality Date    APPENDECTOMY      CATARACT EXTRACTION W/  INTRAOCULAR LENS IMPLANT Left 16    CATARACT EXTRACTION W/  INTRAOCULAR LENS IMPLANT Right 3-16-16    EAR MASTOIDECTOMY W/ COCHLEAR IMPLANT W/ LANDMARK      failed later removed    EYE SURGERY Bilateral 2016    cataract w/ IOL    PCIOL OD Right     DR. CARPIO    TONSILLECTOMY              Family History   Problem Relation Age of Onset    Macular degeneration Sister     Cancer Sister         Breast    Breast cancer Sister     Macular degeneration Sister     Cancer Sister         colon    Stroke Mother     Hyperlipidemia Mother     Cancer Brother         stomach    Prostate cancer Neg Hx     Diabetes Neg Hx     Heart disease Neg Hx     Kidney disease Neg Hx     Mental illness Neg Hx     Mental retardation Neg Hx             Social History     Socioeconomic History    Marital status:    Tobacco Use    Smoking status: Former     Packs/day: 0.50     Years: 20.00     Pack years: 10.00     Types: Cigarettes     Quit date: 7/15/2000     Years since quittin.9    Smokeless tobacco: Never   Substance and Sexual Activity    Alcohol use: Yes     Alcohol/week: 1.0 standard drink     Types: 1 Cans of beer per week     Comment: weekly    Drug use: No    Sexual activity: Never   Social History Narrative    . 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 He still drives.2 children - in good health. Does not have a Living Will.     Social Determinants of Health     Financial Resource Strain: Low Risk     Difficulty of Paying Living Expenses: Not hard at all   Food Insecurity: No Food Insecurity     Worried About Running Out of Food in the Last Year: Never true    Ran Out of Food in the Last Year: Never true   Transportation Needs: No Transportation Needs    Lack of Transportation (Medical): No    Lack of Transportation (Non-Medical): No   Physical Activity: Inactive    Days of Exercise per Week: 0 days    Minutes of Exercise per Session: 0 min   Stress: No Stress Concern Present    Feeling of Stress : Not at all   Social Connections: Moderately Integrated    Frequency of Communication with Friends and Family: Never    Frequency of Social Gatherings with Friends and Family: Three times a week    Attends Worship Services: More than 4 times per year    Active Member of Clubs or Organizations: No    Attends Club or Organization Meetings: Never    Marital Status:    Housing Stability: Low Risk     Unable to Pay for Housing in the Last Year: No    Number of Places Lived in the Last Year: 1    Unstable Housing in the Last Year: No       ROS:  GENERAL: No fever, chills, fatigability or weight loss.  SKIN: Reports elbow pain with warm to touch.  HEENT: No headaches or recent head trauma. Visual acuity fine. No photophobia, ocular pain or diplopia. Denies ear pain, discharge or vertigo. No loss of smell, no epistaxis or postnasal drip. No hoarseness or change in voice.   CHEST: Denies cyanosis, wheezing, cough and sputum production.  CARDIOVASCULAR: Denies chest pain, shortness of breath  MUSCULOSKELETAL:  Left elbow pain, swelling and warm to touch  NEUROLOGIC: No history of seizures, paralysis, alteration of gait or coordination.  PSYCHIATRIC: Denies mood swings, depression or suicidal thoughts.    PE:   APPEARANCE: Well nourished, well developed, in mild distress. [place]  V/S: Reviewed.  SKIN:  Left elbow with minimal redness, tiny abrasion noted posterior elbow, hot to touch, minimal tenderness on palpation, with soft tissue swelling   CHEST:  No respiratory symptoms  CARDIOVASCULAR: Regular rate and  rhythm.  MUSCULOSKELETAL: left Arm and elbow with limited range of motion due to elbow pain, Left elbow with minimal redness, hot to touch, minimal tenderness on palpation, with soft tissue swelling .  NEUROLOGIC: No sensory deficits. Gait & Posture: Normal. No cerebellar signs.  MENTAL STATUS: Patient alert, oriented x 3 & conversant.    PLAN:  X-ray left elbow  Administer Tdap IM now  Consult orthopedics if no improvement  Advise use of cool compresses and elevate  Advise increase oral fluids  Meds: Keflex/ no refills  Advise follow up with PCP in 2-3 days for recheck  Advise go to ER if symptoms worsen or fail to improve with treatment.  AVS provided and reviewed with patient including supportive care, follow up, and red flag symptoms.   Patient verbalizes understanding and agrees with treatment plan. Discharged from Urgent Care in stable condition.    DIAGNOSIS:  Fall  Cellulitis  Left elbow pain  Essential hypertension

## 2023-06-10 NOTE — PATIENT INSTRUCTIONS
PLAN:  X-ray left elbow  Administer Tdap IM now  Consult orthopedics if no improvement  Advise use of cool compresses and elevate  Advise increase oral fluids  Meds: Keflex/ no refills  Advise follow up with PCP in 2-3 days for recheck  Advise go to ER if symptoms worsen or fail to improve with treatment.  AVS provided and reviewed with patient including supportive care, follow up, and red flag symptoms.   Patient verbalizes understanding and agrees with treatment plan. Discharged from Urgent Care in stable condition.

## 2023-06-13 ENCOUNTER — TELEPHONE (OUTPATIENT)
Dept: URGENT CARE | Facility: CLINIC | Age: 88
End: 2023-06-13
Payer: MEDICARE

## 2023-06-13 NOTE — TELEPHONE ENCOUNTER
Made courtesy call to pt,pt is feeling better at the time of the call pt, pt does have an apt with pcp for further evaluation on 06/14/23

## 2023-06-14 ENCOUNTER — OFFICE VISIT (OUTPATIENT)
Dept: FAMILY MEDICINE | Facility: CLINIC | Age: 88
End: 2023-06-14
Attending: FAMILY MEDICINE
Payer: MEDICARE

## 2023-06-14 VITALS
TEMPERATURE: 99 F | DIASTOLIC BLOOD PRESSURE: 76 MMHG | HEART RATE: 59 BPM | HEIGHT: 69 IN | SYSTOLIC BLOOD PRESSURE: 146 MMHG | BODY MASS INDEX: 26.36 KG/M2 | OXYGEN SATURATION: 98 % | WEIGHT: 178 LBS
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
## 2023-06-14 DIAGNOSIS — I10 PRIMARY HYPERTENSION: ICD-10-CM

## 2023-06-14 PROCEDURE — 1160F PR REVIEW ALL MEDS BY PRESCRIBER/CLIN PHARMACIST DOCUMENTED: ICD-10-PCS | Mod: CPTII,S$GLB,, | Performed by: FAMILY MEDICINE

## 2023-06-14 PROCEDURE — 1159F MED LIST DOCD IN RCRD: CPT | Mod: CPTII,S$GLB,, | Performed by: FAMILY MEDICINE

## 2023-06-14 PROCEDURE — 99999 PR PBB SHADOW E&M-EST. PATIENT-LVL III: ICD-10-PCS | Mod: PBBFAC,,, | Performed by: FAMILY MEDICINE

## 2023-06-14 PROCEDURE — 1126F AMNT PAIN NOTED NONE PRSNT: CPT | Mod: CPTII,S$GLB,, | Performed by: FAMILY MEDICINE

## 2023-06-14 PROCEDURE — 1160F RVW MEDS BY RX/DR IN RCRD: CPT | Mod: CPTII,S$GLB,, | Performed by: FAMILY MEDICINE

## 2023-06-14 PROCEDURE — 1126F PR PAIN SEVERITY QUANTIFIED, NO PAIN PRESENT: ICD-10-PCS | Mod: CPTII,S$GLB,, | Performed by: FAMILY MEDICINE

## 2023-06-14 PROCEDURE — 1100F PTFALLS ASSESS-DOCD GE2>/YR: CPT | Mod: CPTII,S$GLB,, | Performed by: FAMILY MEDICINE

## 2023-06-14 PROCEDURE — 1159F PR MEDICATION LIST DOCUMENTED IN MEDICAL RECORD: ICD-10-PCS | Mod: CPTII,S$GLB,, | Performed by: FAMILY MEDICINE

## 2023-06-14 PROCEDURE — 99214 PR OFFICE/OUTPT VISIT, EST, LEVL IV, 30-39 MIN: ICD-10-PCS | Mod: S$GLB,,, | Performed by: FAMILY MEDICINE

## 2023-06-14 PROCEDURE — 99214 OFFICE O/P EST MOD 30 MIN: CPT | Mod: S$GLB,,, | Performed by: FAMILY MEDICINE

## 2023-06-14 PROCEDURE — 3288F PR FALLS RISK ASSESSMENT DOCUMENTED: ICD-10-PCS | Mod: CPTII,S$GLB,, | Performed by: FAMILY MEDICINE

## 2023-06-14 PROCEDURE — 1100F PR PT FALLS ASSESS DOC 2+ FALLS/FALL W/INJURY/YR: ICD-10-PCS | Mod: CPTII,S$GLB,, | Performed by: FAMILY MEDICINE

## 2023-06-14 PROCEDURE — 3288F FALL RISK ASSESSMENT DOCD: CPT | Mod: CPTII,S$GLB,, | Performed by: FAMILY MEDICINE

## 2023-06-14 PROCEDURE — 99999 PR PBB SHADOW E&M-EST. PATIENT-LVL III: CPT | Mod: PBBFAC,,, | Performed by: FAMILY MEDICINE

## 2023-06-14 NOTE — PROGRESS NOTES
Subjective:       Patient ID: Hamran Johnston is a 88 y.o. male.    Chief Complaint: Follow-up    88 y old male f.u today on  visit on 6/10  due to fall 2 d prior. He stumbled on a ledge and landed on L elbow . X rays did not show fracture . Mainly edema . Doing well today . Started on Keflex for elbow cellulitis .     Review of Systems   Constitutional: Negative.    HENT: Negative.     Eyes: Negative.    Respiratory: Negative.     Cardiovascular: Negative.    Gastrointestinal: Negative.    Genitourinary: Negative.    Musculoskeletal: Negative.    Skin: Negative.    Hematological: Negative.      Objective:      Physical Exam  Constitutional:       General: He is not in acute distress.     Appearance: He is well-developed. He is not diaphoretic.   HENT:      Head: Normocephalic and atraumatic.      Right Ear: External ear normal.      Left Ear: External ear normal.      Nose: Nose normal.      Mouth/Throat:      Pharynx: No oropharyngeal exudate.   Eyes:      General: No scleral icterus.        Right eye: No discharge.         Left eye: No discharge.      Conjunctiva/sclera: Conjunctivae normal.      Pupils: Pupils are equal, round, and reactive to light.   Neck:      Thyroid: No thyromegaly.      Vascular: No JVD.      Trachea: No tracheal deviation.   Cardiovascular:      Rate and Rhythm: Normal rate and regular rhythm.      Heart sounds: Normal heart sounds. No murmur heard.    No friction rub. No gallop.   Pulmonary:      Effort: Pulmonary effort is normal. No respiratory distress.      Breath sounds: Normal breath sounds. No stridor. No wheezing or rales.   Chest:      Chest wall: No tenderness.   Abdominal:      General: Bowel sounds are normal. There is no distension.      Palpations: Abdomen is soft.      Tenderness: There is no abdominal tenderness. There is no guarding or rebound.   Musculoskeletal:         General: No tenderness. Normal range of motion.      Left elbow: Swelling and effusion present.       Cervical back: Normal range of motion and neck supple.   Lymphadenopathy:      Cervical: No cervical adenopathy.   Skin:     General: Skin is warm and dry.      Coloration: Skin is not pale.      Findings: No erythema or rash.   Neurological:      Mental Status: He is alert and oriented to person, place, and time.      Cranial Nerves: No cranial nerve deficit.      Motor: No abnormal muscle tone.      Coordination: Coordination normal.      Deep Tendon Reflexes: Reflexes are normal and symmetric. Reflexes normal.   Psychiatric:         Behavior: Behavior normal.         Thought Content: Thought content normal.         Judgment: Judgment normal.       Assessment:     Harman was seen today for follow-up.    Diagnoses and all orders for this visit:    Fall, initial encounter    Primary hypertension       Plan:   Will think about PT   Uncontrolled. Increase atenolol to 25 mg qd. N.v in 1 w

## 2023-06-17 RX ORDER — SOLIFENACIN SUCCINATE 5 MG/1
TABLET, FILM COATED ORAL
Qty: 90 TABLET | Refills: 3 | Status: SHIPPED | OUTPATIENT
Start: 2023-06-17

## 2023-06-23 ENCOUNTER — OFFICE VISIT (OUTPATIENT)
Dept: URGENT CARE | Facility: CLINIC | Age: 88
End: 2023-06-23
Payer: MEDICARE

## 2023-06-23 ENCOUNTER — TELEPHONE (OUTPATIENT)
Dept: URGENT CARE | Facility: CLINIC | Age: 88
End: 2023-06-23

## 2023-06-23 ENCOUNTER — APPOINTMENT (OUTPATIENT)
Dept: RADIOLOGY | Facility: HOSPITAL | Age: 88
End: 2023-06-23
Attending: NURSE PRACTITIONER
Payer: MEDICARE

## 2023-06-23 VITALS
HEART RATE: 58 BPM | DIASTOLIC BLOOD PRESSURE: 70 MMHG | OXYGEN SATURATION: 98 % | BODY MASS INDEX: 26.36 KG/M2 | HEIGHT: 69 IN | WEIGHT: 178 LBS | RESPIRATION RATE: 20 BRPM | SYSTOLIC BLOOD PRESSURE: 146 MMHG | TEMPERATURE: 99 F

## 2023-06-23 DIAGNOSIS — R22.41 LOCALIZED SWELLING OF RIGHT FOOT: ICD-10-CM

## 2023-06-23 DIAGNOSIS — L03.115 CELLULITIS OF RIGHT FOOT: Primary | ICD-10-CM

## 2023-06-23 PROCEDURE — 99204 PR OFFICE/OUTPT VISIT, NEW, LEVL IV, 45-59 MIN: ICD-10-PCS | Mod: S$GLB,,, | Performed by: NURSE PRACTITIONER

## 2023-06-23 PROCEDURE — 93971 EXTREMITY STUDY: CPT | Mod: TC,PO,RT

## 2023-06-23 PROCEDURE — 93971 US LOWER EXTREMITY VEINS RIGHT: ICD-10-PCS | Mod: 26,RT,, | Performed by: RADIOLOGY

## 2023-06-23 PROCEDURE — 93971 EXTREMITY STUDY: CPT | Mod: 26,RT,, | Performed by: RADIOLOGY

## 2023-06-23 PROCEDURE — 99204 OFFICE O/P NEW MOD 45 MIN: CPT | Mod: S$GLB,,, | Performed by: NURSE PRACTITIONER

## 2023-06-23 RX ORDER — CEPHALEXIN 500 MG/1
500 CAPSULE ORAL 4 TIMES DAILY
Qty: 28 CAPSULE | Refills: 0 | Status: SHIPPED | OUTPATIENT
Start: 2023-06-23 | End: 2023-06-30

## 2023-06-23 NOTE — PATIENT INSTRUCTIONS
Rest  Hydration/increase fluids  Elevate right lower extremity  Right lower extremity ultrasound as scheduled  Complete antibiotic course as directed  Signs and symptoms of worsening discussed  Follow up as needed with worsening

## 2023-06-23 NOTE — PROGRESS NOTES
"Subjective:      Patient ID: Harman Johnston is a 88 y.o. male.    Vitals:  height is 5' 9" (1.753 m) and weight is 80.7 kg (178 lb). His temperature is 98.6 °F (37 °C). His blood pressure is 146/70 (abnormal) and his pulse is 58 (abnormal). His respiration is 20 and oxygen saturation is 98%.     Chief Complaint: Foot Swelling    88 year old male presents for evaluation of right foot swelling since Wednesday. Reports waking up with swelling and redness, denies pain/ Denies injury/trauma. Recent fall 6/10 with left elbow skin tear, received tetanus shot and completed antibiotic course.     Edema  This is a new problem. The current episode started in the past 7 days. The problem occurs constantly. The problem has been unchanged. Associated symptoms include joint swelling (right foot, right lower extremity). Pertinent negatives include no abdominal pain, anorexia, arthralgias, change in bowel habit, chest pain, chills, congestion, coughing, diaphoresis, fatigue, fever, headaches, myalgias, nausea, neck pain, numbness, rash, sore throat, swollen glands, urinary symptoms, vertigo, visual change, vomiting or weakness. Nothing aggravates the symptoms. He has tried nothing for the symptoms. The treatment provided no relief.     Constitution: Negative. Negative for activity change, appetite change, chills, sweating, fatigue and fever.   HENT: Negative.  Negative for congestion and sore throat.    Neck: neck negative. Negative for neck pain.   Cardiovascular: Negative.  Negative for chest pain.   Eyes: Negative.    Respiratory: Negative.  Negative for cough.    Gastrointestinal: Negative.  Negative for abdominal pain, nausea and vomiting.   Endocrine: negative.   Genitourinary: Negative.    Musculoskeletal:  Positive for joint swelling (right foot, right lower extremity). Negative for joint pain and muscle ache.   Skin:  Positive for color change and erythema (right foot). Negative for rash, wound, laceration, puncture wound, " bruising, abscess, avulsion and hives.   Allergic/Immunologic: Negative.  Negative for hives.   Neurological: Negative.  Negative for history of vertigo, headaches and numbness.   Hematologic/Lymphatic: Negative.    Psychiatric/Behavioral: Negative.      Objective:     Physical Exam   Constitutional: He is oriented to person, place, and time. He is cooperative.  Non-toxic appearance. He does not appear ill. No distress. normalawake  HENT:   Head: Normocephalic and atraumatic.   Eyes: Conjunctivae are normal. Pupils are equal, round, and reactive to light. Right eye exhibits no discharge. Left eye exhibits no discharge. No scleral icterus. Extraocular movement intact   Neck: Neck supple.   Cardiovascular: Bradycardia present.   Pulses:       Dorsalis pedis pulses are 2+ on the right side.   Pulmonary/Chest: Effort normal.   Abdominal: Normal appearance.   Musculoskeletal:         General: Swelling (right foot, right lower extremity) present. No tenderness, deformity or signs of injury.      Right lower leg: He exhibits swelling. He exhibits no tenderness, no bony tenderness, no deformity and no laceration. Edema present.      Left lower leg: No edema.      Right foot: Normal range of motion and normal capillary refill. Swelling present. No tenderness, bony tenderness, crepitus, deformity or laceration. Plantar foot sensation: normal.        Feet:    Neurological: no focal deficit. He is alert and oriented to person, place, and time.   Skin: Skin is warm, dry, not diaphoretic, not pale and no rash. not right lower leg and not right footerythema (right foot) No bruising and No lesion jaundice  Psychiatric: Mood, judgment and thought content normal.   Nursing note and vitals reviewed.        XR ELBOW 2 VIEWS LEFT    Result Date: 6/10/2023  EXAM: XR ELBOW 2 VIEWS LEFT CLINICAL HISTORY: Left elbow pain. COMPARISON:  No studies are available for comparison. FINDINGS: Large left elbow joint effusion with periarticular soft  tissue swelling.  Moderate degenerative joint disease and prominent triceps enthesophyte.  No acute fracture identified.      No acute fracture identified.  Positive for joint effusion and periarticular soft tissue swelling. Finalized on: 6/10/2023 10:40 AM By:  BRUNO Ricci MD, MD [facility]# [id number]      2023-06-10 10:42:29.200    [facility]    US Lower Extremity Veins Right    Result Date: 6/23/2023  EXAMINATION: US LOWER EXTREMITY VEINS RIGHT CLINICAL HISTORY: Localized swelling, mass and lump, right lower limb TECHNIQUE: Duplex and color flow Doppler evaluation and graded compression of the right lower extremity veins was performed. COMPARISON: None FINDINGS: Right thigh veins: The common femoral, femoral, popliteal, upper greater saphenous, and deep femoral veins are patent and free of thrombus. The veins are normally compressible and have normal phasic flow and augmentation response. Right calf veins: The visualized calf veins are patent. Contralateral CFV: The contralateral (left) common femoral vein is patent and free of thrombus. Miscellaneous: None     No evidence of deep venous thrombosis in the right lower extremity. Electronically signed by: Robson Garcia MD Date:    06/23/2023 Time:    15:33     Assessment:     1. Cellulitis of right foot    2. Localized swelling of right foot        Plan:     Patient presents with non-traumatic right foot edema. Decision to prescribe antibiotic course due to high suspicion for cellulitis. Decision to perform Right lower extremity US to rule out DVT. Patient notified and aware of negative findings. Plan of care discussed with understanding verbalized.    Cellulitis of right foot  -     cephALEXin (KEFLEX) 500 MG capsule; Take 1 capsule (500 mg total) by mouth 4 (four) times daily. for 7 days  Dispense: 28 capsule; Refill: 0    Localized swelling of right foot  -     US Lower Extremity Veins Right; Future; Expected date: 06/23/2023             Patient Instructions    Rest  Hydration/increase fluids  Elevate right lower extremity  Right lower extremity ultrasound as scheduled  Complete antibiotic course as directed  Signs and symptoms of worsening discussed  Follow up as needed with worsening

## 2023-06-29 ENCOUNTER — OFFICE VISIT (OUTPATIENT)
Dept: FAMILY MEDICINE | Facility: CLINIC | Age: 88
End: 2023-06-29
Attending: FAMILY MEDICINE
Payer: MEDICARE

## 2023-06-29 VITALS
DIASTOLIC BLOOD PRESSURE: 78 MMHG | HEIGHT: 69 IN | OXYGEN SATURATION: 97 % | SYSTOLIC BLOOD PRESSURE: 136 MMHG | HEART RATE: 57 BPM | WEIGHT: 176.94 LBS | TEMPERATURE: 98 F | BODY MASS INDEX: 26.21 KG/M2

## 2023-06-29 DIAGNOSIS — R60.0 LEG EDEMA, RIGHT: Primary | ICD-10-CM

## 2023-06-29 DIAGNOSIS — L03.119 CELLULITIS OF FOOT: ICD-10-CM

## 2023-06-29 PROCEDURE — 99999 PR PBB SHADOW E&M-EST. PATIENT-LVL IV: ICD-10-PCS | Mod: PBBFAC,,, | Performed by: FAMILY MEDICINE

## 2023-06-29 PROCEDURE — 99999 PR PBB SHADOW E&M-EST. PATIENT-LVL IV: CPT | Mod: PBBFAC,,, | Performed by: FAMILY MEDICINE

## 2023-06-29 PROCEDURE — 99214 PR OFFICE/OUTPT VISIT, EST, LEVL IV, 30-39 MIN: ICD-10-PCS | Mod: S$GLB,,, | Performed by: FAMILY MEDICINE

## 2023-06-29 PROCEDURE — 1159F MED LIST DOCD IN RCRD: CPT | Mod: CPTII,S$GLB,, | Performed by: FAMILY MEDICINE

## 2023-06-29 PROCEDURE — 1160F RVW MEDS BY RX/DR IN RCRD: CPT | Mod: CPTII,S$GLB,, | Performed by: FAMILY MEDICINE

## 2023-06-29 PROCEDURE — 99214 OFFICE O/P EST MOD 30 MIN: CPT | Mod: S$GLB,,, | Performed by: FAMILY MEDICINE

## 2023-06-29 PROCEDURE — 1160F PR REVIEW ALL MEDS BY PRESCRIBER/CLIN PHARMACIST DOCUMENTED: ICD-10-PCS | Mod: CPTII,S$GLB,, | Performed by: FAMILY MEDICINE

## 2023-06-29 PROCEDURE — 1159F PR MEDICATION LIST DOCUMENTED IN MEDICAL RECORD: ICD-10-PCS | Mod: CPTII,S$GLB,, | Performed by: FAMILY MEDICINE

## 2023-06-29 PROCEDURE — 1126F AMNT PAIN NOTED NONE PRSNT: CPT | Mod: CPTII,S$GLB,, | Performed by: FAMILY MEDICINE

## 2023-06-29 PROCEDURE — 1126F PR PAIN SEVERITY QUANTIFIED, NO PAIN PRESENT: ICD-10-PCS | Mod: CPTII,S$GLB,, | Performed by: FAMILY MEDICINE

## 2023-06-29 NOTE — PROGRESS NOTES
Subjective:       Patient ID: Harman Johnston is a 88 y.o. male.    Chief Complaint: Follow-up (- 6/23)    88 y old male f.u today on  visit on 6/23  due to R foot edema and erythema . Treated as cellulitis . He has one more day of cephalexin . Erythema has improved, edema not so much . Also had u/s to rule out DVT .    Review of Systems   Constitutional: Negative.    HENT: Negative.     Eyes: Negative.    Respiratory: Negative.     Cardiovascular: Negative.    Gastrointestinal: Negative.    Genitourinary: Negative.    Musculoskeletal: Negative.    Skin: Negative.    Hematological: Negative.      Objective:      Physical Exam  Constitutional:       General: He is not in acute distress.     Appearance: He is well-developed. He is not diaphoretic.   HENT:      Head: Normocephalic and atraumatic.      Right Ear: External ear normal.      Left Ear: External ear normal.      Nose: Nose normal.      Mouth/Throat:      Pharynx: No oropharyngeal exudate.   Eyes:      General: No scleral icterus.        Right eye: No discharge.         Left eye: No discharge.      Conjunctiva/sclera: Conjunctivae normal.      Pupils: Pupils are equal, round, and reactive to light.   Neck:      Thyroid: No thyromegaly.      Vascular: No JVD.      Trachea: No tracheal deviation.   Cardiovascular:      Rate and Rhythm: Normal rate and regular rhythm.      Pulses:           Dorsalis pedis pulses are 1+ on the right side.        Posterior tibial pulses are 1+ on the right side.      Heart sounds: Normal heart sounds. No murmur heard.    No friction rub. No gallop.   Pulmonary:      Effort: Pulmonary effort is normal. No respiratory distress.      Breath sounds: Normal breath sounds. No stridor. No wheezing or rales.   Chest:      Chest wall: No tenderness.   Abdominal:      General: Bowel sounds are normal. There is no distension.      Palpations: Abdomen is soft.      Tenderness: There is no abdominal tenderness. There is no guarding or  rebound.   Musculoskeletal:         General: No tenderness. Normal range of motion.      Cervical back: Normal range of motion and neck supple.      Right lower le+ Edema present.   Lymphadenopathy:      Cervical: No cervical adenopathy.   Skin:     General: Skin is warm and dry.      Coloration: Skin is not pale.      Findings: No erythema or rash.   Neurological:      Mental Status: He is alert and oriented to person, place, and time.      Cranial Nerves: No cranial nerve deficit.      Motor: No abnormal muscle tone.      Coordination: Coordination normal.      Deep Tendon Reflexes: Reflexes are normal and symmetric. Reflexes normal.   Psychiatric:         Behavior: Behavior normal.         Thought Content: Thought content normal.         Judgment: Judgment normal.       Assessment:         Harman was seen today for follow-up.    Diagnoses and all orders for this visit:    Leg edema, right  -     US Lower Extremity Arteries Bilateral; Future    Cellulitis of foot       Plan:     Harman was seen today for follow-up.    Diagnoses and all orders for this visit:    Leg edema, right  -     US Lower Extremity Arteries Bilateral; Future     Improving . Continue antibiotic . Will rule out PAD.

## 2023-06-30 ENCOUNTER — APPOINTMENT (OUTPATIENT)
Dept: RADIOLOGY | Facility: HOSPITAL | Age: 88
End: 2023-06-30
Attending: FAMILY MEDICINE
Payer: MEDICARE

## 2023-06-30 DIAGNOSIS — R60.0 LEG EDEMA, RIGHT: ICD-10-CM

## 2023-06-30 PROCEDURE — 93925 US LOWER EXTREMITY ARTERIES BILATERAL: ICD-10-PCS | Mod: 26,,, | Performed by: RADIOLOGY

## 2023-06-30 PROCEDURE — 93925 LOWER EXTREMITY STUDY: CPT | Mod: 26,,, | Performed by: RADIOLOGY

## 2023-06-30 PROCEDURE — 93925 LOWER EXTREMITY STUDY: CPT | Mod: TC,PO

## 2023-08-22 ENCOUNTER — CLINICAL SUPPORT (OUTPATIENT)
Dept: AUDIOLOGY | Facility: CLINIC | Age: 88
End: 2023-08-22
Payer: MEDICARE

## 2023-08-22 DIAGNOSIS — H90.3 SENSORINEURAL HEARING LOSS, ASYMMETRICAL: Primary | ICD-10-CM

## 2023-08-22 NOTE — PROGRESS NOTES
Harman CARDOSO Preston was seen 08/22/2023 for hearing aid clean and check.  He voices no concerns.  Otoscopy was unremarkable.  Earmold had wax impaction in sound bore. Hearing aid, earmold and tubing was cleaned. Aid is found to be in good working order and patient is pleased. Return as needed.

## 2023-09-19 RX ORDER — DUTASTERIDE 0.5 MG/1
CAPSULE, LIQUID FILLED ORAL
Qty: 90 CAPSULE | Refills: 3 | Status: SHIPPED | OUTPATIENT
Start: 2023-09-19

## 2023-09-28 ENCOUNTER — IMMUNIZATION (OUTPATIENT)
Dept: FAMILY MEDICINE | Facility: CLINIC | Age: 88
End: 2023-09-28
Payer: MEDICARE

## 2023-09-28 PROCEDURE — 90694 FLU VACCINE - QUADRIVALENT - ADJUVANTED: ICD-10-PCS | Mod: HCNC,S$GLB,, | Performed by: NURSE PRACTITIONER

## 2023-09-28 PROCEDURE — G0008 ADMIN INFLUENZA VIRUS VAC: HCPCS | Mod: HCNC,S$GLB,, | Performed by: NURSE PRACTITIONER

## 2023-09-28 PROCEDURE — G0008 FLU VACCINE - QUADRIVALENT - ADJUVANTED: ICD-10-PCS | Mod: HCNC,S$GLB,, | Performed by: NURSE PRACTITIONER

## 2023-09-28 PROCEDURE — 90694 VACC AIIV4 NO PRSRV 0.5ML IM: CPT | Mod: HCNC,S$GLB,, | Performed by: NURSE PRACTITIONER

## 2023-10-19 ENCOUNTER — OFFICE VISIT (OUTPATIENT)
Dept: OPHTHALMOLOGY | Facility: CLINIC | Age: 88
End: 2023-10-19
Payer: MEDICARE

## 2023-10-19 DIAGNOSIS — D31.31 CHOROIDAL NEVUS, RIGHT: ICD-10-CM

## 2023-10-19 DIAGNOSIS — H31.012 MACULAR SCAR, LEFT: ICD-10-CM

## 2023-10-19 DIAGNOSIS — Z96.1 PSEUDOPHAKIA OF BOTH EYES: Primary | ICD-10-CM

## 2023-10-19 DIAGNOSIS — H52.223 REGULAR ASTIGMATISM OF BOTH EYES: ICD-10-CM

## 2023-10-19 PROCEDURE — 92014 PR EYE EXAM, EST PATIENT,COMPREHESV: ICD-10-PCS | Mod: HCNC,S$GLB,, | Performed by: OPTOMETRIST

## 2023-10-19 PROCEDURE — 1159F MED LIST DOCD IN RCRD: CPT | Mod: HCNC,CPTII,S$GLB, | Performed by: OPTOMETRIST

## 2023-10-19 PROCEDURE — 92015 DETERMINE REFRACTIVE STATE: CPT | Mod: HCNC,S$GLB,, | Performed by: OPTOMETRIST

## 2023-10-19 PROCEDURE — 99999 PR PBB SHADOW E&M-EST. PATIENT-LVL II: CPT | Mod: PBBFAC,HCNC,, | Performed by: OPTOMETRIST

## 2023-10-19 PROCEDURE — 99999 PR PBB SHADOW E&M-EST. PATIENT-LVL II: ICD-10-PCS | Mod: PBBFAC,HCNC,, | Performed by: OPTOMETRIST

## 2023-10-19 PROCEDURE — 1159F PR MEDICATION LIST DOCUMENTED IN MEDICAL RECORD: ICD-10-PCS | Mod: HCNC,CPTII,S$GLB, | Performed by: OPTOMETRIST

## 2023-10-19 PROCEDURE — 92014 COMPRE OPH EXAM EST PT 1/>: CPT | Mod: HCNC,S$GLB,, | Performed by: OPTOMETRIST

## 2023-10-19 PROCEDURE — 92015 PR REFRACTION: ICD-10-PCS | Mod: HCNC,S$GLB,, | Performed by: OPTOMETRIST

## 2023-10-19 NOTE — PROGRESS NOTES
HPI    Last visit with [facility] on 09/07/2022    Patient states trouble with near vision  No ocular pain/discomfort  Not using any otc drops  Wear PAL glasses   Last edited by Rossy Ellison on 10/19/2023  9:34 AM.            Assessment /Plan     For exam results, see Encounter Report.    Pseudophakia of both eyes    Choroidal nevus, right    Macular scar, left    Regular astigmatism of both eyes      Stable IOL OU.    Unchanged CR nevus OD    Stable scar OS    Mild dry eye ou    Dispense Final Rx for glasses.  RTC 1 year  Discussed above and answered questions.

## 2023-10-31 ENCOUNTER — LAB VISIT (OUTPATIENT)
Dept: LAB | Facility: HOSPITAL | Age: 88
End: 2023-10-31
Attending: INTERNAL MEDICINE
Payer: MEDICARE

## 2023-10-31 DIAGNOSIS — N18.4 STAGE 4 CHRONIC KIDNEY DISEASE: ICD-10-CM

## 2023-10-31 LAB
ALBUMIN SERPL BCP-MCNC: 3.2 G/DL (ref 3.5–5.2)
ANION GAP SERPL CALC-SCNC: 12 MMOL/L (ref 8–16)
BASOPHILS # BLD AUTO: 0.06 K/UL (ref 0–0.2)
BASOPHILS NFR BLD: 0.7 % (ref 0–1.9)
BUN SERPL-MCNC: 47 MG/DL (ref 8–23)
CALCIUM SERPL-MCNC: 9.5 MG/DL (ref 8.7–10.5)
CHLORIDE SERPL-SCNC: 109 MMOL/L (ref 95–110)
CO2 SERPL-SCNC: 21 MMOL/L (ref 23–29)
CREAT SERPL-MCNC: 3.2 MG/DL (ref 0.5–1.4)
DIFFERENTIAL METHOD: ABNORMAL
EOSINOPHIL # BLD AUTO: 1.9 K/UL (ref 0–0.5)
EOSINOPHIL NFR BLD: 23.4 % (ref 0–8)
ERYTHROCYTE [DISTWIDTH] IN BLOOD BY AUTOMATED COUNT: 14 % (ref 11.5–14.5)
EST. GFR  (NO RACE VARIABLE): 17.8 ML/MIN/1.73 M^2
GLUCOSE SERPL-MCNC: 70 MG/DL (ref 70–110)
HCT VFR BLD AUTO: 42.3 % (ref 40–54)
HGB BLD-MCNC: 13.3 G/DL (ref 14–18)
IMM GRANULOCYTES # BLD AUTO: 0.02 K/UL (ref 0–0.04)
IMM GRANULOCYTES NFR BLD AUTO: 0.2 % (ref 0–0.5)
LYMPHOCYTES # BLD AUTO: 1.2 K/UL (ref 1–4.8)
LYMPHOCYTES NFR BLD: 14.5 % (ref 18–48)
MCH RBC QN AUTO: 29.7 PG (ref 27–31)
MCHC RBC AUTO-ENTMCNC: 31.4 G/DL (ref 32–36)
MCV RBC AUTO: 94 FL (ref 82–98)
MONOCYTES # BLD AUTO: 0.7 K/UL (ref 0.3–1)
MONOCYTES NFR BLD: 8.6 % (ref 4–15)
NEUTROPHILS # BLD AUTO: 4.3 K/UL (ref 1.8–7.7)
NEUTROPHILS NFR BLD: 52.6 % (ref 38–73)
NRBC BLD-RTO: 0 /100 WBC
PHOSPHATE SERPL-MCNC: 3.6 MG/DL (ref 2.7–4.5)
PLATELET # BLD AUTO: 212 K/UL (ref 150–450)
PMV BLD AUTO: 11.6 FL (ref 9.2–12.9)
POTASSIUM SERPL-SCNC: 4.6 MMOL/L (ref 3.5–5.1)
PTH-INTACT SERPL-MCNC: 95.8 PG/ML (ref 9–77)
RBC # BLD AUTO: 4.48 M/UL (ref 4.6–6.2)
SODIUM SERPL-SCNC: 142 MMOL/L (ref 136–145)
WBC # BLD AUTO: 8.16 K/UL (ref 3.9–12.7)

## 2023-10-31 PROCEDURE — 85025 COMPLETE CBC W/AUTO DIFF WBC: CPT | Mod: HCNC | Performed by: INTERNAL MEDICINE

## 2023-10-31 PROCEDURE — 36415 COLL VENOUS BLD VENIPUNCTURE: CPT | Mod: HCNC,PO | Performed by: INTERNAL MEDICINE

## 2023-10-31 PROCEDURE — 83970 ASSAY OF PARATHORMONE: CPT | Mod: HCNC | Performed by: INTERNAL MEDICINE

## 2023-10-31 PROCEDURE — 80069 RENAL FUNCTION PANEL: CPT | Mod: HCNC | Performed by: INTERNAL MEDICINE

## 2023-11-06 RX ORDER — ATENOLOL 25 MG/1
TABLET ORAL
Qty: 45 TABLET | Refills: 10 | Status: SHIPPED | OUTPATIENT
Start: 2023-11-06

## 2023-11-07 ENCOUNTER — CLINICAL SUPPORT (OUTPATIENT)
Dept: AUDIOLOGY | Facility: CLINIC | Age: 88
End: 2023-11-07
Payer: MEDICARE

## 2023-11-07 ENCOUNTER — OFFICE VISIT (OUTPATIENT)
Dept: NEPHROLOGY | Facility: CLINIC | Age: 88
End: 2023-11-07
Payer: MEDICARE

## 2023-11-07 VITALS
HEART RATE: 62 BPM | RESPIRATION RATE: 18 BRPM | DIASTOLIC BLOOD PRESSURE: 68 MMHG | BODY MASS INDEX: 26.19 KG/M2 | WEIGHT: 176.81 LBS | SYSTOLIC BLOOD PRESSURE: 164 MMHG | HEIGHT: 69 IN

## 2023-11-07 DIAGNOSIS — H90.3 SENSORINEURAL HEARING LOSS, BILATERAL: Primary | ICD-10-CM

## 2023-11-07 DIAGNOSIS — N18.4 STAGE 4 CHRONIC KIDNEY DISEASE: Primary | ICD-10-CM

## 2023-11-07 PROCEDURE — 99999 PR PBB SHADOW E&M-EST. PATIENT-LVL III: ICD-10-PCS | Mod: PBBFAC,HCNC,, | Performed by: INTERNAL MEDICINE

## 2023-11-07 PROCEDURE — 1101F PR PT FALLS ASSESS DOC 0-1 FALLS W/OUT INJ PAST YR: ICD-10-PCS | Mod: HCNC,CPTII,S$GLB, | Performed by: INTERNAL MEDICINE

## 2023-11-07 PROCEDURE — 1159F PR MEDICATION LIST DOCUMENTED IN MEDICAL RECORD: ICD-10-PCS | Mod: HCNC,CPTII,S$GLB, | Performed by: INTERNAL MEDICINE

## 2023-11-07 PROCEDURE — 99215 PR OFFICE/OUTPT VISIT, EST, LEVL V, 40-54 MIN: ICD-10-PCS | Mod: HCNC,S$GLB,, | Performed by: INTERNAL MEDICINE

## 2023-11-07 PROCEDURE — 3288F FALL RISK ASSESSMENT DOCD: CPT | Mod: HCNC,CPTII,S$GLB, | Performed by: INTERNAL MEDICINE

## 2023-11-07 PROCEDURE — 1126F PR PAIN SEVERITY QUANTIFIED, NO PAIN PRESENT: ICD-10-PCS | Mod: HCNC,CPTII,S$GLB, | Performed by: INTERNAL MEDICINE

## 2023-11-07 PROCEDURE — 1159F MED LIST DOCD IN RCRD: CPT | Mod: HCNC,CPTII,S$GLB, | Performed by: INTERNAL MEDICINE

## 2023-11-07 PROCEDURE — 1101F PT FALLS ASSESS-DOCD LE1/YR: CPT | Mod: HCNC,CPTII,S$GLB, | Performed by: INTERNAL MEDICINE

## 2023-11-07 PROCEDURE — 99999 PR PBB SHADOW E&M-EST. PATIENT-LVL III: CPT | Mod: PBBFAC,HCNC,, | Performed by: INTERNAL MEDICINE

## 2023-11-07 PROCEDURE — 3288F PR FALLS RISK ASSESSMENT DOCUMENTED: ICD-10-PCS | Mod: HCNC,CPTII,S$GLB, | Performed by: INTERNAL MEDICINE

## 2023-11-07 PROCEDURE — 1126F AMNT PAIN NOTED NONE PRSNT: CPT | Mod: HCNC,CPTII,S$GLB, | Performed by: INTERNAL MEDICINE

## 2023-11-07 PROCEDURE — 99215 OFFICE O/P EST HI 40 MIN: CPT | Mod: HCNC,S$GLB,, | Performed by: INTERNAL MEDICINE

## 2023-11-07 NOTE — PROGRESS NOTES
"Renal clinic f/u note:  Date of clinic visit: 11/7/23  Reason for f/u and chief c/o: CKD. 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 H/o of BPH     HPI: Pt is a 89 y/o male who presents for f/u of CKD stage 4. As documented before, s Cr worsened after pt took mobic daily x 11 months between Dec 2018 and June 2019. Pt previously had quite stable CKD stage 3. He had been advised by me to stop all NSAIds. New baseline Cr appears to be above 3 mg/dl. He was last seen in Renal clinic in April 2023. To review, 3 visits ago in June 2022, he was still taking mobic. He was asked to stop, his daughter-in-law was present.      On f/u today, pt and his wife confirm he has not taken any NSAIDs since last visit. Pt is feeling "OK" today, no new c/o's, no discomfort, had no questions.    He also has a h/o of BPH. Pt saw Dr. Ignacio (urology) in the past. He has elevated PSA in the past. Prostate biopsy did not show cancer. He is taking flomax and avodart.            PAST MEDICAL HISTORY:  CKD stage 4, analgesic nephropathy, BPH (benign prostatic hyperplasia), Cataract, Elevated PSA, HEARING LOSS, History of colon polyps, Hyperlipemia, Hypertension, Myocardial infarction, and Tobacco dependence.     PAST SURGICAL HISTORY:  He  has a past surgical history that includes Ear mastoidectomy w/ cochlear implant w/ landmark (2011); Tonsillectomy; PCIOL OD (Right, 03/`16/16); Cataract extraction w/  intraocular lens implant (Left, 4-20-16); Cataract extraction w/  intraocular lens implant (Right, 3-16-16); Appendectomy (1960); and Eye surgery (Bilateral, 2016).     SOCIAL HISTORY:  He  reports that he quit smoking about 19 years ago. His smoking use included cigarettes. He has a 10.00 pack-year smoking history. He has never used smokeless tobacco. He reports that he drinks about 1.0 standard drinks of alcohol per week. He reports that he does not use drugs.     FAMILY MEDICAL HISTORY:  His family history includes Breast " "cancer in his sister; Cancer in his brother, sister, and sister; Hyperlipidemia in his mother; Macular degeneration in his sister and sister; Stroke in his mother.     Review of patient's allergies indicates:  No Known Allergies     Meds: reviewed     Current Outpatient Medications:     aspirin (ECOTRIN) 81 MG EC tablet, Take 81 mg by mouth once daily., Disp: , Rfl:     atenoloL (TENORMIN) 25 MG tablet, Take 1/2 (12.5 mg) po qd, Disp: 45 tablet, Rfl: 3    atorvastatin (LIPITOR) 10 MG tablet, TAKE 1 TABLET ONE TIME DAILY, Disp: 90 tablet, Rfl: 3    betamethasone valerate 0.1% (VALISONE) 0.1 % Oint, Apply topically 2 (two) times daily., Disp: 90 g, Rfl: 1    dutasteride (AVODART) 0.5 mg capsule, TAKE 1 CAPSULE EVERY DAY, Disp: 90 capsule, Rfl: 3    solifenacin (VESICARE) 5 MG tablet, TAKE 1 TABLET EVERY DAY, Disp: 90 tablet, Rfl: 3    tamsulosin (FLOMAX) 0.4 mg Cap, TAKE 1 CAPSULE BY MOUTH ONCE DAILY., Disp: 90 capsule, Rfl: 3    triamcinolone acetonide 0.1% (KENALOG) 0.1 % Lotn, Apply topically 2 (two) times daily., Disp: 60 mL, Rfl: 2     REVIEW OF SYSTEMS:  Patient has no fever, fatigue, visual changes, chest pain, edema, cough, dyspnea, nausea, vomiting, constipation, diarrhea, arthralgias, pruritis, dizziness, weakness, depression, confusion.     PHYSICAL EXAM:  Blood pressure 164/68 on arrival, repeat BP after 5 min of rest 130/70, pulse 63, height 5' 8" (1.727 m), weight 80. Kg, from 80.6 Kg  Gen:    WDWN male in no apparent distress  Psych: Normal mood and affect  Chest:  Clear with no rales, rhonchi, wheezing with normal effort  CV:      bradycardia, regular with no murmurs, gallops or rubs  Abd:     Soft, nontender, no distension, positive bowel sounds  Ext:      no edema     Labs reviewed   BMP  Lab Results   Component Value Date     10/31/2023    K 4.6 10/31/2023     10/31/2023    CO2 21 (L) 10/31/2023    BUN 47 (H) 10/31/2023    CREATININE 3.2 (H) 10/31/2023    CALCIUM 9.5 10/31/2023    " ANIONGAP 12 10/31/2023    EGFRNORACEVR 17.8 (A) 10/31/2023     Lab Results   Component Value Date    WBC 8.16 10/31/2023    HGB 13.3 (L) 10/31/2023    HCT 42.3 10/31/2023    MCV 94 10/31/2023     10/31/2023       Lab Results   Component Value Date    PTH 95.8 (H) 10/31/2023    CALCIUM 9.5 10/31/2023    PHOS 3.6 10/31/2023             PSA 6.1        U/a: no protein, no blood, no RBC's, no casts     U/s of the kidneys on 10/21/20: no hydronephrosis, small kidney with cortical thinning     Repeat u/s June 2022 reviewed:  The right kidney measures 9.8 cm. The left kidney measures 10.5 cm.  The renal parenchymal echogenicity is increased bilaterally.  There is also thinning of the renal parenchyma bilaterally.  There are 2 simple right renal cysts including 1 interpolar cyst measuring up to 1.5 cm in size and 1 lower pole cyst measuring up to 1.9 cm in size.  No hydronephrosis.The bladder is unremarkable in appearance           IMPRESSION AND RECOMMENDATIONS:  89 y/o male with advanced CKD presents for f/u:     1. Renal: s Cr stable at present, not worse, within baseline range.  Prior CKD stage 3 worsened after pt re-took NSAIds x 11 months, CKD stage 4 at baseline now  Analgesic nephropathy     DDX: vs due BPH/obstructive nephropathy (less likely, has no hematuria on recent u/a)  No hydronephrosis on repeat u/s  s Cr may not recover fully as pt had exposure to mobic for a long time (11 months)     Complications of CKD:  K normal  Na normal  Metabolic acidosis, mild  Ca normal  PO4 normal  Secondary hyperparathyroidism, mild, will monitor  Anemia, very mild     2. HTN: BP controlled  Meds reviewed  On minimal meds  Elderly male pt, at risk of fall, recommend against aggressive traetment of BP  Goal for /155     3. Has BPH: had no hydronephrosis on multiple repeat renal u/s's  Elevated PSA (fluctuating), says is taking flomax and avodart  Advised f/u with urology     4. Bradycardia: improved, normal HR  today  On atenolol 25 mg po qd.  No sx's  Meds reviewed     Plans and recommendations:   As reviewed  Above  Again advised pt not to take any forms of NSAIds, examples mentioned.  Opportunity for questions provided  Total time spent 40 minutes including time needed to review the records, the   patient evaluation, documentation, face-to-face discussion with the patient,   more than 50% of the time was spent on coordination of care and counseling.    Level V visit.  RTC 6 months     Tanmay Pereira MD

## 2023-11-07 NOTE — PROGRESS NOTES
Harman Johnston was seen 11/07/2023 for hearing aid check (walk-in).  He reports concerns that the toggle switch fell off his BTE.  Hearing aid check indicates toggle switch and wind screen are attached and functioning. He was unable to see it. I helped patient feel the rocker switch with the up/down button. Return as needed.

## 2023-12-04 NOTE — TELEPHONE ENCOUNTER
No care due was identified.  [facility] Embedded Care Due Messages. Reference number: [id number].   12/04/2023 12:07:09 PM CST

## 2023-12-05 RX ORDER — ATORVASTATIN CALCIUM 10 MG/1
10 TABLET, FILM COATED ORAL
Qty: 90 TABLET | Refills: 1 | Status: SHIPPED | OUTPATIENT
Start: 2023-12-05

## 2023-12-05 NOTE — TELEPHONE ENCOUNTER
Refill Routing Note   Medication(s) are not appropriate for processing by [facility] for the following reason(s):        No active prescription written by provider    [facility] action(s):  Defer               Appointments  past 12m or future 3m with PCP    Date Provider   Last Visit   6/29/2023 Ann Price MD   Next Visit   Visit date not found Ann Price MD   ED visits in past 90 days: 0        Note composed:8:12 PM 12/04/2023

## 2023-12-13 ENCOUNTER — CLINICAL SUPPORT (OUTPATIENT)
Dept: AUDIOLOGY | Facility: CLINIC | Age: 88
End: 2023-12-13
Payer: MEDICARE

## 2023-12-13 DIAGNOSIS — H90.3 SENSORINEURAL HEARING LOSS, BILATERAL: Primary | ICD-10-CM

## 2023-12-13 NOTE — PROGRESS NOTES
Harman Johnston was seen 12/13/2023 for hearing aid clean and check.  Wears Right Power BTE. He voices no concerns.  Otoscopy was unremarkable.  Right aid was cleaned and found to be in good working order. No program adjustments were needed. Patient is scheduled to return in six months for hearing aid clean/check.

## 2024-05-04 DIAGNOSIS — I10 ESSENTIAL HYPERTENSION: Primary | Chronic | ICD-10-CM

## 2024-05-04 DIAGNOSIS — E78.2 MIXED HYPERLIPIDEMIA: Chronic | ICD-10-CM

## 2024-05-04 NOTE — TELEPHONE ENCOUNTER
Refill Routing Note   Medication(s) are not appropriate for processing by [facility] for the following reason(s):        Required labs outdated: CMP, Lipid panel    [facility] action(s):  Defer     Requires labs : Yes    Medication Therapy Plan:    Pended labs utilizing BestPracticeAdvisor (BPA) tab due to extra training from LPN      Appointments  past 12m or future 3m with PCP    Date Provider   Last Visit   6/29/2023 Ann Price MD   Next Visit   Visit date not found Ann Price MD   ED visits in past 90 days: 0        Note composed:9:17 AM 05/04/2024

## 2024-05-04 NOTE — TELEPHONE ENCOUNTER
Care Due:                  Date            Visit Type   Department     Provider  --------------------------------------------------------------------------------                                SAME DAY -                              ESTABLISHED   [facility] INTERNAL  Ann HINTON  Last Visit: 06-      PATIENT      MEDICINE       Albert  Next Visit: None Scheduled  None         None Found                                                            Last  Test          Frequency    Reason                     Performed    Due Date  --------------------------------------------------------------------------------    CMP.........  12 months..  atorvastatin.............  04- 03-    Lipid Panel.  12 months..  atorvastatin.............  04- 03-    [employer] Embedded Care Due Messages. Reference number: [id number].   5/04/2024 1:15:56 AM CDT

## 2024-05-06 RX ORDER — ATORVASTATIN CALCIUM 10 MG/1
10 TABLET, FILM COATED ORAL DAILY
Qty: 90 TABLET | Refills: 0 | Status: SHIPPED | OUTPATIENT
Start: 2024-05-06

## 2024-05-24 ENCOUNTER — OFFICE VISIT (OUTPATIENT)
Dept: FAMILY MEDICINE | Facility: CLINIC | Age: 89
End: 2024-05-24
Payer: MEDICARE

## 2024-05-24 VITALS
SYSTOLIC BLOOD PRESSURE: 122 MMHG | WEIGHT: 171.19 LBS | HEART RATE: 70 BPM | OXYGEN SATURATION: 99 % | BODY MASS INDEX: 25.36 KG/M2 | HEIGHT: 69 IN | RESPIRATION RATE: 18 BRPM | DIASTOLIC BLOOD PRESSURE: 70 MMHG

## 2024-05-24 DIAGNOSIS — I10 ESSENTIAL HYPERTENSION: Chronic | ICD-10-CM

## 2024-05-24 DIAGNOSIS — N40.0 BENIGN PROSTATIC HYPERPLASIA WITHOUT LOWER URINARY TRACT SYMPTOMS: Chronic | ICD-10-CM

## 2024-05-24 DIAGNOSIS — N18.4 CKD (CHRONIC KIDNEY DISEASE), STAGE IV: ICD-10-CM

## 2024-05-24 DIAGNOSIS — R26.9 ABNORMALITY OF GAIT AND MOBILITY: ICD-10-CM

## 2024-05-24 DIAGNOSIS — I70.0 ATHEROSCLEROSIS OF AORTA: ICD-10-CM

## 2024-05-24 DIAGNOSIS — Z00.00 ENCOUNTER FOR MEDICARE ANNUAL WELLNESS EXAM: ICD-10-CM

## 2024-05-24 DIAGNOSIS — E78.2 MIXED HYPERLIPIDEMIA: Chronic | ICD-10-CM

## 2024-05-24 DIAGNOSIS — Z00.00 ENCOUNTER FOR PREVENTIVE HEALTH EXAMINATION: Primary | ICD-10-CM

## 2024-05-24 DIAGNOSIS — H91.93 BILATERAL HEARING LOSS, UNSPECIFIED HEARING LOSS TYPE: ICD-10-CM

## 2024-05-24 DIAGNOSIS — H02.132 SENILE ECTROPION OF RIGHT LOWER EYELID: ICD-10-CM

## 2024-05-24 DIAGNOSIS — N25.81 SECONDARY HYPERPARATHYROIDISM OF RENAL ORIGIN: ICD-10-CM

## 2024-05-24 PROCEDURE — 3288F FALL RISK ASSESSMENT DOCD: CPT | Mod: HCNC,CPTII,S$GLB, | Performed by: NURSE PRACTITIONER

## 2024-05-24 PROCEDURE — 1158F ADVNC CARE PLAN TLK DOCD: CPT | Mod: HCNC,CPTII,S$GLB, | Performed by: NURSE PRACTITIONER

## 2024-05-24 PROCEDURE — 99999 PR PBB SHADOW E&M-EST. PATIENT-LVL IV: CPT | Mod: PBBFAC,HCNC,, | Performed by: NURSE PRACTITIONER

## 2024-05-24 PROCEDURE — 1101F PT FALLS ASSESS-DOCD LE1/YR: CPT | Mod: HCNC,CPTII,S$GLB, | Performed by: NURSE PRACTITIONER

## 2024-05-24 PROCEDURE — G0439 PPPS, SUBSEQ VISIT: HCPCS | Mod: HCNC,S$GLB,, | Performed by: NURSE PRACTITIONER

## 2024-05-24 PROCEDURE — 1126F AMNT PAIN NOTED NONE PRSNT: CPT | Mod: HCNC,CPTII,S$GLB, | Performed by: NURSE PRACTITIONER

## 2024-05-24 PROCEDURE — 1159F MED LIST DOCD IN RCRD: CPT | Mod: HCNC,CPTII,S$GLB, | Performed by: NURSE PRACTITIONER

## 2024-05-24 PROCEDURE — 1170F FXNL STATUS ASSESSED: CPT | Mod: HCNC,CPTII,S$GLB, | Performed by: NURSE PRACTITIONER

## 2024-05-24 PROCEDURE — 1160F RVW MEDS BY RX/DR IN RCRD: CPT | Mod: HCNC,CPTII,S$GLB, | Performed by: NURSE PRACTITIONER

## 2024-05-24 NOTE — PATIENT INSTRUCTIONS
Counseling and Referral of Other Preventative  (Italic type indicates deductible and co-insurance are waived)    Patient Name: Harman Johnston  Today's Date: 5/24/2024    Health Maintenance       Date Due Completion Date    RSV Vaccine (Age 60+ and Pregnant patients) (1 - 1-dose 60+ series) Never done ---    COVID-19 Vaccine (5 - 2023-24 season) 09/01/2023 10/17/2022    Lipid Panel 04/03/2024 4/3/2023    TETANUS VACCINE 06/10/2033 6/10/2023        No orders of the defined types were placed in this encounter.      The following information is provided to all patients.  This information is to help you find resources for any of the problems found today that may be affecting your health:                  Living healthy guide: www.[facility].[place].gov      Understanding Diabetes: www.diabetes.org      Eating healthy: www.cdc.gov/healthyweight      [facility] home safety checklist: www.cdc.gov/steadi/patient.html      Agency on Aging: www.goea.[place].gov      Alcoholics anonymous (AA): www.aa.org      Physical Activity: www.[place].nih.gov/tr8dirz      Tobacco use: www.quitwithusla.org

## 2024-05-24 NOTE — PROGRESS NOTES
"  Harman Johnston presented for a  Medicare AWV and comprehensive Health Risk Assessment today. The following components were reviewed and updated:    Medical history  Family History  Social history  Allergies and Current Medications  Health Risk Assessment  Health Maintenance  Care Team         ** See Completed Assessments for Annual Wellness Visit within the encounter summary.**         The following assessments were completed:  Living Situation  CAGE  Depression Screening  Timed Get Up and Go  Whisper Test  Cognitive Function Screening    Nutrition Screening  ADL Screening  PAQ Screening      Opioid documentation:      Patient does not have a current opioid prescription.        Vitals:    05/24/24 0957   BP: 122/70   Pulse: 70   Resp: 18   SpO2: 99%   Weight: 77.7 kg (171 lb 3 oz)   Height: 5' 9" (1.753 m)     Body mass index is 25.28 kg/m².  Physical Exam  Constitutional:       General: He is not in acute distress.     Appearance: Normal appearance. He is well-developed. He is not ill-appearing, toxic-appearing or diaphoretic.   HENT:      Head: Normocephalic and atraumatic.      Comments: Hearing aid right ear     Right Ear: External ear normal.      Left Ear: External ear normal.      Mouth/Throat:      Mouth: Mucous membranes are moist.   Eyes:      Conjunctiva/sclera: Conjunctivae normal.   Cardiovascular:      Rate and Rhythm: Normal rate and regular rhythm.      Pulses: Normal pulses.      Heart sounds: Normal heart sounds. No murmur heard.     No friction rub. No gallop.   Pulmonary:      Effort: Pulmonary effort is normal. No respiratory distress.      Breath sounds: Normal breath sounds. No wheezing or rales.   Chest:      Chest wall: No tenderness.   Abdominal:      General: Abdomen is flat. Bowel sounds are normal.      Palpations: Abdomen is soft.   Musculoskeletal:         General: No tenderness. Normal range of motion.      Cervical back: Normal range of motion and neck supple.   Skin:     General: " Skin is warm and dry.   Neurological:      Mental Status: He is alert and oriented to person, place, and time.      Cranial Nerves: No cranial nerve deficit.   Psychiatric:         Mood and Affect: Mood normal.         Behavior: Behavior normal.               Diagnoses and health risks identified today and associated recommendations/orders:    1. Encounter for preventive health examination  Screenings performed, as noted above.  Personal preventative testing needs reviewed.      2. Encounter for Medicare annual wellness exam  Screenings performed, as noted above.  Personal preventative testing needs reviewed.    - Ambulatory Referral/Consult to Enhanced Annual Wellness Visit (eAWV)    3. Secondary hyperparathyroidism of renal origin  Monitored, stable, lastPTH 95.8, followed by nephrology, Dr Pereira    4. Atherosclerosis of aorta  Monitored, stable, follow up with pcp    5. Abnormality of gait and mobility  Monitored, stable at this time, fell in June of last year, uses a cane at times now    6. CKD (chronic kidney disease), stage IV  Monitored, stable, last GFR 17.8, followed by nephrology    7. Senile ectropion of right lower eyelid  Monitored, stable followed by eye specialtis    8. Bilateral hearing loss, unspecified hearing loss type  Assessed, unstable, deaf on left side, hearing aid on right, is seeing audiology soon    9. Essential hypertension  Treated with metoprolol, stable cont tx    10. Mixed hyperlipidemia  Treated with atorvastatin,s table, cont tx    11. Benign prostatic hyperplasia without lower urinary tract symptoms  Treated with flomax, avodart, vesicare, stable, follow up as indicated    Discussed vaccines        Provided Harman with a 5-10 year written screening schedule and personal prevention plan. Recommendations were developed using the USPSTF age appropriate recommendations. Education, counseling, and referrals were provided as needed. After Visit Summary printed and given to patient which  includes a list of additional screenings\tests needed.    No follow-ups on file.    Vasile Reed, NP  I offered to discuss advanced care planning, including how to pick a person who would make decisions for you if you were unable to make them for yourself, called a health care power of , and what kind of decisions you might make such as use of life sustaining treatments such as ventilators and tube feeding when faced with a life limiting illness recorded on a living will that they will need to know. (How you want to be cared for as you near the end of your natural life)     X Patient is interested in learning more about how to make advanced directives.  I provided them paperwork and offered to discuss this with them.

## 2024-06-12 ENCOUNTER — CLINICAL SUPPORT (OUTPATIENT)
Dept: AUDIOLOGY | Facility: CLINIC | Age: 89
End: 2024-06-12
Payer: MEDICARE

## 2024-06-12 DIAGNOSIS — H90.3 SENSORINEURAL HEARING LOSS, BILATERAL: Primary | ICD-10-CM

## 2024-06-12 NOTE — PROGRESS NOTES
Harman Johnston was seen 06/12/2024 for hearing aid clean and check.  He voices no concerns but his wife has noticed he is not hearing as well.  Otoscopy was unremarkable.  There was some dry wax in the earmold tubing. Cleaned right aid and found to be in good working order. He was able to understand wife's voice after cleaning; she appreciated a difference in his communication. No program adjustments were needed.Patient is scheduled to return in six months for clean/check.

## 2024-07-18 RX ORDER — ATORVASTATIN CALCIUM 10 MG/1
10 TABLET, FILM COATED ORAL DAILY
Qty: 90 TABLET | Refills: 0 | OUTPATIENT
Start: 2024-07-18

## 2024-07-18 NOTE — TELEPHONE ENCOUNTER
Care Due:                  Date            Visit Type   Department     Provider  --------------------------------------------------------------------------------                                SAME DAY -                              ESTABLISHED   [facility] KEON HINTON  Last Visit: 06-      PATIENT      MEDICINE       Albert                              EP -                              PRIMARY      [facility] KEON HINTON  Next Visit: 07-      CARE ([facility])   MEDICINE       Albert                                                            Last  Test          Frequency    Reason                     Performed    Due Date  --------------------------------------------------------------------------------    CMP.........  12 months..  atorvastatin.............  04- 03-    Lipid Panel.  12 months..  atorvastatin.............  04- 03-    [facility] Embedded Care Due Messages. Reference number: [id number].   7/18/2024 3:20:17 AM CDT

## 2024-07-18 NOTE — TELEPHONE ENCOUNTER
Refill Routing Note   Medication(s) are not appropriate for processing by [facility] for the following reason(s):        Required labs outdated    [facility] action(s):  Defer     Requires labs : Yes             Appointments  past 12m or future 3m with PCP    Date Provider   Last Visit   6/29/2023 Ann Price MD   Next Visit   7/23/2024 Ann Price MD   ED visits in past 90 days: 0        Note composed:5:51 AM 07/18/2024

## 2024-07-30 ENCOUNTER — TELEPHONE (OUTPATIENT)
Dept: FAMILY MEDICINE | Facility: CLINIC | Age: 89
End: 2024-07-30
Payer: MEDICARE

## 2024-07-30 NOTE — TELEPHONE ENCOUNTER
----- Message from Jennifer Alarcon sent at 7/30/2024 12:24 PM CDT -----  He has a f/u appt on 8/12. She would like to know, if he needs to have blood work done. Please call Susanne Johnston  [phone]. Thank you

## 2024-07-30 NOTE — TELEPHONE ENCOUNTER
Return call to patient wife regarding patient  has a f/u appt on 8/12. She would like to know, if he needs to have blood work done. Sending message to Dr. Meadows. Please advise.

## 2024-08-12 ENCOUNTER — OFFICE VISIT (OUTPATIENT)
Dept: FAMILY MEDICINE | Facility: CLINIC | Age: 89
End: 2024-08-12
Attending: FAMILY MEDICINE
Payer: MEDICARE

## 2024-08-12 VITALS
DIASTOLIC BLOOD PRESSURE: 78 MMHG | HEIGHT: 69 IN | HEART RATE: 52 BPM | SYSTOLIC BLOOD PRESSURE: 126 MMHG | WEIGHT: 169.19 LBS | TEMPERATURE: 96 F | BODY MASS INDEX: 25.06 KG/M2 | OXYGEN SATURATION: 98 %

## 2024-08-12 DIAGNOSIS — R79.9 ABNORMAL FINDING OF BLOOD CHEMISTRY, UNSPECIFIED: ICD-10-CM

## 2024-08-12 DIAGNOSIS — E78.5 DYSLIPIDEMIA: Primary | ICD-10-CM

## 2024-08-12 DIAGNOSIS — L30.9 ECZEMA, UNSPECIFIED TYPE: ICD-10-CM

## 2024-08-12 DIAGNOSIS — N40.0 BENIGN PROSTATIC HYPERPLASIA WITHOUT LOWER URINARY TRACT SYMPTOMS: Chronic | ICD-10-CM

## 2024-08-12 DIAGNOSIS — L85.3 XEROSIS CUTIS: ICD-10-CM

## 2024-08-12 DIAGNOSIS — N18.5 CHRONIC KIDNEY DISEASE (CKD), STAGE V: ICD-10-CM

## 2024-08-12 PROCEDURE — 99999 PR PBB SHADOW E&M-EST. PATIENT-LVL IV: CPT | Mod: PBBFAC,HCNC,, | Performed by: FAMILY MEDICINE

## 2024-08-12 RX ORDER — TAMSULOSIN HYDROCHLORIDE 0.4 MG/1
1 CAPSULE ORAL DAILY
Qty: 90 CAPSULE | Refills: 3 | Status: SHIPPED | OUTPATIENT
Start: 2024-08-12

## 2024-08-12 NOTE — PROGRESS NOTES
Subjective:       Patient ID: Harman Johnston is a 89 y.o. male.    Chief Complaint: Follow-up    89 y old male here for f.u . Doing well. No recnet falls. Great appetite. Only bothered by a pruritic rash on L forearm . He was prescribed topical steroids over the spring which he used but he was told he could not used for a prolonged period of time . No sick contacts , no changes in deternets , consmetic products etc .Memory is stable . + Hearing loss. Past due to see Nephrology . Avoids NSAIDS. Current Opth exam .      Review of Systems   Constitutional: Negative.    HENT: Negative.     Eyes: Negative.    Respiratory: Negative.     Cardiovascular: Negative.    Gastrointestinal: Negative.    Genitourinary: Negative.    Musculoskeletal: Negative.    Skin:  Positive for rash.   Hematological: Negative.        Objective:      Physical Exam  Constitutional:       General: He is not in acute distress.     Appearance: He is well-developed. He is not diaphoretic.   HENT:      Head: Normocephalic and atraumatic.      Right Ear: External ear normal.      Left Ear: External ear normal.      Nose: Nose normal.      Mouth/Throat:      Pharynx: No oropharyngeal exudate.   Eyes:      General: No scleral icterus.        Right eye: No discharge.         Left eye: No discharge.      Conjunctiva/sclera: Conjunctivae normal.      Pupils: Pupils are equal, round, and reactive to light.   Neck:      Thyroid: No thyromegaly.      Vascular: No JVD.      Trachea: No tracheal deviation.   Cardiovascular:      Rate and Rhythm: Normal rate and regular rhythm.      Heart sounds: Normal heart sounds. No murmur heard.     No friction rub. No gallop.   Pulmonary:      Effort: Pulmonary effort is normal. No respiratory distress.      Breath sounds: Normal breath sounds. No stridor. No wheezing or rales.   Chest:      Chest wall: No tenderness.   Abdominal:      General: Bowel sounds are normal. There is no distension.      Palpations: Abdomen is  soft.      Tenderness: There is no abdominal tenderness. There is no guarding or rebound.   Musculoskeletal:         General: No tenderness. Normal range of motion.      Cervical back: Normal range of motion and neck supple.   Lymphadenopathy:      Cervical: No cervical adenopathy.   Skin:     General: Skin is warm and dry.      Coloration: Skin is not pale.      Findings: Rash present. No erythema. Rash is scaling.          Neurological:      Mental Status: He is alert and oriented to person, place, and time.      Cranial Nerves: No cranial nerve deficit.      Motor: No abnormal muscle tone.      Coordination: Coordination normal.      Deep Tendon Reflexes: Reflexes are normal and symmetric. Reflexes normal.   Psychiatric:         Behavior: Behavior normal.         Thought Content: Thought content normal.         Judgment: Judgment normal.         Assessment:         Harman was seen today for follow-up.    Diagnoses and all orders for this visit:    Dyslipidemia  -     CBC Auto Differential; Future  -     Comprehensive Metabolic Panel; Future  -     Hemoglobin A1C; Future  -     Lipid Panel; Future    Abnormal finding of blood chemistry, unspecified  -     Hemoglobin A1C; Future    Chronic kidney disease (CKD), stage V    Benign prostatic hyperplasia without lower urinary tract symptoms    Eczema, unspecified type    Xerosis cutis       Plan:     Statin   Avoid NSAIDS. F.u with Nephrology   Stable . Continue meds   Start using moisturizing lotion, avoid hot showers. Prn f.u

## 2024-08-12 NOTE — TELEPHONE ENCOUNTER
----- Message from Alexnymita Davis sent at 8/12/2024  3:13 PM CDT -----  Contact: [phone]  Type:  RX Refill Request    Who Called: WIFE  Refill or New Rx:REFILL  RX Name and Strength:tamsulosin (FLOMAX) 0.4 mg Cap  How is the patient currently taking it? (ex. 1XDay):  Is this a 30 day or 90 day RX:  Preferred Pharmacy with phone number:[phone] Fax: [phone]    Local or Mail Order:MAIL  Ordering Provider:derek  Would the patient rather a call back or a response via [facility]? CALL BACK  Best Call Back Number:[phone]  Additional Information: [facility] - [place], [place] - [address]   [address]  [facility] [address]  Phone: [phone] Fax: [phone]

## 2024-08-12 NOTE — TELEPHONE ENCOUNTER
No care due was identified.  [facility] Embedded Care Due Messages. Reference number: [id number].   8/12/2024 3:34:35 PM CDT

## 2024-08-13 ENCOUNTER — LAB VISIT (OUTPATIENT)
Dept: LAB | Facility: HOSPITAL | Age: 89
End: 2024-08-13
Attending: FAMILY MEDICINE
Payer: MEDICARE

## 2024-08-13 DIAGNOSIS — R79.9 ABNORMAL FINDING OF BLOOD CHEMISTRY, UNSPECIFIED: ICD-10-CM

## 2024-08-13 DIAGNOSIS — E78.5 DYSLIPIDEMIA: ICD-10-CM

## 2024-08-13 LAB
ALBUMIN SERPL BCP-MCNC: 3.2 G/DL (ref 3.5–5.2)
ALP SERPL-CCNC: 96 U/L (ref 55–135)
ALT SERPL W/O P-5'-P-CCNC: 13 U/L (ref 10–44)
ANION GAP SERPL CALC-SCNC: 9 MMOL/L (ref 8–16)
ANISOCYTOSIS BLD QL SMEAR: SLIGHT
AST SERPL-CCNC: 21 U/L (ref 10–40)
BASOPHILS # BLD AUTO: 0.05 K/UL (ref 0–0.2)
BASOPHILS NFR BLD: 0.6 % (ref 0–1.9)
BILIRUB SERPL-MCNC: 0.7 MG/DL (ref 0.1–1)
BUN SERPL-MCNC: 45 MG/DL (ref 8–23)
CALCIUM SERPL-MCNC: 9.3 MG/DL (ref 8.7–10.5)
CHLORIDE SERPL-SCNC: 112 MMOL/L (ref 95–110)
CHOLEST SERPL-MCNC: 125 MG/DL (ref 120–199)
CHOLEST/HDLC SERPL: 3.9 {RATIO} (ref 2–5)
CO2 SERPL-SCNC: 21 MMOL/L (ref 23–29)
CREAT SERPL-MCNC: 3.3 MG/DL (ref 0.5–1.4)
DACRYOCYTES BLD QL SMEAR: ABNORMAL
DIFFERENTIAL METHOD BLD: ABNORMAL
DOHLE BOD BLD QL SMEAR: PRESENT
EOSINOPHIL # BLD AUTO: 2.4 K/UL (ref 0–0.5)
EOSINOPHIL NFR BLD: 28.3 % (ref 0–8)
ERYTHROCYTE [DISTWIDTH] IN BLOOD BY AUTOMATED COUNT: 14.1 % (ref 11.5–14.5)
EST. GFR  (NO RACE VARIABLE): 17.2 ML/MIN/1.73 M^2
ESTIMATED AVG GLUCOSE: 105 MG/DL (ref 68–131)
GIANT PLATELETS BLD QL SMEAR: PRESENT
GLUCOSE SERPL-MCNC: 81 MG/DL (ref 70–110)
HBA1C MFR BLD: 5.3 % (ref 4–5.6)
HCT VFR BLD AUTO: 42.8 % (ref 40–54)
HDLC SERPL-MCNC: 32 MG/DL (ref 40–75)
HDLC SERPL: 25.6 % (ref 20–50)
HGB BLD-MCNC: 13.7 G/DL (ref 14–18)
IMM GRANULOCYTES # BLD AUTO: 0.02 K/UL (ref 0–0.04)
IMM GRANULOCYTES NFR BLD AUTO: 0.2 % (ref 0–0.5)
LDLC SERPL CALC-MCNC: 76.6 MG/DL (ref 63–159)
LYMPHOCYTES # BLD AUTO: 1.4 K/UL (ref 1–4.8)
LYMPHOCYTES NFR BLD: 16.3 % (ref 18–48)
MCH RBC QN AUTO: 30.7 PG (ref 27–31)
MCHC RBC AUTO-ENTMCNC: 32 G/DL (ref 32–36)
MCV RBC AUTO: 96 FL (ref 82–98)
MONOCYTES # BLD AUTO: 0.7 K/UL (ref 0.3–1)
MONOCYTES NFR BLD: 8.5 % (ref 4–15)
NEUTROPHILS # BLD AUTO: 3.9 K/UL (ref 1.8–7.7)
NEUTROPHILS NFR BLD: 46.1 % (ref 38–73)
NONHDLC SERPL-MCNC: 93 MG/DL
NRBC BLD-RTO: 0 /100 WBC
OVALOCYTES BLD QL SMEAR: ABNORMAL
PLATELET # BLD AUTO: 182 K/UL (ref 150–450)
PLATELET BLD QL SMEAR: ABNORMAL
PMV BLD AUTO: 11.9 FL (ref 9.2–12.9)
POIKILOCYTOSIS BLD QL SMEAR: SLIGHT
POTASSIUM SERPL-SCNC: 5 MMOL/L (ref 3.5–5.1)
PROT SERPL-MCNC: 7.1 G/DL (ref 6–8.4)
RBC # BLD AUTO: 4.46 M/UL (ref 4.6–6.2)
SMUDGE CELLS BLD QL SMEAR: PRESENT
SODIUM SERPL-SCNC: 142 MMOL/L (ref 136–145)
SPHEROCYTES BLD QL SMEAR: ABNORMAL
TOXIC GRANULES BLD QL SMEAR: PRESENT
TRIGL SERPL-MCNC: 82 MG/DL (ref 30–150)
WBC # BLD AUTO: 8.55 K/UL (ref 3.9–12.7)
WBC TOXIC VACUOLES BLD QL SMEAR: PRESENT

## 2024-08-13 PROCEDURE — 80061 LIPID PANEL: CPT | Mod: HCNC | Performed by: FAMILY MEDICINE

## 2024-08-13 PROCEDURE — 36415 COLL VENOUS BLD VENIPUNCTURE: CPT | Mod: HCNC,PO | Performed by: FAMILY MEDICINE

## 2024-08-13 PROCEDURE — 85025 COMPLETE CBC W/AUTO DIFF WBC: CPT | Mod: HCNC | Performed by: FAMILY MEDICINE

## 2024-08-13 PROCEDURE — 80053 COMPREHEN METABOLIC PANEL: CPT | Mod: HCNC | Performed by: FAMILY MEDICINE

## 2024-08-13 PROCEDURE — 83036 HEMOGLOBIN GLYCOSYLATED A1C: CPT | Mod: HCNC | Performed by: FAMILY MEDICINE

## 2024-09-18 ENCOUNTER — LAB VISIT (OUTPATIENT)
Dept: LAB | Facility: HOSPITAL | Age: 89
End: 2024-09-18
Attending: INTERNAL MEDICINE
Payer: MEDICARE

## 2024-09-18 DIAGNOSIS — N18.4 STAGE 4 CHRONIC KIDNEY DISEASE: ICD-10-CM

## 2024-09-18 LAB
ALBUMIN SERPL BCP-MCNC: 3.3 G/DL (ref 3.5–5.2)
ANION GAP SERPL CALC-SCNC: 7 MMOL/L (ref 8–16)
BASOPHILS # BLD AUTO: 0.04 K/UL (ref 0–0.2)
BASOPHILS NFR BLD: 0.5 % (ref 0–1.9)
BUN SERPL-MCNC: 49 MG/DL (ref 8–23)
CALCIUM SERPL-MCNC: 9.1 MG/DL (ref 8.7–10.5)
CHLORIDE SERPL-SCNC: 114 MMOL/L (ref 95–110)
CO2 SERPL-SCNC: 21 MMOL/L (ref 23–29)
CREAT SERPL-MCNC: 3.5 MG/DL (ref 0.5–1.4)
DIFFERENTIAL METHOD BLD: ABNORMAL
EOSINOPHIL # BLD AUTO: 1.8 K/UL (ref 0–0.5)
EOSINOPHIL NFR BLD: 21.3 % (ref 0–8)
ERYTHROCYTE [DISTWIDTH] IN BLOOD BY AUTOMATED COUNT: 13.6 % (ref 11.5–14.5)
EST. GFR  (NO RACE VARIABLE): 16 ML/MIN/1.73 M^2
GLUCOSE SERPL-MCNC: 83 MG/DL (ref 70–110)
HCT VFR BLD AUTO: 42.6 % (ref 40–54)
HGB BLD-MCNC: 13.2 G/DL (ref 14–18)
IMM GRANULOCYTES # BLD AUTO: 0.02 K/UL (ref 0–0.04)
IMM GRANULOCYTES NFR BLD AUTO: 0.2 % (ref 0–0.5)
LYMPHOCYTES # BLD AUTO: 1.7 K/UL (ref 1–4.8)
LYMPHOCYTES NFR BLD: 20.3 % (ref 18–48)
MCH RBC QN AUTO: 29.8 PG (ref 27–31)
MCHC RBC AUTO-ENTMCNC: 31 G/DL (ref 32–36)
MCV RBC AUTO: 96 FL (ref 82–98)
MONOCYTES # BLD AUTO: 0.7 K/UL (ref 0.3–1)
MONOCYTES NFR BLD: 8.1 % (ref 4–15)
NEUTROPHILS # BLD AUTO: 4.2 K/UL (ref 1.8–7.7)
NEUTROPHILS NFR BLD: 49.6 % (ref 38–73)
NRBC BLD-RTO: 0 /100 WBC
PHOSPHATE SERPL-MCNC: 3.3 MG/DL (ref 2.7–4.5)
PLATELET # BLD AUTO: 196 K/UL (ref 150–450)
PMV BLD AUTO: 11.2 FL (ref 9.2–12.9)
POTASSIUM SERPL-SCNC: 4.9 MMOL/L (ref 3.5–5.1)
RBC # BLD AUTO: 4.43 M/UL (ref 4.6–6.2)
SODIUM SERPL-SCNC: 142 MMOL/L (ref 136–145)
WBC # BLD AUTO: 8.53 K/UL (ref 3.9–12.7)

## 2024-09-18 PROCEDURE — 80069 RENAL FUNCTION PANEL: CPT | Mod: HCNC | Performed by: INTERNAL MEDICINE

## 2024-09-18 PROCEDURE — 36415 COLL VENOUS BLD VENIPUNCTURE: CPT | Mod: HCNC,PO | Performed by: INTERNAL MEDICINE

## 2024-09-18 PROCEDURE — 85025 COMPLETE CBC W/AUTO DIFF WBC: CPT | Mod: HCNC | Performed by: INTERNAL MEDICINE

## 2024-09-23 ENCOUNTER — OFFICE VISIT (OUTPATIENT)
Dept: NEPHROLOGY | Facility: CLINIC | Age: 89
End: 2024-09-23
Payer: MEDICARE

## 2024-09-23 VITALS
DIASTOLIC BLOOD PRESSURE: 80 MMHG | SYSTOLIC BLOOD PRESSURE: 132 MMHG | HEART RATE: 63 BPM | HEIGHT: 70 IN | BODY MASS INDEX: 23.66 KG/M2 | WEIGHT: 165.25 LBS

## 2024-09-23 DIAGNOSIS — N18.4 STAGE 4 CHRONIC KIDNEY DISEASE: Primary | ICD-10-CM

## 2024-09-23 PROCEDURE — 1159F MED LIST DOCD IN RCRD: CPT | Mod: HCNC,CPTII,S$GLB, | Performed by: INTERNAL MEDICINE

## 2024-09-23 PROCEDURE — 99215 OFFICE O/P EST HI 40 MIN: CPT | Mod: HCNC,S$GLB,, | Performed by: INTERNAL MEDICINE

## 2024-09-23 PROCEDURE — 1126F AMNT PAIN NOTED NONE PRSNT: CPT | Mod: HCNC,CPTII,S$GLB, | Performed by: INTERNAL MEDICINE

## 2024-09-23 PROCEDURE — 99999 PR PBB SHADOW E&M-EST. PATIENT-LVL III: CPT | Mod: PBBFAC,HCNC,, | Performed by: INTERNAL MEDICINE

## 2024-09-23 RX ORDER — ATORVASTATIN CALCIUM 10 MG/1
10 TABLET, FILM COATED ORAL DAILY
Qty: 90 TABLET | Refills: 0 | Status: SHIPPED | OUTPATIENT
Start: 2024-09-23

## 2024-09-23 RX ORDER — TAMSULOSIN HYDROCHLORIDE 0.4 MG/1
1 CAPSULE ORAL DAILY
Qty: 90 CAPSULE | Refills: 3 | Status: SHIPPED | OUTPATIENT
Start: 2024-09-23

## 2024-09-23 NOTE — PROGRESS NOTES
Renal clinic f/u note:  Date of clinic visit: 9/23/24  Reason for f/u and chief c/o: CKD. 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 H/o of BPH     HPI: Pt is a 89 y/o male who presents for f/u of CKD stage 4. Pt was last seen in renal clinic in  Nov 2023. Pt presents with his son today. Pt has no new c/o's, no new events. Had no questions. Feels comfortable, no SOB. His son told me that pt's meds ran out and he has refilled any of them.    As documented before, s Cr worsened after pt took mobic daily x 11 months between Dec 2018 and June 2019. Pt previously had quite stable CKD stage 3. He had been advised by me to stop all NSAIds. New baseline Cr appears to be above 3 to 4 mg/dl. Also to review, in June 2022, he was still taking mobic. He was asked to stop, his daughter-in-law was present.       He also has a h/o of BPH. Pt saw Dr. Ignacio (urology) in the past. He has elevated PSA in the past. Prostate biopsy did not show cancer. He is taking flomax and avodart.         PAST MEDICAL HISTORY:  CKD stage 4, analgesic nephropathy, BPH (benign prostatic hyperplasia), Cataract, Elevated PSA, HEARING LOSS, History of colon polyps, Hyperlipemia, Hypertension, Myocardial infarction, and Tobacco dependence.     PAST SURGICAL HISTORY:  He  has a past surgical history that includes Ear mastoidectomy w/ cochlear implant w/ landmark (2011); Tonsillectomy; PCIOL OD (Right, 03/`16/16); Cataract extraction w/  intraocular lens implant (Left, 4-20-16); Cataract extraction w/  intraocular lens implant (Right, 3-16-16); Appendectomy (1960); and Eye surgery (Bilateral, 2016).     SOCIAL HISTORY:  He  reports that he quit smoking about 19 years ago. His smoking use included cigarettes. He has a 10.00 pack-year smoking history. He has never used smokeless tobacco. He reports that he drinks about 1.0 standard drinks of alcohol per week. He reports that he does not use drugs.     FAMILY MEDICAL HISTORY:  His family  "history includes Breast cancer in his sister; Cancer in his brother, sister, and sister; Hyperlipidemia in his mother; Macular degeneration in his sister and sister; Stroke in his mother.     Review of patient's allergies indicates:  No Known Allergies     Meds: reviewed     Current Outpatient Medications:     aspirin (ECOTRIN) 81 MG EC tablet, Take 81 mg by mouth once daily. (Patient not taking: Reported on 9/23/2024), Disp: , Rfl:     atenoloL (TENORMIN) 25 MG tablet, TAKE 1/2 TABLET EVERY DAY (Patient not taking: Reported on 9/23/2024), Disp: 45 tablet, Rfl: 10    atorvastatin (LIPITOR) 10 MG tablet, Take 1 tablet (10 mg total) by mouth once daily. (Patient not taking: Reported on 9/23/2024), Disp: 90 tablet, Rfl: 0    dutasteride (AVODART) 0.5 mg capsule, TAKE 1 CAPSULE EVERY DAY (Patient not taking: Reported on 9/23/2024), Disp: 90 capsule, Rfl: 3    solifenacin (VESICARE) 5 MG tablet, TAKE 1 TABLET EVERY DAY (Patient not taking: Reported on 9/23/2024), Disp: 90 tablet, Rfl: 3    tamsulosin (FLOMAX) 0.4 mg Cap, Take 1 capsule (0.4 mg total) by mouth once daily., Disp: 90 capsule, Rfl: 3     REVIEW OF SYSTEMS:  Patient has no fever, fatigue, visual changes, chest pain, edema, cough, dyspnea, nausea, vomiting, constipation, diarrhea, arthralgias, pruritis, dizziness, weakness, depression, confusion.     PHYSICAL EXAM:  Blood pressure 114/50 pulse 63, height 5' 8" (1.727 m), weight 75.0 Kg, from 80.0 Kg, from 80.6 Kg  Gen:    WDWN male in no apparent distress  Psych: Normal mood and affect  Chest:  Clear with no rales, rhonchi, wheezing with normal effort  CV:      bradycardia, regular with no murmurs, gallops or rubs  Abd:     Soft, nontender, no distension, positive bowel sounds  Ext:      no edema     Labs reviewed   BMP  Lab Results   Component Value Date     09/18/2024    K 4.9 09/18/2024     (H) 09/18/2024    CO2 21 (L) 09/18/2024    BUN 49 (H) 09/18/2024    CREATININE 3.5 (H) 09/18/2024    " CALCIUM 9.1 09/18/2024    ANIONGAP 7 (L) 09/18/2024    EGFRNORACEVR 16.0 (A) 09/18/2024     Lab Results   Component Value Date    WBC 8.53 09/18/2024    HGB 13.2 (L) 09/18/2024    HCT 42.6 09/18/2024    MCV 96 09/18/2024     09/18/2024        Lab Results   Component Value Date    PTH 95.8 (H) 10/31/2023    CALCIUM 9.1 09/18/2024    PHOS 3.3 09/18/2024       PSA 6.1        U/a: no protein, no blood, no RBC's, no casts     U/s of the kidneys on 10/21/20: no hydronephrosis, small kidney with cortical thinning     Repeat u/s June 2022 reviewed:  The right kidney measures 9.8 cm. The left kidney measures 10.5 cm.  The renal parenchymal echogenicity is increased bilaterally.  There is also thinning of the renal parenchyma bilaterally.  There are 2 simple right renal cysts including 1 interpolar cyst measuring up to 1.5 cm in size and 1 lower pole cyst measuring up to 1.9 cm in size.  No hydronephrosis.The bladder is unremarkable in appearance           IMPRESSION AND RECOMMENDATIONS:  89 y/o male with advanced CKD presents for f/u:     1. Renal: s Cr stable at present, not worse, within baseline range. Reviewed the labs with pt's son today.  Prior CKD stage 3 worsened after pt re-took NSAIds x 11 months, CKD stage 4 at baseline now  Analgesic nephropathy  S Cr, though high, has not worsened since he stopped ibuprofen.     DDX: vs due BPH/obstructive nephropathy (less likely, has no hematuria on recent u/a)  No hydronephrosis on repeat u/s  s Cr may not recover fully as pt had exposure to mobic for a long time (11 months)     Complications of CKD:  K normal  Na normal  Metabolic acidosis, mild  Ca normal  PO4 normal  Secondary hyperparathyroidism, mild, will monitor  Anemia, very mild     2. HTN: BP controlled to low  Meds reviewed  Has not been taking atenolol, chris not re-stat, will d/c  Elderly male pt, at risk of fall, recommend against aggressive traetment of BP  Goal for /155     3. Has BPH: had no  hydronephrosis on multiple repeat renal u/s's  Elevated PSA (fluctuating), says is not taking flomax and avodart, ran out and did not refill, will refill both  Advised f/u with urology     4. Bradycardia: improved, normal HR today  On atenolol 25 mg po qd.  No sx's  Meds reviewed     Plans and recommendations:   As reviewed  Above  Again advised pt not to take any forms of NSAIds, examples mentioned.  Opportunity for questions provided  Total time spent 40 minutes including time needed to review the records, the   patient evaluation, documentation, face-to-face discussion with the patient,   more than 50% of the time was spent on coordination of care and counseling.    Level V visit.  RTC 9-12 months     Tanmay Pereira MD

## 2024-09-24 ENCOUNTER — TELEPHONE (OUTPATIENT)
Dept: NEPHROLOGY | Facility: CLINIC | Age: 89
End: 2024-09-24
Payer: MEDICARE

## 2024-09-24 RX ORDER — ATENOLOL 25 MG/1
12.5 TABLET ORAL DAILY
COMMUNITY

## 2024-09-24 NOTE — TELEPHONE ENCOUNTER
----- Message from Osiris Hernandez sent at 9/24/2024 10:57 AM CDT -----  Contact: Stefano Mercado, [phone]  Calling to speak with the nurse regarding the patient's medication. Please call him. Thanks.

## 2024-09-24 NOTE — TELEPHONE ENCOUNTER
----- Message from Raina Lopez sent at 9/24/2024 11:32 AM CDT -----  Contact: Roman/son  .Type:  Patient Returning Call    Who Called:Roman  Who Left Message for Patient:nurse  Does the patient know what this is regarding?:yes  Would the patient rather a call back or a response via [facility]? Call back  Best Call Back Number:[phone]  Additional Information: na        Thanks  SANTIAGO

## 2024-09-24 NOTE — TELEPHONE ENCOUNTER
Returned call to patient's wife who called to clarify mediation list. He was confused and states that he wasn't taking any medications but his wife confirmed that he does take the ones that are listed. He is also taking Atenolol 12.5mg daily. I have added it to the list.

## 2024-09-26 ENCOUNTER — CLINICAL SUPPORT (OUTPATIENT)
Dept: AUDIOLOGY | Facility: CLINIC | Age: 89
End: 2024-09-26
Payer: MEDICARE

## 2024-09-26 DIAGNOSIS — H90.3 SENSORINEURAL HEARING LOSS, BILATERAL: Primary | ICD-10-CM

## 2024-09-26 NOTE — PROGRESS NOTES
Harman Johnston was seen 09/26/2024 for hearing aid repair.    BTE manuel screen fell off - reattached and aid is in GWO. Cleaned earmold and tubing. Return as needed.

## 2024-09-30 RX ORDER — DUTASTERIDE 0.5 MG/1
CAPSULE, LIQUID FILLED ORAL
Qty: 90 CAPSULE | Refills: 3 | Status: SHIPPED | OUTPATIENT
Start: 2024-09-30

## 2024-10-21 ENCOUNTER — OFFICE VISIT (OUTPATIENT)
Dept: OPHTHALMOLOGY | Facility: CLINIC | Age: 89
End: 2024-10-21
Payer: MEDICARE

## 2024-10-21 DIAGNOSIS — Z96.1 PSEUDOPHAKIA OF BOTH EYES: Primary | ICD-10-CM

## 2024-10-21 DIAGNOSIS — H31.012 MACULAR SCAR, LEFT: ICD-10-CM

## 2024-10-21 DIAGNOSIS — H02.115 CICATRICIAL ECTROPION OF LEFT LOWER EYELID: ICD-10-CM

## 2024-10-21 DIAGNOSIS — D31.31 CHOROIDAL NEVUS, RIGHT: ICD-10-CM

## 2024-10-21 DIAGNOSIS — H52.7 REFRACTIVE ERRORS: ICD-10-CM

## 2024-10-21 PROCEDURE — 92014 COMPRE OPH EXAM EST PT 1/>: CPT | Mod: HCNC,S$GLB,, | Performed by: OPTOMETRIST

## 2024-10-21 PROCEDURE — 99999 PR PBB SHADOW E&M-EST. PATIENT-LVL III: CPT | Mod: PBBFAC,HCNC,, | Performed by: OPTOMETRIST

## 2024-10-21 PROCEDURE — 1159F MED LIST DOCD IN RCRD: CPT | Mod: HCNC,CPTII,S$GLB, | Performed by: OPTOMETRIST

## 2024-10-21 PROCEDURE — 92015 DETERMINE REFRACTIVE STATE: CPT | Mod: HCNC,S$GLB,, | Performed by: OPTOMETRIST

## 2024-10-21 NOTE — PROGRESS NOTES
SUBJECTIVE  Harman JANAE Johnston is 89 y.o. male  Corrected distance visual acuity was 20/20 in the right eye and 20/25 in the left eye. Uncorrected near visual acuity was J2 in the right eye and J6 in the left eye.   Chief Complaint   Patient presents with    Hypertensive Eye Exam    Eye Exam          HPI    Hard to see small print sometimes.  Left eyelid red.  Last eye exam 10/19/2023 TRF.  Update glasses RX.    1.PCIOL OD 03/16/16   2.PCIOL OS 4/20/16  3. Chorioretinal scars OD  4. Choroidal nevus OD    Last edited by Hannah Felix MA on 10/21/2024  8:48 AM.         Assessment /Plan :  1. Pseudophakia of both eyes  Well-centered, stable IOL OU. Monitor annually.       2. Choroidal nevus, right  Unchanged     3. Macular scar, left  Stable     4. Refractive errors  Dispense Final Rx for glasses.  RTC 1 year  Discussed above and answered questions.

## 2024-10-21 NOTE — PROGRESS NOTES
SUBJECTIVE  Harman JANAE Johnston is 89 y.o. male  Corrected distance visual acuity was 20/20 in the right eye and 20/25 in the left eye. Uncorrected near visual acuity was J2 in the right eye and J6 in the left eye.   Chief Complaint   Patient presents with    Hypertensive Eye Exam    Eye Exam          HPI    Hard to see small print sometimes.  Left eyelid red.  Last eye exam 10/19/2023 TRF.  Update glasses RX.    1.PCIOL OD 03/16/16   2.PCIOL OS 4/20/16  3. Chorioretinal scars OD  4. Choroidal nevus OD    Last edited by Hannah Felix MA on 10/21/2024  8:48 AM.         Assessment /Plan :  1. Pseudophakia of both eyes  Well-centered, stable IOL OU. Monitor annually.     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 Choroidal nevus, right  Unchanged    4. Macular scar, left  Unchanged    5. Refractive errors  Dispense Final Rx for glasses.  RTC 1 year  Discussed above and answered questions.

## 2024-11-03 NOTE — TELEPHONE ENCOUNTER
Please let him know his testing showed no signs of breast cancer.   Keep his appt with dr haro in November.  thanks   Additional Progress Note...

## 2024-11-11 RX ORDER — ATENOLOL 25 MG/1
12.5 TABLET ORAL
Qty: 45 TABLET | Refills: 3 | Status: SHIPPED | OUTPATIENT
Start: 2024-11-11

## 2024-11-25 RX ORDER — TAMSULOSIN HYDROCHLORIDE 0.4 MG/1
CAPSULE ORAL
Refills: 0 | OUTPATIENT
Start: 2024-11-25

## 2024-11-25 NOTE — TELEPHONE ENCOUNTER
No care due was identified.  [facility] Embedded Care Due Messages. Reference number: [id number].   11/25/2024 1:01:39 PM CST

## 2024-11-26 NOTE — TELEPHONE ENCOUNTER
Refill Decision Note   Harman Johnston  is requesting a refill authorization.  Brief Assessment and Rationale for Refill:  Quick Discontinue     Medication Therapy Plan:    Pharmacy is requesting new scripts for the following medications without required information, (sig/ frequency/qty/etc)      Medication Reconciliation Completed: No     Comments: Pharmacies have been requesting medications for patients without required information, (sig, frequency, qty, etc.). In addition, requests are sent for medication(s) pt. are currently not taking, and medications patients have never taken.    We have spoken to the pharmacies about these request types and advised their teams previously that we are unable to assess these New Script requests and require all details for these requests. This is a known issue and has been reported.     Note composed:6:03 PM 11/25/2024

## 2024-12-11 ENCOUNTER — TELEPHONE (OUTPATIENT)
Dept: OTOLARYNGOLOGY | Facility: CLINIC | Age: 89
End: 2024-12-11
Payer: MEDICARE

## 2024-12-11 ENCOUNTER — TELEPHONE (OUTPATIENT)
Dept: INTERNAL MEDICINE | Facility: CLINIC | Age: 89
End: 2024-12-11
Payer: MEDICARE

## 2024-12-11 ENCOUNTER — CLINICAL SUPPORT (OUTPATIENT)
Dept: AUDIOLOGY | Facility: CLINIC | Age: 89
End: 2024-12-11
Payer: MEDICARE

## 2024-12-11 DIAGNOSIS — H90.3 ASYMMETRIC SNHL (SENSORINEURAL HEARING LOSS): Primary | ICD-10-CM

## 2024-12-11 DIAGNOSIS — H90.3 ASYMMETRIC SNHL (SENSORINEURAL HEARING LOSS): ICD-10-CM

## 2024-12-11 NOTE — PROGRESS NOTES
Harmanadebayo Johnston was seen 12/11/2024 for hearing aid clean and check.  He voices no concerns.  Otoscopy was unremarkable.  There is some wax in earmold tubing and slight debris around hearing aid BTE manuel. Aid was cleaned and found to be in good working order. Patient is scheduled to return in three months for audiogram and hearing aid check.

## 2024-12-11 NOTE — TELEPHONE ENCOUNTER
LVM  for call back . Medication being requested was prescribed by Nephrology not Internal Med provider.

## 2024-12-11 NOTE — TELEPHONE ENCOUNTER
----- Message from Keara sent at 12/11/2024  3:19 PM CST -----  Type:  RX Refill Request    Who Called: Wife  Refill or New Rx:Refill  RX Name and Strength:tamsulosin (FLOMAX) 0.4 mg Cap  How is the patient currently taking it? (ex. 1XDay):  Is this a 30 day or 90 day RX:  Preferred Pharmacy with phone number    [facility] - [facility] [address]  [address] [address]  Phone: [phone] Fax: [phone]     Local or Mail Order:Mail Order  Ordering Provider:Ann Meadows  Would the patient rather a call back or a response via [facility]? Callback  Best Call Back Number:[phone]  Additional Information: Need a refill

## 2024-12-12 RX ORDER — TAMSULOSIN HYDROCHLORIDE 0.4 MG/1
1 CAPSULE ORAL DAILY
Qty: 90 CAPSULE | Refills: 3 | Status: SHIPPED | OUTPATIENT
Start: 2024-12-12

## 2024-12-12 NOTE — TELEPHONE ENCOUNTER
----- Message from Quintin sent at 12/12/2024 12:47 PM CST -----  Contact: savannah/daughter  Type:  RX Refill Request    Who Called: savannah  Refill or New Rx: refill  RX Name and Strength: tamsulosin (FLOMAX) 0.4 mg Cap   How is the patient currently taking it? (ex. 1XDay):  Is this a 30 day or 90 day RX:  Preferred Pharmacy with phone number:     [facility] - [facility] [address]  [address] [address]  Phone: [phone] Fax: [phone]  Local or Mail Order:mail  Ordering Provider:  Would the patient rather a call back or a response via [facility]?  call  Best Call Back Number:[phone]   Additional Information:   Been out over a week

## 2024-12-20 RX ORDER — ATORVASTATIN CALCIUM 10 MG/1
10 TABLET, FILM COATED ORAL
Qty: 90 TABLET | Refills: 0 | Status: SHIPPED | OUTPATIENT
Start: 2024-12-20

## 2024-12-26 ENCOUNTER — TELEPHONE (OUTPATIENT)
Dept: FAMILY MEDICINE | Facility: CLINIC | Age: 89
End: 2024-12-26
Payer: MEDICARE

## 2024-12-26 NOTE — TELEPHONE ENCOUNTER
Called patient's daughter in law and she states they called the ambulance and he refused to go to the ER but the ambulance checked him out and got him to feeling better.    Mindy Koenig LPN    ----- Message from Florence sent at 12/26/2024 11:16 AM CST -----  Name of Who is Calling:LEVI LEVY [[id number]] Daughter in law         What is the request in detail:when they got in pt was pass out ,wet himself and speech was blurry and they just wan to know is he okay and need medical advise thank you          Can the clinic reply by [facility]:call back         What Number to Call Back if not in [facility]: [phone] Rika

## 2025-01-20 ENCOUNTER — TELEPHONE (OUTPATIENT)
Dept: FAMILY MEDICINE | Facility: CLINIC | Age: OVER 89
End: 2025-01-20
Payer: MEDICARE

## 2025-01-20 NOTE — TELEPHONE ENCOUNTER
Message left for pt to return call to clinic in regards to rescheduling appointment tomorrow due to the weather.

## 2025-01-27 ENCOUNTER — TELEPHONE (OUTPATIENT)
Dept: FAMILY MEDICINE | Facility: CLINIC | Age: OVER 89
End: 2025-01-27
Payer: MEDICARE

## 2025-01-29 ENCOUNTER — HOSPITAL ENCOUNTER (OUTPATIENT)
Dept: RADIOLOGY | Facility: HOSPITAL | Age: OVER 89
Discharge: HOME | End: 2025-01-29
Attending: FAMILY MEDICINE
Payer: MEDICARE

## 2025-01-29 ENCOUNTER — OFFICE VISIT (OUTPATIENT)
Dept: FAMILY MEDICINE | Facility: CLINIC | Age: OVER 89
End: 2025-01-29
Payer: MEDICARE

## 2025-01-29 VITALS
HEART RATE: 84 BPM | WEIGHT: 154.63 LBS | DIASTOLIC BLOOD PRESSURE: 74 MMHG | SYSTOLIC BLOOD PRESSURE: 132 MMHG | HEIGHT: 70 IN | OXYGEN SATURATION: 100 % | BODY MASS INDEX: 22.14 KG/M2 | TEMPERATURE: 97 F

## 2025-01-29 DIAGNOSIS — R26.9 GAIT DIFFICULTY: ICD-10-CM

## 2025-01-29 DIAGNOSIS — E78.5 DYSLIPIDEMIA: ICD-10-CM

## 2025-01-29 DIAGNOSIS — M54.50 ACUTE MIDLINE LOW BACK PAIN WITHOUT SCIATICA: ICD-10-CM

## 2025-01-29 DIAGNOSIS — R97.20 ELEVATED PSA: ICD-10-CM

## 2025-01-29 DIAGNOSIS — N18.5 CHRONIC KIDNEY DISEASE (CKD), STAGE V: ICD-10-CM

## 2025-01-29 DIAGNOSIS — I10 PRIMARY HYPERTENSION: ICD-10-CM

## 2025-01-29 DIAGNOSIS — M54.50 ACUTE MIDLINE LOW BACK PAIN WITHOUT SCIATICA: Primary | ICD-10-CM

## 2025-01-29 PROCEDURE — 73521 X-RAY EXAM HIPS BI 2 VIEWS: CPT | Mod: TC,HCNC,PO

## 2025-01-29 PROCEDURE — 73521 X-RAY EXAM HIPS BI 2 VIEWS: CPT | Mod: 26,HCNC,, | Performed by: RADIOLOGY

## 2025-01-29 PROCEDURE — 72100 X-RAY EXAM L-S SPINE 2/3 VWS: CPT | Mod: TC,HCNC,PO

## 2025-01-29 PROCEDURE — 99999 PR PBB SHADOW E&M-EST. PATIENT-LVL IV: CPT | Mod: PBBFAC,HCNC,, | Performed by: FAMILY MEDICINE

## 2025-01-29 PROCEDURE — 72100 X-RAY EXAM L-S SPINE 2/3 VWS: CPT | Mod: 26,HCNC,, | Performed by: RADIOLOGY

## 2025-01-29 NOTE — PROGRESS NOTES
"Subjective:       Patient ID: Harman Johnston is a 89 y.o. male.    Chief Complaint: Back Pain      HPI Comments:       Current Outpatient Medications:     aspirin (ECOTRIN) 81 MG EC tablet, Take 81 mg by mouth once daily., Disp: , Rfl:     atenoloL (TENORMIN) 25 MG tablet, TAKE 1/2 TABLET EVERY DAY, Disp: 45 tablet, Rfl: 3    atorvastatin (LIPITOR) 10 MG tablet, TAKE 1 TABLET BY MOUTH EVERY DAY, Disp: 90 tablet, Rfl: 0    dutasteride (AVODART) 0.5 mg capsule, TAKE 1 CAPSULE EVERY DAY, Disp: 90 capsule, Rfl: 3    tamsulosin (FLOMAX) 0.4 mg Cap, Take 1 capsule (0.4 mg total) by mouth once daily., Disp: 90 capsule, Rfl: 3      This my 1st time seeing this patient.  He is here with his wife and his granddaughter.  He lives at home and comes in today with a walker.      Fell last week when he turned too quickly.  Landed on his back in his low back has been hurting ever since.  No loss of consciousness.  Will not take Tylenol or heat.  Avoids NSAIDs because of chronic kidney disease    Main concern today is difficulty getting around.  Says his legs feel weak in his getting harder for him to get up from a sitting position into a standing position.  Welcomes home physical therapy if it can be arranged.      Review of Systems   Constitutional:  Negative for activity change, appetite change and fever.   HENT:  Negative for sore throat.    Respiratory:  Negative for cough and shortness of breath.    Cardiovascular:  Negative for chest pain.   Gastrointestinal:  Negative for abdominal pain, diarrhea and nausea.   Genitourinary:  Negative for difficulty urinating.   Musculoskeletal:  Positive for back pain. Negative for arthralgias and myalgias.   Neurological:  Positive for weakness. Negative for dizziness and headaches.       Objective:      Vitals:    01/29/25 1136   BP: 132/74   Pulse: 84   Temp: 97 °F (36.1 °C)   TempSrc: Tympanic   SpO2: 100%   Weight: 70.1 kg (154 lb 10.4 oz)   Height: 5' 10" (1.778 m)   PainSc:   8 "   PainLoc: Back     Physical Exam  Vitals and nursing note reviewed.   Constitutional:       General: He is not in acute distress.     Appearance: He is well-developed. He is not diaphoretic.      Comments: Very hard of hearing  Very slow to get up from a sitting position to a standing position   HENT:      Head: Normocephalic.   Neck:      Thyroid: No thyromegaly.   Cardiovascular:      Rate and Rhythm: Normal rate and regular rhythm.      Heart sounds: Normal heart sounds. No murmur heard.  Pulmonary:      Effort: Pulmonary effort is normal.      Breath sounds: Normal breath sounds. No wheezing or rales.   Abdominal:      General: There is no distension.      Palpations: Abdomen is soft.   Musculoskeletal:      Cervical back: Neck supple.      Lumbar back: Tenderness and bony tenderness present. No deformity or spasms. Decreased range of motion.        Back:       Comments: Area of complaint and tenderness   Lymphadenopathy:      Cervical: No cervical adenopathy.   Skin:     General: Skin is warm and dry.   Neurological:      Mental Status: He is alert and oriented to person, place, and time.      Comments: Lower extremity strength 4+/5 bilaterally   Psychiatric:         Mood and Affect: Mood normal.         Behavior: Behavior normal.         Thought Content: Thought content normal.         Judgment: Judgment normal.         Assessment:       1. Acute midline low back pain without sciatica    2. Chronic kidney disease (CKD), stage V    3. Gait difficulty    4. Dyslipidemia    5. Primary hypertension    6. Elevated PSA        Plan:   Acute midline low back pain without sciatica  Comments:  X-ray of lumbar spine and hips, Tylenol, heat  Orders:  -     X-Ray Lumbar Spine AP And Lateral; Future; Expected date: 01/29/2025  -     X-Ray Hips Bilateral 2 View Incl AP Pelvis; Future; Expected date: 01/29/2025    Chronic kidney disease (CKD), stage V  Comments:  Avoids NSAIDs.  Followed closely by Nephrology    Gait  difficulty  Comments:  Significant functional decline since the fall.  No focal weakness.  Physical therapy ordered  Orders:  -     Ambulatory referral/consult to Home Health; Future; Expected date: 01/30/2025    Dyslipidemia  Comments:  LDL 76    Primary hypertension  Comments:  Controlled.    Elevated PSA  Comments:  Check x-ray for spinal pain

## 2025-01-31 ENCOUNTER — TELEPHONE (OUTPATIENT)
Dept: FAMILY MEDICINE | Facility: CLINIC | Age: OVER 89
End: 2025-01-31
Payer: MEDICARE

## 2025-01-31 ENCOUNTER — PATIENT MESSAGE (OUTPATIENT)
Dept: FAMILY MEDICINE | Facility: CLINIC | Age: OVER 89
End: 2025-01-31
Payer: MEDICARE

## 2025-01-31 DIAGNOSIS — S32.040A CLOSED COMPRESSION FRACTURE OF L4 LUMBAR VERTEBRA, INITIAL ENCOUNTER: Primary | ICD-10-CM

## 2025-01-31 NOTE — TELEPHONE ENCOUNTER
Called daughter and informed of below results per Dr. Licona. Daughter voiced understanding.  Message sent to spine clinic.    Mindy Koenig LPN    ----- Message from Darryl Licona MD sent at 1/31/2025  2:36 PM CST -----  Call the family.  He appears to have a compression fracture of L4.  Please try to get an urgent appointment with spine for symptomatic compression fracture.  Order has been placed

## 2025-02-05 PROCEDURE — G0180 MD CERTIFICATION HHA PATIENT: HCPCS | Mod: ,,, | Performed by: FAMILY MEDICINE

## 2025-02-10 ENCOUNTER — TELEPHONE (OUTPATIENT)
Dept: NEUROSURGERY | Facility: CLINIC | Age: OVER 89
End: 2025-02-10
Payer: MEDICARE

## 2025-02-10 NOTE — TELEPHONE ENCOUNTER
Spoke with Mell REARDON, and appt made with [facility] for compression fracture. I then called patient's wife and let her know of day/time of appt per her request. All v/u.  RD

## 2025-02-11 ENCOUNTER — OFFICE VISIT (OUTPATIENT)
Dept: NEUROSURGERY | Facility: CLINIC | Age: OVER 89
End: 2025-02-11
Payer: MEDICARE

## 2025-02-11 VITALS — HEIGHT: 70 IN | BODY MASS INDEX: 22.41 KG/M2 | WEIGHT: 156.5 LBS

## 2025-02-11 DIAGNOSIS — M80.00XA OSTEOPOROSIS WITH CURRENT PATHOLOGICAL FRACTURE, UNSPECIFIED OSTEOPOROSIS TYPE, INITIAL ENCOUNTER: ICD-10-CM

## 2025-02-11 DIAGNOSIS — S32.040A CLOSED COMPRESSION FRACTURE OF L4 LUMBAR VERTEBRA, INITIAL ENCOUNTER: Primary | ICD-10-CM

## 2025-02-11 DIAGNOSIS — T14.8XXA MUSCULOLIGAMENTOUS STRAIN: ICD-10-CM

## 2025-02-11 PROCEDURE — 1101F PT FALLS ASSESS-DOCD LE1/YR: CPT | Mod: CPTII,S$GLB,, | Performed by: NEUROLOGICAL SURGERY

## 2025-02-11 PROCEDURE — 3288F FALL RISK ASSESSMENT DOCD: CPT | Mod: CPTII,S$GLB,, | Performed by: NEUROLOGICAL SURGERY

## 2025-02-11 PROCEDURE — 1125F AMNT PAIN NOTED PAIN PRSNT: CPT | Mod: CPTII,S$GLB,, | Performed by: NEUROLOGICAL SURGERY

## 2025-02-11 PROCEDURE — 99204 OFFICE O/P NEW MOD 45 MIN: CPT | Mod: S$GLB,,, | Performed by: NEUROLOGICAL SURGERY

## 2025-02-11 PROCEDURE — 1159F MED LIST DOCD IN RCRD: CPT | Mod: CPTII,S$GLB,, | Performed by: NEUROLOGICAL SURGERY

## 2025-02-11 PROCEDURE — 99999 PR PBB SHADOW E&M-EST. PATIENT-LVL III: CPT | Mod: PBBFAC,,, | Performed by: NEUROLOGICAL SURGERY

## 2025-02-11 NOTE — PROGRESS NOTES
Subjective:      Patient ID: Harman Johnston is a 89 y.o. male.    Chief Complaint: Lumbar Spine Pain (L-Spine) (Pt reports for eval of lumbar spine pain; Pt reports he has always had back pain but it has been exacerbated by a recent fall; Pt is here for eval of compression fx; Pt denies pain that radiates but pt does have trouble with knees; Pt has XR lumbar performed 01/29/25 for review )    Pt here for LBP, L4 compression fx. Xray 1/29/25, which is when his last fall was.   Patients struggles with LOB, frequent falls.  Uses walker to ambulate.  Denies N/T in [facility]. Complains of stiffness and soreness in legs. R knee is most bothersome, does see an orthopedic for this.  Describes pain as a constant ache that sits in low back.  Has in home PT once/wk who started exercises for his back this week, which he continues on his own and has found helpful.   Finds Tylenol & heating pad to be helpful for pain.  Pain = 3/10.      Objective:       Physical Exam:  Nursing note and vitals reviewed.    Constitutional: He appears well-nourished. He is not diaphoretic. No distress.     Eyes: Pupils are equal, round, and reactive to light. EOM are normal.     Cardiovascular: Normal rate and regular rhythm.     Psych/Behavior: He is alert. He is oriented to person, place, and time. He has a normal mood and affect.     Musculoskeletal:        Neck: Range of motion is limited. Muscle strength is 5/5.        Back: Range of motion is limited. There is tenderness. Muscle strength is 5/5.        Right Upper Extremities: There is no tenderness. Muscle strength is 5/5.        Left Upper Extremities: There is no tenderness. Muscle strength is 5/5.       Right Lower Extremities: Range of motion is full. There is no tenderness. Muscle strength is 5/5. Tone is normal.        Left Lower Extremities: There is no tenderness. Muscle strength is 5/5. Tone is normal.     Neurological:        Sensory: There is no sensory deficit in the trunk. There is  no sensory deficit in the extremities.        Cranial nerves: Cranial nerve(s) II, III, IV, V, VI, VII, VIII, IX, X, XI and XII are intact.     General    Nursing note and vitals reviewed.  Constitutional: He is oriented to person, place, and time. He appears well-nourished. No distress.   Eyes: EOM are normal. Pupils are equal, round, and reactive to light.   Cardiovascular:  Normal rate and regular rhythm.            Neurological: He is alert and oriented to person, place, and time.   Psychiatric: He has a normal mood and affect.             Ortho Exam        No results found for this or any previous visit.     No results found for this or any previous visit.    Results for orders placed during the hospital encounter of 01/29/25    X-Ray Lumbar Spine AP And Lateral    Narrative  EXAMINATION:  XR LUMBAR SPINE AP AND LATERAL    CLINICAL HISTORY:  Low back pain, unspecified    TECHNIQUE:  AP, lateral and spot images were performed of the lumbar spine.    COMPARISON:  None    FINDINGS:  There is a compression deformity at the L4 level with approximately 50% vertebral body height loss with increased density paralleling the superior endplate suggesting that this is acute.  Remaining vertebral bodies demonstrate a normal height.  Mild disc space narrowing is noted at the L3-4 and L2-3 levels with at least moderate disc space narrowing seen at L5-S1 and more prominent along the posterior aspect of the disc space at this level.  Vascular calcifications are present.    Impression  1.  As above      Electronically signed by: Ernie Luther DO  Date:    01/29/2025  Time:    12:55         I  reviewed all pertinent imaging regarding this case.  Assessment:     1. Closed compression fracture of L4 lumbar vertebra, initial encounter    2. Musculoligamentous strain    3. Osteoporosis with current pathological fracture, unspecified osteoporosis type, initial encounter      Plan:     Closed compression fracture of L4 lumbar  vertebra, initial encounter  -     Ambulatory referral/consult to [facility] Sp[facility]  -     NM Bone Scan Spect; Future; Expected date: 02/12/2025    Musculoligamentous strain    Osteoporosis with current pathological fracture, unspecified osteoporosis type, initial encounter      Patient is status post fall several weeks ago with x-ray showing fracture of the L4 vertebral body initial encounter  Patient having back pain which has not subsided over the past several weeks  Unsure whether or not he can obtain MRI because of cochlear implants  Would recommend CT scan with nuclear medicine bone scan  Follow-up virtually for patient convenience after to discuss findings and discuss whether or not he would be a potential candidate for kyphoplasty    Thank you for the referral   Please call with any questions    Nilson Caicedo MD  Neurosurgery     Disclaimer: This note was prepared using a voice recognition system and is likely to have sound alike errors within the text.

## 2025-02-24 ENCOUNTER — HOSPITAL ENCOUNTER (OUTPATIENT)
Dept: RADIOLOGY | Facility: HOSPITAL | Age: OVER 89
Discharge: HOME | End: 2025-02-24
Attending: NEUROLOGICAL SURGERY
Payer: MEDICARE

## 2025-02-24 DIAGNOSIS — S32.040A CLOSED COMPRESSION FRACTURE OF L4 LUMBAR VERTEBRA, INITIAL ENCOUNTER: ICD-10-CM

## 2025-02-24 PROCEDURE — 72131 CT LUMBAR SPINE W/O DYE: CPT | Mod: TC

## 2025-02-24 PROCEDURE — 72131 CT LUMBAR SPINE W/O DYE: CPT | Mod: 26,,, | Performed by: RADIOLOGY

## 2025-02-25 ENCOUNTER — OFFICE VISIT (OUTPATIENT)
Dept: NEUROSURGERY | Facility: CLINIC | Age: OVER 89
End: 2025-02-25
Payer: MEDICARE

## 2025-02-25 ENCOUNTER — PATIENT MESSAGE (OUTPATIENT)
Dept: NEUROSURGERY | Facility: CLINIC | Age: OVER 89
End: 2025-02-25

## 2025-02-25 DIAGNOSIS — M54.9 DORSALGIA, UNSPECIFIED: ICD-10-CM

## 2025-02-25 DIAGNOSIS — S32.040D COMPRESSION FRACTURE OF L4 VERTEBRA WITH ROUTINE HEALING, SUBSEQUENT ENCOUNTER: Primary | ICD-10-CM

## 2025-02-25 RX ORDER — TRAMADOL HYDROCHLORIDE 50 MG/1
50 TABLET ORAL EVERY 6 HOURS PRN
Qty: 60 TABLET | Refills: 0 | Status: SHIPPED | OUTPATIENT
Start: 2025-02-25

## 2025-02-25 NOTE — PROGRESS NOTES
Audio Only Telehealth Visit     The patient location is: home   The chief complaint leading to consultation is: L4 compression fracrt  Visit type: Virtual visit with audio only (telephone)       The reason for the audio only service rather than synchronous audio and video virtual visit was related to technical difficulties or patient preference/necessity.     Each patient to whom I provide medical services by telemedicine is:  (1) informed of the relationship between the physician and patient and the respective role of any other health care provider with respect to management of the patient; and (2) notified that they may decline to receive medical services by telemedicine and may withdraw from such care at any time. Patient verbally consented to receive this service via voice-only telephone call.       HPI:   Patient daughter is on virtual audio visit to follow-up L4 compression fracture  He has a CT scan of the lumbar spine for my review  States the pain has been stable at around 3/10  No increased symptoms or any new or radiating pain through the lower extremities  No numbness and tingling  He does have some knee issues for which They are monitoring clinically with orthopedics     CT scan lumbar spine    L4-5: Mild disc bulge with vacuum disc phenomenon and facet arthrosis with mild central stenosis.     L5-S1: Mild disc patient with disc bulge and vacuum disc phenomenon.  Mild facet arthrosis.     Impression:     Subacute to early chronic L4 burst type compression fracture with antepulsion and retropulsion of the cortex.  Osteoporosis.     Multilevel degenerative disc and facet disease.     Assessment and plan:    Patient with chronic L4 compression fracture  Symptoms remained stable  We discussed continued clinical watchful waiting  I have ordered x-rays AP and lateral lumbar spine  Follow up in 6 weeks to revisit symptoms sooner if develops any new or worsening problems  I have ordered tramadol which she can  take for symptom relief in the interim\    Thank you for the referral   Please call with any questions    Nilson Caicedo MD  Neurosurgery     Disclaimer: This note was prepared using a voice recognition system and is likely to have sound alike errors within the text.                          This service was not originating from a related E/M service provided within the previous 7 days nor will  to an E/M service or procedure within the next 24 hours or my soonest available appointment.  Prevailing standard of care was able to be met in this audio-only visit.

## 2025-03-06 ENCOUNTER — CLINICAL SUPPORT (OUTPATIENT)
Dept: AUDIOLOGY | Facility: CLINIC | Age: OVER 89
End: 2025-03-06
Payer: MEDICARE

## 2025-03-06 DIAGNOSIS — H93.293 IMPAIRED AUDITORY DISCRIMINATION, BILATERAL: ICD-10-CM

## 2025-03-06 DIAGNOSIS — H90.3 SENSORINEURAL HEARING LOSS, ASYMMETRICAL: Primary | ICD-10-CM

## 2025-03-06 DIAGNOSIS — H90.3 SENSORINEURAL HEARING LOSS OF BOTH EARS: Primary | ICD-10-CM

## 2025-03-06 PROCEDURE — 92557 COMPREHENSIVE HEARING TEST: CPT | Mod: 52,HCNC,S$GLB, | Performed by: AUDIOLOGIST-HEARING AID FITTER

## 2025-03-06 PROCEDURE — 92567 TYMPANOMETRY: CPT | Mod: HCNC,S$GLB,, | Performed by: AUDIOLOGIST-HEARING AID FITTER

## 2025-03-06 NOTE — PROGRESS NOTES
Harmanadebayo Johnston was seen 03/06/2025 for hearing aid follow-up.  Audiogram today reveals stable hearing. Patient is doing well with the aids. The earmold tubing is loose and impacted - retubed 13T right. Aid was cleaned and found to be in good working order. Earmold pull cord has broken off. Advised patient how to remove from ear without pulling from earmold tubing. Quoted cost to replace EM if and when desired; fit is otherwise good. Patient PIF today for EM tubing. Recommend: 6-month hearing aid follow-up.

## 2025-03-06 NOTE — PROGRESS NOTES
Referring provider: SHARON Riberaadebayo Johnston was seen 03/06/2025 for an audiological evaluation.  Patient has long history of sensorineural hearing loss. His last audiogram was in 2023 indicating a severe SNHL for the right ear. He wears a hearing aid in the right ear: Katelyn BTE fitted here. There is concern for change in hearing. He has no hearing in the left ear. Patient wore a left CI for over ten years, but discontinued use due to limited benefit. No tinnitus or dizziness. No otalgia, aural pressure, fullness or drainage from ears.     Left ear was not tested due to history of cochlear implant for that ear. Audiogram results reveal a severe sensorineural hearing loss 250-8000 Hz for the right ear.   Speech Reception Threshold was 85 dBHL for the right ear.   Word recognition scores was poor auditory only, and fair auditory + visual cues for the right ear.  Tympanograms were Type A for the right ear and Type As for the left ear.    Patient was counseled on the above findings.    Recommendations:  Stable SNHL: audiologic monitoring every two years, or sooner if any perceived changes in hearing.  Continue with hearing aid for the right ear.

## 2025-03-25 ENCOUNTER — EXTERNAL HOME HEALTH (OUTPATIENT)
Dept: HOME HEALTH SERVICES | Facility: HOSPITAL | Age: OVER 89
End: 2025-03-25
Payer: MEDICARE

## 2025-05-27 ENCOUNTER — OFFICE VISIT (OUTPATIENT)
Dept: FAMILY MEDICINE | Facility: CLINIC | Age: OVER 89
End: 2025-05-27
Payer: MEDICARE

## 2025-05-27 VITALS
DIASTOLIC BLOOD PRESSURE: 56 MMHG | BODY MASS INDEX: 23.34 KG/M2 | SYSTOLIC BLOOD PRESSURE: 120 MMHG | TEMPERATURE: 98 F | HEIGHT: 70 IN | OXYGEN SATURATION: 98 % | HEART RATE: 58 BPM | WEIGHT: 163 LBS

## 2025-05-27 DIAGNOSIS — I10 ESSENTIAL HYPERTENSION: Chronic | ICD-10-CM

## 2025-05-27 DIAGNOSIS — R79.9 ABNORMAL FINDING OF BLOOD CHEMISTRY, UNSPECIFIED: ICD-10-CM

## 2025-05-27 DIAGNOSIS — L30.9 ECZEMA, UNSPECIFIED TYPE: Primary | ICD-10-CM

## 2025-05-27 PROCEDURE — 3288F FALL RISK ASSESSMENT DOCD: CPT | Mod: CPTII,S$GLB,, | Performed by: NURSE PRACTITIONER

## 2025-05-27 PROCEDURE — 1159F MED LIST DOCD IN RCRD: CPT | Mod: CPTII,S$GLB,, | Performed by: NURSE PRACTITIONER

## 2025-05-27 PROCEDURE — 99999 PR PBB SHADOW E&M-EST. PATIENT-LVL III: CPT | Mod: PBBFAC,,, | Performed by: NURSE PRACTITIONER

## 2025-05-27 PROCEDURE — G2211 COMPLEX E/M VISIT ADD ON: HCPCS | Mod: S$GLB,,, | Performed by: NURSE PRACTITIONER

## 2025-05-27 PROCEDURE — 1101F PT FALLS ASSESS-DOCD LE1/YR: CPT | Mod: CPTII,S$GLB,, | Performed by: NURSE PRACTITIONER

## 2025-05-27 PROCEDURE — 1126F AMNT PAIN NOTED NONE PRSNT: CPT | Mod: CPTII,S$GLB,, | Performed by: NURSE PRACTITIONER

## 2025-05-27 PROCEDURE — 99214 OFFICE O/P EST MOD 30 MIN: CPT | Mod: S$GLB,,, | Performed by: NURSE PRACTITIONER

## 2025-05-27 RX ORDER — CLOBETASOL PROPIONATE 0.5 MG/G
CREAM TOPICAL 2 TIMES DAILY
Qty: 60 G | Refills: 1 | Status: SHIPPED | OUTPATIENT
Start: 2025-05-27

## 2025-05-27 RX ORDER — METHYLPREDNISOLONE 4 MG/1
TABLET ORAL
Qty: 21 EACH | Refills: 0 | Status: SHIPPED | OUTPATIENT
Start: 2025-05-27 | End: 2025-06-17

## 2025-05-28 ENCOUNTER — LAB VISIT (OUTPATIENT)
Dept: LAB | Facility: HOSPITAL | Age: OVER 89
End: 2025-05-28
Attending: NURSE PRACTITIONER
Payer: MEDICARE

## 2025-05-28 DIAGNOSIS — R79.9 ABNORMAL FINDING OF BLOOD CHEMISTRY, UNSPECIFIED: ICD-10-CM

## 2025-05-28 DIAGNOSIS — I10 ESSENTIAL HYPERTENSION: ICD-10-CM

## 2025-05-28 LAB
ABSOLUTE EOSINOPHIL (OHS): 2.68 K/UL
ABSOLUTE MONOCYTE (OHS): 0.64 K/UL (ref 0.3–1)
ABSOLUTE NEUTROPHIL COUNT (OHS): 4.46 K/UL (ref 1.8–7.7)
ALBUMIN SERPL BCP-MCNC: 3.3 G/DL (ref 3.5–5.2)
ALP SERPL-CCNC: 108 UNIT/L (ref 40–150)
ALT SERPL W/O P-5'-P-CCNC: 19 UNIT/L (ref 10–44)
ANION GAP (OHS): 7 MMOL/L (ref 8–16)
AST SERPL-CCNC: 23 UNIT/L (ref 11–45)
BASOPHILS # BLD AUTO: 0.06 K/UL
BASOPHILS NFR BLD AUTO: 0.7 %
BILIRUB SERPL-MCNC: 0.7 MG/DL (ref 0.1–1)
BUN SERPL-MCNC: 50 MG/DL (ref 8–23)
CALCIUM SERPL-MCNC: 8.8 MG/DL (ref 8.7–10.5)
CHLORIDE SERPL-SCNC: 113 MMOL/L (ref 95–110)
CHOLEST SERPL-MCNC: 138 MG/DL (ref 120–199)
CHOLEST/HDLC SERPL: 3.3 {RATIO} (ref 2–5)
CO2 SERPL-SCNC: 23 MMOL/L (ref 23–29)
CREAT SERPL-MCNC: 3.1 MG/DL (ref 0.5–1.4)
EAG (OHS): 103 MG/DL (ref 68–131)
ERYTHROCYTE [DISTWIDTH] IN BLOOD BY AUTOMATED COUNT: 13.6 % (ref 11.5–14.5)
GFR SERPLBLD CREATININE-BSD FMLA CKD-EPI: 18 ML/MIN/1.73/M2
GLUCOSE SERPL-MCNC: 72 MG/DL (ref 70–110)
HBA1C MFR BLD: 5.2 % (ref 4–5.6)
HCT VFR BLD AUTO: 40.7 % (ref 40–54)
HDLC SERPL-MCNC: 42 MG/DL (ref 40–75)
HDLC SERPL: 30.4 % (ref 20–50)
HGB BLD-MCNC: 12.9 GM/DL (ref 14–18)
IMM GRANULOCYTES # BLD AUTO: 0.02 K/UL (ref 0–0.04)
IMM GRANULOCYTES NFR BLD AUTO: 0.2 % (ref 0–0.5)
LDLC SERPL CALC-MCNC: 81.4 MG/DL (ref 63–159)
LYMPHOCYTES # BLD AUTO: 1.25 K/UL (ref 1–4.8)
MCH RBC QN AUTO: 30.8 PG (ref 27–31)
MCHC RBC AUTO-ENTMCNC: 31.7 G/DL (ref 32–36)
MCV RBC AUTO: 97 FL (ref 82–98)
NONHDLC SERPL-MCNC: 96 MG/DL
NUCLEATED RBC (/100WBC) (OHS): 0 /100 WBC
PLATELET # BLD AUTO: 205 K/UL (ref 150–450)
PMV BLD AUTO: 11.4 FL (ref 9.2–12.9)
POTASSIUM SERPL-SCNC: 4.8 MMOL/L (ref 3.5–5.1)
PROT SERPL-MCNC: 7.1 GM/DL (ref 6–8.4)
RBC # BLD AUTO: 4.19 M/UL (ref 4.6–6.2)
RELATIVE EOSINOPHIL (OHS): 29.4 %
RELATIVE LYMPHOCYTE (OHS): 13.7 % (ref 18–48)
RELATIVE MONOCYTE (OHS): 7 % (ref 4–15)
RELATIVE NEUTROPHIL (OHS): 49 % (ref 38–73)
SODIUM SERPL-SCNC: 143 MMOL/L (ref 136–145)
TRIGL SERPL-MCNC: 73 MG/DL (ref 30–150)
WBC # BLD AUTO: 9.11 K/UL (ref 3.9–12.7)

## 2025-05-28 PROCEDURE — 83036 HEMOGLOBIN GLYCOSYLATED A1C: CPT

## 2025-05-28 PROCEDURE — 85025 COMPLETE CBC W/AUTO DIFF WBC: CPT

## 2025-05-28 PROCEDURE — 80053 COMPREHEN METABOLIC PANEL: CPT

## 2025-05-28 PROCEDURE — 36415 COLL VENOUS BLD VENIPUNCTURE: CPT | Mod: PO

## 2025-05-28 PROCEDURE — 80061 LIPID PANEL: CPT

## 2025-06-02 ENCOUNTER — RESULTS FOLLOW-UP (OUTPATIENT)
Dept: FAMILY MEDICINE | Facility: CLINIC | Age: OVER 89
End: 2025-06-02

## 2025-07-01 DIAGNOSIS — N18.4 CKD (CHRONIC KIDNEY DISEASE), STAGE IV: Primary | ICD-10-CM

## 2025-07-03 ENCOUNTER — LAB VISIT (OUTPATIENT)
Dept: LAB | Facility: HOSPITAL | Age: OVER 89
End: 2025-07-03
Attending: INTERNAL MEDICINE
Payer: MEDICARE

## 2025-07-03 DIAGNOSIS — N18.4 CKD (CHRONIC KIDNEY DISEASE), STAGE IV: ICD-10-CM

## 2025-07-03 LAB
ALBUMIN SERPL BCP-MCNC: 3.1 G/DL (ref 3.5–5.2)
ANION GAP (OHS): 7 MMOL/L (ref 8–16)
BUN SERPL-MCNC: 39 MG/DL (ref 8–23)
CALCIUM SERPL-MCNC: 8.8 MG/DL (ref 8.7–10.5)
CHLORIDE SERPL-SCNC: 112 MMOL/L (ref 95–110)
CO2 SERPL-SCNC: 24 MMOL/L (ref 23–29)
CREAT SERPL-MCNC: 3.3 MG/DL (ref 0.5–1.4)
GFR SERPLBLD CREATININE-BSD FMLA CKD-EPI: 17 ML/MIN/1.73/M2
GLUCOSE SERPL-MCNC: 94 MG/DL (ref 70–110)
PHOSPHATE SERPL-MCNC: 3.7 MG/DL (ref 2.7–4.5)
POTASSIUM SERPL-SCNC: 4.3 MMOL/L (ref 3.5–5.1)
SODIUM SERPL-SCNC: 143 MMOL/L (ref 136–145)

## 2025-07-03 PROCEDURE — 80069 RENAL FUNCTION PANEL: CPT | Mod: HCNC

## 2025-07-03 PROCEDURE — 36415 COLL VENOUS BLD VENIPUNCTURE: CPT | Mod: HCNC,PO

## 2025-07-08 ENCOUNTER — OFFICE VISIT (OUTPATIENT)
Dept: NEPHROLOGY | Facility: CLINIC | Age: OVER 89
End: 2025-07-08
Payer: MEDICARE

## 2025-07-08 VITALS
DIASTOLIC BLOOD PRESSURE: 70 MMHG | HEART RATE: 56 BPM | BODY MASS INDEX: 25.85 KG/M2 | HEIGHT: 67 IN | WEIGHT: 164.69 LBS | SYSTOLIC BLOOD PRESSURE: 130 MMHG

## 2025-07-08 DIAGNOSIS — N18.4 STAGE 4 CHRONIC KIDNEY DISEASE: Primary | ICD-10-CM

## 2025-07-08 PROCEDURE — 99999 PR PBB SHADOW E&M-EST. PATIENT-LVL III: CPT | Mod: PBBFAC,HCNC,, | Performed by: INTERNAL MEDICINE

## 2025-07-08 NOTE — PROGRESS NOTES
Renal clinic f/u note:  Date of clinic visit: 7/8/25  Reason for f/u and chief c/o: CKD. 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 H/o of BPH     HPI: Pt is a 89 y/o male who presents for f/u of CKD stage 4. Pt was last seen in renal clinic in Sept 2024. Pt has no new c/o's, no new events, no urinary issues, no cardiopulmonary complaints. Had no questions. Feels comfortable.  PMH, labs, and meds discussed with the patient.  Patient confirms he is not taking NSAIDs anymore.      As documented before, s Cr worsened after pt took mobic daily x 11 months between Dec 2018 and June 2019. Pt previously had stable CKD stage 3. He had been advised by me to stop all NSAIds. New baseline Cr appears to be above 3 to 4 mg/dl. Also to review, in June 2022, he was still taking mobic.      He also has a h/o of BPH. Pt saw Dr. Ignacio (urology) in the past. He has elevated PSA in the past. Prostate biopsy did not show cancer. He is taking flomax and avodart.         PAST MEDICAL HISTORY:  CKD stage 4, analgesic nephropathy, BPH (benign prostatic hyperplasia), Cataract, Elevated PSA, HEARING LOSS, History of colon polyps, Hyperlipemia, Hypertension, Myocardial infarction, and Tobacco dependence.     PAST SURGICAL HISTORY:  He  has a past surgical history that includes Ear mastoidectomy w/ cochlear implant w/ landmark (2011); Tonsillectomy; PCIOL OD (Right, 03/`16/16); Cataract extraction w/  intraocular lens implant (Left, 4-20-16); Cataract extraction w/  intraocular lens implant (Right, 3-16-16); Appendectomy (1960); and Eye surgery (Bilateral, 2016).     SOCIAL HISTORY:  He  reports that he quit smoking about 19 years ago. His smoking use included cigarettes. He has a 10.00 pack-year smoking history. He has never used smokeless tobacco. He reports that he drinks about 1.0 standard drinks of alcohol per week. He reports that he does not use drugs.     FAMILY MEDICAL HISTORY:  His family history includes Breast  "cancer in his sister; Cancer in his brother, sister, and sister; Hyperlipidemia in his mother; Macular degeneration in his sister and sister; Stroke in his mother.     Review of patient's allergies indicates:  No Known Allergies     Meds: reviewed     Current Outpatient Medications   Medication Instructions    aspirin (ECOTRIN) 81 mg, Daily    atenoloL (TENORMIN) 12.5 mg, Oral    atorvastatin (LIPITOR) 10 mg, Oral    clobetasoL (TEMOVATE) 0.05 % cream Topical (Top), 2 times daily    dutasteride (AVODART) 0.5 mg capsule TAKE 1 CAPSULE EVERY DAY    tamsulosin (FLOMAX) 0.4 mg, Oral, Daily    traMADoL (ULTRAM) 50 mg, Oral, Every 6 hours PRN            REVIEW OF SYSTEMS:  Patient has no fever, fatigue, visual changes, chest pain, edema, cough, dyspnea, nausea, vomiting, constipation, diarrhea, arthralgias, pruritis, dizziness, weakness, depression, confusion.     PHYSICAL EXAM:  Blood pressure 130/70, pulse 60, height 5' 8" (1.727 m), weight 74.7 Kg, from 75.0 Kg  Gen:    WDWN male in no apparent distress  Psych: Normal mood and affect  Chest:  Clear with no rales, rhonchi, wheezing with normal effort  CV:      bradycardia, regular with no murmurs, gallops or rubs  Abd:     Soft, nontender, no distension, positive bowel sounds  Ext:      no edema     Labs reviewed   BMP  Lab Results   Component Value Date     07/03/2025    K 4.3 07/03/2025     (H) 07/03/2025    CO2 24 07/03/2025    BUN 39 (H) 07/03/2025    CREATININE 3.3 (H) 07/03/2025    CALCIUM 8.8 07/03/2025    ANIONGAP 7 (L) 07/03/2025    EGFRNORACEVR 17 (L) 07/03/2025     Lab Results   Component Value Date    WBC 9.11 05/28/2025    HGB 12.9 (L) 05/28/2025    HCT 40.7 05/28/2025    MCV 97 05/28/2025     05/28/2025       Lab Results   Component Value Date    PTH 95.8 (H) 10/31/2023    CALCIUM 8.8 07/03/2025    PHOS 3.7 07/03/2025        PSA 6.1        U/a: no protein, no blood, no RBC's, no casts     U/s of the kidneys on 10/21/20: no " hydronephrosis, small kidney with cortical thinning     Repeat u/s June 2022 reviewed:  The right kidney measures 9.8 cm. The left kidney measures 10.5 cm.  The renal parenchymal echogenicity is increased bilaterally.  There is also thinning of the renal parenchyma bilaterally.  There are 2 simple right renal cysts including 1 interpolar cyst measuring up to 1.5 cm in size and 1 lower pole cyst measuring up to 1.9 cm in size.  No hydronephrosis.The bladder is unremarkable in appearance           IMPRESSION AND RECOMMENDATIONS:  89 y/o male with advanced CKD presents for f/u:     1. Renal: s Cr has remained stable, not worse, within baseline range. Reviewed the labs with pt's today.  Prior CKD stage 3 worsened after pt re-took NSAIds x 11 months (see prior documentation),   CKD stage 4 at baseline now  Analgesic nephropathy  S Cr, though high, has not worsened since he stopped ibuprofen.     DDX: vs due BPH/obstructive nephropathy (less likely, has no hematuria on recent u/a)  No hydronephrosis on repeat u/s  s Cr may not recover fully as pt had exposure to mobic for a long time (11 months)     Complications of CKD:  K normal  Na normal  Metabolic acidosis, mild  Ca normal  PO4 normal  Secondary hyperparathyroidism, mild, will monitor  Anemia, very mild     2. HTN: BP controlled  Meds reviewed  Recommend against aggressive management of BP  Elderly male pt, at risk of fall, recommend against aggressive traetment of BP  Goal for /155     3. Has BPH: had no hydronephrosis on multiple repeat renal u/s's  Elevated PSA (fluctuating), says is not taking flomax and avodart, ran out and did not refill, will refill both  Advised f/u with urology     4. Bradycardia: improved, normal HR today  On atenolol 25 mg po qd.  No sx's  Meds reviewed     Plans and recommendations:   As reviewed  Above  Again advised pt not to take any forms of NSAIds, examples mentioned.  Opportunity for questions provided  Total time spent 40  minutes including time needed to review the records, the   patient evaluation, documentation, face-to-face discussion with the patient,   more than 50% of the time was spent on coordination of care and counseling.    Level V visit.  RTC 9-12 months     Tanmay Pereira MD

## 2025-08-20 RX ORDER — ATORVASTATIN CALCIUM 10 MG/1
10 TABLET, FILM COATED ORAL NIGHTLY
Qty: 90 TABLET | Refills: 3 | Status: SHIPPED | OUTPATIENT
Start: 2025-08-20

## 2025-09-02 RX ORDER — ATENOLOL 25 MG/1
12.5 TABLET ORAL
Qty: 45 TABLET | Refills: 3 | Status: SHIPPED | OUTPATIENT
Start: 2025-09-02